# Patient Record
Sex: MALE | Race: WHITE | NOT HISPANIC OR LATINO | Employment: PART TIME | URBAN - METROPOLITAN AREA
[De-identification: names, ages, dates, MRNs, and addresses within clinical notes are randomized per-mention and may not be internally consistent; named-entity substitution may affect disease eponyms.]

---

## 2017-01-24 ENCOUNTER — TRANSCRIBE ORDERS (OUTPATIENT)
Dept: ADMINISTRATIVE | Age: 42
End: 2017-01-24

## 2017-01-24 ENCOUNTER — APPOINTMENT (OUTPATIENT)
Dept: LAB | Age: 42
End: 2017-01-24
Payer: MEDICARE

## 2017-01-24 DIAGNOSIS — Z79.899 OTHER LONG TERM (CURRENT) DRUG THERAPY: ICD-10-CM

## 2017-01-24 DIAGNOSIS — G40.109 LOCALZ-RLTD SYMPTOMATIC EPILEPSY W SMPL PART SZ, NONINTRACT, WO STATUS (HCC): ICD-10-CM

## 2017-01-24 LAB
25(OH)D3 SERPL-MCNC: 8.3 NG/ML (ref 30–100)
ALBUMIN SERPL BCP-MCNC: 3.7 G/DL (ref 3.5–5)
ALP SERPL-CCNC: 56 U/L (ref 46–116)
ALT SERPL W P-5'-P-CCNC: 26 U/L (ref 12–78)
ANION GAP SERPL CALCULATED.3IONS-SCNC: 7 MMOL/L (ref 4–13)
AST SERPL W P-5'-P-CCNC: 6 U/L (ref 5–45)
BASOPHILS # BLD AUTO: 0.03 THOUSANDS/ΜL (ref 0–0.1)
BASOPHILS NFR BLD AUTO: 0 % (ref 0–1)
BILIRUB SERPL-MCNC: 0.39 MG/DL (ref 0.2–1)
BUN SERPL-MCNC: 15 MG/DL (ref 5–25)
CALCIUM SERPL-MCNC: 8.8 MG/DL (ref 8.3–10.1)
CHLORIDE SERPL-SCNC: 105 MMOL/L (ref 100–108)
CO2 SERPL-SCNC: 31 MMOL/L (ref 21–32)
CREAT SERPL-MCNC: 0.75 MG/DL (ref 0.6–1.3)
EOSINOPHIL # BLD AUTO: 0.27 THOUSAND/ΜL (ref 0–0.61)
EOSINOPHIL NFR BLD AUTO: 3 % (ref 0–6)
ERYTHROCYTE [DISTWIDTH] IN BLOOD BY AUTOMATED COUNT: 12.4 % (ref 11.6–15.1)
GFR SERPL CREATININE-BSD FRML MDRD: >60 ML/MIN/1.73SQ M
GLUCOSE SERPL-MCNC: 90 MG/DL (ref 65–140)
HCT VFR BLD AUTO: 46 % (ref 36.5–49.3)
HGB BLD-MCNC: 15.6 G/DL (ref 12–17)
LYMPHOCYTES # BLD AUTO: 3.97 THOUSANDS/ΜL (ref 0.6–4.47)
LYMPHOCYTES NFR BLD AUTO: 38 % (ref 14–44)
MCH RBC QN AUTO: 30.1 PG (ref 26.8–34.3)
MCHC RBC AUTO-ENTMCNC: 33.9 G/DL (ref 31.4–37.4)
MCV RBC AUTO: 89 FL (ref 82–98)
MONOCYTES # BLD AUTO: 1.06 THOUSAND/ΜL (ref 0.17–1.22)
MONOCYTES NFR BLD AUTO: 10 % (ref 4–12)
NEUTROPHILS # BLD AUTO: 5.01 THOUSANDS/ΜL (ref 1.85–7.62)
NEUTS SEG NFR BLD AUTO: 49 % (ref 43–75)
NRBC BLD AUTO-RTO: 0 /100 WBCS
PLATELET # BLD AUTO: 266 THOUSANDS/UL (ref 149–390)
PMV BLD AUTO: 9.8 FL (ref 8.9–12.7)
POTASSIUM SERPL-SCNC: 3.9 MMOL/L (ref 3.5–5.3)
PROT SERPL-MCNC: 6.7 G/DL (ref 6.4–8.2)
RBC # BLD AUTO: 5.18 MILLION/UL (ref 3.88–5.62)
SODIUM SERPL-SCNC: 143 MMOL/L (ref 136–145)
VALPROATE SERPL-MCNC: 66 UG/ML (ref 50–100)
WBC # BLD AUTO: 10.51 THOUSAND/UL (ref 4.31–10.16)

## 2017-01-24 PROCEDURE — 82306 VITAMIN D 25 HYDROXY: CPT

## 2017-01-24 PROCEDURE — 80053 COMPREHEN METABOLIC PANEL: CPT

## 2017-01-24 PROCEDURE — 36415 COLL VENOUS BLD VENIPUNCTURE: CPT

## 2017-01-24 PROCEDURE — 80164 ASSAY DIPROPYLACETIC ACD TOT: CPT

## 2017-01-24 PROCEDURE — 85025 COMPLETE CBC W/AUTO DIFF WBC: CPT

## 2017-01-27 ENCOUNTER — ALLSCRIPTS OFFICE VISIT (OUTPATIENT)
Dept: OTHER | Facility: OTHER | Age: 42
End: 2017-01-27

## 2017-08-11 DIAGNOSIS — Z79.899 OTHER LONG TERM (CURRENT) DRUG THERAPY: ICD-10-CM

## 2017-08-11 DIAGNOSIS — J45.20 MILD INTERMITTENT ASTHMA, UNCOMPLICATED: ICD-10-CM

## 2017-08-11 DIAGNOSIS — E55.9 VITAMIN D DEFICIENCY: ICD-10-CM

## 2017-09-12 ENCOUNTER — TRANSCRIBE ORDERS (OUTPATIENT)
Dept: ADMINISTRATIVE | Age: 42
End: 2017-09-12

## 2017-09-12 ENCOUNTER — APPOINTMENT (OUTPATIENT)
Dept: LAB | Age: 42
End: 2017-09-12
Payer: MEDICARE

## 2017-09-12 DIAGNOSIS — Z79.899 OTHER LONG TERM (CURRENT) DRUG THERAPY: ICD-10-CM

## 2017-09-12 DIAGNOSIS — J45.20 MILD INTERMITTENT ASTHMA, UNCOMPLICATED: ICD-10-CM

## 2017-09-12 DIAGNOSIS — E55.9 VITAMIN D DEFICIENCY: ICD-10-CM

## 2017-09-12 LAB
25(OH)D3 SERPL-MCNC: 37.2 NG/ML (ref 30–100)
ALBUMIN SERPL BCP-MCNC: 3.5 G/DL (ref 3.5–5)
ALP SERPL-CCNC: 52 U/L (ref 46–116)
ALT SERPL W P-5'-P-CCNC: 14 U/L (ref 12–78)
ANION GAP SERPL CALCULATED.3IONS-SCNC: 6 MMOL/L (ref 4–13)
AST SERPL W P-5'-P-CCNC: 5 U/L (ref 5–45)
BASOPHILS # BLD AUTO: 0.03 THOUSANDS/ΜL (ref 0–0.1)
BASOPHILS NFR BLD AUTO: 0 % (ref 0–1)
BILIRUB SERPL-MCNC: 0.41 MG/DL (ref 0.2–1)
BUN SERPL-MCNC: 13 MG/DL (ref 5–25)
CALCIUM SERPL-MCNC: 8.9 MG/DL (ref 8.3–10.1)
CHLORIDE SERPL-SCNC: 107 MMOL/L (ref 100–108)
CHOLEST SERPL-MCNC: 162 MG/DL (ref 50–200)
CO2 SERPL-SCNC: 31 MMOL/L (ref 21–32)
CREAT SERPL-MCNC: 0.78 MG/DL (ref 0.6–1.3)
EOSINOPHIL # BLD AUTO: 0.32 THOUSAND/ΜL (ref 0–0.61)
EOSINOPHIL NFR BLD AUTO: 4 % (ref 0–6)
ERYTHROCYTE [DISTWIDTH] IN BLOOD BY AUTOMATED COUNT: 12.7 % (ref 11.6–15.1)
GFR SERPL CREATININE-BSD FRML MDRD: 112 ML/MIN/1.73SQ M
GLUCOSE P FAST SERPL-MCNC: 92 MG/DL (ref 65–99)
HCT VFR BLD AUTO: 43.7 % (ref 36.5–49.3)
HDLC SERPL-MCNC: 40 MG/DL (ref 40–60)
HGB BLD-MCNC: 15.5 G/DL (ref 12–17)
LDLC SERPL CALC-MCNC: 96 MG/DL (ref 0–100)
LYMPHOCYTES # BLD AUTO: 2.46 THOUSANDS/ΜL (ref 0.6–4.47)
LYMPHOCYTES NFR BLD AUTO: 31 % (ref 14–44)
MCH RBC QN AUTO: 31.3 PG (ref 26.8–34.3)
MCHC RBC AUTO-ENTMCNC: 35.5 G/DL (ref 31.4–37.4)
MCV RBC AUTO: 88 FL (ref 82–98)
MONOCYTES # BLD AUTO: 0.81 THOUSAND/ΜL (ref 0.17–1.22)
MONOCYTES NFR BLD AUTO: 10 % (ref 4–12)
NEUTROPHILS # BLD AUTO: 4.34 THOUSANDS/ΜL (ref 1.85–7.62)
NEUTS SEG NFR BLD AUTO: 55 % (ref 43–75)
NRBC BLD AUTO-RTO: 0 /100 WBCS
PLATELET # BLD AUTO: 273 THOUSANDS/UL (ref 149–390)
PMV BLD AUTO: 10 FL (ref 8.9–12.7)
POTASSIUM SERPL-SCNC: 4.2 MMOL/L (ref 3.5–5.3)
PROT SERPL-MCNC: 6.7 G/DL (ref 6.4–8.2)
RBC # BLD AUTO: 4.95 MILLION/UL (ref 3.88–5.62)
SODIUM SERPL-SCNC: 144 MMOL/L (ref 136–145)
TRIGL SERPL-MCNC: 128 MG/DL
TSH SERPL DL<=0.05 MIU/L-ACNC: 3.23 UIU/ML (ref 0.36–3.74)
VALPROATE SERPL-MCNC: 67 UG/ML (ref 50–100)
WBC # BLD AUTO: 8.04 THOUSAND/UL (ref 4.31–10.16)

## 2017-09-12 PROCEDURE — 80164 ASSAY DIPROPYLACETIC ACD TOT: CPT

## 2017-09-12 PROCEDURE — 36415 COLL VENOUS BLD VENIPUNCTURE: CPT

## 2017-09-12 PROCEDURE — 80061 LIPID PANEL: CPT

## 2017-09-12 PROCEDURE — 82306 VITAMIN D 25 HYDROXY: CPT

## 2017-09-12 PROCEDURE — 80053 COMPREHEN METABOLIC PANEL: CPT

## 2017-09-12 PROCEDURE — 85025 COMPLETE CBC W/AUTO DIFF WBC: CPT

## 2017-09-12 PROCEDURE — 84443 ASSAY THYROID STIM HORMONE: CPT

## 2017-09-15 ENCOUNTER — GENERIC CONVERSION - ENCOUNTER (OUTPATIENT)
Dept: OTHER | Facility: OTHER | Age: 42
End: 2017-09-15

## 2017-09-26 ENCOUNTER — ALLSCRIPTS OFFICE VISIT (OUTPATIENT)
Dept: OTHER | Facility: OTHER | Age: 42
End: 2017-09-26

## 2017-10-04 ENCOUNTER — APPOINTMENT (OUTPATIENT)
Dept: RADIOLOGY | Facility: HOSPITAL | Age: 42
End: 2017-10-04
Payer: MEDICARE

## 2017-10-04 ENCOUNTER — OFFICE VISIT (OUTPATIENT)
Dept: URGENT CARE | Age: 42
End: 2017-10-04
Payer: MEDICARE

## 2017-10-04 ENCOUNTER — APPOINTMENT (EMERGENCY)
Dept: RADIOLOGY | Facility: HOSPITAL | Age: 42
End: 2017-10-04
Payer: MEDICARE

## 2017-10-04 ENCOUNTER — HOSPITAL ENCOUNTER (OUTPATIENT)
Facility: HOSPITAL | Age: 42
Setting detail: OBSERVATION
Discharge: HOME | End: 2017-10-05
Attending: EMERGENCY MEDICINE | Admitting: INTERNAL MEDICINE
Payer: MEDICARE

## 2017-10-04 DIAGNOSIS — R51.9 INTRACTABLE HEADACHE: ICD-10-CM

## 2017-10-04 DIAGNOSIS — R51.9 HEADACHE: Primary | ICD-10-CM

## 2017-10-04 DIAGNOSIS — R56.9 SEIZURES (HCC): ICD-10-CM

## 2017-10-04 DIAGNOSIS — T85.618A SHUNT MALFUNCTION: ICD-10-CM

## 2017-10-04 PROBLEM — G40.909 SEIZURE DISORDER (HCC): Chronic | Status: ACTIVE | Noted: 2017-10-04

## 2017-10-04 PROBLEM — J45.909 ASTHMA: Chronic | Status: ACTIVE | Noted: 2017-10-04

## 2017-10-04 PROBLEM — G43.909 MIGRAINE HEADACHE: Chronic | Status: ACTIVE | Noted: 2017-10-04

## 2017-10-04 PROBLEM — G80.9 CEREBRAL PALSY (HCC): Chronic | Status: ACTIVE | Noted: 2017-10-04

## 2017-10-04 LAB
ALBUMIN SERPL BCP-MCNC: 3.7 G/DL (ref 3.5–5)
ALP SERPL-CCNC: 60 U/L (ref 46–116)
ALT SERPL W P-5'-P-CCNC: 14 U/L (ref 12–78)
AMMONIA PLAS-SCNC: 41 UMOL/L (ref 11–35)
ANION GAP SERPL CALCULATED.3IONS-SCNC: 9 MMOL/L (ref 4–13)
AST SERPL W P-5'-P-CCNC: 27 U/L (ref 5–45)
BACTERIA UR QL AUTO: ABNORMAL /HPF
BASOPHILS # BLD AUTO: 0.01 THOUSANDS/ΜL (ref 0–0.1)
BASOPHILS NFR BLD AUTO: 0 % (ref 0–1)
BILIRUB SERPL-MCNC: 0.62 MG/DL (ref 0.2–1)
BILIRUB UR QL STRIP: NEGATIVE
BUN SERPL-MCNC: 11 MG/DL (ref 5–25)
CALCIUM SERPL-MCNC: 9.5 MG/DL (ref 8.3–10.1)
CHLORIDE SERPL-SCNC: 105 MMOL/L (ref 100–108)
CLARITY UR: CLEAR
CO2 SERPL-SCNC: 22 MMOL/L (ref 21–32)
COLOR UR: YELLOW
CREAT SERPL-MCNC: 0.68 MG/DL (ref 0.6–1.3)
EOSINOPHIL # BLD AUTO: 0.01 THOUSAND/ΜL (ref 0–0.61)
EOSINOPHIL NFR BLD AUTO: 0 % (ref 0–6)
ERYTHROCYTE [DISTWIDTH] IN BLOOD BY AUTOMATED COUNT: 12.1 % (ref 11.6–15.1)
GFR SERPL CREATININE-BSD FRML MDRD: 118 ML/MIN/1.73SQ M
GLUCOSE SERPL-MCNC: 145 MG/DL (ref 65–140)
GLUCOSE UR STRIP-MCNC: NEGATIVE MG/DL
HCT VFR BLD AUTO: 44.7 % (ref 36.5–49.3)
HGB BLD-MCNC: 16.5 G/DL (ref 12–17)
HGB UR QL STRIP.AUTO: ABNORMAL
HYALINE CASTS #/AREA URNS LPF: ABNORMAL /LPF
KETONES UR STRIP-MCNC: ABNORMAL MG/DL
LEUKOCYTE ESTERASE UR QL STRIP: NEGATIVE
LYMPHOCYTES # BLD AUTO: 1.25 THOUSANDS/ΜL (ref 0.6–4.47)
LYMPHOCYTES NFR BLD AUTO: 8 % (ref 14–44)
MCH RBC QN AUTO: 31.1 PG (ref 26.8–34.3)
MCHC RBC AUTO-ENTMCNC: 36.9 G/DL (ref 31.4–37.4)
MCV RBC AUTO: 84 FL (ref 82–98)
MONOCYTES # BLD AUTO: 1.08 THOUSAND/ΜL (ref 0.17–1.22)
MONOCYTES NFR BLD AUTO: 7 % (ref 4–12)
NEUTROPHILS # BLD AUTO: 14.2 THOUSANDS/ΜL (ref 1.85–7.62)
NEUTS SEG NFR BLD AUTO: 85 % (ref 43–75)
NITRITE UR QL STRIP: NEGATIVE
NON-SQ EPI CELLS URNS QL MICRO: ABNORMAL /HPF
NRBC BLD AUTO-RTO: 0 /100 WBCS
PH UR STRIP.AUTO: 8 [PH] (ref 4.5–8)
PLATELET # BLD AUTO: 295 THOUSANDS/UL (ref 149–390)
PMV BLD AUTO: 9.9 FL (ref 8.9–12.7)
POTASSIUM SERPL-SCNC: 5.1 MMOL/L (ref 3.5–5.3)
PROT SERPL-MCNC: 7.5 G/DL (ref 6.4–8.2)
PROT UR STRIP-MCNC: ABNORMAL MG/DL
RBC # BLD AUTO: 5.3 MILLION/UL (ref 3.88–5.62)
RBC #/AREA URNS AUTO: ABNORMAL /HPF
SODIUM SERPL-SCNC: 136 MMOL/L (ref 136–145)
SP GR UR STRIP.AUTO: 1.02 (ref 1–1.03)
UROBILINOGEN UR QL STRIP.AUTO: 0.2 E.U./DL
VALPROATE SERPL-MCNC: 62 UG/ML (ref 50–100)
WBC # BLD AUTO: 16.66 THOUSAND/UL (ref 4.31–10.16)
WBC #/AREA URNS AUTO: ABNORMAL /HPF

## 2017-10-04 PROCEDURE — 99285 EMERGENCY DEPT VISIT HI MDM: CPT

## 2017-10-04 PROCEDURE — G0463 HOSPITAL OUTPT CLINIC VISIT: HCPCS | Performed by: FAMILY MEDICINE

## 2017-10-04 PROCEDURE — 96374 THER/PROPH/DIAG INJ IV PUSH: CPT

## 2017-10-04 PROCEDURE — 99213 OFFICE O/P EST LOW 20 MIN: CPT | Performed by: FAMILY MEDICINE

## 2017-10-04 PROCEDURE — 80053 COMPREHEN METABOLIC PANEL: CPT | Performed by: EMERGENCY MEDICINE

## 2017-10-04 PROCEDURE — 74000 HB X-RAY EXAM OF ABDOMEN (SINGLE ANTEROPOSTERIOR VIEW): CPT

## 2017-10-04 PROCEDURE — 85025 COMPLETE CBC W/AUTO DIFF WBC: CPT | Performed by: EMERGENCY MEDICINE

## 2017-10-04 PROCEDURE — 80175 DRUG SCREEN QUAN LAMOTRIGINE: CPT | Performed by: EMERGENCY MEDICINE

## 2017-10-04 PROCEDURE — 70450 CT HEAD/BRAIN W/O DYE: CPT

## 2017-10-04 PROCEDURE — 36415 COLL VENOUS BLD VENIPUNCTURE: CPT | Performed by: EMERGENCY MEDICINE

## 2017-10-04 PROCEDURE — 71020 HB CHEST X-RAY 2VW FRONTAL&LATL: CPT

## 2017-10-04 PROCEDURE — 71010 HB CHEST X-RAY 1 VIEW FRONTAL (PORTABLE): CPT

## 2017-10-04 PROCEDURE — 82140 ASSAY OF AMMONIA: CPT | Performed by: EMERGENCY MEDICINE

## 2017-10-04 PROCEDURE — 70250 X-RAY EXAM OF SKULL: CPT

## 2017-10-04 PROCEDURE — 96375 TX/PRO/DX INJ NEW DRUG ADDON: CPT

## 2017-10-04 PROCEDURE — 80164 ASSAY DIPROPYLACETIC ACD TOT: CPT | Performed by: EMERGENCY MEDICINE

## 2017-10-04 PROCEDURE — 81001 URINALYSIS AUTO W/SCOPE: CPT | Performed by: PHYSICIAN ASSISTANT

## 2017-10-04 RX ORDER — LAMOTRIGINE 100 MG/1
TABLET ORAL
COMMUNITY
End: 2017-10-05 | Stop reason: HOSPADM

## 2017-10-04 RX ORDER — ACETAMINOPHEN 325 MG/1
650 TABLET ORAL EVERY 6 HOURS PRN
Status: DISCONTINUED | OUTPATIENT
Start: 2017-10-04 | End: 2017-10-05 | Stop reason: HOSPADM

## 2017-10-04 RX ORDER — KETOROLAC TROMETHAMINE 30 MG/ML
15 INJECTION, SOLUTION INTRAMUSCULAR; INTRAVENOUS ONCE
Status: COMPLETED | OUTPATIENT
Start: 2017-10-04 | End: 2017-10-04

## 2017-10-04 RX ORDER — LORAZEPAM 2 MG/ML
1 INJECTION INTRAMUSCULAR ONCE
Status: COMPLETED | OUTPATIENT
Start: 2017-10-04 | End: 2017-10-04

## 2017-10-04 RX ORDER — SUMATRIPTAN 25 MG/1
25 TABLET, FILM COATED ORAL ONCE
Status: COMPLETED | OUTPATIENT
Start: 2017-10-04 | End: 2017-10-04

## 2017-10-04 RX ORDER — ACETAMINOPHEN 160 MG
2000 TABLET,DISINTEGRATING ORAL DAILY
COMMUNITY

## 2017-10-04 RX ORDER — LORAZEPAM 2 MG/ML
1 INJECTION INTRAMUSCULAR EVERY 4 HOURS PRN
Status: DISCONTINUED | OUTPATIENT
Start: 2017-10-04 | End: 2017-10-05 | Stop reason: HOSPADM

## 2017-10-04 RX ORDER — ALBUTEROL SULFATE 90 UG/1
AEROSOL, METERED RESPIRATORY (INHALATION)
COMMUNITY
Start: 2015-12-23 | End: 2019-06-11 | Stop reason: ALTCHOICE

## 2017-10-04 RX ORDER — METOCLOPRAMIDE HYDROCHLORIDE 5 MG/ML
10 INJECTION INTRAMUSCULAR; INTRAVENOUS ONCE
Status: COMPLETED | OUTPATIENT
Start: 2017-10-04 | End: 2017-10-04

## 2017-10-04 RX ORDER — MELATONIN
1000 DAILY
Status: DISCONTINUED | OUTPATIENT
Start: 2017-10-04 | End: 2017-10-05 | Stop reason: HOSPADM

## 2017-10-04 RX ORDER — KETOROLAC TROMETHAMINE 30 MG/ML
15 INJECTION, SOLUTION INTRAMUSCULAR; INTRAVENOUS EVERY 6 HOURS PRN
Status: DISCONTINUED | OUTPATIENT
Start: 2017-10-04 | End: 2017-10-05 | Stop reason: HOSPADM

## 2017-10-04 RX ORDER — LAMOTRIGINE 100 MG/1
200 TABLET ORAL DAILY
Status: DISCONTINUED | OUTPATIENT
Start: 2017-10-05 | End: 2017-10-05

## 2017-10-04 RX ORDER — DIVALPROEX SODIUM 500 MG/1
500 TABLET, DELAYED RELEASE ORAL EVERY 8 HOURS SCHEDULED
Status: DISCONTINUED | OUTPATIENT
Start: 2017-10-04 | End: 2017-10-05 | Stop reason: HOSPADM

## 2017-10-04 RX ORDER — DIPHENHYDRAMINE HYDROCHLORIDE 50 MG/ML
25 INJECTION INTRAMUSCULAR; INTRAVENOUS ONCE
Status: COMPLETED | OUTPATIENT
Start: 2017-10-04 | End: 2017-10-04

## 2017-10-04 RX ORDER — METHYLPREDNISOLONE SODIUM SUCCINATE 125 MG/2ML
500 INJECTION, POWDER, LYOPHILIZED, FOR SOLUTION INTRAMUSCULAR; INTRAVENOUS ONCE
Status: DISCONTINUED | OUTPATIENT
Start: 2017-10-04 | End: 2017-10-04

## 2017-10-04 RX ORDER — LORAZEPAM 1 MG/1
TABLET ORAL
COMMUNITY
End: 2017-10-20

## 2017-10-04 RX ORDER — ALBUTEROL SULFATE 90 UG/1
1 AEROSOL, METERED RESPIRATORY (INHALATION) EVERY 4 HOURS PRN
Status: DISCONTINUED | OUTPATIENT
Start: 2017-10-04 | End: 2017-10-05 | Stop reason: HOSPADM

## 2017-10-04 RX ORDER — LORAZEPAM 1 MG/1
1 TABLET ORAL 3 TIMES DAILY
Status: DISCONTINUED | OUTPATIENT
Start: 2017-10-04 | End: 2017-10-05 | Stop reason: HOSPADM

## 2017-10-04 RX ORDER — RIZATRIPTAN BENZOATE 10 MG/1
TABLET, ORALLY DISINTEGRATING ORAL
COMMUNITY
Start: 2016-02-05 | End: 2018-02-20

## 2017-10-04 RX ORDER — DIVALPROEX SODIUM 500 MG/1
TABLET, DELAYED RELEASE ORAL
COMMUNITY
End: 2017-10-26 | Stop reason: HOSPADM

## 2017-10-04 RX ADMIN — VITAMIN D, TAB 1000IU (100/BT) 1000 UNITS: 25 TAB at 16:07

## 2017-10-04 RX ADMIN — LORAZEPAM 1 MG: 1 TABLET ORAL at 21:28

## 2017-10-04 RX ADMIN — METOCLOPRAMIDE 10 MG: 5 INJECTION, SOLUTION INTRAMUSCULAR; INTRAVENOUS at 12:05

## 2017-10-04 RX ADMIN — SUMATRIPTAN SUCCINATE 25 MG: 25 TABLET, FILM COATED ORAL at 16:07

## 2017-10-04 RX ADMIN — FLUTICASONE PROPIONATE AND SALMETEROL 1 PUFF: 50; 100 POWDER RESPIRATORY (INHALATION) at 21:27

## 2017-10-04 RX ADMIN — DIVALPROEX SODIUM 500 MG: 500 TABLET, DELAYED RELEASE ORAL at 16:07

## 2017-10-04 RX ADMIN — LAMOTRIGINE 150 MG: 100 TABLET ORAL at 21:28

## 2017-10-04 RX ADMIN — SODIUM CHLORIDE 500 MG: 0.9 INJECTION, SOLUTION INTRAVENOUS at 13:07

## 2017-10-04 RX ADMIN — LORAZEPAM 1 MG: 1 TABLET ORAL at 16:07

## 2017-10-04 RX ADMIN — LORAZEPAM 1 MG: 2 INJECTION INTRAMUSCULAR; INTRAVENOUS at 10:27

## 2017-10-04 RX ADMIN — DIPHENHYDRAMINE HYDROCHLORIDE 25 MG: 50 INJECTION, SOLUTION INTRAMUSCULAR; INTRAVENOUS at 12:02

## 2017-10-04 RX ADMIN — KETOROLAC TROMETHAMINE 15 MG: 30 INJECTION, SOLUTION INTRAMUSCULAR at 12:04

## 2017-10-04 RX ADMIN — DIVALPROEX SODIUM 500 MG: 500 TABLET, DELAYED RELEASE ORAL at 21:28

## 2017-10-04 NOTE — H&P
History and Physical - [facility] Internal Medicine    Patient Information: Fleet Jeans 43 y o  male MRN: [id number]  Unit/Bed#: ED 01 Encounter: [id number]  Admitting Physician: Brian Vásquez DO  PCP: Matt Dupree DO  Date of Admission:  10/04/17    Assessment/Plan:    Hospital Problem List:     Active Problems:    Intractable headache      Plan for the Primary Problem(s): # Neuro  - Intractable headache  - breakthrough sz  - Consult neurology  - S/p CT head  - S/p XR shunt series noted with disconnection of one of the shunts, per patient's mom last xr series done in [place] > 5yrs ago and she states she was assured the shunts were functioning  No recent xr series here on file and patient does not routinely follow up with a neurosurgeon  Will consult neurosurgery for further input   - Sz precautions, prn ativan  - Prn IV toradol, received IV solumedrol in the ED, now headache free  - Cont antisz meds, levels pending    # Leukocytosis - likely stress induced; no sign of occult infxn, cont to monitor    Plan for Additional Problems:   # Asthma - no sign of exacerbation    VTE Prophylaxis: Low risk  / sequential compression device   Code Status: Full code  POLST: There is no POLST form on file for this patient (pre-hospital)    Anticipated Length of Stay:  Patient will be admitted on an Observation basis with an anticipated length of stay of  < 2 midnights  Justification for Hospital Stay: Intractable headache, sz breakthrough    Total Time for Visit, including Counseling / Coordination of Care: 45 minutes  Greater than 50% of this total time spent on direct patient counseling and coordination of care  Chief Complaint:   Intractable headache, breakthrough sz x 2    History of Present Illness:    History aided by his mother as well    Fleet Jeans is a 43 y o  male medical hx of cerebral palsy, TBI as a child, sz d/o, s/p shunt placement x 2   Migraine headache who presents with intractable headache and breakthrough sz  His mother notes symptoms initially occurring in the middle of the night was given rizatriptan w/ no relief  Early 4am he had a vomiting episode, non bloody, non bilious thus decided to present to the urgent care center  While there patient had 2 sz events w/ resulting post-ictal confusion  Patient has been seizure free x 2 years  Upon presentation to the ED, s/p CT head, xray shunt series  In the ED given IV ativan, solumedrol, toradol, benadryl and now patient reports of resolved headache  Patient has had no sz event since presentation to the ED  Neurology has been notified recommended to admit for observation  Review of Systems:    Review of Systems   Constitutional: Negative  HENT: Negative  Eyes: Negative  Respiratory: Negative  Cardiovascular: Negative  Gastrointestinal: Negative  Endocrine: Negative  Genitourinary: Negative  Musculoskeletal: Negative  Allergic/Immunologic: Negative  Neurological:        Resolved headache   Hematological: Negative  Psychiatric/Behavioral: Negative  Past Medical and Surgical History:   Cerebral palsy  Congenital hydrocephalus, s/p  shunt x 2 with multiple revisions and replacement  Seizure disorder  Asthma  Vit D deficiency  Migraine Headache    Past Surgical History:   Procedure Laterality Date    BRAIN SURGERY         Meds/Allergies:    Prior to Admission medications    Medication Sig Start Date End Date Taking? Authorizing Provider   albuterol (PROAIR HFA) 90 mcg/act inhaler ProAir  (90 Base) MCG/ACT Inhalation Aerosol Solution  INHALE 1 TO 2 PUFFS EVERY 4 TO 6 HOURS AS NEEDED  Quantity: 1;  Refills: 5       Myah Wilkinson DO;  Started 23-Dec-2015  Manda Fleming  5 GM Inhaler 12/23/15  Yes Historical Provider, MD   cholecalciferol (VITAMIN D3) 1,000 units tablet Take 1,000 Units by mouth daily Pt takes 2,000 units per day   Yes Historical Provider, MD   fluticasone-salmeterol (Rama Anastasiia) 100-50 mcg/dose Advair Diskus 100-50 MCG/DOSE Inhalation Aerosol Powder Breath Activated  TAKE 1 PUFF TWICE A DAY   Quantity: 1;  Refills: 4       Murray Anna DO;  Started 23-Dec-2015  [id number] Aerosol Powder Breath Activated Disp Pack 12/23/15  Yes Historical Provider, MD   rizatriptan (MAXALT-MLT) 10 MG disintegrating tablet Rizatriptan Benzoate 10 MG Oral Tablet Dispersible  TAKE 1 TABLET AT ONSET OF HEADACHE  MAY REPEAT EVERY 2 HOURS AS NEEDED  MAXIMUM 3 TABLETS IN 24 HOURS  Quantity: 9;  Refills: 5       Prema KULKARNI D ;  Started [id number]  Active 2/5/16  Yes Historical Provider, MD   divalproex sodium (DEPAKOTE) 500 mg EC tablet Divalproex Sodium 500 MG Oral Tablet Delayed Release  1 tab in am, 1 tab at noon, 1 tab in pm   Quantity: 270;  Refills: 3       Premamouna Bains M D ; Active    Historical Provider, MD   lamoTRIgine (LaMICtal) 100 mg tablet LamoTRIgine 100 MG Oral Tablet  2 TABS IN AM AND 1 5 TABS AT PM   Quantity: 315;  Refills: 3       Premamouna KULKARNI D ; Active    Historical Provider, MD   LORazepam (ATIVAN) 1 mg tablet LORazepam 1 MG Oral Tablet  1 TAB THREE TIMES A DAY   Refills: 0    Active    Historical Provider, MD     I have reviewed home medications with patient family member      Allergies:  Latex, animal dander, grass  Social History:     Marital Status: Single   Occupation: Works part-time at [employer]  Patient Pre-hospital Living Situation: Resides with his mother, independent of adls  Patient Pre-hospital Level of Mobility: Independent of assisted devices  Patient Pre-hospital Diet Restrictions: None  Substance Use History:   History   Alcohol Use No     History   Smoking Status    Never Smoker   Smokeless Tobacco    Never Used     History   Drug Use No       Family History:    non-contributory    Physical Exam:     Vitals:   Blood Pressure: 119/70 (10/04/17 1300)  Pulse: 92 (10/04/17 1300)  Temperature: (!) 97 2 °F (36 2 °C) (10/04/17 0956)  Temp Source: Oral (10/04/17 2836)  Respirations: 22 (10/04/17 1300)  Weight - Scale: 95 3 kg (210 lb) (10/04/17 0956)  SpO2: 97 % (10/04/17 1300)    General Appearance:  Sleeping but arousable to verbal commands, alert, no distress, appears stated age   Head:  Normocephalic, without obvious abnormality, atraumatic   Neck: Supple   Lungs:   Clear to auscultation bilaterally, respirations unlabored   Chest Wall:  No tenderness or deformity    Heart:  Regular rate and rhythm, S1 and S2 normal, no murmur, rub or gallop   Abdomen:   Soft, non-tender, bowel sounds active all four quadrants,  no masses, no organomegaly   Extremities: Extremities normal, atraumatic, no cyanosis or edema   Pulses: 2+ and symmetric all extremities   Skin: Skin color, texture, turgor normal, no rashes or lesions   Lymph nodes:    Neurologic: No gross focal deficts       Additional Data:     Lab Results: I have personally reviewed pertinent reports  Results from last 7 days  Lab Units 10/04/17  1121   WBC Thousand/uL 16 66*   HEMOGLOBIN g/dL 16 5   HEMATOCRIT % 44 7   PLATELETS Thousands/uL 295   NEUTROS PCT % 85*   LYMPHS PCT % 8*   MONOS PCT % 7   EOS PCT % 0       Results from last 7 days  Lab Units 10/04/17  1023   SODIUM mmol/L 136   POTASSIUM mmol/L 5 1   CHLORIDE mmol/L 105   CO2 mmol/L 22   BUN mg/dL 11   CREATININE mg/dL 0 68   CALCIUM mg/dL 9 5   TOTAL PROTEIN g/dL 7 5   BILIRUBIN TOTAL mg/dL 0 62   ALK PHOS U/L 60   ALT U/L 14   AST U/L 27   GLUCOSE RANDOM mg/dL 145*           Imaging: I have personally reviewed pertinent reports  Ct Head Without Contrast    Result Date: 10/4/2017  Narrative: CT BRAIN - WITHOUT CONTRAST INDICATION:  Headache  COMPARISON:  None  TECHNIQUE:  CT examination of the brain was performed  In addition to axial images, coronal reformatted images were created and submitted for interpretation  Radiation dose length product (DLP) for this visit:  1124 94 mGy-cm     This examination, like all CT scans performed in the 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  There is left frontal ventricular catheter with its tip in the frontal horn of right lateral ventricle  There are two left parietal region catheters one with its tip in the posterior body of left lateral ventricle and the other with its tip in the cephalocaudal malacia in the left parietal region  The left lateral ventricle is slightly smaller than the right lateral ventricle however there is no evidence of significant hydrocephalus  VISUALIZED ORBITS AND PARANASAL SINUSES:  Unremarkable  CALVARIUM AND EXTRACRANIAL SOFT TISSUES:   Normal      Impression: No acute parenchymal brain abnormality  Encephalomalacia and 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  Ventricular catheters are noted in both right and left lateral ventricles as described  Right lateral ventricle is slightly larger in the left however, there does not appear to be significant hydrocephalus  Workstation performed: [id number]     Xr Shunt Series    Result Date: 10/4/2017  Narrative: SHUNT SERIES INDICATION:  Evaluate shunt  COMPARISON:  None VIEWS: AP and lateral projections of the skull, chest and abdomen IMAGES:  7 FINDINGS: Patient is status post multiple shunt revisions which limits evaluation  There are 2 separate tubes coursing through the left neck over the left chest and terminating with tip in the left upper quadrant of the abdomen  One tube ends within the soft tissues of the left neck at the C4-5 level    The 2nd tube continues superiorly and terminates over the occiput  This shunt appears grossly intact  Impression: Left-sided  shunts one of which appears disconnected and the 2nd of which appears intact  Correlate with surgical history and prior studies if available to evaluate for interval change  Workstation performed: [id number]       EKG, Pathology, and Other Studies Reviewed on Admission:   · EKG:     Allscripts Records Reviewed: Yes     ** Please Note: Dragon 360 Dictation voice to text software may have been used in the creation of this document   **

## 2017-10-04 NOTE — ED ATTENDING ATTESTATION
Yen Urias DO, saw and evaluated the patient  I have discussed the patient with the resident/non-physician practitioner and agree with the resident's/non-physician practitioner's findings, Plan of Care, and MDM as documented in the resident's/non-physician practitioner's note, except where noted  All available labs and Radiology studies were reviewed  At this point I agree with the current assessment done in the Emergency Department  I have conducted an independent evaluation of this patient a history and physical is as follows:  Patient is a 70-year-old male with history of seizure disorder, migraine headaches, hydrocephalus with a  shunt with multiple revisions of replacements presenting with headache that has been present x2 days and that 2 seizures today  Patient states he had 1 seizure while at home and had another seizure while at [facility]  Currently asymptomatic other than his headache  Was initially postictal upon arrival to the emergency department that has since resolved  Patient is here with his mother who was very familiar with his medical history  Follows with Dr Linda Jama, Neurology  Will check labs including Depakote level, LFTs, will manage her medications for his headache, obtain a CT head and shunt series and discussed with Neurology  Discussed with Neurology, reviewed imaging and labs  Recommended to observe overnight given he had 2 seizures today  Will be evaluated by Neurology and admitted to Medicine  Reviewed with patient and family who understand and agree with admission for observation at this time      Critical Care Time  CritCare Time

## 2017-10-04 NOTE — ED NOTES
Dr Ned Blackman made patient and family know that they are admitted to the [facility]  10/04/17 9263

## 2017-10-04 NOTE — PLAN OF CARE
Problem: Potential for Falls  Goal: Patient will remain free of falls  INTERVENTIONS:  - Assess patient frequently for physical needs  -  Identify cognitive and physical deficits and behaviors that affect risk of falls    -  [place] fall precautions as indicated by assessment   - Educate patient/family on patient safety including physical limitations  - Instruct patient to call for assistance with activity based on assessment  - Modify environment to reduce risk of injury  - Consider OT/PT consult to assist with strengthening/mobility   Outcome: Progressing

## 2017-10-04 NOTE — ED PROVIDER NOTES
History  Chief Complaint   Patient presents with    Seizure - Prior Hx Of     Pt started last night with headache behind his eyes  Pt had witnessed seizure today at home and went to [facility]  Pt had another seizure other there  Pt vomited  Pt c/o headache, pain behind eyes  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  HPI  Patient is a 17-year-old male with history of seizure disorder, TBI as a child, hydrocephalus status post shunt who presents emergency department today with headache and seizures  Patient's last seizure was 2 years ago, well controlled on medications, no recent change in medications  Patient had headache that started last night described as pressure eyes eyes  Patient woke up this morning he had vomiting and then had a seizure at home  Patient with [facility], [place] for evaluation where he had a witnessed seizure at [facility] [place]  The seizure terminated without any benzodiazepine medications  On evaluation in the emergency department at [place], patient is endorsing 10/10 pain, he is not slurring his words, neuro exam is nonfocal with the exception that he is exquisitely photophobic  Patient does get headaches for which he takes Triptan aborting medications  This usually works if it does not patient will take NSAIDs  Pain at the patient was having was similar to his previous headache pain however was much more intense  The aborting medications and NSAIDs did not help his pain  Pts last seizure was approximately 2 years ago  Patient has had no recent changes in his antiepileptic medications  Patient did miss his morning doses of antibiotics secondary to nausea  Patient otherwise denies any recent illness, fevers chills cough abdominal pain or diarrhea  Case was discussed with on-call epileptologist   Because patient had 2 seizures in 24 hours patient should be observed  She patient will be admitted to the hospital for observation      Prior to Admission Medications Prescriptions Last Dose Informant Patient Reported? Taking? LORazepam (ATIVAN) 1 mg tablet Unknown at Unknown time  Yes No   Sig: LORazepam 1 MG Oral Tablet  1 TAB THREE TIMES A DAY   Refills: 0    Active   albuterol (PROAIR HFA) 90 mcg/act inhaler Unknown at Unknown time  Yes No   Sig: ProAir  (90 Base) MCG/ACT Inhalation Aerosol Solution  INHALE 1 TO 2 PUFFS EVERY 4 TO 6 HOURS AS NEEDED  Quantity: 1;  Refills: 5       Myah Wilkinson DO;  Started 23-Dec-2015  Wanda Medrano  5 GM Inhaler   cholecalciferol (VITAMIN D3) 1,000 units tablet 10/3/2017 at Unknown time  Yes Yes   Sig: Take 1,000 Units by mouth daily Pt takes 2,000 units per day   divalproex sodium (DEPAKOTE) 500 mg EC tablet 10/3/2017 at Unknown time  Yes Yes   Sig: Divalproex Sodium 500 MG Oral Tablet Delayed Release  1 tab in am, 1 tab at noon, 1 tab in pm   Quantity: 270;  Refills: 3       Marvin SAEED ; Active   fluticasone-salmeterol (ADVAIR DISKUS) 100-50 mcg/dose 10/3/2017 at Unknown time  Yes Yes   Sig: Advair Diskus 100-50 MCG/DOSE Inhalation Aerosol Powder Breath Activated  TAKE 1 PUFF TWICE A DAY   Quantity: 1;  Refills: 4       Dove Creekyessi García DO;  Started 23-Dec-2015  [id number] Aerosol Powder Breath Activated Disp Pack   lamoTRIgine (LaMICtal) 100 mg tablet 10/3/2017 at Unknown time  Yes Yes   Sig: LamoTRIgine 100 MG Oral Tablet  2 TABS IN AM AND 1 5 TABS AT PM   Quantity: 315;  Refills: 3       Marvin SAEED ; Active   rizatriptan (MAXALT-MLT) 10 MG disintegrating tablet 10/4/2017 at Unknown time  Yes Yes   Sig: Rizatriptan Benzoate 10 MG Oral Tablet Dispersible  TAKE 1 TABLET AT ONSET OF HEADACHE  MAY REPEAT EVERY 2 HOURS AS NEEDED  MAXIMUM 3 TABLETS IN 24 HOURS     Quantity: 9;  Refills: 5       Marvin SAEED ;  Started [id number]  Active      Facility-Administered Medications: None       Past Medical History:   Diagnosis Date    Asthma     [facility]bral pals[facility] ([facility] [address] [facility]     Congenital hydrocephalus (HCC)     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[facility] [address] )     Migraines        Past Surgical History:   Procedure Laterality Date    BRAIN SURGERY      Multiple  shunt revisions    HERNIA REPAIR         Family History   Problem Relation Age of Onset    Family history unknown: Yes     I have reviewed and agree with the history as documented  Social History   Substance Use Topics    Smoking status: Never Smoker    Smokeless tobacco: Never Used    Alcohol use No        Review of Systems   Constitutional: Negative for activity change, chills, diaphoresis and fever  HENT: Negative for ear pain, trouble swallowing and voice change  Eyes: Negative for pain  Respiratory: Negative for chest tightness, shortness of breath, wheezing and stridor  Cardiovascular: Negative for chest pain, palpitations and leg swelling  Gastrointestinal: Negative for abdominal distention, abdominal pain, constipation, diarrhea, nausea and vomiting  Endocrine: Negative for polyuria  Genitourinary: Negative for dysuria and frequency  Musculoskeletal: Negative for back pain and myalgias  Skin: Negative for rash  Neurological: Positive for seizures and headaches  Negative for dizziness, syncope and weakness  Psychiatric/Behavioral: Negative for agitation and dysphoric mood  Physical Exam  ED Triage Vitals [10/04/17 0956]   Temperature Pulse Respirations Blood Pressure SpO2   (!) 97 2 °F (36 2 °C) 81 18 143/73 100 %      Temp Source Heart Rate Source Patient Position - Orthostatic VS BP Location FiO2 (%)   Oral Monitor Lying Left arm --      Pain Score       Worst Possible Pain           Physical Exam   Constitutional: He is oriented to person, place, and time  He appears well-developed and well-nourished  No distress  HENT:   Head: Normocephalic and atraumatic     Right Ear: External ear normal    Left Ear: External ear normal    Mouth/Throat: Oropharynx is clear and moist    Eyes: Conjunctivae and EOM are normal  Pupils are equal, round, and reactive to light  Right eye exhibits no discharge  Left eye exhibits no discharge  No scleral icterus  Neck: Normal range of motion  Neck supple  No tracheal deviation present  No thyromegaly present  Cardiovascular: Normal rate, regular rhythm and intact distal pulses  Exam reveals no gallop and no friction rub  No murmur heard  Pulmonary/Chest: Effort normal and breath sounds normal  No stridor  No respiratory distress  He has no wheezes  He has no rales  Abdominal: Soft  Bowel sounds are normal  He exhibits no distension  There is no tenderness  There is no rebound and no guarding  Musculoskeletal: Normal range of motion  He exhibits no edema or deformity  Neurological: He is alert and oriented to person, place, and time  No cranial nerve deficit  Neuro exam is nonfocal, patient has exquisite photophobia  The patient can raise both legs off the bed and has symmetric strength  Patient can hold both arms out in front of him against resistance  Patient has symmetric strength  No changes in sensation  Skin: Skin is warm and dry  No rash noted  He is not diaphoretic  No erythema  Psychiatric: He has a normal mood and affect  His behavior is normal  Thought content normal    Nursing note and vitals reviewed        ED Medications  Medications   LORazepam (ATIVAN) 2 mg/mL injection 1 mg (1 mg Intravenous Given 10/4/17 1027)   ketorolac (TORADOL) 30 mg/mL injection 15 mg (15 mg Intravenous Given 10/4/17 1204)   metoclopramide (REGLAN) injection 10 mg (10 mg Intravenous Given 10/4/17 1205)   diphenhydrAMINE (BENADRYL) injection 25 mg (25 mg Intravenous Given 10/4/17 1202)   methylPREDNISolone sodium succinate (Solu-MEDROL) 500 mg in sodium chloride 0 9 % 250 mL IVPB (500 mg Intravenous New Bag 10/4/17 1307)   SUMAtriptan (IMITREX) tablet 25 mg (25 mg Oral Given 10/4/17 1607)       Diagnostic Studies  Labs Reviewed   AMMONIA - Abnormal        Result Value Ref Range Status    Ammonia 41 (*) 11 - 35 umol/L Final    Comment: Moderately Hemolyzed; Results May be Affected  Specimen collection should occur prior to Sulfasalazine administration due to the potential for falsely elevated results  Specimen collection should occur prior to Sulfapyridine administration due to the potential for falsely depressed results  CBC AND DIFFERENTIAL - Abnormal     WBC 16 66 (*) 4 31 - 10 16 Thousand/uL Final    Neutrophils Relative 85 (*) 43 - 75 % Final    Lymphocytes Relative 8 (*) 14 - 44 % Final    Neutrophils Absolute 14 20 (*) 1 85 - 7 62 Thousands/µL Final    RBC 5 30  3 88 - 5 62 Million/uL Final    Hemoglobin 16 5  12 0 - 17 0 g/dL Final    Hematocrit 44 7  36 5 - 49 3 % Final    MCV 84  82 - 98 fL Final    MCH 31 1  26 8 - 34 3 pg Final    MCHC 36 9  31 4 - 37 4 g/dL Final    RDW 12 1  11 6 - 15 1 % Final    MPV 9 9  8 9 - 12 7 fL Final    Platelets 447  047 - 390 Thousands/uL Final    nRBC 0  /100 WBCs Final    Monocytes Relative 7  4 - 12 % Final    Eosinophils Relative 0  0 - 6 % Final    Basophils Relative 0  0 - 1 % Final    Lymphocytes Absolute 1 25  0 60 - 4 47 Thousands/µL Final    Monocytes Absolute 1 08  0 17 - 1 22 Thousand/µL Final    Eosinophils Absolute 0 01  0 00 - 0 61 Thousand/µL Final    Basophils Absolute 0 01  0 00 - 0 10 Thousands/µL Final   COMPREHENSIVE METABOLIC PANEL - Abnormal     Glucose 145 (*) 65 - 140 mg/dL Final    Comment:   If the patient is fasting, the ADA then defines impaired fasting glucose as > 100 mg/dL and diabetes as > or equal to 123 mg/dL  Specimen collection should occur prior to Sulfasalazine administration due to the potential for falsely depressed results  Specimen collection should occur prior to Sulfapyridine administration due to the potential for falsely elevated results  Sodium 136  136 - 145 mmol/L Final    Potassium 5 1  3 5 - 5 3 mmol/L Final    Comment: Slightly Hemolyzed;  Results May be Affected    Chloride 105  100 - 108 mmol/L Final    CO2 22  21 - 32 mmol/L Final    Anion Gap 9  4 - 13 mmol/L Final    BUN 11  5 - 25 mg/dL Final    Creatinine 0 68  0 60 - 1 30 mg/dL Final    Comment: Standardized to IDMS reference method    Calcium 9 5  8 3 - 10 1 mg/dL Final    AST 27  5 - 45 U/L Final    Comment: Slightly Hemolyzed; Results May be Affected  Specimen collection should occur prior to Sulfasalazine administration due to the potential for falsely depressed results  ALT 14  12 - 78 U/L Final    Comment:   Specimen collection should occur prior to Sulfasalazine and/or Sulfapyridine administration due to the potential for falsely depressed results  Alkaline Phosphatase 60  46 - 116 U/L Final    Total Protein 7 5  6 4 - 8 2 g/dL Final    Albumin 3 7  3 5 - 5 0 g/dL Final    Total Bilirubin 0 62  0 20 - 1 00 mg/dL Final    eGFR 118  ml/min/1 73sq m Final    Narrative:     National Kidney Disease Education Program recommendations are as follows:  GFR calculation is accurate only with a steady state creatinine  Chronic Kidney disease less than 60 ml/min/1 73 sq  meters  Kidney failure less than 15 ml/min/1 73 sq  meters  VALPROIC ACID LEVEL, TOTAL - Normal    Valproic Acid, Total 62  50 - 100 ug/mL Final       XR shunt series   Final Result      Left-sided  shunts one of which appears disconnected and the 2nd of which appears intact  Correlate with surgical history and prior studies if available to evaluate for interval change  Workstation performed: [id number]         CT head without contrast   Final Result      No acute parenchymal brain abnormality  Encephalomalacia and 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  Ventricular catheters are noted in both right and left lateral ventricles as described  Right lateral ventricle is slightly larger in the left however, there does not appear to be significant hydrocephalus           Workstation performed: [id number]         XR chest portable    (Results Pending)       Procedures  Procedures      Phone Consults  ED Phone Contact    ED Course  ED Course                                MDM  Number of Diagnoses or Management Options  Headache:   Seizures [facility]):   Diagnosis management comments: 42-year-old male with history of hydrocephalus status post shunt  Concerned that this could be shunt failure given headache and seizures  Will get labs, will get levels of antiepileptic medications  Patient's urine is negative will discuss case with on-call Neurology  Disposition will be pending results of the workup      CritCare Time    Disposition  Final diagnoses:   Headache   Seizures ([address] )     ED Disposition     None      Follow-up Information     Follow up With Specialties Details Why Contact Ramses Queen, DO Family Medicine Follow up in 1 week(s)  [address] [place] [address]  [phone]      Eliana Rossi, DO Neurology Follow up in 2 week(s)  [facility] [address] [facility] [address] Neurosurgery Follow up in 1 week(s)  [address] [id number]  [phone]        Discharge Medication List as of 10/5/2017  5:10 PM      CONTINUE these medications which have CHANGED    Details   lamoTRIgine (LaMICtal) 200 MG tablet Take 1 tablet by mouth 2 (two) times a day, Starting Thu 10/5/2017, Normal         CONTINUE these medications which have NOT CHANGED    Details   cholecalciferol (VITAMIN D3) 1,000 units tablet Take 1,000 Units by mouth daily Pt takes 2,000 units per day, Historical Med      divalproex sodium (DEPAKOTE) 500 mg EC tablet Divalproex Sodium 500 MG Oral Tablet Delayed Release  1 tab in am, 1 tab at noon, 1 tab in pm   Quantity: 270;  Refills: 3       Redge Lone M D ; Active, Historical Med      fluticasone-salmeterol (ADVAIR DISKUS) 100-50 mcg/dose Advair Diskus 100-50 MCG/DOSE Inhalation Aerosol Powder Breath Activated  TAKE 1 PUFF TWICE A DAY Quantity: 1;  Refills: 4       Gaurang Flanagan DO;  Started 23-Dec-2015  [id number] Aerosol Powder Breath Activated Disp Pack, Historical Med      rizatriptan (MAXALT-MLT) 10 MG disintegrating tablet Rizatriptan Benzoate 10 MG Oral Tablet Dispersible  TAKE 1 TABLET AT ONSET OF HEADACHE  MAY REPEAT EVERY 2 HOURS AS NEEDED  MAXIMUM 3 TABLETS IN 24 HOURS  Quantity: 9;  Refills: 5       Nyla Schaumann M D ;  Started 5-Feb-2016  Active, Historical Med      al[facility]ol [facility]) 90 mcg/act inhaler ProAir  (90 Base) MCG/ACT Inhalation Aerosol Solution  INHALE 1 TO 2 PUFFS EVERY 4 TO 6 HOURS AS NEEDED  Quantity: 1;  Refills: 5       Myha Wilkinson DO;  Started 23-Dec-2015  Moi Fredericksher  5 GM Inhaler, Historical Med      LORazepam (ATIVAN) 1 mg tablet LORazepam 1 MG Oral Tablet  1 TAB THREE TIMES A DAY   Refills: 0    Active, Historical Med             Outpatient Discharge Orders  Discharge Diet     Activity as tolerated         ED Provider  Attending physically available and evaluated Paul Nash I managed the patient along with the ED Attending      Electronically Signed by       Jacquelyn Carmona MD  Resident  10/06/17 7793

## 2017-10-04 NOTE — CONSULTS
Consultation - Neurology   Ely Kowalski 43 y o  male MRN: [id number]  Unit/Bed#: CW2 218-02 Encounter: [id number]      Assessment/Plan   Assessment:  3 43year old male admitted after two seizures in setting of intractable headache   2  Congenital hydrocephalus s/p  shunt and multiple revisions  3  Asthma  4  Mild cerebral palsy   5  Leukocytosis     Plan:  1  Patient notes headache is now resolved  Would continue to monitor  Sumatriptan ordered PRN if headache returns  2  Leukocytosis may be related to seizure, but would investigate for occult infection which may also lower seizure threshold  Will check UA, consider CXR  3  Continue on Depakote 500 mg every 8 hours and Lamotrigine 200 mg QAM and 150 mg QPM  Patient has remained stable on medications for many years  Depakote level 62 and this is consistent with his prior levels which were checked in January and September 2017  Await Lamotrigine level  4  Will appreciate neurosurgery input regarding possible disconnected  shunt  Patient does not appear to have any signs of increased intracranial pressure at this time but given headache which was not responsive to treatments which are usually effective for patient would appreciate their input  5  Maintain seizure precautions  6  Monitor exam and notify with changes  History of Present Illness     Reason for Consult / Principal Problem: Intractable headache/Seizures   Hx and PE limited by:   Patient is a poor historian but his mother is at bedside and will provide history  HPI: Ely Kowalski is a 43 y o   male who presented to the hospital after he was witnessed to have a seizure  Per the patient he had a headache that started last night which she describes as pressure behind his eyes  He notes associated photophobia, nausea at that time  His mother notes that she gave him a Triptan which normally is effective for his migraine headaches but he had no relief    He took Aleve p m  at bedtime but per his mother he did not sleep well secondary to the headache  This morning when he woke up he had nausea and vomiting and then his mother said she noted he had a mini seizure    She describes as many seizures as him having an aura of feeling fuzzy hands, he will then have decreased awareness and his upper extremities will shake  She notes he had a witnessed episode of this at the urgent care today and was brought by ambulance to the hospital for evaluation  Both of these episodes which happened today resolved on their own without any additional medication  She does note that while at the urgent care she felt as though he was slurring his words but that has since resolved  She notes that he has this type of seizure and also has had generalized tonic-clonic seizures in the past   She notes that he has not had a seizure and approximately two years  He does have a history of congenital hydrocephalus and multiple shunt revisions in the past   In the emergency room he was given IV Ativan, Solu-Medrol, Toradol, Reglan and Benadryl  At the time of this exam, the patient notes that he feels better and notes that his headache is 0/10 in severity and denies complaints aside from feeling tired  Per the patient's mother he gets migraine headaches infrequently and generally these are well treated with over the counter medications or the Triptan for which he is prescribed  She notes that he gets a migraine every few months  Inpatient consult to Neurology  Consult performed by: Stephanie Cortez ordered by: Randi Henderson          Review of Systems   Constitutional: Positive for fatigue  Negative for chills and fever  HENT: Negative for trouble swallowing  Eyes: Negative for visual disturbance  Respiratory: Negative for shortness of breath  Cardiovascular: Negative for chest pain  Gastrointestinal: Negative for abdominal pain, nausea and vomiting     Genitourinary: Negative for difficulty urinating and dysuria  Musculoskeletal: Negative for back pain and neck pain  Neurological: Positive for headaches  Negative for dizziness, seizures, speech difficulty, weakness, light-headedness and numbness  Psychiatric/Behavioral: Negative for confusion  Historical Information   Past Medical History:   Diagnosis Date    Asthma     Cerebral [facility] [address] [facility]     Congenital hydrocephalus (H[facility])     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      Past Surgical History:   Procedure Laterality Date    BRAIN SURGERY      Multiple  shunt revisions     Social History   History   Alcohol Use No     History   Drug Use No     History   Smoking Status    Never Smoker   Smokeless Tobacco    Never Used     Family History: History reviewed  No pertinent family history  Review of previous medical records was completed  Patient has a history of localization-related epilepsy and follow Dr Alexandra Barron an outpatient  He was most recently seen by him in January 2017  His antiepileptic medications are Depakote 500 mg 3 times a day and lamotrigine 200 mg in the morning and 150 mg at night  Her records he has two different types of spells  He will have generalized tonic-clonic seizures and he will also have many seizures which were described as the patient breaking into a sweat and then briefly losing awareness  Meds/Allergies   Scheduled Meds:  cholecalciferol 1,000 Units Oral Daily   divalproex sodium 500 mg Oral Q8H [address]   fluticasone-salmeterol 1 puff Inhalation Q12H ALEX   lamoTRIgine 150 mg Oral HS   [START ON 10/5/2017] lamoTRIgine 200 mg Oral Daily   LORazepam 1 mg Oral TID   SUMAtriptan 25 mg Oral Once     Continuous Infusions:   PRN Meds:   acetaminophen    albuterol    ketorolac    LORazepam      Allergies   Allergen Reactions    Latex Hives       Objective   Vitals:Blood pressure 137/67, pulse 86, temperature 98 1 °F (36 7 °C), temperature source Oral, resp   rate 18, height 5' 10" (1 778 m), weight 87 2 kg (192 lb 3 2 oz), SpO2 95 %  ,Body mass index is 27 58 kg/m²  No intake or output data in the 24 hours ending 10/04/17 1507    Invasive Devices: Invasive Devices          No matching active lines, drains, or airways          Physical Exam   Constitutional: He is oriented to person, place, and time  He appears well-developed and well-nourished  No distress  HENT:   Head: Normocephalic and atraumatic  Eyes: Conjunctivae and EOM are normal  Pupils are equal, round, and reactive to light  No scleral icterus  Neck: Normal range of motion  Neck supple  Cardiovascular: Normal rate and regular rhythm  Pulmonary/Chest: Effort normal and breath sounds normal    Abdominal: Soft  He exhibits no distension  There is no tenderness  Musculoskeletal: Normal range of motion  He exhibits no edema  Neurological: He is alert and oriented to person, place, and time  He has a normal Finger-Nose-Finger Test    Reflex Scores:       Bicep reflexes are 2+ on the right side and 2+ on the left side  Brachioradialis reflexes are 2+ on the right side and 2+ on the left side  Patellar reflexes are 2+ on the right side and 2+ on the left side  Achilles reflexes are 2+ on the right side and 2+ on the left side  Skin: Skin is warm and dry  No rash noted  He is not diaphoretic  No erythema  No pallor  Psychiatric: He has a normal mood and affect  His speech is normal and behavior is normal      Neurologic Exam     Mental Status   Oriented to person, place, and time  Oriented to person  Oriented to place  Oriented to time  Registration: recalls 3 of 3 objects  Recall of objects at 5 minutes: Unable to recall objects with or without cue  Attention: decreased  Concentration: decreased  Speech: speech is normal   Level of consciousness: alert  Knowledge: good  Able to name object  Normal comprehension  Able to spell word world forward       Cranial Nerves     CN II   Right visual field deficit: none  Left visual field deficit: none     CN III, IV, VI   Pupils are equal, round, and reactive to light  Extraocular motions are normal    Right pupil: Size: 3 mm  Shape: regular  Reactivity: brisk  Consensual response: intact  Left pupil: Size: 3 mm  Shape: regular  Reactivity: brisk  Consensual response: intact  Nystagmus: none   Diplopia: none  Ophthalmoparesis: none  Upgaze: normal  Downgaze: normal  Conjugate gaze: present    CN V   Right facial sensation deficit: none  Left facial sensation deficit: none    CN VII   Right facial weakness: none  Left facial weakness: none    CN VIII   Hearing: intact    CN IX, X   Palate: symmetric    CN XII   Tongue: not atrophic  Fasciculations: absent  Tongue deviation: none  Mild left ptosis  Motor Exam   Muscle bulk: normal  Overall muscle tone: normal  Right arm tone: normal  Left arm tone: normal  Right arm pronator drift: absent  Left arm pronator drift: absent  Right leg tone: normal  Left leg tone: normalMotor strength 5/5 B/L UE and LE       Sensory Exam   Light touch normal    Right arm light touch: normal  Left arm light touch: normal  Right leg light touch: normal  Left leg light touch: normal  Vibration normal    Right arm vibration: normal  Left arm vibration: normal  Right leg vibration: normal  Left leg vibration: normal  Pinprick normal    Right arm pinprick: normal  Left arm pinprick: normal  Right leg pinprick: normal  Left leg pinprick: normal    Gait, Coordination, and Reflexes     Coordination   Finger to nose coordination: normal    Tremor   Resting tremor: absent  Intention tremor: absent  Action tremor: absent    Reflexes   Right brachioradialis: 2+  Left brachioradialis: 2+  Right biceps: 2+  Left biceps: 2+  Right patellar: 2+  Left patellar: 2+  Right achilles: 2+  Left achilles: 2+  Right plantar: normal  Left plantar: normalGait deferred       Lab Results:   Recent Results (from the past 24 hour(s))   Ammonia    Collection Time: 10/04/17 10:23 AM   Result Value Ref Range    Ammonia 41 (H) 11 - 35 umol/L   Valproic acid level, total    Collection Time: 10/04/17 10:23 AM   Result Value Ref Range    Valproic Acid, Total 62 50 - 100 ug/mL   Comprehensive metabolic panel    Collection Time: 10/04/17 10:23 AM   Result Value Ref Range    Sodium 136 136 - 145 mmol/L    Potassium 5 1 3 5 - 5 3 mmol/L    Chloride 105 100 - 108 mmol/L    CO2 22 21 - 32 mmol/L    Anion Gap 9 4 - 13 mmol/L    BUN 11 5 - 25 mg/dL    Creatinine 0 68 0 60 - 1 30 mg/dL    Glucose 145 (H) 65 - 140 mg/dL    Calcium 9 5 8 3 - 10 1 mg/dL    AST 27 5 - 45 U/L    ALT 14 12 - 78 U/L    Alkaline Phosphatase 60 46 - 116 U/L    Total Protein 7 5 6 4 - 8 2 g/dL    Albumin 3 7 3 5 - 5 0 g/dL    Total Bilirubin 0 62 0 20 - 1 00 mg/dL    eGFR 118 ml/min/1 73sq m   CBC and differential    Collection Time: 10/04/17 11:21 AM   Result Value Ref Range    WBC 16 66 (H) 4 31 - 10 16 Thousand/uL    RBC 5 30 3 88 - 5 62 Million/uL    Hemoglobin 16 5 12 0 - 17 0 g/dL    Hematocrit 44 7 36 5 - 49 3 %    MCV 84 82 - 98 fL    MCH 31 1 26 8 - 34 3 pg    MCHC 36 9 31 4 - 37 4 g/dL    RDW 12 1 11 6 - 15 1 %    MPV 9 9 8 9 - 12 7 fL    Platelets 737 425 - 547 Thousands/uL    nRBC 0 /100 WBCs    Neutrophils Relative 85 (H) 43 - 75 %    Lymphocytes Relative 8 (L) 14 - 44 %    Monocytes Relative 7 4 - 12 %    Eosinophils Relative 0 0 - 6 %    Basophils Relative 0 0 - 1 %    Neutrophils Absolute 14 20 (H) 1 85 - 7 62 Thousands/µL    Lymphocytes Absolute 1 25 0 60 - 4 47 Thousands/µL    Monocytes Absolute 1 08 0 17 - 1 22 Thousand/µL    Eosinophils Absolute 0 01 0 00 - 0 61 Thousand/µL    Basophils Absolute 0 01 0 00 - 0 10 Thousands/µL       Imaging Studies: I have personally reviewed pertinent reports  and I have personally reviewed pertinent films in PACS  CTH- No acute parenchymal brain abnormality   Encephalomalacia 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

## 2017-10-05 VITALS
OXYGEN SATURATION: 95 % | HEIGHT: 70 IN | WEIGHT: 192.2 LBS | DIASTOLIC BLOOD PRESSURE: 64 MMHG | TEMPERATURE: 97.9 F | HEART RATE: 88 BPM | RESPIRATION RATE: 18 BRPM | SYSTOLIC BLOOD PRESSURE: 113 MMHG | BODY MASS INDEX: 27.52 KG/M2

## 2017-10-05 LAB
ANION GAP SERPL CALCULATED.3IONS-SCNC: 11 MMOL/L (ref 4–13)
BUN SERPL-MCNC: 16 MG/DL (ref 5–25)
CALCIUM SERPL-MCNC: 8.9 MG/DL (ref 8.3–10.1)
CHLORIDE SERPL-SCNC: 105 MMOL/L (ref 100–108)
CO2 SERPL-SCNC: 23 MMOL/L (ref 21–32)
CREAT SERPL-MCNC: 0.74 MG/DL (ref 0.6–1.3)
ERYTHROCYTE [DISTWIDTH] IN BLOOD BY AUTOMATED COUNT: 12.7 % (ref 11.6–15.1)
GFR SERPL CREATININE-BSD FRML MDRD: 114 ML/MIN/1.73SQ M
GLUCOSE SERPL-MCNC: 109 MG/DL (ref 65–140)
HCT VFR BLD AUTO: 43.3 % (ref 36.5–49.3)
HGB BLD-MCNC: 15.3 G/DL (ref 12–17)
MCH RBC QN AUTO: 30.5 PG (ref 26.8–34.3)
MCHC RBC AUTO-ENTMCNC: 35.3 G/DL (ref 31.4–37.4)
MCV RBC AUTO: 86 FL (ref 82–98)
PLATELET # BLD AUTO: 311 THOUSANDS/UL (ref 149–390)
PMV BLD AUTO: 10.2 FL (ref 8.9–12.7)
POTASSIUM SERPL-SCNC: 3.8 MMOL/L (ref 3.5–5.3)
RBC # BLD AUTO: 5.01 MILLION/UL (ref 3.88–5.62)
SODIUM SERPL-SCNC: 139 MMOL/L (ref 136–145)
WBC # BLD AUTO: 18.3 THOUSAND/UL (ref 4.31–10.16)

## 2017-10-05 PROCEDURE — 80048 BASIC METABOLIC PNL TOTAL CA: CPT | Performed by: INTERNAL MEDICINE

## 2017-10-05 PROCEDURE — 85027 COMPLETE CBC AUTOMATED: CPT | Performed by: INTERNAL MEDICINE

## 2017-10-05 RX ORDER — LAMOTRIGINE 100 MG/1
200 TABLET ORAL 2 TIMES DAILY
Status: DISCONTINUED | OUTPATIENT
Start: 2017-10-05 | End: 2017-10-05 | Stop reason: HOSPADM

## 2017-10-05 RX ORDER — LAMOTRIGINE 200 MG/1
200 TABLET ORAL 2 TIMES DAILY
Qty: 60 TABLET | Refills: 0 | Status: ON HOLD | OUTPATIENT
Start: 2017-10-05 | End: 2017-10-26

## 2017-10-05 RX ADMIN — DIVALPROEX SODIUM 500 MG: 500 TABLET, DELAYED RELEASE ORAL at 14:41

## 2017-10-05 RX ADMIN — DIVALPROEX SODIUM 500 MG: 500 TABLET, DELAYED RELEASE ORAL at 08:24

## 2017-10-05 RX ADMIN — LORAZEPAM 1 MG: 1 TABLET ORAL at 08:23

## 2017-10-05 RX ADMIN — FLUTICASONE PROPIONATE AND SALMETEROL 1 PUFF: 50; 100 POWDER RESPIRATORY (INHALATION) at 08:23

## 2017-10-05 RX ADMIN — LAMOTRIGINE 200 MG: 100 TABLET ORAL at 08:26

## 2017-10-05 RX ADMIN — LAMOTRIGINE 200 MG: 100 TABLET ORAL at 17:07

## 2017-10-05 RX ADMIN — LORAZEPAM 1 MG: 1 TABLET ORAL at 17:07

## 2017-10-05 RX ADMIN — VITAMIN D, TAB 1000IU (100/BT) 1000 UNITS: 25 TAB at 08:23

## 2017-10-05 NOTE — CONSULTS
Consultation - Neurosurgery   Mati Dorado 43 y o  male MRN: [id number]  Unit/Bed#: CW2 211-02 Encounter: [id number]      Assessment/Plan     Assessment:  1  History of congenital hydrocephalus status post multiple shunts and revisions  2  History of longstanding seizure disorder frequent breakthrough seizures  3  Cerebral palsy  4  Headache and vomiting-resolved  5  History of migraines  6  Asthma    Plan:  44-year-old male with a complicated past medical history of shunt placement and revision is indicated for congenital hydrocephalus  Also had longstanding history of seizure disorder  Presents with headache found to have disconnection of shunt tubing  · Neuro exam:  Patient is GCS 15, alert and oriented x3, follows commands consistently, pleasant and cooperative, baseline increased tone on left secondary to cerebral palsy  Otherwise exam baseline  · Imaging reviewed personally with attending  · CT head:  No acute intraparenchymal brain abnormality  Encephalomalacia in left parietal region  Ventricular catheter is fixed in bilateral ventricles  No ventriculomegaly appreciated  · x-ray shunt series:  2 separate shunt catheters coursing through left neck over left chest terminating in left upper quadrant  One catheter terminating in the soft tissues of the left neck at the C4-5 region  The catheter terminates over the left occiput  · Discussed with internal medicine team   A vehicle and for further neurosurgical diagnostics or intervention this admission  Patient is clinically well  Disconnection of shunt tubing is likely incidental   No clinical signs of infection  Leukocytosis likely secondary to steroid dose in the ED and reactive response status post seizure  · Neurology team consulted  Appreciate recommendations of 80 adjustment  Will need close outpatient follow-up  · Due to stability of exam and return to baseline patient is clear for discharge from a neurosurgical standpoint  Close interval follow-up with her surgery scheduled for 10/20  Repeat CT head will be completed prior to this appointment  Encouraged patient/mother to call with any questions or concerns  Patient will need to bring available pre-existing records to this appointment    History of Present Illness     HPI: Ladean Kocher is a 43y o  year old male , PMH of congenital hydrocephalus status post multiple shunt revisions, cerebral palsy, seizure disorder, who presents with 1 day history of headaches and vomiting followed by 2 episodes of seizures found to have a disconnection of distal shunt catheter tubing  Upon evaluation acute symptoms have resolved the patient is at baseline per mother  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  Upon discharge patient is behind development, had persistent vomiting  Patient was taken to neurologist at 5 months diagnosed with hydrocephalus upon completion of CT scan  First shunt was inserted at that time  Mother believes in left frontal region  Upon shunt insertion patient improvement developmentally until the age of 2  Which time he developed seizure disorder  In this time frame patient had lengthening of distal shunt catheter  He subsequently had did developmental delay word how to walk and talk again   At the age of 11 patient developed a distal blockage of shunt catheter  Mother states that he had both a cranial and distal incision  Following this revision patient noted to have a CSF leak over the reservoir  Patient was intraoperatively found to have a proximal dysfunction at which time the proximal shunt portion was replaced  Next significant event occurred at the age of 6  Mom states patient became comatose this time was diagnosed with hemorrhagic stroke   Struggled with status epilepticus during this period    Over the next few years patient recovered and was able to be weaned off of antiepileptic drugs  In his teens patient had return of grand mal seizures for which 8 these were re-initiated  A few years later patient had another comatose phase  During this time seizures recurred  Mother believes that 1 shunt was removed and another was added at that time  She is unable to identify where the locations were  In his 35s mother states that patient was diagnosed with this cyst that was taking up most of his skull at which time a left temporal shunt was inserted specifically for this cyst   In this perioperative period breakthrough seizures occurred  The continue to follow with a neurologist   At that time the Depakote levels were adjusted while Lamictal levels remained consistent  Of note patient has been diagnosed with a left esotropia and underwent procedure at the age of 6  For a this has recurred and outpatient follow-up with ophthalmology as scheduled  Patient lives at home with his mother  Does not drive  Bags groceries  He does have a history of migraines but states that the recent headache was a different quality  Patient reports developing a slight headache after work on Tuesday p m   Mother gave him Tylenol and Aleve prior to bed  Awoke in the middle of the night with pounding headache  Mother states giving him a half a day prescription medication  The narcotic which is not his own  Symptoms do not resolve  Upon a the morning patient vomited twice and was taken to an urgent care for evaluation  At the urgent care patient was witnessed to have 2 seizure episodes  Mother describes this as a mini seizure in which she developed generalized tonic-clonic movement but does not dropped to the ground  He is big seizures are described as generalized tonic-clonic with a postictal phase  Since admission patient feels he is back to baseline  She denies headache, nausea, vomiting, fever chills, recent cold symptoms,    Denies recurrent seizure since admission  Inpatient consult to Neurosurgery  Consult performed by: Riya Chapman ordered by: Kulwinder Singh          Review of Systems   Constitutional: Negative for fatigue and fever  HENT: Negative for hearing loss and trouble swallowing  Eyes: Negative for visual disturbance  Respiratory: Negative for shortness of breath  Cardiovascular: Negative for chest pain and palpitations  Gastrointestinal: Positive for vomiting (resolved)  Negative for abdominal pain  Musculoskeletal: Negative for back pain, gait problem, neck pain and neck stiffness  Skin: Negative for color change  Allergic/Immunologic:        Latex   Neurological: Positive for tremors (baseline ) and seizures  Negative for dizziness and speech difficulty  Headaches: resolved  Psychiatric/Behavioral: Negative for confusion         Historical Information   Past Medical History:   Diagnosis Date    Asthma     Cere[facility]y ([facility] [address] )     Congenital hydrocephalus (HCC)     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[facility] [address] )     Migraines      Past Surgical History:   Procedure Laterality Date    BRAIN SURGERY      Multiple  shunt revisions    HERNIA REPAIR       History   Alcohol Use No     History   Drug Use No     History   Smoking Status    Never Smoker   Smokeless Tobacco    Never Used     Family History   Problem Relation Age of Onset    Family history unknown: Yes       Meds/Allergies   all current active meds have been reviewed, current meds:   Current Facility-Administered Medications   Medication Dose Route Frequency    acetaminophen (TYLENOL) tablet 650 mg  650 mg Oral Q6H PRN    albuterol (PROVENTIL HFA,VENTOLIN HFA) inhaler 1 puff  1 puff Inhalation Q4H PRN    cholecalciferol (VITAMIN D3) tablet 1,000 Units  1,000 Units Oral Daily    divalproex sodium (DEPAKOTE) EC tablet 500 mg  500 mg Oral Q8H [address]    fluticasone-salmeterol (ADVAIR) 100-50 mcg/dose inhaler 1 puff  1 puff Inhalation Q12H [address]    ketorolac (TORADOL) 30 mg/mL injection 15 mg  15 mg Intravenous Q6H PRN    lamoTRIgine (LaMICtal) tablet 200 mg  200 mg Oral BID    LORazepam (ATIVAN) 2 mg/mL injection 1 mg  1 mg Intravenous Q4H PRN    LORazepam (ATIVAN) tablet 1 mg  1 mg Oral TID    and PTA meds:   Prior to Admission Medications   Prescriptions Last Dose Informant Patient Reported? Taking? LORazepam (ATIVAN) 1 mg tablet Unknown at Unknown time  Yes No   Sig: LORazepam 1 MG Oral Tablet  1 TAB THREE TIMES A DAY   Refills: 0    Active   albuterol (PROAIR HFA) 90 mcg/act inhaler Unknown at Unknown time  Yes No   Sig: ProAir  (90 Base) MCG/ACT Inhalation Aerosol Solution  INHALE 1 TO 2 PUFFS EVERY 4 TO 6 HOURS AS NEEDED  Quantity: 1;  Refills: 5       Myah Wilkinson DO;  Started 23-Dec-2015  Lucila Novel  5 GM Inhaler   cholecalciferol (VITAMIN D3) 1,000 units tablet 10/3/2017 at Unknown time  Yes Yes   Sig: Take 1,000 Units by mouth daily Pt takes 2,000 units per day   divalproex sodium (DEPAKOTE) 500 mg EC tablet 10/3/2017 at Unknown time  Yes Yes   Sig: Divalproex Sodium 500 MG Oral Tablet Delayed Release  1 tab in am, 1 tab at noon, 1 tab in pm   Quantity: 270;  Refills: 3       Meriel Mons M D ; Active   fluticasone-salmeterol (ADVAIR DISKUS) 100-50 mcg/dose 10/3/2017 at Unknown time  Yes Yes   Sig: Advair Diskus 100-50 MCG/DOSE Inhalation Aerosol Powder Breath Activated  TAKE 1 PUFF TWICE A DAY   Quantity: 1;  Refills: 4       Viraj Wagner DO;  Started 23-Dec-2015  [id number] Aerosol Powder Breath Activated Disp Pack   lamoTRIgine (LaMICtal) 100 mg tablet 10/3/2017 at Unknown time  Yes Yes   Sig: LamoTRIgine 100 MG Oral Tablet  2 TABS IN AM AND 1 5 TABS AT PM   Quantity: 315;  Refills: 3       Meriel Mons M D ; Active   rizatriptan (MAXALT-MLT) 10 MG disintegrating tablet 10/4/2017 at Unknown time  Yes Yes   Sig: Rizatriptan Benzoate 10 MG Oral Tablet Dispersible  TAKE 1 TABLET AT ONSET OF HEADACHE  MAY REPEAT EVERY 2 HOURS AS NEEDED   MAXIMUM 3 TABLETS IN 24 HOURS  Quantity: 9;  Refills: 5       Michelle SAEED ;  Started 5-Feb-2016  Active      Facility-Administered Medications: None     Allergies   Allergen Reactions    Latex Hives       Objective     Intake/Output Summary (Last 24 hours) at 10/05/17 1619  Last data filed at 10/05/17 1228   Gross per 24 hour   Intake              600 ml   Output              200 ml   Net              400 ml       Physical Exam   Constitutional: He is oriented to person, place, and time  He appears well-developed and well-nourished  Eyes: EOM are normal  Pupils are equal, round, and reactive to light  Neck:   Palpable left-sided shunt catheter  Cardiovascular: Normal rate  Pulmonary/Chest: Effort normal    Abdominal: Soft  Neurological: He is oriented to person, place, and time  He has a normal Finger-Nose-Finger Test  GCS eye subscore is 4  GCS verbal subscore is 5  GCS motor subscore is 6  Reflex Scores:       Bicep reflexes are 2+ on the right side and 2+ on the left side  Brachioradialis reflexes are 2+ on the right side and 2+ on the left side  Patellar reflexes are 3+ on the right side and 3+ on the left side  2 left sided shunt reservoirs palpable located in left parietal region  More superior of two reservoirs is palpable- compresses and refills briskly  Inferior more reservoir does not compress  Skin: Skin is warm and dry  Multiple scalp incisions related to prior shunt placement  Multiple left-sided cervical and abdominal incisions related to shunt placement  All incisions are clean dry and intact  No dehiscence, fluctuance, drainage  Psychiatric: His speech is normal    Baseline  Alert, cooperative, pleasant, conversational      Neurologic Exam     Mental Status   Oriented to person, place, and time  Follows 3 step commands  Attention: normal  Concentration: normal    Speech: speech is normal   Level of consciousness: alert  Knowledge: good  Normal comprehension  Cranial Nerves     CN II   Visual fields full to confrontation  CN III, IV, VI   Pupils are equal, round, and reactive to light  Extraocular motions are normal    Nystagmus: none   Conjugate gaze: absent (Left-sided esotropia )    CN V   Facial sensation intact  CN VII   Facial expression full, symmetric  CN VIII   CN VIII normal      CN XI   CN XI normal      CN XII   CN XII normal      Motor Exam   Overall muscle tone: increased  Right arm tone: decreased  Left arm tone: increased  Right leg tone: decreased  Left leg tone: increased5/5 throughout b/l UE  5-/5 in LLE 4+ in RLE (baseline per patient)     Sensory Exam   Light touch normal    Proprioception normal      Gait, Coordination, and Reflexes     Coordination   Finger to nose coordination: normal    Tremor   Resting tremor: present (Baseline tremor in bilateral upper extremities )    Reflexes   Right brachioradialis: 2+  Left brachioradialis: 2+  Right biceps: 2+  Left biceps: 2+  Right patellar: 3+  Left patellar: 3+  Right Sheramn: absent  Left Sherman: absent  Right ankle clonus: absent  Left ankle clonus: absent      Vitals:Blood pressure 113/64, pulse 88, temperature 97 9 °F (36 6 °C), temperature source Oral, resp  rate 18, height 5' 10" (1 778 m), weight 87 2 kg (192 lb 3 2 oz), SpO2 95 %  ,Body mass index is 27 58 kg/m²  Lab Results:   I have personally reviewed pertinent results  Lab Results   Component Value Date    WBC 18 30 (H) 10/05/2017    HGB 15 3 10/05/2017    HCT 43 3 10/05/2017    MCV 86 10/05/2017     10/05/2017    MCH 30 5 10/05/2017    MCHC 35 3 10/05/2017    RDW 12 7 10/05/2017    MPV 10 2 10/05/2017     10/05/2017     10/05/2017    CO2 23 10/05/2017    ANIONGAP 11 10/05/2017    BUN 16 10/05/2017    CREATININE 0 74 10/05/2017    GLUCOSE 109 10/05/2017    CALCIUM 8 9 10/05/2017    EGFR 114 10/05/2017       Imaging Studies: I have personally reviewed pertinent reports     and I have personally reviewed pertinent films in PACS    EKG, Pathology, and Other Studies: I have personally reviewed pertinent reports        VTE Prophylaxis: Sequential compression device Romie Interiano)     Code Status: Level 1 - Full Code  Advance Directive and Living Will:      Power of :    POLST:

## 2017-10-05 NOTE — MEDICAL STUDENT
MEDICAL STUDENT  Inpatient Progress Note for TRAINING ONLY  Not Part of Legal Medical Record       Progress Note - Devang Cage 43 y o  male MRN: [id number]    Unit/Bed#: 2 211-02 Encounter: [id number]      Assessment:  44 y/o male w/ seizures, headache  Plan:  1  Seizures  - Valproic acid level 62, around baseline  - Lamotrigine level pending  - Appreciate neurology input, will maintain seizure precautions, follow up on neurosurgery consult  No evidence of increased intracranial pressure  No signs of ventriculitis/meningitis clinically  If no evidence of infection, and if Neurosurgery feels shunt issue is noncontributory, may increase Lamictal to 200 b i d     - No recurrence of seizures  - Follow up on neurosurgical consult     2  Headache  - Resolved in emergency department with solumedrol, Toradol, benadryl     - Neurology to provide recommendations for headache management  3  Congenital Hydrocephalus s/p  Shunt and Multiple Revisions  - XR shunt series showing one shunt appears disconnected  - CT head showing no acute parenchymal brain abnormality  Encephalomalacia and possible morphologic abnormality such as 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  Ventricular catheters in both right and left lateral ventricles, with right lateral ventricle slightly larger than the left, but without significant hydrocephalus  4  Leukocytosis  - UA w/o evidence of infection  - CXR pending, will follow up on CXR     - WBC 18 30, up from 16 66 yesterday  May be related to stress/seizure plus use of methylprednisolone  - No current signs of infection, will monitor  5  Microscopic Hematuria  - 20-30 RBC/hpf on urinalysis  - Follow up outpatient to evaluate for resolution  6  Asthma  - Continue current regimen, no signs of exacerbation         Subjective:   Patient reports he is doing well, has no complaints, has no headache, and has had no recurrence of seizures    Denies any fever, chest pain, shortness of breath, nausea, abdominal pain, malaise, or pain/burning with urination  Feels well  Reports the headache he had yesterday was similar to his migraines, but did not resolve with the triptans his mother gave him  Seizures were similar to prior seizures  Patient's mother states that if the shunt was malfunctioning, then patient would have severe headaches which would be unable to be resolved with medication until the shunt is fixed  States one shunt was used to drain a cyst in the brain, and that if that shunt is disconnected, that is probably okay, but wishes to speak with neurosurgery regarding this  No recent falls/trauma/injuries  Objective:     Vitals: Blood pressure 116/56, pulse 80, temperature 97 5 °F (36 4 °C), temperature source Oral, resp  rate 18, height 5' 10" (1 778 m), weight 87 2 kg (192 lb 3 2 oz), SpO2 93 %  ,Body mass index is 27 58 kg/m²  Intake/Output Summary (Last 24 hours) at 10/05/17 0977  Last data filed at 10/05/17 0755   Gross per 24 hour   Intake              360 ml   Output              200 ml   Net              160 ml       Physical Exam:   Physical Exam   Constitutional: He appears well-developed and well-nourished  HENT:   Head: Atraumatic  Neck: No JVD present  No neck rigidity  No tracheal deviation present  Cardiovascular: Normal rate, regular rhythm and normal heart sounds  Exam reveals no gallop and no friction rub  No murmur heard  Pulmonary/Chest: Effort normal and breath sounds normal  No stridor  No respiratory distress  He has no wheezes  He has no rales  Abdominal: Soft  Bowel sounds are normal  He exhibits no distension  There is no tenderness  There is no rebound and no guarding  Musculoskeletal: Normal range of motion  Neurological: He is alert  He has normal strength  He displays tremor (upper extremities, normal for patient)  Skin: Skin is warm and dry  No rash noted  He is not diaphoretic  No erythema  No pallor  Psychiatric: He has a normal mood and affect  His behavior is normal  Judgment and thought content normal    Vitals reviewed  Invasive Devices     Peripheral Intravenous Line            Peripheral IV 10/04/17 Left Wrist less than 1 day                Lab, Imaging and other studies: I have personally reviewed pertinent reports      VTE Pharmacologic Prophylaxis: Reason for no pharmacologic prophylaxis low risk  VTE Mechanical Prophylaxis: sequential compression device

## 2017-10-05 NOTE — PROGRESS NOTES
Progress Note - Neurology   Taylor Kent 43 y o  male MRN: [id number]  Unit/Bed#: CW2 211-02 Encounter: [id number]    Assessment:  3 43year old male admitted after two seizures in setting of intractable headache  2  Congenital hydrocephalus s/p  shunt and multiple revisions with possible disconnection in one of the catheters   3  Asthma  4  Mild cerebral palsy  5  Leukocytosis- worsened today, likely secondary to steroid dose in ER yesterday for headache    Plan:  1  No infectious origin noted, UA unremarkable for infection, CXR pending  2  Work-up of hematuria as per medicine team    3  Awaiting neurosurgical input regarding possible shunt disconnection  4  Would recommend increase Lamictal to 200 mg BID and continue on Depakote 500 mg TID  5  Maintain seizure precautions  6  Did discuss headache management with patient's mother at bedside  Recommend triptan be given earlier rather than later when migraine starts as this may aid in efficacy  Also discussed that dose may be repeated 2 hours later if headache persists  7  Recommend outpatient follow-up with epilepsy team  Office will contact to arrange for follow-up  8  Above case an plan discussed with Dr Krunal Sy and he is in agreement with above  Subjective:   Taylor Kent is a 43year old male with PMH of congenital hydrocephalus s/p  shunt and resultant localization related epilepsy  He presented to the hospital complaining of HA and after two witnessed seizure-events       ROS:  History obtained from the patient  General ROS: negative for - chills, fatigue or fever  Ophthalmic ROS: negative for - blurry vision, decreased vision or double vision  Respiratory ROS: negative for - shortness of breath  Cardiovascular ROS: negative for - chest pain  Gastrointestinal ROS: negative for - abdominal pain or nausea/vomiting  Musculoskeletal ROS: negative for - muscular weakness  Neurological ROS: negative for - confusion, dizziness, headaches, seizures, speech problems, visual changes or weakness    Medications  Scheduled Meds:  cholecalciferol 1,000 Units Oral Daily   divalproex sodium 500 mg Oral Q8H [address]   fluticasone-salmeterol 1 puff Inhalation Q12H ALEX   lamoTRIgine 150 mg Oral HS   lamoTRIgine 200 mg Oral Daily   LORazepam 1 mg Oral TID     Continuous Infusions:   PRN Meds:   acetaminophen    albuterol    ketorolac    LORazepam    Vitals: Blood pressure 116/56, pulse 80, temperature 97 5 °F (36 4 °C), temperature source Oral, resp  rate 18, height 5' 10" (1 778 m), weight 87 2 kg (192 lb 3 2 oz), SpO2 93 %  ,Body mass index is 27 58 kg/m²      Physical Exam: /56   Pulse 80   Temp 97 5 °F (36 4 °C) (Oral)   Resp 18   Ht 5' 10" (1 778 m)   Wt 87 2 kg (192 lb 3 2 oz)   SpO2 93%   BMI 27 58 kg/m²   General appearance: alert, appears stated age and cooperative  Head: Normocephalic, without obvious abnormality, atraumatic  Eyes: negative findings: lids and lashes normal, conjunctivae and sclerae normal and pupils equal, round, reactive to light and accomodation  Lungs: clear to auscultation bilaterally  Heart: regular rate and rhythm  Abdomen: soft, non-tender; bowel sounds normal; no masses,  no organomegaly  Extremities: extremities normal, atraumatic, no cyanosis or edema  Skin: Skin color, texture, turgor normal  No rashes or lesions  Neurologic: Mental status: Alert, oriented, thought content appropriate  Cranial nerves: II: pupils equal, round, reactive to light and accommodation, III,VII: ptosis No ptosis noted, III,IV,VI: extraocular muscles extra-ocular motions intact, VII: upper facial muscle function normal bilaterally, VII: lower facial muscle function normal bilaterally, XII: tongue strength  normal  Sensory: normal, Grossly intact to crude touch  Motor:Motor strength 5/5 B/L UE and LE  Coordination: finger to nose normal bilaterally, mild endpoint tremor B/L    Labs  Recent Results (from the past 24 hour(s)) Ammonia    Collection Time: 10/04/17 10:23 AM   Result Value Ref Range    Ammonia 41 (H) 11 - 35 umol/L   Valproic acid level, total    Collection Time: 10/04/17 10:23 AM   Result Value Ref Range    Valproic Acid, Total 62 50 - 100 ug/mL   Comprehensive metabolic panel    Collection Time: 10/04/17 10:23 AM   Result Value Ref Range    Sodium 136 136 - 145 mmol/L    Potassium 5 1 3 5 - 5 3 mmol/L    Chloride 105 100 - 108 mmol/L    CO2 22 21 - 32 mmol/L    Anion Gap 9 4 - 13 mmol/L    BUN 11 5 - 25 mg/dL    Creatinine 0 68 0 60 - 1 30 mg/dL    Glucose 145 (H) 65 - 140 mg/dL    Calcium 9 5 8 3 - 10 1 mg/dL    AST 27 5 - 45 U/L    ALT 14 12 - 78 U/L    Alkaline Phosphatase 60 46 - 116 U/L    Total Protein 7 5 6 4 - 8 2 g/dL    Albumin 3 7 3 5 - 5 0 g/dL    Total Bilirubin 0 62 0 20 - 1 00 mg/dL    eGFR 118 ml/min/1 73sq m   CBC and differential    Collection Time: 10/04/17 11:21 AM   Result Value Ref Range    WBC 16 66 (H) 4 31 - 10 16 Thousand/uL    RBC 5 30 3 88 - 5 62 Million/uL    Hemoglobin 16 5 12 0 - 17 0 g/dL    Hematocrit 44 7 36 5 - 49 3 %    MCV 84 82 - 98 fL    MCH 31 1 26 8 - 34 3 pg    MCHC 36 9 31 4 - 37 4 g/dL    RDW 12 1 11 6 - 15 1 %    MPV 9 9 8 9 - 12 7 fL    Platelets 910 248 - 492 Thousands/uL    nRBC 0 /100 WBCs    Neutrophils Relative 85 (H) 43 - 75 %    Lymphocytes Relative 8 (L) 14 - 44 %    Monocytes Relative 7 4 - 12 %    Eosinophils Relative 0 0 - 6 %    Basophils Relative 0 0 - 1 %    Neutrophils Absolute 14 20 (H) 1 85 - 7 62 Thousands/µL    Lymphocytes Absolute 1 25 0 60 - 4 47 Thousands/µL    Monocytes Absolute 1 08 0 17 - 1 22 Thousand/µL    Eosinophils Absolute 0 01 0 00 - 0 61 Thousand/µL    Basophils Absolute 0 01 0 00 - 0 10 Thousands/µL   Urinalysis with reflex to microscopic    Collection Time: 10/04/17  4:19 PM   Result Value Ref Range    Color, UA Yellow     Clarity, UA Clear     Specific Gravity, UA 1 023 1 003 - 1 030    pH, UA 8 0 4 5 - 8 0    Leukocytes, UA Negative Negative    Nitrite, UA Negative Negative    Protein, UA 30 (1+) (A) Negative mg/dl    Glucose, UA Negative Negative mg/dl    Ketones, UA 15 (1+) (A) Negative mg/dl    Urobilinogen, UA 0 2 0 2, 1 0 E U /dl E U /dl    Bilirubin, UA Negative Negative    Blood, UA Moderate (A) Negative   Urine Microscopic    Collection Time: 10/04/17  4:19 PM   Result Value Ref Range    RBC, UA 20-30 (A) None Seen, 0-5 /hpf    WBC, UA 2-4 (A) None Seen, 0-5, 5-55, 5-65 /hpf    Epithelial Cells None Seen None Seen, Occasional /hpf    Bacteria, UA None Seen None Seen, Occasional /hpf    Hyaline Casts, UA 3-5 (A) None Seen /lpf   Basic metabolic panel    Collection Time: 10/05/17  4:40 AM   Result Value Ref Range    Sodium 139 136 - 145 mmol/L    Potassium 3 8 3 5 - 5 3 mmol/L    Chloride 105 100 - 108 mmol/L    CO2 23 21 - 32 mmol/L    Anion Gap 11 4 - 13 mmol/L    BUN 16 5 - 25 mg/dL    Creatinine 0 74 0 60 - 1 30 mg/dL    Glucose 109 65 - 140 mg/dL    Calcium 8 9 8 3 - 10 1 mg/dL    eGFR 114 ml/min/1 73sq m   CBC (With Platelets)    Collection Time: 10/05/17  4:40 AM   Result Value Ref Range    WBC 18 30 (H) 4 31 - 10 16 Thousand/uL    RBC 5 01 3 88 - 5 62 Million/uL    Hemoglobin 15 3 12 0 - 17 0 g/dL    Hematocrit 43 3 36 5 - 49 3 %    MCV 86 82 - 98 fL    MCH 30 5 26 8 - 34 3 pg    MCHC 35 3 31 4 - 37 4 g/dL    RDW 12 7 11 6 - 15 1 %    Platelets 720 985 - 670 Thousands/uL    MPV 10 2 8 9 - 12 7 fL     Imaging   No new neuro imaging available for review  Counseling / Coordination of Care  Total time spent today 27 minutes  Greater than 50% of total time was spent with the patient and / or family counseling and / or coordination of care  A description of the counseling / coordination of care: Discussed plan of care wih increase Lamictal to 200 mg BID  Depakote dose will remain the same  Recommend treat migraine headaches with triptan earlier once migraine starts to hopefully aid in efficacy   Patient and his mothe expressed understanding

## 2017-10-05 NOTE — CASE MANAGEMENT
[address] [facility] in the [facility] by Yovani Simmons for 2017  Network Utilization Review Department  Phone: [phone]; Fax [phone]  ATTENTION: The Network Utilization Review Department is now centralized for our 7 Facilities  Make a note that we have a new phone and fax numbers for our Department  Please call with any questions or concerns to [phone] and carefully follow the prompts so that you are directed to the right person  All voicemails are confidential  Fax any determinations, approvals, denials, and requests for initial or continue stay review clinical to [phone]  Due to HIGH CALL volume, it would be easier if you could please send faxed requests to expedite your requests and in part, help us provide discharge notifications faster  Initial Clinical Review    Admission: Date/Time/Statement:   10/4  1222    Orders Placed This Encounter   Procedures    Place in Observation (expected length of stay for this patient is less than two midnights)     Standing Status:   Standing     Number of Occurrences:   1     Order Specific Question:   Admitting Physician     Answer:   Kulwinder Singh [[id number]]     Order Specific Question:   Level of Care     Answer:   Med Surg [16]         ED: Date/Time/Mode of Arrival:   ED Arrival Information     Expected Arrival Acuity Means of Arrival Escorted By Service Admission Type    10/4/2017 09:34 10/4/2017 09:52 [facility]    Arrival Complaint    severe headache          Chief Complaint:   Chief Complaint   Patient presents with    Seizure - Prior Hx Of     Pt started last night with headache behind his eyes  Pt had witnessed seizure today at home and went to [facility]  Pt had another seizure other there  Pt vomited  Pt c/o headache, pain behind eyes  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          History of Illness: History aided by his mother as well     Carlos Ervin Belen is a 43 y o  male medical hx of cerebral palsy, TBI as a child, sz d/o, s/p shunt placement x 2  Migraine headache who presents with intractable headache and breakthrough sz  His mother notes symptoms initially occurring in the middle of the night was given rizatriptan w/ no relief  Early 4am he had a vomiting episode, non bloody, non bilious thus decided to present to the urgent care center  While there patient had 2 sz events w/ resulting post-ictal confusion  Patient has been seizure free x 2 years      Upon presentation to the ED, s/p CT head, xray shunt series  In the ED given IV ativan, solumedrol, toradol, benadryl and now patient reports of resolved headache  Patient has had no sz event since presentation to the ED  Neurology has been notified recommended to admit for observation        ED Vital Signs:   ED Triage Vitals [10/04/17 0956]   Temperature Pulse Respirations Blood Pressure SpO2   (!) 97 2 °F (36 2 °C) 81 18 143/73 100 %      Temp Source Heart Rate Source Patient Position - Orthostatic VS BP Location FiO2 (%)   Oral Monitor Lying Left arm --      Pain Score       Worst Possible Pain        Wt Readings from Last 1 Encounters:   10/04/17 87 2 kg (192 lb 3 2 oz)       Vital Signs (abnormal): wnl    Abnormal Labs/Diagnostic Test Results: wbc  16 66, ammonia  41, gluc   145  CT head- No acute parenchymal brain abnormality  Encephalomalacia and 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  Ventricular catheters are noted in both right and left lateral ventricles as described   Right lateral ventricle is slightly larger in the left however, there does not appear to be significant hydrocephalus    XR shunt series-      Left-sided  shunts one of which appears disconnected and the 2nd of which appears intact          ED Treatment:   Medication Administration from 10/04/2017 0934 to 10/04/2017 3868       Date/Time Order Dose Route Action Action by Comments     10/04/2017 1027 LORazepam (ATIVAN) 2 mg/mL injection 1 mg 1 mg Intravenous Given Kristie Bull, BEATRIZ      03/91/0566 1204 ketorolac (TORADOL) 30 mg/mL injection 15 mg 15 mg Intravenous Given Kristie Bull, BEATRIZ      38/99/2651 1205 metoclopramide (REGLAN) injection 10 mg 10 mg Intravenous Given Giuseppe Ashwin Bull, BEATRIZ      51/36/5290 1202 diphenhydrAMINE (BENADRYL) injection 25 mg 25 mg Intravenous Given Kristie Bull, BEATRIZ      21/17/1795 1307 methylPREDNISolone sodium succinate (Solu-MEDROL) 500 mg in sodium chloride 0 9 % 250 mL IVPB 500 mg Intravenous New Bag Willie Bosworth, RN           Past Medical/Surgical History: Active Ambulatory Problems     Diagnosis Date Noted    C[facility]bral palsy ([facility] ) 10/04/2017    [facility] 10[facility]04[facility]    Migraine headache 10/04/2017    Asthma 10/04/2017     Resolved Ambulatory Problems     Diagnosis Date Noted    No Resolved Ambulatory Problems     Past Medical History:   Diagnosis Date    Asthma     Cerebral palsy ([facility] )     Congenital hydrocephalus (HCC)     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HCC)     Migraines        Admitting Diagnosis: Seizures ([facility] ) [R56 9]  Severe headache [R51]  Shunt malfunction [T85 618A]  Intractable headache [R51]  Headache [R51]    Age/Sex: 43 y o  male    Assessment/Plan: Hospital Problem List:      Active Problems:    Intractable headache   Plan for the Primary Problem(s): # Neuro  - Intractable headache  - breakthrough sz  - Consult neurology  - S/p CT head  - S/p XR shunt series noted with disconnection of one of the shunts, per patient's mom last xr series done in [place] > 5yrs ago and she states she was assured the shunts were functioning  No recent xr series here on file and patient does not routinely follow up with a neurosurgeon    Will consult neurosurgery for further input   - Sz precautions, prn ativan  - Prn IV toradol, received IV solumedrol in the ED, now headache free  - Cont antisz meds, levels pending   # Leukocytosis - likely stress induced; no sign of occult infxn, cont to monitor   Plan for Additional Problems:   # Asthma - no sign of exacerbation   VTE Prophylaxis: Low risk  / sequential compression device   Code Status: Full code  POLST: There is no POLST form on file for this patient (pre-hospital)   Anticipated Length of Stay:  Patient will be admitted on an Observation basis with an anticipated length of stay of  < 2 midnights  Justification for Hospital Stay: Intractable headache, sz breakthrough    Admission Orders:  Scheduled Meds:   cholecalciferol 1,000 Units Oral Daily   divalproex sodium 500 mg Oral Q8H [address]   fluticasone-salmeterol 1 puff Inhalation Q12H ALEX   lamoTRIgine 150 mg Oral HS   lamoTRIgine 200 mg Oral Daily   LORazepam 1 mg Oral TID     Continuous Infusions:    PRN Meds:   acetaminophen    albuterol    ketorolac    LORazepam     Seizure precautions  Up w/ assist   Neuro surgical consult   SCD  10/5   Cbc,bmp  Wbc  18 30    Neuro consult   10/4  Assessment:  3 43year old male admitted after two seizures in setting of intractable headache   2  Congenital hydrocephalus s/p  shunt and multiple revisions  3  Asthma  4  Mild cerebral palsy   5  Leukocytosis    Plan:  1  Patient notes headache is now resolved  Would continue to monitor  Sumatriptan ordered PRN if headache returns  2  Leukocytosis may be related to seizure, but would investigate for occult infection which may also lower seizure threshold  Will check UA, consider CXR  3  Continue on Depakote 500 mg every 8 hours and Lamotrigine 200 mg QAM and 150 mg QPM  Patient has remained stable on medications for many years  Depakote level 62 and this is consistent with his prior levels which were checked in January and September 2017  Await Lamotrigine level  4  Will appreciate neurosurgery input regarding possible disconnected  shunt   Patient does not appear to have any signs of increased intracranial pressure at this time but given headache which was not responsive to treatments which are usually effective for patient would appreciate their input  5  Maintain seizure precautions     6  Monitor exam and notify with changes

## 2017-10-05 NOTE — CASE MANAGEMENT
Continued Stay Review    Date: 10/05/2017    Vital Signs: /64   Pulse 88   Temp 97 9 °F (36 6 °C) (Oral)   Resp 18   Ht 5' 10" (1 778 m)   Wt 87 2 kg (192 lb 3 2 oz)   SpO2 95%   BMI 27 58 kg/m²     Medications:   Scheduled Meds:   cholecalciferol 1,000 Units Oral Daily   divalproex sodium 500 mg Oral Q8H ALEX   fluticasone-salmeterol 1 puff Inhalation Q12H ALEX   lamoTRIgine 200 mg Oral BID   LORazepam 1 mg Oral TID     Continuous Infusions:    PRN Meds:   acetaminophen    albuterol    ketorolac    LORazepam    Abnormal Labs/Diagnostic Results:     Age/Sex: 43 y o  male     Assessment/Plan:   Assessment:  3 43year old male admitted after two seizures in setting of intractable headache  2  Congenital hydrocephalus s/p  shunt and multiple revisions with possible disconnection in one of the catheters   3  Asthma  4  Mild cerebral palsy  5  Leukocytosis- worsened today, likely secondary to steroid dose in ER yesterday for headache     Plan:  1  No infectious origin noted, UA unremarkable for infection, CXR pending  2  Work-up of hematuria as per medicine team    3  Awaiting neurosurgical input regarding possible shunt disconnection  4  Would recommend increase Lamictal to 200 mg BID and continue on Depakote 500 mg TID  5  Maintain seizure precautions  6  Did discuss headache management with patient's mother at bedside  Recommend triptan be given earlier rather than later when migraine starts as this may aid in efficacy  Also discussed that dose may be repeated 2 hours later if headache persists  7  Recommend outpatient follow-up with epilepsy team  Office will contact to arrange for follow-up     8  Above case an plan discussed with Dr Mary Munson and he is in agreement with above      Discharge Plan:

## 2017-10-05 NOTE — SOCIAL WORK
Pt new admit to the floor  CM met with patient and explained cm role  Pt alert and oriented  Pt reports he lives in 2 story home w/Mom Roger Ray [phone] and family w/5 prateek  Pt reports being independent PTA, he reports good support at home, he does not drive, and he has transport for d/c  Pts pharmacy is [facility] on [address] in [place]  No POA  Pt has special needs, so pts Mom Roger Ray is the Guardian (mom, wanted it notated in system)  Denies DME, VNA, and rehab  Denies hx/admission for psych/mental health  CM reviewed d/c planning process including the following: identifying help at home, patient preference for d/c planning needs, Discharge Lounge, [facility] Meds to Bed program, availability of treatment team to discuss questions or concerns patient and/or family may have regarding understanding medications and recognizing signs and symptoms once discharged  CM also encouraged patient to follow up with all recommended appointments after discharge  Patient advised of importance for patient and family to participate in managing patients medical well being

## 2017-10-05 NOTE — DISCHARGE SUMMARY
[facility]    Patient Information: Leopold Schmitt 43 y o  male MRN: [id number]  Unit/Bed#: CW2 211-02 Encounter: [id number]    Discharging Physician / Practitioner: Adrianna Bradshaw MD  PCP: Orville Frazier DO  Admission Date: 10/4/2017  Discharge Date: 10/05/17    Reason for Admission:  Headache and possible seizures    Discharge Diagnoses:     Principal Problem:    Intractable headache  Resolved Problems:    * No resolved hospital problems  *      Consultations During Hospital Stay:  · Neurology, Neurosurgery    Procedures Performed:     · None    Significant Findings / Test Results:     CT head WO Con -   No acute parenchymal brain abnormality    Encephalomalacia and 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  Ventricular catheters are noted in both right and left lateral ventricles as described  Right lateral ventricle is slightly larger in the left however, there does not appear to be significant hydrocephalus  XR shunt series  Left-sided  shunts one of which appears disconnected and the 2nd of which appears intact  Correlate with surgical history and prior studies if available to evaluate for interval change  Incidental Findings:   · none     Test Results Pending at Discharge (will require follow up):   · none     Outpatient Tests Requested:  · none    Complications:  none    Hospital Course:     Leopold Schmitt is a 43 y o  male patient who originally presented to the hospital on 10/4/2017 due to headache and possible seizures  Patient has known history of chronic migraine headaches and he gets occasional flares  He came in of his headache was not resolved with its triptan at home  In the ER he was given Solu-Medrol and Toradol and is headache resolved  For his seizures he was evaluated by Neurology and his dose of Lamictal was increased to 200 b i d    He did not have any more seizures while in the hospital     He was evaluated by Neurosurgery given the concern for his the patient malfunction  But since patient was asymptomatic and CT did not show any significant hydrocephalus, Neurosurgery recommended outpatient follow-up within 1 week  Patient's mother has been given contact information for Neurosurgery  He will need close follow-up with his PCP and Neurosurgery  Condition at Discharge: good     Discharge Day Visit / Exam:     Subjective:  Pt seen and examined by me this morning  Pt denies any complaints  His headache resolved  No dizziness or lightheadedness  No seizures after being admitted to the hospital     Vitals: Blood Pressure: 113/64 (10/05/17 1502)  Pulse: 88 (10/05/17 1502)  Temperature: 97 9 °F (36 6 °C) (10/05/17 1502)  Temp Source: Oral (10/05/17 1502)  Respirations: 18 (10/05/17 1502)  Height: 5' 10" (177 8 cm) (10/04/17 1427)  Weight - Scale: 87 2 kg (192 lb 3 2 oz) (10/04/17 1427)  SpO2: 95 % (10/05/17 1502)     Exam:   Physical Exam  Constitutional: Pt appears well-developed and well-nourished  Not in any acute distress  HENT:   Head: Normocephalic and atraumatic  Eyes: EOM are normal    Neck: Neck supple  Cardiovascular: Normal rate, regular rhythm, normal heart sounds and intact distal pulses  Exam reveals no gallop and no friction rub  No murmur heard  Pulmonary/Chest: Effort normal and breath sounds normal  No respiratory distress  Pt has no wheezes or rales  Abdominal: Soft  Bowel sounds are normal    Musculoskeletal: Normal range of motion  Neurological: alert and oriented to person, place, and time  Normal strength and sensations  Psychiatric: normal mood and affect  Discussion with Family: mother    Discharge instructions/Information to patient and family:   See after visit summary for information provided to patient and family        Provisions for Follow-Up Care:  See after visit summary for information related to follow-up care and any pertinent home health orders  Disposition:     Home    For Discharges to [facility] SNF:   · Not Applicable to this Patient - Not Applicable to this Patient    Planned Readmission: no     Discharge Statement:  I spent 45 minutes discharging the patient  This time was spent on the day of discharge  I had direct contact with the patient on the day of discharge  Greater than 50% of the total time was spent examining patient, answering all patient questions, arranging and discussing plan of care with patient as well as directly providing post-discharge instructions  Additional time then spent on discharge activities  Discharge Medications:  See after visit summary for reconciled discharge medications provided to patient and family        ** Please Note: This note has been constructed using a voice recognition system **

## 2017-10-06 LAB — LAMOTRIGINE SERPL-MCNC: 8.9 UG/ML (ref 2–20)

## 2017-10-06 NOTE — PHYSICIAN ADVISOR
Current patient class: Observation  The patient is currently on Hospital Day: 2      The patient was admitted to the hospital  on N/A at N/A for the following diagnosis:  Seizures ([address] ) [R56 9]  Severe headache [R51]  Shunt malfunction [T85 618A]  Intractable headache [R51]  Headache [R51]     After review of the relevant documentation, labs, vital signs and test results, the patient is most appropriate for OBSERVATION STATUS  The patient was admitted to the hospital without an expected length of stay of at least 2 midnights  Given that the patient is subject to the 2 midnight benchmark as a CMS patient, they are appropriate for observation status at this time  Should the patient remain hospitalized for a second midnight the status should be reevaluated for medical necessity  Rationale is as follows: The patient is a 43 yrs   Male who presented to the ED at 10/4/2017  9:52 AM with a chief complaint of Seizure - Prior Hx Of (Pt started last night with headache behind his eyes  Pt had witnessed seizure today at home and went to [facility]  Pt had another seizure other there  Pt vomited  Pt c/o headache, pain behind eyes    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  )    The patients vitals on arrival were ED Triage Vitals [10/04/17 0956]   Temperature Pulse Respirations Blood Pressure SpO2   (!) 97 2 °F (36 2 °C) 81 18 143/73 100 %      Temp Source Heart Rate Source Patient Position - Orthostatic VS BP Location FiO2 (%)   Oral Monitor Lying Left arm --      Pain Score       Worst Possible Pain           Past Medical History:   Diagnosis Date    Asthma     Cerebral palsy (HCC)     Congenital hydrocephalus (HCC)     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HCC)     Migraines      Past Surgical History:   Procedure Laterality Date    BRAIN SURGERY      Multiple  shunt revisions    HERNIA REPAIR             Consults have been placed to:   IP CONSULT TO NEUROLOGY  IP CONSULT TO NEUROSURGERY    Vitals:    10/04/17 4732 10/05/17 0738 10/05/17 0755 10/05/17 1502   BP: 124/60  116/56 113/64   Pulse: 98  80 88   Resp: 18  18 18   Temp: 98 8 °F (37 1 °C)  97 5 °F (36 4 °C) 97 9 °F (36 6 °C)   TempSrc: Oral  Oral Oral   SpO2: 96% 97% 93% 95%   Weight:       Height:           Most recent labs:    Recent Labs      10/04/17   1023   10/05/17   0440   WBC   --    < >  18 30*   HGB   --    < >  15 3   HCT   --    < >  43 3   PLT   --    < >  311   K  5 1   --   3 8   NA  136   --   139   CALCIUM  9 5   --   8 9   BUN  11   --   16   CREATININE  0 68   --   0 74   AST  27   --    --    ALT  14   --    --    ALKPHOS  60   --    --    BILITOT  0 62   --    --     < > = values in this interval not displayed  Scheduled Meds:  Continuous Infusions:  No current facility-administered medications for this encounter  PRN Meds:      Surgical procedures (if appropriate):

## 2017-10-06 NOTE — PROGRESS NOTES
Assessment  1  Headache (784 0) (R51)    Plan  Headache    · Ondansetron 4 MG Oral Tablet Disintegrating    Discussion/Summary  Discussion Summary:   Patient sent to ER via Ambulance  Zofran given but patient vomited  Transfer center called  Report given to charge nurse at [facility]  appears ill, EMS called upon examining patient  He was given 1 dose of zofran but vomited about 5 minutes after taking medication  He has tremors in both arm  Right arm greater than left arm  Alert and speaking  After vomiting episode sluggish  Medication Side Effects Reviewed: Possible side effects of new medications were reviewed with the patient/guardian today  Understands and agrees with treatment plan: The treatment plan was reviewed with the patient/guardian  The patient/guardian understands and agrees with the treatment plan   Counseling Documentation With Imm: The patient was counseled regarding instructions for management,-patient and family education,-importance of compliance with treatment  Chief Complaint  1  Headache  Chief Complaint Free Text Note Form: c/o headache to face and whole head with nausea and vomiting,+ photophobia , pt with hx  of seizures,migraines and shunt to head  accompanyed with mother , whom is speaking for patient, patient able to speak , A/A/Ox 3   History of Present Illness  HPI: This is 43year old male here today with severe headache, nausea, and vomiting  Symptoms started last night  History of headache but this is different from previous headaches  He has history of hydrocephalus as child with bilateral shunts  He had vomiting this morning prior to coming large amount  Headache started at 1 AM  This is worse headache he has every had  Mother states he has not headache in a 'while as per mother  He has tremor of the right arm  Mother states he seems sluggish today  Mother states this morning he had tremor in his right arm this morning  He has tremor in arms here today  History of seizures  He has not had seizure in about 2 years  Mother believes he had motor seizure this morning  No recent head trauma  Upon walking in exam room, patient does not appear well, he appears uncomfortable with tremors in arms  He is alert and answer questions appropriately but is slightly sluggish  he was given Migraine medication as 1 am with no relief  He denies any cough or congestion  Hospital Based Practices Required Assessment: (on a scale of 0 to 10, the patient rates the pain at 10 )   Abuse And Domestic Violence Screen    Yes, the patient is safe at home -The patient states no one is hurting them  Depression And Suicide Screen  No, the patient has not had thoughts of hurting themself  No, the patient has not felt depressed in the past 7 days  Prefered Language is  english  Review of Systems  Focused-Male:   Constitutional: feeling poorly, but-as noted in HPI    ENT: no complaints of earache, no loss of hearing, no nosebleeds or nasal discharge, no sore throat or hoarseness  Cardiovascular: no complaints of slow or fast heart rate, no chest pain, no palpitations, no leg claudication or lower extremity edema  Respiratory: no complaints of shortness of breath, no wheezing or cough, no dyspnea on exertion, no orthopnea or PND  Neurological: headache, but-as noted in HPI  ROS Reviewed:   ROS reviewed  Active Problems  1  Encounter for preventive health examination (V70 0) (Z00 00)   2  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)   3  Long term use of drug (V58 69) (Z79 899)   4  Medicare annual wellness visit, subsequent (V70 0) (Z00 00)   5  Migraine with aura and without status migrainosus, not intractable (346 00) (G43 109)   6  Mild cerebral palsy (343 9) (G80 9)   7  Mild intermittent asthma without complication (667 28) (M18 15)   8  Need for influenza vaccination (V04 81) (Z23)   9  Sinus congestion (478 19) (R09 81)   10   Situational anxiety (300 09) (F41 8) 11  Vitamin D deficiency (268 9) (E55 9)    Past Medical History  1  History of influenza vaccination (V49 89) (Z92 29)   2  History of Mild intermittent asthma without complication (411 55) (B49 34)  Active Problems And Past Medical History Reviewed: The active problems and past medical history were reviewed and updated today  Family History  Mother    1  Family history of hypercholesterolemia (V18 19) (Z83 42)   2  Family history of hypertension (V17 49) (Z82 49)   3  Family history of malignant neoplasm of breast (V16 3) (Z80 3)  Family History Reviewed: The family history was reviewed and updated today  Social History   · Never a smoker  Social History Reviewed: The social history was reviewed and updated today  The social history was reviewed and is unchanged  Surgical History  1  History of Creation Of Ventriculo-Peritoneal CSF Shunt   2  History of Eye Surgery   3  History of Hernia Repair   4  History of Replacement Of Ventricular CSF Catheter  Surgical History Reviewed: The surgical history was reviewed and updated today  Current Meds   1  Advair Diskus 100-50 MCG/DOSE Inhalation Aerosol Powder Breath Activated; INHALE 1   PUFF BY MOUTH TWICE DAILY; Therapy: 94ZQU9130 to (Evaluate:27Tls3078)  Requested for: 22Nov2016; Last   Rx:22Nov2016 Ordered   2  ALPRAZolam 0 25 MG Oral Tablet; 1 TAB  PO BEFORE TRAVEL - CAN REPEAT  Q8   HOURS AS NEEDED; Therapy: 26ODJ6577 to (Evaluate:22Jan2016) Recorded   3  Divalproex Sodium 500 MG Oral Tablet Delayed Release; 1 tab in am, 1 tab at noon, 1   tab in pm  Requested for: 70AWK4746; Last QD:[id number] Ordered   4  LamoTRIgine 100 MG Oral Tablet; TAKE 2 TABLETS BY MOUTH EVERY MORNING AND   1 AND 1/2 TABLET BY MOUTH EVERY EVENING; Therapy: 05DED8396 to (Evaluate:26Mar2018)  Requested for: 86STO8156; Last   Rx:31Mar2017 Ordered   5  LORazepam 1 MG Oral Tablet; 1 TAB THREE TIMES A DAY; Therapy: (Recorded:74Tmx2053) to Recorded   6   ProAir HFA 108 (90 Base) MCG/ACT Inhalation Aerosol Solution; INHALE 1 TO 2 PUFFS   EVERY 4 TO 6 HOURS AS NEEDED; Therapy: 48HRA6859 to (Evaluate:14Ezq1324)  Requested for: 92Glt7790; Last   Rx:02Qix2977 Ordered   7  Rizatriptan Benzoate 10 MG Oral Tablet Disintegrating; TAKE 1 TABLET AT ONSET OF   HEADACHE  MAY REPEAT EVERY 2 HOURS AS NEEDED  MAXIMUM 3 TABLETS IN 24   HOURS; Therapy: 13OBS7582 to (Evaluate:73Owm8037)  Requested for: 32NZG6577; Last   Rx:27Jan2017 Ordered   8  Vitamin D (Ergocalciferol) 43833 UNIT Oral Capsule; 1 CAPSULE WEEKLY FOR THE   NEXT 8 WEEKS; Therapy: 84FQF6827 to (Evaluate:01Nyn3145)  Requested for: 21Mar2017; Last   Rx:27Jan2017; Status: ACTIVE - Renewal Denied Ordered   9  Vitamin D 2000 UNIT Oral Capsule; take 1 capsule daily; Therapy: 30VSK3008 to )  Requested for: 24AOJ2623; Last   PB:49PGP0963 Ordered  Medication List Reviewed: The medication list was reviewed and updated today  Allergies  1  Latex Gloves MISC  2  Animal dander - Cats   3  Grass    Vitals  Signs   Recorded: 86CES8077 08:59AM   Temperature: 98 F, Temporal  Heart Rate: 94  Respiration: 20  Systolic: 809  Diastolic: 83  Weight: 034 lb   BMI Calculated: 27 98  BSA Calculated: 2 06  O2 Saturation: 100  Pain Scale: 10    Physical Exam    Constitutional   General appearance: Abnormal  -appears uncomfortable  Hand on head  Pulmonary   Respiratory effort: No increased work of breathing or signs of respiratory distress  Psychiatric   Orientation to person, place and time: Normal  -slightly sluggish after episode of vomiting here today  Mood and affect: Normal     Additional Exam:  Tremors to bilateral arms, has some tremor at baseline as per mother but it is increased today  Future Appointments    Date/Time Provider Specialty Site   02/02/2018 10:00 AM SHAD Funes   Neurology [address]     Signatures   Electronically signed by : Steph Ferrer, Kd [facility]; Oct  4 2017 11: 49AM EST                       (Author)    Electronically signed by : Arsen Rios DO; Oct  5 2017 10:28AM EST                       (Co-author)

## 2017-10-17 DIAGNOSIS — G40.109 LOCALZ-RLTD SYMPTOMATIC EPILEPSY W SMPL PART SZ, NONINTRACT, WO STATUS (HCC): ICD-10-CM

## 2017-10-17 DIAGNOSIS — G91.9 HYDROCEPHALUS (HCC): ICD-10-CM

## 2017-10-17 DIAGNOSIS — G93.89 OTHER SPECIFIED DISORDERS OF BRAIN (CODE): ICD-10-CM

## 2017-10-17 DIAGNOSIS — Z48.89 ENCOUNTER FOR OTHER SPECIFIED SURGICAL AFTERCARE (CODE): ICD-10-CM

## 2017-10-17 DIAGNOSIS — G80.8 OTHER CEREBRAL PALSY (HCC): ICD-10-CM

## 2017-10-19 ENCOUNTER — HOSPITAL ENCOUNTER (OUTPATIENT)
Dept: RADIOLOGY | Facility: HOSPITAL | Age: 42
Discharge: HOME | DRG: 026 | End: 2017-10-19
Payer: MEDICARE

## 2017-10-19 ENCOUNTER — TRANSCRIBE ORDERS (OUTPATIENT)
Dept: RADIOLOGY | Facility: HOSPITAL | Age: 42
End: 2017-10-19

## 2017-10-19 DIAGNOSIS — G80.8 OTHER CEREBRAL PALSY (HCC): ICD-10-CM

## 2017-10-19 DIAGNOSIS — G91.9 HYDROCEPHALUS (HCC): ICD-10-CM

## 2017-10-19 DIAGNOSIS — G93.89 OTHER SPECIFIED DISORDERS OF BRAIN (CODE): ICD-10-CM

## 2017-10-19 PROCEDURE — 70450 CT HEAD/BRAIN W/O DYE: CPT

## 2017-10-20 ENCOUNTER — GENERIC CONVERSION - ENCOUNTER (OUTPATIENT)
Dept: OTHER | Facility: OTHER | Age: 42
End: 2017-10-20

## 2017-10-20 ENCOUNTER — HOSPITAL ENCOUNTER (INPATIENT)
Facility: HOSPITAL | Age: 42
LOS: 6 days | Discharge: HOME | DRG: 026 | End: 2017-10-26
Attending: EMERGENCY MEDICINE | Admitting: GENERAL PRACTICE
Payer: MEDICARE

## 2017-10-20 ENCOUNTER — APPOINTMENT (EMERGENCY)
Dept: RADIOLOGY | Facility: HOSPITAL | Age: 42
DRG: 026 | End: 2017-10-20
Payer: MEDICARE

## 2017-10-20 DIAGNOSIS — Z98.2 VP (VENTRICULOPERITONEAL) SHUNT STATUS: ICD-10-CM

## 2017-10-20 DIAGNOSIS — R51.9 HEADACHE: ICD-10-CM

## 2017-10-20 DIAGNOSIS — J45.909 ASTHMA, UNSPECIFIED ASTHMA SEVERITY, UNSPECIFIED WHETHER COMPLICATED, UNSPECIFIED WHETHER PERSISTENT: Chronic | ICD-10-CM

## 2017-10-20 DIAGNOSIS — G80.9 CEREBRAL PALSY (HCC): Chronic | ICD-10-CM

## 2017-10-20 DIAGNOSIS — T85.09XA OBSTRUCTED VP SHUNT, INITIAL ENCOUNTER (HCC): ICD-10-CM

## 2017-10-20 DIAGNOSIS — R56.9 SEIZURE-LIKE ACTIVITY (HCC): Primary | ICD-10-CM

## 2017-10-20 LAB
ALBUMIN SERPL BCP-MCNC: 3.7 G/DL (ref 3.5–5)
ALP SERPL-CCNC: 52 U/L (ref 46–116)
ALT SERPL W P-5'-P-CCNC: 15 U/L (ref 12–78)
ANION GAP SERPL CALCULATED.3IONS-SCNC: 8 MMOL/L (ref 4–13)
APPEARANCE CSF: CLEAR
AST SERPL W P-5'-P-CCNC: 5 U/L (ref 5–45)
BASOPHILS # BLD AUTO: 0.03 THOUSANDS/ΜL (ref 0–0.1)
BASOPHILS NFR BLD AUTO: 0 % (ref 0–1)
BILIRUB SERPL-MCNC: 0.42 MG/DL (ref 0.2–1)
BUN SERPL-MCNC: 17 MG/DL (ref 5–25)
CALCIUM SERPL-MCNC: 9.3 MG/DL (ref 8.3–10.1)
CHLORIDE SERPL-SCNC: 104 MMOL/L (ref 100–108)
CO2 SERPL-SCNC: 28 MMOL/L (ref 21–32)
CREAT SERPL-MCNC: 0.78 MG/DL (ref 0.6–1.3)
EOSINOPHIL # BLD AUTO: 0.47 THOUSAND/ΜL (ref 0–0.61)
EOSINOPHIL NFR BLD AUTO: 4 % (ref 0–6)
ERYTHROCYTE [DISTWIDTH] IN BLOOD BY AUTOMATED COUNT: 12.3 % (ref 11.6–15.1)
GFR SERPL CREATININE-BSD FRML MDRD: 111 ML/MIN/1.73SQ M
GLUCOSE CSF-MCNC: 57 MG/DL (ref 50–80)
GLUCOSE SERPL-MCNC: 100 MG/DL (ref 65–140)
GLUCOSE SERPL-MCNC: 101 MG/DL (ref 65–140)
GRAM STN SPEC: NORMAL
GRAM STN SPEC: NORMAL
HCT VFR BLD AUTO: 46.1 % (ref 36.5–49.3)
HGB BLD-MCNC: 16.2 G/DL (ref 12–17)
LYMPHOCYTES # BLD AUTO: 3.79 THOUSANDS/ΜL (ref 0.6–4.47)
LYMPHOCYTES NFR BLD AUTO: 31 % (ref 14–44)
MCH RBC QN AUTO: 30.7 PG (ref 26.8–34.3)
MCHC RBC AUTO-ENTMCNC: 35.1 G/DL (ref 31.4–37.4)
MCV RBC AUTO: 88 FL (ref 82–98)
MONOCYTES # BLD AUTO: 0.93 THOUSAND/ΜL (ref 0.17–1.22)
MONOCYTES NFR BLD AUTO: 8 % (ref 4–12)
NEUTROPHILS # BLD AUTO: 6.82 THOUSANDS/ΜL (ref 1.85–7.62)
NEUTS SEG NFR BLD AUTO: 57 % (ref 43–75)
NRBC BLD AUTO-RTO: 0 /100 WBCS
PLATELET # BLD AUTO: 268 THOUSANDS/UL (ref 149–390)
PMV BLD AUTO: 10 FL (ref 8.9–12.7)
POTASSIUM SERPL-SCNC: 3.5 MMOL/L (ref 3.5–5.3)
PROT CSF-MCNC: 70 MG/DL (ref 15–45)
PROT SERPL-MCNC: 7.2 G/DL (ref 6.4–8.2)
RBC # BLD AUTO: 5.27 MILLION/UL (ref 3.88–5.62)
RBC # CSF MANUAL: 1.1 UL (ref 0–10)
SODIUM SERPL-SCNC: 140 MMOL/L (ref 136–145)
TOTAL CELLS COUNTED BLD: NO
TUBE # CSF: 4
VALPROATE SERPL-MCNC: 25 UG/ML (ref 50–100)
WBC # BLD AUTO: 12.16 THOUSAND/UL (ref 4.31–10.16)
WBC # CSF AUTO: 1.65 /UL (ref 0–5)

## 2017-10-20 PROCEDURE — 80175 DRUG SCREEN QUAN LAMOTRIGINE: CPT | Performed by: EMERGENCY MEDICINE

## 2017-10-20 PROCEDURE — 96375 TX/PRO/DX INJ NEW DRUG ADDON: CPT

## 2017-10-20 PROCEDURE — G8979 MOBILITY GOAL STATUS: HCPCS

## 2017-10-20 PROCEDURE — 89050 BODY FLUID CELL COUNT: CPT | Performed by: NEUROLOGICAL SURGERY

## 2017-10-20 PROCEDURE — 74000 HB X-RAY EXAM OF ABDOMEN (SINGLE ANTEROPOSTERIOR VIEW): CPT

## 2017-10-20 PROCEDURE — 36415 COLL VENOUS BLD VENIPUNCTURE: CPT | Performed by: EMERGENCY MEDICINE

## 2017-10-20 PROCEDURE — 97161 PT EVAL LOW COMPLEX 20 MIN: CPT

## 2017-10-20 PROCEDURE — 00963ZX DRAINAGE OF CEREBRAL VENTRICLE, PERCUTANEOUS APPROACH, DIAGNOSTIC: ICD-10-PCS | Performed by: NEUROLOGICAL SURGERY

## 2017-10-20 PROCEDURE — 80164 ASSAY DIPROPYLACETIC ACD TOT: CPT | Performed by: EMERGENCY MEDICINE

## 2017-10-20 PROCEDURE — G8980 MOBILITY D/C STATUS: HCPCS

## 2017-10-20 PROCEDURE — 97165 OT EVAL LOW COMPLEX 30 MIN: CPT

## 2017-10-20 PROCEDURE — 89051 BODY FLUID CELL COUNT: CPT | Performed by: NEUROLOGICAL SURGERY

## 2017-10-20 PROCEDURE — G8989 SELF CARE D/C STATUS: HCPCS

## 2017-10-20 PROCEDURE — 96374 THER/PROPH/DIAG INJ IV PUSH: CPT

## 2017-10-20 PROCEDURE — 70250 X-RAY EXAM OF SKULL: CPT

## 2017-10-20 PROCEDURE — G8978 MOBILITY CURRENT STATUS: HCPCS

## 2017-10-20 PROCEDURE — G8987 SELF CARE CURRENT STATUS: HCPCS

## 2017-10-20 PROCEDURE — 96361 HYDRATE IV INFUSION ADD-ON: CPT

## 2017-10-20 PROCEDURE — 85025 COMPLETE CBC W/AUTO DIFF WBC: CPT | Performed by: EMERGENCY MEDICINE

## 2017-10-20 PROCEDURE — 82945 GLUCOSE OTHER FLUID: CPT | Performed by: NEUROLOGICAL SURGERY

## 2017-10-20 PROCEDURE — 82948 REAGENT STRIP/BLOOD GLUCOSE: CPT

## 2017-10-20 PROCEDURE — 80053 COMPREHEN METABOLIC PANEL: CPT | Performed by: EMERGENCY MEDICINE

## 2017-10-20 PROCEDURE — 84157 ASSAY OF PROTEIN OTHER: CPT | Performed by: NEUROLOGICAL SURGERY

## 2017-10-20 PROCEDURE — G8988 SELF CARE GOAL STATUS: HCPCS

## 2017-10-20 PROCEDURE — 87070 CULTURE OTHR SPECIMN AEROBIC: CPT | Performed by: NEUROLOGICAL SURGERY

## 2017-10-20 PROCEDURE — 99285 EMERGENCY DEPT VISIT HI MDM: CPT

## 2017-10-20 PROCEDURE — 71020 HB CHEST X-RAY 2VW FRONTAL&LATL: CPT

## 2017-10-20 RX ORDER — DIVALPROEX SODIUM 500 MG/1
500 TABLET, DELAYED RELEASE ORAL DAILY
Status: DISCONTINUED | OUTPATIENT
Start: 2017-10-20 | End: 2017-10-22

## 2017-10-20 RX ORDER — DIPHENHYDRAMINE HYDROCHLORIDE 50 MG/ML
25 INJECTION INTRAMUSCULAR; INTRAVENOUS ONCE
Status: COMPLETED | OUTPATIENT
Start: 2017-10-20 | End: 2017-10-20

## 2017-10-20 RX ORDER — SUMATRIPTAN 50 MG/1
50 TABLET, FILM COATED ORAL ONCE
Status: COMPLETED | OUTPATIENT
Start: 2017-10-20 | End: 2017-10-20

## 2017-10-20 RX ORDER — DOCUSATE SODIUM 100 MG/1
100 CAPSULE, LIQUID FILLED ORAL 2 TIMES DAILY
Status: DISCONTINUED | OUTPATIENT
Start: 2017-10-20 | End: 2017-10-26 | Stop reason: HOSPADM

## 2017-10-20 RX ORDER — SENNOSIDES 8.6 MG
1 TABLET ORAL DAILY
Status: DISCONTINUED | OUTPATIENT
Start: 2017-10-20 | End: 2017-10-25

## 2017-10-20 RX ORDER — MELATONIN
1000 DAILY
Status: DISCONTINUED | OUTPATIENT
Start: 2017-10-20 | End: 2017-10-26 | Stop reason: HOSPADM

## 2017-10-20 RX ORDER — RIZATRIPTAN BENZOATE 10 MG/1
10 TABLET, ORALLY DISINTEGRATING ORAL ONCE AS NEEDED
Status: DISCONTINUED | OUTPATIENT
Start: 2017-10-20 | End: 2017-10-20

## 2017-10-20 RX ORDER — KETOROLAC TROMETHAMINE 30 MG/ML
15 INJECTION, SOLUTION INTRAMUSCULAR; INTRAVENOUS ONCE
Status: COMPLETED | OUTPATIENT
Start: 2017-10-20 | End: 2017-10-20

## 2017-10-20 RX ORDER — METOCLOPRAMIDE HYDROCHLORIDE 5 MG/ML
10 INJECTION INTRAMUSCULAR; INTRAVENOUS ONCE
Status: COMPLETED | OUTPATIENT
Start: 2017-10-20 | End: 2017-10-20

## 2017-10-20 RX ORDER — POLYETHYLENE GLYCOL 3350 17 G/17G
17 POWDER, FOR SOLUTION ORAL DAILY
Status: DISCONTINUED | OUTPATIENT
Start: 2017-10-20 | End: 2017-10-26 | Stop reason: HOSPADM

## 2017-10-20 RX ORDER — LAMOTRIGINE 100 MG/1
100 TABLET ORAL 2 TIMES DAILY
Status: DISCONTINUED | OUTPATIENT
Start: 2017-10-20 | End: 2017-10-20

## 2017-10-20 RX ORDER — ACETAMINOPHEN 325 MG/1
650 TABLET ORAL EVERY 6 HOURS PRN
Status: DISCONTINUED | OUTPATIENT
Start: 2017-10-20 | End: 2017-10-26 | Stop reason: HOSPADM

## 2017-10-20 RX ORDER — DIVALPROEX SODIUM 500 MG/1
500 TABLET, DELAYED RELEASE ORAL EVERY 8 HOURS SCHEDULED
Status: DISCONTINUED | OUTPATIENT
Start: 2017-10-20 | End: 2017-10-20

## 2017-10-20 RX ORDER — DIVALPROEX SODIUM 500 MG/1
500 TABLET, DELAYED RELEASE ORAL EVERY 12 HOURS SCHEDULED
Status: DISCONTINUED | OUTPATIENT
Start: 2017-10-20 | End: 2017-10-20

## 2017-10-20 RX ORDER — LAMOTRIGINE 100 MG/1
100 TABLET ORAL 2 TIMES DAILY
COMMUNITY
End: 2017-10-26 | Stop reason: HOSPADM

## 2017-10-20 RX ORDER — DIVALPROEX SODIUM 500 MG/1
1000 TABLET, DELAYED RELEASE ORAL EVERY 12 HOURS SCHEDULED
Status: DISCONTINUED | OUTPATIENT
Start: 2017-10-20 | End: 2017-10-22

## 2017-10-20 RX ORDER — ALBUTEROL SULFATE 90 UG/1
1 AEROSOL, METERED RESPIRATORY (INHALATION) EVERY 4 HOURS PRN
Status: DISCONTINUED | OUTPATIENT
Start: 2017-10-20 | End: 2017-10-26 | Stop reason: HOSPADM

## 2017-10-20 RX ORDER — LAMOTRIGINE 100 MG/1
200 TABLET ORAL 2 TIMES DAILY
Status: DISCONTINUED | OUTPATIENT
Start: 2017-10-20 | End: 2017-10-26 | Stop reason: HOSPADM

## 2017-10-20 RX ADMIN — SODIUM CHLORIDE 1000 ML: 0.9 INJECTION, SOLUTION INTRAVENOUS at 05:26

## 2017-10-20 RX ADMIN — FLUTICASONE PROPIONATE AND SALMETEROL 1 PUFF: 50; 100 POWDER RESPIRATORY (INHALATION) at 08:57

## 2017-10-20 RX ADMIN — FLUTICASONE PROPIONATE AND SALMETEROL 1 PUFF: 50; 100 POWDER RESPIRATORY (INHALATION) at 21:47

## 2017-10-20 RX ADMIN — SUMATRIPTAN SUCCINATE 50 MG: 50 TABLET ORAL at 19:34

## 2017-10-20 RX ADMIN — ALBUTEROL SULFATE 1 PUFF: 90 AEROSOL, METERED RESPIRATORY (INHALATION) at 10:57

## 2017-10-20 RX ADMIN — DIPHENHYDRAMINE HYDROCHLORIDE 25 MG: 50 INJECTION, SOLUTION INTRAMUSCULAR; INTRAVENOUS at 05:26

## 2017-10-20 RX ADMIN — DIVALPROEX SODIUM 500 MG: 500 TABLET, DELAYED RELEASE ORAL at 11:20

## 2017-10-20 RX ADMIN — ENOXAPARIN SODIUM 40 MG: 40 INJECTION SUBCUTANEOUS at 08:54

## 2017-10-20 RX ADMIN — DIVALPROEX SODIUM 500 MG: 500 TABLET, DELAYED RELEASE ORAL at 17:19

## 2017-10-20 RX ADMIN — METOCLOPRAMIDE 10 MG: 5 INJECTION, SOLUTION INTRAMUSCULAR; INTRAVENOUS at 05:27

## 2017-10-20 RX ADMIN — VITAMIN D, TAB 1000IU (100/BT) 1000 UNITS: 25 TAB at 08:51

## 2017-10-20 RX ADMIN — SENNOSIDES 8.6 MG: 8.6 TABLET, FILM COATED ORAL at 08:53

## 2017-10-20 RX ADMIN — DIVALPROEX SODIUM 1000 MG: 500 TABLET, DELAYED RELEASE ORAL at 21:48

## 2017-10-20 RX ADMIN — VALPROATE SODIUM 1000 MG: 100 INJECTION, SOLUTION INTRAVENOUS at 06:09

## 2017-10-20 RX ADMIN — LAMOTRIGINE 200 MG: 100 TABLET ORAL at 19:34

## 2017-10-20 RX ADMIN — DOCUSATE SODIUM 100 MG: 100 CAPSULE, LIQUID FILLED ORAL at 08:51

## 2017-10-20 RX ADMIN — DOCUSATE SODIUM 100 MG: 100 CAPSULE, LIQUID FILLED ORAL at 17:19

## 2017-10-20 RX ADMIN — KETOROLAC TROMETHAMINE 15 MG: 30 INJECTION, SOLUTION INTRAMUSCULAR at 05:26

## 2017-10-20 RX ADMIN — LAMOTRIGINE 200 MG: 100 TABLET ORAL at 08:54

## 2017-10-20 NOTE — CONSULTS
Consultation - Neurosurgery   Vance Furth 43 y o  male MRN: [id number]  Unit/Bed#: -01 Encounter: [id number]      Assessment/Plan     Assessment:  1  History of congenital hydrocephalus status post multiple shunts and revisions  2  History of longstanding seizure disorder frequent breakthrough seizures  3  Cerebral palsy  4  Headache and vomiting-resolved  5  History of migraines  6  Asthma    Plan:  · Neuro exam:  Nonfocal, alert and oriented x3, GCS 15, no pronator drift, nuchal rigidity, signs of hydrocephalus  · Imaging reviewed personally and with attending  · CT head 10/4: no acute intraparenchymal brain abnormality, encephalomalacia in left parietal region  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  Left frontal ventricular catherter in good position within the right anterior horn  Ventricles look well decompressed  Left posterior parietal ventricular catheter remnant which is connected to a valve with a reservoir in the scalp  Left lateral parietal ventricular catheter w hich extends to region of posterior horn, but is within parenchymal    · CT head 10/19: stable from above  · Xray shunt series 10/4: The left frontal ventricular catheter  appears attached to a Rickham reservoir and the valve connection before coursing down the left side of the hemicranium down the neck to the left upper quadrant  This is likely the functioning shunt  2 separate shunt catheters noted coursing through left neck over left chest terminating in left upper quadrant  One catheter terminating in the soft tissues of the left neck at the C4-5 region   The catheter terminates over the left occiput     · XR shunt series 10/20: 2  shunt catheters unchanged, including discontinuity of catheter in left lateral neck  · Imaging requested from previous institutions  · Continue medical management by primary team- [facility]    As discussed with Yosvayn Denton DNP  · Neurology team consulted for management of anti-epileptic regimen- appreciate input  · Anticipate shunt tap today to determine elevated intracranial pressure  Based on results may proceed with nuclear med shunt survey  Pending results of shunt tap patient may indicate revision of ventriculoperitoneal shunt system this hospitalization vs close outpatient follow-up  History of Present Illness     HPI: Franco Russo is a 43y o  year old male PMH of congenital hydrocephalus status post multiple shunt revisions, cerebral palsy, seizure disorder, who presents with headaches and seizure-like activity  Mother states patient awoke from sleep complaining of frontal headache described as throbbing and rated at 7/10  This is consistent with his migraine headaches  Following dosing Triptan his headache did not subside  Thereafter he developed a "mini seizure" described as bilateral UE tonic-clonic activity lasting about 1 minute  Of note, patient had recent admission on 10/5 for 2 day history of headaches and vomiting followed by 2 episodes of seizures  Patient was found to have disconnection of distal shunt catheter tubing  Patient was clinically baseline and therefor was discharged home with close interval outpatient follow-up with Dr Ye Oconnell his appointment was scheduled for today  Since recent discharge patient has had increasing frequency of headaches  Mom states that prior to previous shunt revisions secondary to failure patient presented with symptoms such as increasing seizure, headaches, vomiting, and "being comatose     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  Upon discharge patient was behind development, had persistent vomiting  Patient was taken to neurologist at 5 months diagnosed with hydrocephalus upon completion of CT scan  First shunt was inserted at that time  Mother believes in left frontal region  Upon shunt insertion patient improvement developmentally until the age of 2    Which time he developed seizure disorder  In this time frame patient had lengthening of distal shunt catheter  He subsequently had did developmental delay learned how to walk and talk again   At the age of 11 patient developed a distal blockage of shunt catheter  Mother states that he had both a cranial and distal incision  Following this revision patient noted to have a CSF leak over the reservoir  Patient was intraoperatively found to have a proximal dysfunction at which time the proximal shunt portion was replaced  Next significant event occurred at the age of 6  Mom states patient became comatose this time was diagnosed with hemorrhagic stroke   Struggled with status epilepticus during this period  Over the next few years patient recovered and was able to be weaned off of antiepileptic drugs  In his teens patient had return of grand mal seizures for which 8 these were re-initiated  A few years later patient had another comatose phase  During this time seizures recurred  Mother believes that 1 shunt was removed and another was added at that time  She is unable to identify where the locations were  In his 35s mother states that patient was diagnosed with this cyst that was taking up most of his skull at which time a left temporal shunt was inserted specifically for this cyst   In this perioperative period breakthrough seizures occurred  They continue to follow with a neurologist   At that time the Depakote levels were adjusted while Lamictal levels remained consistent      Of note patient has been diagnosed with a left esotropia and underwent procedure at the age of 6  For a this has recurred and outpatient follow-up with ophthalmology as scheduled  Patient lives at home with his mother  Does not drive  Bags groceries  He does have a history of migraines but states that the recent headache was a different quality      Inpatient consult to Neurosurgery  Consult performed by: Prabhjot Hernandez  Consult ordered by: Zakia Thompson          Review of Systems   Constitutional: Negative for activity change, appetite change, fatigue and fever  HENT: Negative for tinnitus  Eyes: Negative for visual disturbance  Respiratory: Negative for cough and shortness of breath  Gastrointestinal: Negative for abdominal pain, nausea and vomiting  Genitourinary: Negative for difficulty urinating  Musculoskeletal: Negative for back pain, neck pain and neck stiffness  Skin: Negative for color change  Allergic/Immunologic:        Latex   Neurological: Positive for dizziness, seizures, weakness (Prior to seizure-like activity) and headaches  Negative for numbness  Psychiatric/Behavioral: Negative for confusion  The patient is nervous/anxious          Historical Information   Past Medical History:   Diagnosis Date    Asthma     [facility] [address] [facility]     [facility] [address] [facility]     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     Migraines     Seizures ([facility])      Past Surgical History:   Procedure Laterality Date    BRAIN SURGERY      Multiple  shunt revisions    HERNIA REPAIR       History   Alcohol Use No     Comment: N/A     History   Drug Use No     Comment: N/A     History   Smoking Status    Never Smoker   Smokeless Tobacco    Never Used     Comment: N/A     Family History   Problem Relation Age of Onset    Family history unknown: Yes       Meds/Allergies   all current active meds have been reviewed, current meds:   Current Facility-Administered Medications   Medication Dose Route Frequency    acetaminophen (TYLENOL) tablet 650 mg  650 mg Oral Q6H PRN    albuterol (PROVENTIL HFA,VENTOLIN HFA) inhaler 1 puff  1 puff Inhalation Q4H PRN    cholecalciferol (VITAMIN D3) tablet 1,000 Units  1,000 Units Oral Daily    divalproex sodium (DEPAKOTE) EC tablet 1,000 mg  1,000 mg Oral Q12H [address]    divalproex sodium (DEPAKOTE) EC tablet 500 mg  500 mg Oral Daily    docusate sodium (COLACE) capsule 100 mg  100 mg Oral BID  enoxaparin (LOVENOX) subcutaneous injection 40 mg  40 mg Subcutaneous Daily    fluticasone-salmeterol (ADVAIR) 100-50 mcg/dose inhaler 1 puff  1 puff Inhalation Q12H [facility]    lamoTRIgine (LaMICtal) tablet 200 mg  200 mg Oral BID    polyethylene glycol (MIRALAX) packet 17 g  17 g Oral Daily    rizatriptan (MAXALT-MLT) disintegrating tablet 10 mg  10 mg Oral Once PRN    senna (SENOKOT) tablet 8 6 mg  1 tablet Oral Daily    and PTA meds:   Prior to Admission Medications   Prescriptions Last Dose Informant Patient Reported? Taking? albuterol (PROAIR HFA) 90 mcg/act inhaler 10/19/2017 at Unknown time  Yes Yes   Sig: ProAir  (90 Base) MCG/ACT Inhalation Aerosol Solution  INHALE 1 TO 2 PUFFS EVERY 4 TO 6 HOURS AS NEEDED  Quantity: 1;  Refills: 5       Myah Wilkinson DO;  Started 23-Dec-2015  Heinz Kussmaul  5 GM Inhaler   cholecalciferol (VITAMIN D3) 1,000 units tablet 10/19/2017 at Unknown time  Yes Yes   Sig: Take 1,000 Units by mouth daily Pt takes 2,000 units per day   divalproex sodium (DEPAKOTE) 500 mg EC tablet 10/19/2017 at Unknown time  Yes Yes   Sig: Divalproex Sodium 500 MG Oral Tablet Delayed Release  1 tab in am, 1 tab at noon, 1 tab in pm   Quantity: 270;  Refills: 3       Rollen Roller M D ; Active   fluticasone-salmeterol (ADVAIR DISKUS) 100-50 mcg/dose 10/19/2017 at Unknown time  Yes Yes   Sig: Advair Diskus 100-50 MCG/DOSE Inhalation Aerosol Powder Breath Activated  TAKE 1 PUFF TWICE A DAY   Quantity: 1;  Refills: 4       Stevenson Fierro DO;  Started 23-Dec-2015  [id number] Aerosol Powder Breath Activated Disp Pack   lamoTRIgine (LaMICtal) 100 mg tablet 10/19/2017 at Unknown time  Yes Yes   Sig: Take 100 mg by mouth 2 (two) times a day   lamoTRIgine (LaMICtal) 200 MG tablet 10/19/2017 at Unknown time  No Yes   Sig: Take 1 tablet by mouth 2 (two) times a day   rizatriptan (MAXALT-MLT) 10 MG disintegrating tablet 10/20/2017 at 0000  Yes Yes   Sig: Rizatriptan Benzoate 10 MG Oral Tablet Dispersible  TAKE 1 TABLET AT ONSET OF HEADACHE  MAY REPEAT EVERY 2 HOURS AS NEEDED  MAXIMUM 3 TABLETS IN 24 HOURS  Quantity: 9;  Refills: 5       Rollen Roller M D ;  Started 5-Feb-2016  Active      Facility-Administered Medications: None     Allergies   Allergen Reactions    Latex Hives       Objective     Intake/Output Summary (Last 24 hours) at 10/20/17 1540  Last data filed at 10/20/17 1100   Gross per 24 hour   Intake             1240 ml   Output                0 ml   Net             1240 ml       Physical Exam   Constitutional: He is oriented to person, place, and time  He appears well-developed and well-nourished  HENT:   Left frontal Rickham reservoir  Two left-sided palpable shunt reservoir is in the temporal parietal region  More anterior shunt reservoir does not compress, more posterior shunt reservoir compresses and refills briskly  Eyes: EOM are normal  Pupils are equal, round, and reactive to light  Patient has pre-existing left-sided strabismus  Unchanged  Patient looking up to right superior rhythmicly quadrant following EOM testing  Neck: Normal range of motion  Neck supple  Cardiovascular: Normal rate  Pulmonary/Chest: Effort normal    Abdominal: Soft  He exhibits no distension  Incisions from prior  shunt insertion/revision well approximated  Musculoskeletal: Normal range of motion  Neurological: He is alert and oriented to person, place, and time  He has normal strength  He has a normal Finger-Nose-Finger Test  GCS eye subscore is 4  GCS verbal subscore is 5  GCS motor subscore is 6  Reflex Scores:       Brachioradialis reflexes are 2+ on the right side and 2+ on the left side  Patellar reflexes are 2+ on the right side and 2+ on the left side  Skin: Skin is warm and dry  Cranial incisions well healed  Psychiatric: He has a normal mood and affect   His speech is normal and behavior is normal  Judgment and thought content normal      Neurologic Exam Mental Status   Oriented to person, place, and time  Oriented to person  Oriented to place  Oriented to time  Follows 3 step commands  Attention: normal  Concentration: normal    Speech: speech is normal   Level of consciousness: alert  Knowledge: good  Able to perform simple calculations  Able to name object  Able to repeat  Normal comprehension  Cranial Nerves     CN II   Visual fields full to confrontation  CN III, IV, VI   Pupils are equal, round, and reactive to light  Extraocular motions are normal    Nystagmus: none     CN V   Facial sensation intact  CN VII   Facial expression full, symmetric  CN VIII   CN VIII normal      CN XI   CN XI normal      CN XII   CN XII normal    Left-sided strabismus     Motor Exam   Muscle bulk: normal  Right arm pronator drift: absent  Left arm pronator drift: absent    Strength   Strength 5/5 throughout  Sensory Exam   Light touch normal    Proprioception normal      Gait, Coordination, and Reflexes     Coordination   Finger to nose coordination: normal    Reflexes   Right brachioradialis: 2+  Left brachioradialis: 2+  Right patellar: 2+  Left patellar: 2+  Right ankle clonus: absent  Left ankle clonus: absent      Vitals:Blood pressure 143/66, pulse 72, temperature 98 1 °F (36 7 °C), temperature source Oral, resp  rate 16, height 5' 8" (1 727 m), weight 87 9 kg (193 lb 12 6 oz), SpO2 96 %  ,Body mass index is 29 46 kg/m²  Lab Results:   I have personally reviewed pertinent results      Lab Results   Component Value Date    WBC 12 16 (H) 10/20/2017    HGB 16 2 10/20/2017    HCT 46 1 10/20/2017    MCV 88 10/20/2017     10/20/2017    MCH 30 7 10/20/2017    MCHC 35 1 10/20/2017    RDW 12 3 10/20/2017    MPV 10 0 10/20/2017    NRBC 0 10/20/2017     10/20/2017     10/20/2017    CO2 28 10/20/2017    ANIONGAP 8 10/20/2017    BUN 17 10/20/2017    CREATININE 0 78 10/20/2017    GLUCOSE 101 10/20/2017    CALCIUM 9 3 10/20/2017 AST 5 10/20/2017    ALT 15 10/20/2017    ALKPHOS 52 10/20/2017    PROT 7 2 10/20/2017    ALBUMIN 3 7 10/20/2017    BILITOT 0 42 10/20/2017    EGFR 111 10/20/2017       Imaging Studies: I have personally reviewed pertinent reports  and I have personally reviewed pertinent films in PACS    EKG, Pathology, and Other Studies: I have personally reviewed pertinent reports        VTE Prophylaxis: Enoxaparin (Lovenox)    Code Status: Level 1 - Full Code  Advance Directive and Living Will:      Power of :    POLST:

## 2017-10-20 NOTE — ED NOTES
Pt right arm shaking at this time  Per Mom ,"This is one of his mini seizures   If he has a lot more, it's going to turn into a grand mal seizure  "     Peterson Reno, RN  10/20/17 6404

## 2017-10-20 NOTE — ASSESSMENT & PLAN NOTE
-right arm tremors with reduced responsiveness to stimuli at home  -several episodes occurred in the ER  -see seizure assessment and plan

## 2017-10-20 NOTE — ASSESSMENT & PLAN NOTE
-patient born premature with history of congenital hydrocephalus  - shunts placed at age 10 months with multiple revisions in the past  -Neurosurgery consulted to manage shunt  Dr Bambi Herbert at the bedside to tap  shunt   CSF clear, cultures sent    -per Dr Bambi Herbert, patient may need isotope study on Monday  ? 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 CT head 10/19: stable from above  ? Xray shunt series 10/4: The left frontal ventricular catheter  appears attached to a Rickham reservoir and the valve connection before coursing down the left side of the hemicranium down the neck to the left upper quadrant  This is likely the functioning shunt   2 separate shunt catheters noted coursing through left neck over left chest terminating in left upper quadrant  One catheter terminating in the soft tissues of the left neck at the C4-5 region   The catheter terminates over the left occiput     ? XR shunt series 10/20: 2  shunt catheters unchanged, including discontinuity of catheter in left lateral neck

## 2017-10-20 NOTE — ASSESSMENT & PLAN NOTE
-patient with history of migraines with increasing frequency and headaches   -patient arrived with headaches, takes PRN triptan at home but was not effective  -given 30mg toradol IV in the ER  -patient now denies headache at this time

## 2017-10-20 NOTE — PHYSICAL THERAPY NOTE
Physical Therapy eval    Patient Name: Jess Arvizu    [id number] Date: 10/20/2017     Problem List  Patient Active Problem List   Diagnosis    Intractable headache    Cerebral palsy (HCC)    Seizure disorder (HCC)    Migraine headache    Asthma    [facility] [address] [facility]     (ventriculoperitoneal) shunt status        Past Medical History  Past Medical History:   Diagnosis Date    Asthma     [facility]     [address]enital hydrocephalus ([address]     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[facility] [address] [facility]     Migraines     Seizures ([facility] [facility]         Past Surgical History  Past Surgical History:   Procedure Laterality Date    BRAIN SURGERY      Multiple  shunt revisions    HERNIA REPAIR           10/20/17 1330   Note Type   Note type Eval only   Pain Assessment   Pain Assessment No/denies pain   Pain Score No Pain   Home Living   Type of Home House  (renting)   Home Layout Two level;Stairs to enter without rails;Bed/bath upstairs  (FF to access bed/bath with L HR)   Bathroom Shower/Tub Tub/shower unit   Prior Function   Level of [place] Independent with ADLs and functional mobility; Needs assistance with IADLs   Lives With Family  (mom, brother/MARTIN and nephew)   Receives Help From Family   ADL Assistance Independent   IADLs Needs assistance   Falls in the last 6 months 0   Vocational Part time employment  ([employer]  x 4 hrs x 4 days a week)   Restrictions/Precautions   Weight Bearing Precautions Per Order No   Other Precautions Seizure;Aspiration   General   Additional Pertinent History per mother pt has mental age of 7-14 years old   Family/Caregiver Present Yes  (mother)   Cognition   Overall Cognitive Status Impaired   Arousal/Participation Cooperative   Orientation Level Oriented X4   Memory Within functional limits   Following Commands Follows one step commands without difficulty   RLE Assessment   RLE Assessment WFL   Strength RLE   RLE Overall Strength 4/5   LLE Assessment   LLE Assessment WFL   Strength LLE   LLE Overall Strength 4+/5   Coordination   Sensation WFL   Light Touch   RLE Light Touch Grossly intact   LLE Light Touch Grossly intact   Bed Mobility   Rolling R 7  Independent   Rolling L 7  Independent   Supine to Sit 7  Independent   Sit to Supine 7  Independent   Transfers   Sit to Stand 7  Independent   Stand to Sit 7  Independent   Stand pivot 7  Independent   Ambulation/Elevation   Gait pattern Inconsistent garcia   Gait Assistance 7  Independent   Assistive Device None   Stair Management Assistance 5  Supervision   Stair Management Technique One rail L   Number of Stairs 12   Ramp Technique (no AD)   Ramp Assistance 5  Supervision   Curbs S without an AD   Balance   Static Sitting Normal   Dynamic Sitting Normal   Static Standing Good   Dynamic Standing Good   Ambulatory Good   Endurance Deficit   Endurance Deficit No   Activity Tolerance   Activity Tolerance Patient tolerated treatment well   Nurse Made Aware spoke with BEATRIZ Espinal prior to eval and confirmed pt is cleared for PT   Assessment   Assessment Pt is a 43year old male with admission dx of seizure  Pt tolerated eval with no c/o pain, headache or dizziness  PT eval completed with pt  completing functional transfers and amb task at indep level without using an AD  While negotiated FF with 1 HR, ramp and curb without an AD at S level   pt verbalized the need to concentrate as taught by his mother and demonstrated safe technique while going up/down the steps using 1 HR alternating between nonreciprocal to reciprocal pattern without any difficulty noted  mother confirmed pt is functioning at baseline and she also confirmed ability to provide S with stair activity at d/c   Pt is ok to be d/c to home with family support once medically cleared by medical team     Barriers to Discharge None   Barriers to Discharge Comments pt to go home with family support as confirmed by mother   Recommendation Recommendation Home with family support   PT - OK to Discharge Yes  (once medically appropriate)   Barthel Index   Feeding 10   Bathing 5   Grooming Score 5   Dressing Score 10   Bladder Score 10   Bowels Score 10   Toilet Use Score 10   Transfers (Bed/Chair) Score 15   Mobility (Level Surface) Score 15   Stairs Score 5   Barthel Index Score 95

## 2017-10-20 NOTE — PLAN OF CARE
Problem: Potential for Falls  Goal: Patient will remain free of falls  INTERVENTIONS:  - Assess patient frequently for physical needs  -  Identify cognitive and physical deficits and behaviors that affect risk of falls    -  [place] fall precautions as indicated by assessment   - Educate patient/family on patient safety including physical limitations  - Instruct patient to call for assistance with activity based on assessment  - Modify environment to reduce risk of injury  - Consider OT/PT consult to assist with strengthening/mobility   Outcome: Progressing      Problem: PAIN - ADULT  Goal: Verbalizes/displays adequate comfort level or baseline comfort level  Interventions:  - Encourage patient to monitor pain and request assistance  - Assess pain using appropriate pain scale  - Administer analgesics based on type and severity of pain and evaluate response  - Implement non-pharmacological measures as appropriate and evaluate response  - Consider cultural and social influences on pain and pain management  - Notify physician/advanced practitioner if interventions unsuccessful or patient reports new pain  Outcome: Progressing      Problem: INFECTION - ADULT  Goal: Absence or prevention of progression during hospitalization  INTERVENTIONS:  - Assess and monitor for signs and symptoms of infection  - Monitor lab/diagnostic results  - Monitor all insertion sites, i e  indwelling lines, tubes, and drains  - Monitor endotracheal (as able) and nasal secretions for changes in amount and color  - [place] appropriate cooling/warming therapies per order  - Administer medications as ordered  - Instruct and encourage patient and family to use good hand hygiene technique  - Identify and instruct in appropriate isolation precautions for identified infection/condition  Outcome: Progressing      Problem: SAFETY ADULT  Goal: Maintain or return to baseline ADL function  INTERVENTIONS:  -  Assess patient's ability to carry out ADLs; assess patient's baseline for ADL function and identify physical deficits which impact ability to perform ADLs (bathing, care of mouth/teeth, toileting, grooming, dressing, etc )  - Assess/evaluate cause of self-care deficits   - Assess range of motion  - Assess patient's mobility; develop plan if impaired  - Assess patient's need for assistive devices and provide as appropriate  - Encourage maximum independence but intervene and supervise when necessary  ¯ Involve family in performance of ADLs  ¯ Assess for home care needs following discharge   ¯ Request OT consult to assist with ADL evaluation and planning for discharge  ¯ Provide patient education as appropriate  Outcome: Progressing    Goal: Maintain or return mobility status to optimal level  INTERVENTIONS:  - Assess patient's baseline mobility status (ambulation, transfers, stairs, etc )    - Identify cognitive and physical deficits and behaviors that affect mobility  - Identify mobility aids required to assist with transfers and/or ambulation (gait belt, sit-to-stand, lift, walker, cane, etc )  - [place] fall precautions as indicated by assessment  - Record patient progress and toleration of activity level on Mobility SBAR; progress patient to next Phase/Stage  - Instruct patient to call for assistance with activity based on assessment  - Request Rehabilitation consult to assist with strengthening/weightbearing, etc   Outcome: Progressing      Problem: DISCHARGE PLANNING  Goal: Discharge to home or other facility with appropriate resources  INTERVENTIONS:  - Identify barriers to discharge w/patient and caregiver  - Arrange for needed discharge resources and transportation as appropriate  - Identify discharge learning needs (meds, wound care, etc )  - Arrange for interpretive services to assist at discharge as needed  - Refer to Case Management Department for coordinating discharge planning if the patient needs post-hospital services based on physician/advanced practitioner order or complex needs related to functional status, cognitive ability, or social support system  Outcome: Progressing      Problem: Knowledge Deficit  Goal: Patient/family/caregiver demonstrates understanding of disease process, treatment plan, medications, and discharge instructions  Complete learning assessment and assess knowledge base  Interventions:  - Provide teaching at level of understanding  - Provide teaching via preferred learning methods  Outcome: Progressing      Problem: Nutrition/Hydration-ADULT  Goal: Nutrient/Hydration intake appropriate for improving, restoring or maintaining nutritional needs  Monitor and assess patient's nutrition/hydration status for malnutrition (ex- brittle hair, bruises, dry skin, pale skin and conjunctiva, muscle wasting, smooth red tongue, and disorientation)  Collaborate with interdisciplinary team and initiate plan and interventions as ordered  Monitor patient's weight and dietary intake as ordered or per policy  Utilize nutrition screening tool and intervene per policy  Determine patient's food preferences and provide high-protein, high-caloric foods as appropriate       INTERVENTIONS:  - Monitor oral intake, urinary output, labs, and treatment plans  - Assess nutrition and hydration status and recommend course of action  - Evaluate amount of meals eaten  - Assist patient with eating if necessary   - Allow adequate time for meals  - Recommend/ encourage appropriate diets, oral nutritional supplements, and vitamin/mineral supplements  - Order, calculate, and assess calorie counts as needed  - Recommend, monitor, and adjust tube feedings and TPN/PPN based on assessed needs  - Assess need for intravenous fluids  - Provide specific nutrition/hydration education as appropriate  - Include patient/family/caregiver in decisions related to nutrition  Outcome: Progressing      Problem: NEUROSENSORY - ADULT  Goal: Achieves stable or improved neurological status  INTERVENTIONS  - Monitor and report changes in neurological status  - Initiate measures to prevent increased intracranial pressure  - Maintain blood pressure and fluid volume within ordered parameters to optimize cerebral perfusion  - Monitor temperature, glucose, and sodium or any other associated labs   Initiate appropriate interventions as ordered  - Monitor for seizure activity   - Administer anti-seizure medications as ordered  Outcome: Progressing    Goal: Absence of seizures  INTERVENTIONS  - Monitor for seizure activity  - Administer anti-seizure medications as ordered  - Monitor neurological status  Outcome: Progressing    Goal: Remains free of injury related to seizures activity  INTERVENTIONS  - Maintain airway, patient safety  and administer oxygen as ordered  - Monitor patient for seizure activity, document and report duration and description of seizure to physician/advanced practitioner  - If seizure occurs,  ensure patient safety during seizure  - Reorient patient post seizure  - Seizure pads on all 4 side rails  - Instruct patient/family to notify RN of any seizure activity including if an aura is experienced  - Instruct patient/family to call for assistance with activity based on nursing assessment  - Administer anti-seizure medications as ordered  - Monitor fetal well being  Outcome: Progressing    Goal: Achieves maximal functionality and self care  INTERVENTIONS  - Monitor swallowing and airway patency with patient fatigue and changes in neurological status  - Encourage and assist patient to increase activity and self care with guidance from rehab services  - Encourage visually impaired, hearing impaired and aphasic patients to use assistive/communication devices  Outcome: Progressing      Problem: SKIN/TISSUE INTEGRITY - ADULT  Goal: Skin integrity remains intact  INTERVENTIONS  - Identify patients at risk for skin breakdown  - Assess and monitor skin integrity  - Assess and monitor nutrition and hydration status  - Monitor labs (i e  albumin)  - Assess for incontinence   - Turn and reposition patient  - Assist with mobility/ambulation  - Relieve pressure over bony prominences  - Avoid friction and shearing  - Provide appropriate hygiene as needed including keeping skin clean and dry  - Evaluate need for skin moisturizer/barrier cream  - Collaborate with interdisciplinary team (i e  Nutrition, Rehabilitation, etc )   - Patient/family teaching  Outcome: Progressing

## 2017-10-20 NOTE — PROCEDURES
Procedure Note    Date of procedure 10/20/2017  1345 hours    Title of procedure:  Left parietal existing shunt reservoir tap    Surgeon: Dr Idalmis Hilton MD  Assistant none    No qualified resident was available to assist    Anesthesia none    Preoperative diagnosis:  Shunt dysfunction  Disconnection left parietal  shunt system  Separate cysto peritoneal shunt system disconnected- nonfunctioning  Separate left frontal [place] reservoir with blind ending distal tubing-non functional  Ex-32 week preemie  Hydrocephalus  Placement of left parietal  shunt system aged five months, [address]  Seizures seizures aged age [age]  Mild cerebral palsy  Delayed developmental milestones: first words and walking aged [age]  Prior shunt dysfunction  Intraventricular and intraparenchymal hemorrhage aged [age] at time of shunt revision  Placement of a [place] reservoir aged [age]  Further revision and left  shunt system aged 5  Recent episodic headaches, sudden sharp the last 1-2 hrs, X4 over two weeks  Recent seizures progressing to GM 10/04 and 10/19/2017    Postop diagnosis:   Same    The risks, benefits and complications of surgery were explained in detail to Ely Kowalski and his mother Sarah Petty  including hemorrhage, infection, CSF leakage, seizures wound problems, pain, weakness, numbness, dysesthesiae, paralysis, coma, and death  Also, the possibility of further surgery being required was emphasized  This could be revision removal adjustment or externalization of shunt system (s)    Other potential medical complications were outlined, including deep venous thrombosis, pulmonary embolism, pneumonia, urinary tract infection, myocardial infarction,  and stroke  The need for physical therapy, occupational therapy, and rehabilitation was also mentioned  The alternatives to surgery were discussed       Dalimuriel Kowalski and his mother Richardson Kati asked relevant questions and asked that we proceed with left parietal reservoir shunt tap  Procedure and Technique: The area of the dome of the reservoir was clipped  The area was prepped with Betadine  X 3 which was allowed to dry    A time-out was occasioned with his primary nurse Deangelo Scott  The patient was correctly identified  The imaging was reviewed in the room  The consented procedure were confirmed  All were in agreement  The dome of the left parietal shunt reservoir was accessed with a 25 gauge butterfly  There was free ascent of CSF up the tube to a level of 5-6 cm of water  A small sample of CSF was removed at this point  The runoff was observed to go down  It went down very quickly  Too quickly suggestive of a disconnected distal shunt proximal peritoneal catheter tubing in my estimate    The rapid runoff confirmed that the shunt was disconnected and CSF was likely draining into likely pseudo sheath around shunt which had stretched or migrated away from the distal end of the catheter below the shunt reservoir    See shunt survey and my evaluation in consult    Further CSF, which was clear, could be aspirated  About 3-1/2 cc in total   Thereafter it was difficult to aspirate any further CSF  This is likely because the left lateral ventricle is quite small  The CSF was sent for surveillance panel  The preliminary results showed CSF glucose of 57 CSF protein of 70  G Gram stain showed no polys and no bacteria 1+ mono nuclear cells    The butterfly needle was withdrawn and the exit site compressed with dry gauze for 2 minutes    He tolerated the test well    Conclusion:  patent proximal ventricular catheter in left lateral ventricle  Accelerated distal runoff into soft tissues or stretched pseudo sheath around disconnected shunt suggestive of disconnection as seen on shunt survey    Recommend distal shunt revision with replacement of valve to a new a programmable valve and new distal peritoneal tubing      Following discussion Jennifer Jean and his mother were in agreement with this recommendation    We will look to find an OR slot in the near future possibly Monday    10/20/2017 6:33 PM    Flor Bernal MD, Attending Neurological Surgeon

## 2017-10-20 NOTE — SOCIAL WORK
CM met with the Pt and his mother at bedside to explain the CM role and discuss any potential D/C needs  Pt lives with his mother and immediate family in a 2story home with 3STE  PTA IADL's, mother provides 24hr support in the home  Pt does not use any DME  No hx of HHC, IP rehab or [facility] admission  Pt is employed, does not drive  Pt's [facility]  Pt's mother can provide transportation home @ D/C  CM reviewed d/c planning process including the following: identifying help at home, patient preference for d/c planning needs, Discharge Lounge, [facility] Meds to Bed program, availability of treatment team to discuss questions or concerns patient and/or family may have regarding understanding medications and recognizing signs and symptoms once discharged  CM also encouraged patient to follow up with all recommended appointments after discharge  Patient advised of importance for patient and family to participate in managing patients medical well being

## 2017-10-20 NOTE — OCCUPATIONAL THERAPY NOTE
[address] Evaluation     Patient Name: [facility]  BIKKW'U Date: 10/20/2017  Problem List  Patient Active Problem List   Diagnosis    Intractable headache    Cerebral palsy (HCC)    Seizure disorder (HCC)    Migraine headache    Asthma    [facility] [address] [facility]     (ventriculoperitoneal) shunt status     Past Medical History  Past Medical History:   Diagnosis Date    Asthma     [facility]bral [facility]y ([facility] [address] [facility]     Congenital hydrocephalus ([facility] [address] [facility]     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[facility] [address] )     Migraines     Seizures ([facility] [address] )      Past Surgical History  Past Surgical History:   Procedure Laterality Date    BRAIN SURGERY      Multiple  shunt revisions    HERNIA REPAIR        10/20/17 1320   Note Type   Note type Eval only   Restrictions/Precautions   Weight Bearing Precautions Per Order No   Other Precautions Seizure   Pain Assessment   Pain Assessment No/denies pain   Pain Score No Pain   Home Living   Type of [address] [place] [address] Multi-level;Bed/bath upstairs   Bathroom Shower/Tub Tub/shower unit   Bathroom Toilet Standard   Bathroom Accessibility Accessible   Prior Function   Level of [place] Independent with ADLs and functional mobility   Lives With Family  (Mother, Brother sister in-law and nephew)   Receives Help From Family   ADL Assistance Independent   IADLs Needs assistance   Falls in the last 6 months 0   Vocational Part time employment   Lifestyle   Autonomy Pt reports I w/ ADLs w/ Supervision monitoring for bathing in shower  A for IADL management, driving, and medication management       Reciprocal Relationships Pt reports he resides with Mother, Brother sister in-law and nephew   Service to Others 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   Intrinsic Gratification Pt reports he enjoys watching television and reading   Subjective   Subjective "I'm doing much better now "   ADL   Where Assessed Edge of bed   Eating Assistance 5  Supervision/Setup Eating Deficit Setup   Grooming Assistance 5  Supervision/Setup   Grooming Deficit Setup   UB Bathing Assistance 5  Supervision/Setup   UB Bathing Deficit Setup   LB Bathing Assistance 7  Independent   LB Bathing Deficit Setup   UB Dressing Assistance 5  Supervision/Setup   UB Dressing Deficit Setup   LB Dressing Assistance 5  Supervision/Setup   LB Dressing Deficit Setup   Toileting Assistance  5  Supervision/Setup   Functional Assistance 5  Supervision/Setup   Functional Deficit Setup   Transfers   Sit to Stand 5  Supervision   Stand to Sit 5  Supervision   Stand pivot 5  Supervision   Additional Comments No device   Functional Mobility   Functional Mobility 5  Supervision   Balance   Static Sitting Good   Dynamic Sitting Good   Static Standing Fair +   Dynamic Standing Fair +   Activity Tolerance   Activity Tolerance Patient tolerated treatment well   RUE Assessment   RUE Assessment WFL   LUE Assessment   LUE Assessment WFL   Hand Function   Gross Motor Coordination Functional   Fine Motor Coordination Functional   Sensation   Light Touch No apparent deficits   Sharp/Dull No apparent deficits   Stereognosis No apparent deficits   Proprioception   Proprioception No apparent deficits   Vision-Basic Assessment   Current Vision Wears glasses all the time   Cognition   Overall Cognitive Status Impaired   Arousal/Participation Alert; Cooperative   Attention Within functional limits   Orientation Level Oriented to person;Oriented to place;Oriented to situation   Memory Decreased short term memory;Decreased recall of recent events;Decreased recall of precautions   Following Commands Follows multistep commands with increased time or repetition   Comments Pt's mother reports pt has memory deficits and requires supervision and cueing at times for daily activities  Assessment   Assessment Pt is a 43 y o  male seen for OT evaluation s/p admit to [place] [id number] on 10/20/2017 w/ Seizure ([facility] [address] )    OT evaluation and assessment of strength, ADL participation, and functional transfers completed  Pt reports I w/ ADLs w/ supervision at times, A required for  IADLs PTA  Pt resides in multi level home w/supportive mother and family  Pt using no DME PTA  Educated Pt on safety precautions, importance of and how to use using call bell; currently using chair alarm  Educated Pt on role of Occupational Therapy in acute setting  Based on Pt extensive Occupational profile review, From OT standpoint Pt appears to be functioning near baseline for ADLs, and functional transfers  Pt has adequate support from family at home  D/C home w/ family support when medically stable  No further acute OT services are required      Plan   OT Frequency Eval only   Recommendation   OT Discharge Recommendation Home with family support   OT - OK to Discharge Yes   [place] Index   Feeding 10   Bathing 5   Grooming Score 5   Dressing Score 10   Bladder Score 10   Bowels Score 10   Toilet Use Score 10   Transfers (Bed/Chair) Score 15   Mobility (Level Surface) Score 10   Stairs Score 5   Barthel Index Score 90

## 2017-10-20 NOTE — ASSESSMENT & PLAN NOTE
-brought to the ED by his mother for right arm tremors with associated episodes of decreased responsiveness    -patient had several episodes in the ER as well  -depakote levels drawn on admission were low, patient loaded with 1g depakote in the ER  -lamictal levels drawn in the ER, pending  -Neurology consulted, recommend to increase Depakote to 1000 mg in 8AM, 500 mg at 2PM and 1000 mg at 8PM  Check level in AM    And to continue Lamictal 200 mg BID     -non focal neuro exam  Alert and oriented, follows commands with all extremities equally

## 2017-10-20 NOTE — CONSULTS
Consultation - Neurology   Mike Lazar 43 y o  male MRN: [id number]  Unit/Bed#: -01 Encounter: [id number]      Assessment/Plan   Assessment:  3 43year old male admitted with breakthrough seizure activity in setting of low Depakote level  2  Congenitcal hydrocephalus s/p  shunt and multiple revisions  3  Asthma  4  Mild cerebral palsy    Plan:  1  Recommend increase Depakote to 1000 mg in 8AM, 500 mg at 2PM and 1000 mg at 8PM  Check level in AM    2  Continue Lamictal 200 mg BID  3  Defer work-up of  shunt to neurosurgery team    4  Patient will follow-up with Dr Jeet Doe as outpatient  Office will contact patient to schedule appointment  5  Above case and plan discussed with Dr Per Choudhary and he is in agreement with above  History of Present Illness     Reason for Consult / Principal Problem: Seizure-like activity    HPI: Mike Lazar is a 43 y o  male who presents with multiple episodes of seizure-like activity  His mother is at bedside and notes that he was having his "mini-seizures " She notes that this involved him having tonic clonic movements of his UE and decreased responsiveness  She notes that just prior to this he had complained of HA and she had given him triptan  She denied any N/V associated with the episode  She notes he has not been ill recently and denied any missed doses of medications  He was recently admitted for similar event and was scheduled to see neurosurgery today to evaluate  shunt as it appears to be disconnected in the neck area  The patient currently denies any complaints and states that he is feeling well  He does not some tremor B/L UE due to Depakote but not interfering with life at this time  He remains able to work, eat and write   Depakote level was noted to be low on admission and he was given a loading dose in the ED>     Consult to neurology  Consult performed by: Georgie Pearce  Consult ordered by: Ritika Adames          Review of Systems Constitutional: Negative for chills, fatigue and fever  HENT: Negative for trouble swallowing  Eyes: Negative for visual disturbance  Respiratory: Negative for shortness of breath  Cardiovascular: Negative for chest pain  Gastrointestinal: Negative for abdominal pain, nausea and vomiting  Genitourinary: Negative for difficulty urinating and dysuria  Musculoskeletal: Negative for back pain and neck pain  Neurological: Positive for tremors and seizures  Negative for dizziness, syncope, facial asymmetry, speech difficulty, weakness, light-headedness, numbness and headaches  Psychiatric/Behavioral: Negative for confusion  Historical Information   Past Medical History:   Diagnosis Date    Asthma     [facility] ([facility]     Congenital hydrocephalus (HCC)     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[facility] )     Migraines     Seizures (HCC)      Past Surgical History:   Procedure Laterality Date    BRAIN SURGERY      Multiple  shunt revisions    HERNIA REPAIR       Social History   History   Alcohol Use No     Comment: N/A     History   Drug Use No     Comment: N/A     History   Smoking Status    Never Smoker   Smokeless Tobacco    Never Used     Comment: N/A     Family History:   Family History   Problem Relation Age of Onset    Family history unknown: Yes       Review of previous medical records was completed  Patient was recently admitted at the beginning of October  At that time, he complained of intractable HA and seizures  Lamictal was increased to 200 mg BID at that time  He has a known history of localization-related epilepsy and follows with Dr Dana Singh as an outpatient  He also has extensive neurosurgical history and has had multiple  shunt revisions       Meds/Allergies   Scheduled Meds:  cholecalciferol 1,000 Units Oral Daily   divalproex sodium 500 mg Oral Q8H [address]   docusate sodium 100 mg Oral BID   enoxaparin 40 mg Subcutaneous Daily   fluticasone-salmeterol 1 puff Inhalation Q12H [address]   lamoTRIgine 200 mg Oral BID   polyethylene glycol 17 g Oral Daily   senna 1 tablet Oral Daily     Continuous Infusions:   PRN Meds:   acetaminophen    albuterol    rizatriptan      Allergies   Allergen Reactions    Latex Hives       Objective   Vitals:Blood pressure 143/66, pulse 72, temperature 98 1 °F (36 7 °C), temperature source Oral, resp  rate 16, height 5' 8" (1 727 m), weight 87 9 kg (193 lb 12 6 oz), SpO2 96 %  ,Body mass index is 29 46 kg/m²  Intake/Output Summary (Last 24 hours) at 10/20/17 1447  Last data filed at 10/20/17 1100   Gross per 24 hour   Intake             1240 ml   Output                0 ml   Net             1240 ml       Invasive Devices: Invasive Devices     Peripheral Intravenous Line            Peripheral IV 10/20/17 Right Antecubital less than 1 day                Physical Exam   Constitutional: He is oriented to person, place, and time  He appears well-developed and well-nourished  No distress  HENT:   Head: Normocephalic and atraumatic  Eyes: Conjunctivae and EOM are normal  Pupils are equal, round, and reactive to light  No scleral icterus  Neck: Normal range of motion  Neck supple  Cardiovascular: Normal rate and regular rhythm  Pulmonary/Chest: Effort normal and breath sounds normal  No respiratory distress  Abdominal: Soft  Bowel sounds are normal  He exhibits no distension  There is no tenderness  Musculoskeletal: Normal range of motion  He exhibits no edema  Neurological: He is alert and oriented to person, place, and time  He has a normal Finger-Nose-Finger Test    Reflex Scores:       Bicep reflexes are 3+ on the right side and 3+ on the left side  Brachioradialis reflexes are 3+ on the right side and 3+ on the left side  Patellar reflexes are 3+ on the right side and 3+ on the left side  Achilles reflexes are 2+ on the right side and 2+ on the left side  Skin: Skin is warm and dry  No rash noted  He is not diaphoretic   No erythema  No pallor  Psychiatric: He has a normal mood and affect  His speech is normal and behavior is normal      Neurologic Exam     Mental Status   Oriented to person, place, and time  Oriented to person  Oriented to place  Oriented to time  Registration: recalls 3 of 3 objects  Recall at 5 minutes: recalls 2 of 3 objects  Attention: normal  Concentration: normal    Speech: speech is normal   Level of consciousness: alert  Knowledge: good  Normal comprehension  Able to spell word "world" forward and backward  Cranial Nerves     CN II   Right visual field deficit: none  Left visual field deficit: none     CN III, IV, VI   Pupils are equal, round, and reactive to light  Extraocular motions are normal    Right pupil: Size: 4 mm  Shape: regular  Reactivity: brisk  Consensual response: intact  Left pupil: Size: 4 mm  Shape: regular  Reactivity: brisk  Consensual response: intact  Nystagmus: none   Diplopia: none  Ophthalmoparesis: none  Upgaze: normal  Downgaze: normal  Conjugate gaze: present    CN V   Facial sensation intact  Right facial sensation deficit: none  Left facial sensation deficit: none    CN VII   Facial expression full, symmetric     Right facial weakness: none  Left facial weakness: none    CN VIII   Hearing: intact    CN IX, X   Palate: symmetric    CN XI   Right sternocleidomastoid strength: normal  Left sternocleidomastoid strength: normal    CN XII   Tongue: not atrophic  Fasciculations: absent  Tongue deviation: none  Mild L ptosis      Motor Exam   Muscle bulk: normal  Overall muscle tone: normal  Right arm tone: normal  Left arm tone: normal  Right arm pronator drift: absent  Left arm pronator drift: absent  Right leg tone: normal  Left leg tone: normalMotor strength 5/5 B/L UE and LE     Sensory Exam   Light touch normal    Right arm light touch: normal  Left arm light touch: normal  Right leg light touch: normal  Left leg light touch: normal  Vibration normal  Right arm vibration: normal  Left arm vibration: normal  Right leg vibration: normal  Left leg vibration: normal    Gait, Coordination, and Reflexes     Coordination   Finger to nose coordination: normal    Tremor   Resting tremor: present  Intention tremor: present    Reflexes   Right brachioradialis: 3+  Left brachioradialis: 3+  Right biceps: 3+  Left biceps: 3+  Right patellar: 3+  Left patellar: 3+  Right achilles: 2+  Left achilles: 2+  Right plantar: normal  Left plantar: normalGait deferred         Lab Results:   Recent Results (from the past 24 hour(s))   Fingerstick Glucose (POCT)    Collection Time: 10/20/17  3:48 AM   Result Value Ref Range    POC Glucose 100 65 - 140 mg/dl   CBC and differential    Collection Time: 10/20/17  3:57 AM   Result Value Ref Range    WBC 12 16 (H) 4 31 - 10 16 Thousand/uL    RBC 5 27 3 88 - 5 62 Million/uL    Hemoglobin 16 2 12 0 - 17 0 g/dL    Hematocrit 46 1 36 5 - 49 3 %    MCV 88 82 - 98 fL    MCH 30 7 26 8 - 34 3 pg    MCHC 35 1 31 4 - 37 4 g/dL    RDW 12 3 11 6 - 15 1 %    MPV 10 0 8 9 - 12 7 fL    Platelets 837 161 - 078 Thousands/uL    nRBC 0 /100 WBCs    Neutrophils Relative 57 43 - 75 %    Lymphocytes Relative 31 14 - 44 %    Monocytes Relative 8 4 - 12 %    Eosinophils Relative 4 0 - 6 %    Basophils Relative 0 0 - 1 %    Neutrophils Absolute 6 82 1 85 - 7 62 Thousands/µL    Lymphocytes Absolute 3 79 0 60 - 4 47 Thousands/µL    Monocytes Absolute 0 93 0 17 - 1 22 Thousand/µL    Eosinophils Absolute 0 47 0 00 - 0 61 Thousand/µL    Basophils Absolute 0 03 0 00 - 0 10 Thousands/µL   Comprehensive metabolic panel    Collection Time: 10/20/17  3:57 AM   Result Value Ref Range    Sodium 140 136 - 145 mmol/L    Potassium 3 5 3 5 - 5 3 mmol/L    Chloride 104 100 - 108 mmol/L    CO2 28 21 - 32 mmol/L    Anion Gap 8 4 - 13 mmol/L    BUN 17 5 - 25 mg/dL    Creatinine 0 78 0 60 - 1 30 mg/dL    Glucose 101 65 - 140 mg/dL    Calcium 9 3 8 3 - 10 1 mg/dL    AST 5 5 - 45 U/L ALT 15 12 - 78 U/L    Alkaline Phosphatase 52 46 - 116 U/L    Total Protein 7 2 6 4 - 8 2 g/dL    Albumin 3 7 3 5 - 5 0 g/dL    Total Bilirubin 0 42 0 20 - 1 00 mg/dL    eGFR 111 ml/min/1 73sq m   Valproic acid level, total    Collection Time: 10/20/17  3:57 AM   Result Value Ref Range    Valproic Acid, Total 25 (L) 50 - 100 ug/mL       Imaging Studies: I have personally reviewed pertinent reports  and I have personally reviewed pertinent films in PACS  CTH- Stable CT of the brain  No acute intracranial pathology       VTE Prophylaxis: Enoxaparin (Lovenox)

## 2017-10-20 NOTE — PROGRESS NOTES
Progress Note - Vilma Wells 43 y o  male MRN: [id number]    Unit/Bed#: -01 Encounter: [id number]        * Seizure [facility])   Assessment & Plan    -brought to the ED by his mother for right arm tremors with associated episodes of decreased responsiveness    -patient had several episodes in the ER as well  -depakote levels drawn on admission were low, patient loaded with 1g depakote in the ER  -lamictal levels drawn in the ER, pending  -Neurology consulted, recommend to increase Depakote to 1000 mg in 8AM, 500 mg at 2PM and 1000 mg at 8PM  Check level in AM    And to continue Lamictal 200 mg BID  -non focal neuro exam  Alert and oriented, follows commands with all extremities equally             (ventriculoperitoneal) shunt status   Assessment & Plan    -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 CT head 10/19: stable from above  ? Xray shunt series 10/4: The left frontal ventricular catheter  appears attached to a [place] reservoir and the valve connection before coursing down the left side of the hemicranium down the neck to the left upper quadrant   This is likely the functioning shunt   2 separate shunt catheters noted coursing through left neck over left chest terminating in left upper quadrant  One catheter terminating in the soft tissues of the left neck at the C4-5 region   The catheter terminates over the left occiput     ? XR shunt series 10/20: 2  shunt catheters unchanged, including discontinuity of catheter in left lateral neck  Seizure-like activity (HCC)   Assessment & Plan    -right arm tremors with reduced responsiveness to stimuli at home  -several episodes occurred in the ER  -see seizure assessment and plan        Asthma   Assessment & Plan    -continue PRN albuterol, scheduled advair  -denies shortness of breath or difficulty breathing        Migraine headache   Assessment & Plan    -patient with history of migraines with increasing frequency and headaches   -patient arrived with headaches, takes PRN triptan at home but was not effective  -given 30mg toradol IV in the ER  -patient now denies headache at this time            Cerebral palsy [facility])   Assessment & Plan    -patient with cerebral palsy, lives with his mother who helps care for him            VTE Pharmacologic Prophylaxis:   Pharmacologic: Enoxaparin (Lovenox)  Mechanical VTE Prophylaxis in Place: Yes    Patient Centered Rounds: I have performed bedside rounds with nursing staff today  Discussions with Specialists or Other Care Team Provider: nursing, neurosurgery    Education and Discussions with Family / Patient: patient, mother    Time Spent for Care: 30 minutes  More than 50% of total time spent on counseling and coordination of care as described above  Current Length of Stay: 0 day(s)    Current Patient Status: Inpatient   Certification Statement: The patient will continue to require additional inpatient hospital stay due to isotope study of  shunt on Monday, monitor seizures    Discharge Plan: discharge home when medically stable    Code Status: Level 1 - Full Code      Subjective:   Patient feels well overall  Denies headache at this time   Mother provides most of history at the bedside  Denies nausea, vomiting, fever, or chills  Objective:     Vitals:   Temp (24hrs), Av 8 °F (36 6 °C), Min:97 4 °F (36 3 °C), Max:98 1 °F (36 7 °C)    HR:  [72-93] 74  Resp:  [10-21] 18  BP: (124-147)/(66-82) 124/82  SpO2:  [96 %-99 %] 96 %  Body mass index is 29 46 kg/m²  Input and Output Summary (last 24 hours): Intake/Output Summary (Last 24 hours) at 10/20/17 1739  Last data filed at 10/20/17 1100   Gross per 24 hour   Intake             1240 ml   Output                0 ml   Net             1240 ml       Physical Exam:     Physical Exam   Constitutional: He is oriented to person, place, and time  He appears well-developed and well-nourished  No distress  HENT:   Head: Normocephalic and atraumatic  Right Ear: External ear normal    Left Ear: External ear normal    Eyes: Conjunctivae are normal  Pupils are equal, round, and reactive to light  Right eye exhibits no discharge  Left eye exhibits no discharge  No scleral icterus  Neck: Normal range of motion  Neck supple  No JVD present  No tracheal deviation present  No thyromegaly present  Cardiovascular: Normal rate, regular rhythm and normal heart sounds  Exam reveals no gallop and no friction rub  No murmur heard  Pulmonary/Chest: Effort normal and breath sounds normal  No respiratory distress  He has no wheezes  He has no rales  Abdominal: Soft  Bowel sounds are normal  He exhibits no distension  There is no tenderness  There is no rebound  Musculoskeletal: Normal range of motion  He exhibits no edema, tenderness or deformity  Neurological: He is alert and oriented to person, place, and time  He displays tremor  He displays no atrophy  He exhibits normal muscle tone  Cerebral palsy   Skin: Skin is warm and dry  No rash noted  He is not diaphoretic  No erythema  Psychiatric: He has a normal mood and affect  His speech is normal  Cognition and memory are impaired         Additional Data: Labs:      Results from last 7 days  Lab Units 10/20/17  0357   WBC Thousand/uL 12 16*   HEMOGLOBIN g/dL 16 2   HEMATOCRIT % 46 1   PLATELETS Thousands/uL 268   NEUTROS PCT % 57   LYMPHS PCT % 31   MONOS PCT % 8   EOS PCT % 4       Results from last 7 days  Lab Units 10/20/17  0357   SODIUM mmol/L 140   POTASSIUM mmol/L 3 5   CHLORIDE mmol/L 104   CO2 mmol/L 28   BUN mg/dL 17   CREATININE mg/dL 0 78   CALCIUM mg/dL 9 3   TOTAL PROTEIN g/dL 7 2   BILIRUBIN TOTAL mg/dL 0 42   ALK PHOS U/L 52   ALT U/L 15   AST U/L 5   GLUCOSE RANDOM mg/dL 101           * I Have Reviewed All Lab Data Listed Above  * Additional Pertinent Lab Tests Reviewed: All Labs Within Last 24 Hours Reviewed    Imaging:    Imaging Reports Reviewed Today Include: reviewed      Recent Cultures (last 7 days):           Last 24 Hours Medication List:     cholecalciferol 1,000 Units Oral Daily   divalproex sodium 1,000 mg Oral Q12H [facility]   divalproex sodium 500 mg Oral Daily   docusate sodium 100 mg Oral BID   enoxaparin 40 mg Subcutaneous Daily   fluticasone-salmeterol 1 puff Inhalation Q12H [facility]   lamoTRIgine 200 mg Oral BID   polyethylene glycol 17 g Oral Daily   senna 1 tablet Oral Daily   SUMAtriptan 50 mg Oral Once        Today, Patient Was Seen By: DEEDEE Tim    ** Please Note: Dictation voice to text software may have been used in the creation of this document   **

## 2017-10-20 NOTE — ED ATTENDING ATTESTATION
Ramirez Lee MD, saw and evaluated the patient  I have discussed the patient with the resident/non-physician practitioner and agree with the resident's/non-physician practitioner's findings, Plan of Care, and MDM as documented in the resident's/non-physician practitioner's note, except where noted  All available labs and Radiology studies were reviewed  At this point I agree with the current assessment done in the Emergency Department  I have conducted an independent evaluation of this patient including a focused history of:    Emergency Department Note- Nain Ingram 43 y o  male MRN: [id number]    Unit/Bed#: -01 Encounter: [id number]    Nain Ingram is a 43 y o  male who presents with   Chief Complaint   Patient presents with    Seizure - Prior Hx Of     Mother reports "pt  has been having multiple mini seizures tonight "  Pt  had CT performed yesterday, and was to follow up with neurosurgery today  History of Present Illness   HPI:  Nain Ingram is a 43 y o  male who presents for evaluation of: Worsened headache tonight  Patient has h/o  shunt and shunt revisions  Patient had an episode of shaking of RUE which concerned his mother for seizure  Tonight's HA is typical and frontal  Patient's last CTH was yesterday ordered by [address] [place] [address]'s neurosurgery that was unremarkable       Review of Systems    Historical Information   Past Medical History:   Diagnosis Date    Asthma     Cerebral palsy ([address] )     Congenital hydrocephalus ([address] )     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[address] )     Migraines     Seizures (HCC)      Past Surgical History:   Procedure Laterality Date    BRAIN SURGERY      Multiple  shunt revisions    HERNIA REPAIR       Social History   History   Alcohol Use No     Comment: N/A     History   Drug Use No     Comment: N/A     History   Smoking Status    Never Smoker   Smokeless Tobacco    Never Used     Comment: N/A     Family History: non-contributory    Meds/Allergies   all medications and allergies reviewed  Allergies   Allergen Reactions    Latex Hives       Objective   First Vitals:   Blood Pressure: 135/69 (10/20/17 0317)  Pulse: 72 (10/20/17 0317)  Temperature: (!) 97 4 °F (36 3 °C) (10/20/17 0317)  Temp Source: Oral (10/20/17 0317)  Respirations: 21 (10/20/17 0317)  Height: 5' 8" (172 7 cm) (10/20/17 0317)  Weight - Scale: 87 9 kg (193 lb 12 6 oz) (10/20/17 0749)  SpO2: 98 % (10/20/17 0317)    Current Vitals:   Blood Pressure: 124/82 (10/20/17 1554)  Pulse: 74 (10/20/17 1554)  Temperature: 98 °F (36 7 °C) (10/20/17 1554)  Temp Source: Oral (10/20/17 1554)  Respirations: 18 (10/20/17 1554)  Height: 5' 8" (172 7 cm) (10/20/17 0749)  Weight - Scale: 87 9 kg (193 lb 12 6 oz) (10/20/17 0749)  SpO2: 96 % (10/20/17 1554)      Intake/Output Summary (Last 24 hours) at 10/20/17 1555  Last data filed at 10/20/17 1100   Gross per 24 hour   Intake             1240 ml   Output                0 ml   Net             1240 ml       Invasive Devices     Peripheral Intravenous Line            Peripheral IV 10/20/17 Right Antecubital less than 1 day                Physical Exam   Constitutional: He is oriented to person, place, and time  He appears well-developed  HENT:   Head: Normocephalic and atraumatic  Musculoskeletal: Normal range of motion  He exhibits no deformity  Neurological: He is alert and oriented to person, place, and time  Skin: Skin is warm and dry  Nursing note and vitals reviewed  Medical Decision Makin  Recurrent HA and seizures: patient is scheduled to see Dr Jf López of [facility] neurosurgery tomorrow       Recent Results (from the past 36 hour(s))   Fingerstick Glucose (POCT)    Collection Time: 10/20/17  3:48 AM   Result Value Ref Range    POC Glucose 100 65 - 140 mg/dl   CBC and differential    Collection Time: 10/20/17  3:57 AM   Result Value Ref Range    WBC 12 16 (H) 4 31 - 10 16 Thousand/uL    RBC 5 27 3 88 - 5 62 Million/uL    Hemoglobin 16 2 12 0 - 17 0 g/dL    Hematocrit 46 1 36 5 - 49 3 %    MCV 88 82 - 98 fL    MCH 30 7 26 8 - 34 3 pg    MCHC 35 1 31 4 - 37 4 g/dL    RDW 12 3 11 6 - 15 1 %    MPV 10 0 8 9 - 12 7 fL    Platelets 855 931 - 089 Thousands/uL    nRBC 0 /100 WBCs    Neutrophils Relative 57 43 - 75 %    Lymphocytes Relative 31 14 - 44 %    Monocytes Relative 8 4 - 12 %    Eosinophils Relative 4 0 - 6 %    Basophils Relative 0 0 - 1 %    Neutrophils Absolute 6 82 1 85 - 7 62 Thousands/µL    Lymphocytes Absolute 3 79 0 60 - 4 47 Thousands/µL    Monocytes Absolute 0 93 0 17 - 1 22 Thousand/µL    Eosinophils Absolute 0 47 0 00 - 0 61 Thousand/µL    Basophils Absolute 0 03 0 00 - 0 10 Thousands/µL   Comprehensive metabolic panel    Collection Time: 10/20/17  3:57 AM   Result Value Ref Range    Sodium 140 136 - 145 mmol/L    Potassium 3 5 3 5 - 5 3 mmol/L    Chloride 104 100 - 108 mmol/L    CO2 28 21 - 32 mmol/L    Anion Gap 8 4 - 13 mmol/L    BUN 17 5 - 25 mg/dL    Creatinine 0 78 0 60 - 1 30 mg/dL    Glucose 101 65 - 140 mg/dL    Calcium 9 3 8 3 - 10 1 mg/dL    AST 5 5 - 45 U/L    ALT 15 12 - 78 U/L    Alkaline Phosphatase 52 46 - 116 U/L    Total Protein 7 2 6 4 - 8 2 g/dL    Albumin 3 7 3 5 - 5 0 g/dL    Total Bilirubin 0 42 0 20 - 1 00 mg/dL    eGFR 111 ml/min/1 73sq m   Valproic acid level, total    Collection Time: 10/20/17  3:57 AM   Result Value Ref Range    Valproic Acid, Total 25 (L) 50 - 100 ug/mL     XR shunt series   Final Result      Unchanged  shunt catheters  Workstation performed: [id number]               Portions of the record may have been created with voice recognition software  Occasional wrong word or "sound a like" substitutions may have occurred due to the inherent limitations of voice recognition software  Read the chart carefully and recognize, using context, where substitutions have occurred

## 2017-10-20 NOTE — H&P
History and Physical - [facility] Internal Medicine    Patient Information: Deandra Stiles 43 y o  male MRN: [id number]  Unit/Bed#: ED 01 Encounter: [id number]  Admitting Physician: Kvng Hurtado MD  PCP: Derek Trivedi DO  Date of Admission:  10/20/17    Assessment/Plan:    Hospital Problem List:     Principal Problem:    Seizure [facility])  Active Problems:    Cerebral palsy ([facility] [address] )    Seizure disorder ([facility] [address] )    Migraine headache    Asthma     (ventriculoperitoneal) shunt status      Plan for the Primary Problem(s):  · SZD with seizure-like activity and HA last night  · Follow-up Lamictal level   Valproic  level low on admission, patient loaded with Depakote   · Will increased dose of valproate  to 500 mg q 8 hours  · Seizure , fall, aspiration precautions   · Neurology evaluation  ·  neurosurgery follow-up  · C/w Lamictal    Plan for Additional Problems:   · Leukocytosis, trending down   most likely reactive  2/2 SZD  Will monitor VS, off abx  VTE Prophylaxis: Enoxaparin (Lovenox)  / sequential compression device   Code Status: full  POLST: There is no POLST form on file for this patient (pre-hospital)    Anticipated Length of Stay:  Patient will be admitted on an Inpatient basis with an anticipated length of stay of  >2 midnights  Justification for Hospital Stay:  Neurology and neurosurgeon evaluation    Total Time for Visit, including Counseling / Coordination of Care: 45 minutes  Greater than 50% of this total time spent on direct patient counseling and coordination of care  Chief Complaint:   Seizure-like activity    History of Present Illness:    Deandra Stiles is a 43 y o  male with history of congenital hydrocephalus ,s/p   shunt, SZD , migraine who presented with episodes of seizure noted by patient;s mother tonight      Seizure activity described as upper extremity tonic-clonic activity lasted up to 1 minute and associated with decreased responsiveness to question and stimulus post event and diaphoresis  Patient also was complaining on throbbing headache behind his eyes, triptan did not help  Most of the history obtained from the patient mother who states that she wants "to get an answer" why patient  has increased seizure activity lately,  as SZD was well controlled x 2 years  Of notes on 10/4-10/5 patient was admitted to the hospital with similar complaints  He was evaluated by Neurology and neurosurgeon  Lamictal was increased to 200 mg b i d    given abnormal findings of nonfunctioning left-sided occipital shunt  patient was seen by neurosurgeon, who did not think that ventricle drainage can be improve with intervention and there are no  indication for further neurosurgical diagnostic or intervention  The ventricles were well decompressed on imaging study  Outpatient follow-up with neurosurgeon,Dr Kate, and XR shunt series was recommended within a week  ER proceed with x-ray shunt series which reported unchanged  shunt catheters  Valproic acid level low/25, Lamictal level still pending  The neurology on call contacted, he recommended  to proceed with loading dose of Depakote given low level on admission  Patient received 1 g of Valproic acid in the ER  He will be admitted on an inpatient basis for neurology and neurosurgery evaluation            Review of Systems:    Review of Systems   Neurological: Positive for seizures and headaches  Past Medical and Surgical History:     Past Medical History:   Diagnosis Date    Asthma     Cerebral palsy ([address] )     Congenital hydrocephalus ([address] )     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[address] )     Migraines     Seizures (HCC)        Past Surgical History:   Procedure Laterality Date    BRAIN SURGERY      Multiple  shunt revisions    HERNIA REPAIR         Meds/Allergies:    Prior to Admission medications    Medication Sig Start Date End Date Taking?  Authorizing Provider   albuterol (PROAIR HFA) 90 mcg/act inhaler ProAir  (90 Base) MCG/ACT Inhalation Aerosol Solution  INHALE 1 TO 2 PUFFS EVERY 4 TO 6 HOURS AS NEEDED  Quantity: 1;  Refills: 5       Myah Wilkinson DO;  Started 23-Dec-2015  Candelario Sales  5 GM Inhaler 12/23/15  Yes Historical Provider, MD   cholecalciferol (VITAMIN D3) 1,000 units tablet Take 1,000 Units by mouth daily Pt takes 2,000 units per day   Yes Historical Provider, MD   divalproex sodium (DEPAKOTE) 500 mg EC tablet Divalproex Sodium 500 MG Oral Tablet Delayed Release  1 tab in am, 1 tab at noon, 1 tab in pm   Quantity: 270;  Refills: 3       Ila SAEED ; Active   Yes Historical Provider, MD   fluticasone-salmeterol (ADVAIR DISKUS) 100-50 mcg/dose Advair Diskus 100-50 MCG/DOSE Inhalation Aerosol Powder Breath Activated  TAKE 1 PUFF TWICE A DAY   Quantity: 1;  Refills: 4       Virginia Hasmukh BECKETT;  Started 23-Dec-2015  [id number] Aerosol Powder Breath Activated Disp Pack 12/23/15  Yes Historical Provider, MD   lamoTRIgine (LaMICtal) 100 mg tablet Take 100 mg by mouth 2 (two) times a day   Yes Historical Provider, MD   lamoTRIgine (LaMICtal) 200 MG tablet Take 1 tablet by mouth 2 (two) times a day 10/5/17  Yes Shaunna Paniagua MD   rizatriptan (MAXALT-MLT) 10 MG disintegrating tablet Rizatriptan Benzoate 10 MG Oral Tablet Dispersible  TAKE 1 TABLET AT ONSET OF HEADACHE  MAY REPEAT EVERY 2 HOURS AS NEEDED  MAXIMUM 3 TABLETS IN 24 HOURS  Quantity: 9;  Refills: 5       Ila SEAED ;  Started 5-Feb-2016  Active 2/5/16  Yes Historical Provider, MD   LORazepam (ATIVAN) 1 mg tablet LORazepam 1 MG Oral Tablet  1 TAB THREE TIMES A DAY   Refills: 0    Active  10/20/17  Historical Provider, MD     I have reviewed home medications with a medical source (PCP, Pharmacy, other)  Allergies:    Allergies   Allergen Reactions    Latex Hives       Social History:     Marital Status: Single   Occupation: none  Patient Pre-hospital Living Situation :home  Patient Pre-hospital Level of Mobility: reg  Patient Pre-hospital Diet Restrictions: reg  Substance Use History:   History   Alcohol Use No     History   Smoking Status    Never Smoker   Smokeless Tobacco    Never Used     History   Drug Use No       Family History:    non-contributory    Physical Exam:     Vitals:   Blood Pressure: 147/67 (10/20/17 0611)  Pulse: 93 (10/20/17 0611)  Temperature: (!) 97 4 °F (36 3 °C) (10/20/17 0317)  Temp Source: Oral (10/20/17 0317)  Respirations: 20 (10/20/17 4782)  Height: 5' 8" (172 7 cm) (10/20/17 0317)  SpO2: 98 % (10/20/17 9904)    Physical Exam   Constitutional: He appears well-developed  HENT:   Head: Normocephalic  Eyes: Pupils are equal, round, and reactive to light  Neck: Normal range of motion  Cardiovascular: Regular rhythm  Pulmonary/Chest: No respiratory distress  Abdominal: He exhibits no distension  Musculoskeletal: He exhibits no edema  Neurological: He is alert  Skin: Skin is warm  Psychiatric: He has a normal mood and affect  Additional Data:     Lab Results: I have personally reviewed pertinent reports  Results from last 7 days  Lab Units 10/20/17  0357   WBC Thousand/uL 12 16*   HEMOGLOBIN g/dL 16 2   HEMATOCRIT % 46 1   PLATELETS Thousands/uL 268   NEUTROS PCT % 57   LYMPHS PCT % 31   MONOS PCT % 8   EOS PCT % 4       Results from last 7 days  Lab Units 10/20/17  0357   SODIUM mmol/L 140   POTASSIUM mmol/L 3 5   CHLORIDE mmol/L 104   CO2 mmol/L 28   BUN mg/dL 17   CREATININE mg/dL 0 78   CALCIUM mg/dL 9 3   TOTAL PROTEIN g/dL 7 2   BILIRUBIN TOTAL mg/dL 0 42   ALK PHOS U/L 52   ALT U/L 15   AST U/L 5   GLUCOSE RANDOM mg/dL 101           Imaging: I have personally reviewed pertinent reports  Xr Chest Portable    Result Date: 10/10/2017  Narrative: CHEST INDICATION:  Onset of seizures, concern for infection  COMPARISON:  Chest radiograph 9/30/2015 VIEWS:   AP frontal IMAGES:  1 FINDINGS:     Cardiomediastinal silhouette appears unremarkable  The lungs are clear    No pneumothorax or pleural effusion  Visualized osseous structures appear within normal limits for the patient's age  There is tubing overlying the left abdomen and chest      Impression: No active pulmonary disease  Workstation performed: [id number]     Ct Head Wo Contrast    Result Date: 10/19/2017  Narrative: CT BRAIN - WITHOUT CONTRAST INDICATION:  Hydrocephalus  COMPARISON:  10/4/2017 TECHNIQUE:  CT examination of the brain was performed  In addition to axial images, coronal reformatted images were created and submitted for interpretation  Radiation dose length product (DLP) for this visit:  1173 mGy-cm   This examination, like all CT scans performed in the [facility], was performed utilizing techniques to minimize radiation dose exposure, including the use of iterative reconstruction and automated exposure control  IMAGE QUALITY:  Diagnostic  FINDINGS:  PARENCHYMA:   Two left parietal lizett holes with a right anterior frontal lizett hole  3 separate shunt catheters are noted, extending into the ventricular system  The posterior shunts extend into the posterior body and atria of the lateral ventricle while the anterior shunt extends into the lateral horn of the right lateral ventricle despite its left-sided approach  No mass, mass effect or midline shift  No extra-axial fluid collections  No hemorrhage  Mild localized encephalomalacia in the left parietal lobe at the site of shunt insertions  VENTRICLES AND EXTRA-AXIAL SPACES:  No ventriculomegaly  Ventricles are stable  Left lateral ventricle decompressed especially posteriorly  VISUALIZED ORBITS AND PARANASAL SINUSES:  Unremarkable  CALVARIUM AND EXTRACRANIAL SOFT TISSUES:   Normal      Impression: Stable CT of the brain  No acute intracranial pathology  Workstation performed: [id number]     Ct Head Without Contrast    Result Date: 10/4/2017  Narrative: CT BRAIN - WITHOUT CONTRAST INDICATION:  Headache  COMPARISON:  None   TECHNIQUE:  CT examination of the brain was performed  In addition to axial images, coronal reformatted images were created and submitted for interpretation  Radiation dose length product (DLP) for this visit:  1124 94 mGy-cm   This examination, like all CT scans performed in the [facility], was performed utilizing techniques to minimize radiation dose exposure, including the use of iterative reconstruction and automated exposure control  IMAGE QUALITY:  Diagnostic  FINDINGS:  PARENCHYMA:  No intracranial mass, mass effect or midline shift  No CT signs of acute infarction  There is no parenchymal hemorrhage  VENTRICLES AND EXTRA-AXIAL SPACES:  There is 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  There is left frontal ventricular catheter with its tip in the frontal horn of right lateral ventricle  There are two left parietal region catheters one with its tip in the posterior body of left lateral ventricle and the other with its tip in the cephalocaudal malacia in the left parietal region  The left lateral ventricle is slightly smaller than the right lateral ventricle however there is no evidence of significant hydrocephalus  VISUALIZED ORBITS AND PARANASAL SINUSES:  Unremarkable  CALVARIUM AND EXTRACRANIAL SOFT TISSUES:   Normal      Impression: No acute parenchymal brain abnormality  Encephalomalacia and 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  Ventricular catheters are noted in both right and left lateral ventricles as described  Right lateral ventricle is slightly larger in the left however, there does not appear to be significant hydrocephalus  Workstation performed: [id number]     Xr Shunt Series    Result Date: 10/20/2017  Narrative: SHUNT SERIES INDICATION:  Evaluate shunt   COMPARISON:  None VIEWS: AP and lateral projections of the skull, chest and abdomen IMAGES:  7 FINDINGS: 2 left-sided  shunt catheters appear unchanged, including discontinuity of one of the catheters in the left lateral neck  The tubing is unchanged through the visualized course in the neck, chest and abdomen  Both catheters terminate in the left abdomen  Impression: Unchanged  shunt catheters  Workstation performed: [id number]     Xr Shunt Series    Result Date: 10/4/2017  Narrative: SHUNT SERIES INDICATION:  Evaluate shunt  COMPARISON:  None VIEWS: AP and lateral projections of the skull, chest and abdomen IMAGES:  7 FINDINGS: Patient is status post multiple shunt revisions which limits evaluation  There are 2 separate tubes coursing through the left neck over the left chest and terminating with tip in the left upper quadrant of the abdomen  One tube ends within the soft tissues of the left neck at the C4-5 level  The 2nd tube continues superiorly and terminates over the occiput  This shunt appears grossly intact  Impression: Left-sided  shunts one of which appears disconnected and the 2nd of which appears intact  Correlate with surgical history and prior studies if available to evaluate for interval change  Workstation performed: [id number]       EKG, Pathology, and Other Studies Reviewed on Admission:   · EKG: sinus    Allscripts / Epic Records Reviewed: Yes     ** Please Note: This note has been constructed using a voice recognition system   **

## 2017-10-20 NOTE — ED PROVIDER NOTES
History  Chief Complaint   Patient presents with    Seizure - Prior Hx Of     Mother reports "pt  has been having multiple mini seizures tonight "  Pt  had CT performed yesterday, and was to follow up with neurosurgery today  51-year-old male with history of hydrocephalus status post  shunt placement with no recent revisions, chronic headaches who presents for evaluation of headaches and seizure-like activity  The mother bedside present provides [de-identified] of the history  She states that the patient awoke from sleeping this evening and reported a frontal headache behind his eyes, constant, throbbing, 7/10  He states this is the typical location and severity of his headaches  He took a triptan which did not resolve his headache  Mother states she then witnessed the patient having "seizure-like activity "which she calls a mini seizure  She describes it as bilateral upper extremity tonic-clonic activity the last for seconds upwards to 1 minute at a time  She feels that the patient intermittently goes in and out of responsiveness to questions and stimulus  She states this is a very similar presentation to what he had on 10/04 when he was admitted to the hospital   She reports 1 episode of the symptoms prior to arrival and 2 since arrival to the hospital   She denies any fevers, recent illness, nausea, vomiting, diarrhea, constipation  No sick contacts  No urinary symptoms  No recent change in medication  No recent trauma or falls  Patient had an outpatient CT head to evaluate for hydrocephalus for a scheduled Neurosurgery outpatient follow-up  At that time there is no evidence of any hydrocephalus  On exam the patient is well-appearing in no acute distress with normal vitals  Nonfocal cranial nerve and motor/sensory exam             Prior to Admission Medications   Prescriptions Last Dose Informant Patient Reported? Taking?    albuterol (PROAIR HFA) 90 mcg/act inhaler 10/19/2017 at Unknown time  Yes Yes   Sig: ProAir  (90 Base) MCG/ACT Inhalation Aerosol Solution  INHALE 1 TO 2 PUFFS EVERY 4 TO 6 HOURS AS NEEDED  Quantity: 1;  Refills: 5       Myah Wilkinson DO;  Started 23-Dec-2015  Yuliet Alden  5 GM Inhaler   cholecalciferol (VITAMIN D3) 1,000 units tablet 10/19/2017 at Unknown time  Yes Yes   Sig: Take 1,000 Units by mouth daily Pt takes 2,000 units per day   divalproex sodium (DEPAKOTE) 500 mg EC tablet 10/19/2017 at Unknown time  Yes Yes   Sig: Divalproex Sodium 500 MG Oral Tablet Delayed Release  1 tab in am, 1 tab at noon, 1 tab in pm   Quantity: 270;  Refills: 3       Gercarlota Grgraham M D ; Active   fluticasone-salmeterol (ADVAIR DISKUS) 100-50 mcg/dose 10/19/2017 at Unknown time  Yes Yes   Sig: Advair Diskus 100-50 MCG/DOSE Inhalation Aerosol Powder Breath Activated  TAKE 1 PUFF TWICE A DAY   Quantity: 1;  Refills: 4       Tchulayony Altman DO;  Started 23-Dec-2015  [id number] Aerosol Powder Breath Activated Disp Pack   lamoTRIgine (LaMICtal) 100 mg tablet 10/19/2017 at Unknown time  Yes Yes   Sig: Take 100 mg by mouth 2 (two) times a day   lamoTRIgine (LaMICtal) 200 MG tablet 10/19/2017 at Unknown time  No Yes   Sig: Take 1 tablet by mouth 2 (two) times a day   rizatriptan (MAXALT-MLT) 10 MG disintegrating tablet 10/20/2017 at 0000  Yes Yes   Sig: Rizatriptan Benzoate 10 MG Oral Tablet Dispersible  TAKE 1 TABLET AT ONSET OF HEADACHE  MAY REPEAT EVERY 2 HOURS AS NEEDED  MAXIMUM 3 TABLETS IN 24 HOURS     Quantity: 9;  Refills: 5       Vishal Petersen M D ;  Started 5-Feb-2016  Active      Facility-Administered Medications: None       Past Medical History:   Diagnosis Date    Asthma     Cerebral palsy ([facility] [address] )     Congenital hydrocephalus (HCC)     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HCC)     Migraines     Seizures (HCC)        Past Surgical History:   Procedure Laterality Date    BRAIN SURGERY      Multiple  shunt revisions    HERNIA REPAIR         Family History   Problem Relation Age of Onset    Family history unknown: Yes     I have reviewed and agree with the history as documented  Social History   Substance Use Topics    Smoking status: Never Smoker    Smokeless tobacco: Never Used    Alcohol use No        Review of Systems   Constitutional: Negative for activity change, chills, fatigue and fever  HENT: Negative for congestion and sore throat  Eyes: Negative for photophobia, pain and visual disturbance  Respiratory: Negative for cough, chest tightness, shortness of breath and wheezing  Cardiovascular: Negative for chest pain, palpitations and leg swelling  Gastrointestinal: Negative for abdominal pain, blood in stool, diarrhea, nausea and vomiting  Genitourinary: Negative for difficulty urinating, dysuria, flank pain, hematuria and urgency  Musculoskeletal: Negative for arthralgias, back pain and gait problem  Skin: Negative for rash  Allergic/Immunologic: Negative for immunocompromised state  Neurological: Positive for seizures and headaches  Negative for dizziness, syncope, weakness, light-headedness and numbness  Hematological: Negative for adenopathy  Psychiatric/Behavioral: Negative for agitation and sleep disturbance  Physical Exam  ED Triage Vitals [10/20/17 0317]   Temperature Pulse Respirations Blood Pressure SpO2   (!) 97 4 °F (36 3 °C) 72 21 135/69 98 %      Temp Source Heart Rate Source Patient Position - Orthostatic VS BP Location FiO2 (%)   Oral Monitor Sitting Left arm --      Pain Score       Worst Possible Pain           Physical Exam   Constitutional: He is oriented to person, place, and time  Vital signs are normal  He appears well-developed and well-nourished  He does not appear ill  No distress  HENT:   Head: Normocephalic and atraumatic  Not macrocephalic and not microcephalic  Head is without raccoon's eyes, without Grimaldo's sign, without abrasion and without contusion     Right Ear: Tympanic membrane normal    Left Ear: Tympanic membrane normal  Nose: Nose normal    Mouth/Throat: Uvula is midline, oropharynx is clear and moist and mucous membranes are normal  No oropharyngeal exudate, posterior oropharyngeal edema, posterior oropharyngeal erythema or tonsillar abscesses  No tonsillar exudate  Eyes: Conjunctivae and EOM are normal  Pupils are equal, round, and reactive to light  Fundoscopic exam:       The right eye shows no papilledema  The left eye shows no papilledema  Neck: Trachea normal, normal range of motion, full passive range of motion without pain and phonation normal  Neck supple  No JVD present  No spinous process tenderness and no muscular tenderness present  Carotid bruit is not present  Normal range of motion present  No Brudzinski's sign and no Kernig's sign noted  Cardiovascular: Normal rate, regular rhythm and intact distal pulses  No murmur heard  Pulmonary/Chest: Effort normal and breath sounds normal  No accessory muscle usage  No tachypnea  No respiratory distress  He has no decreased breath sounds  He has no wheezes  He has no rhonchi  He has no rales  He exhibits no tenderness and no crepitus  Abdominal: Soft  Normal appearance and bowel sounds are normal  He exhibits no distension  There is no hepatosplenomegaly  There is no tenderness  There is no rigidity, no rebound, no guarding and no CVA tenderness  Musculoskeletal: Normal range of motion  Full range of motion all extremities  No bony tenderness   Lymphadenopathy:     He has no cervical adenopathy  Neurological: He is alert and oriented to person, place, and time  He has normal strength  No cranial nerve deficit or sensory deficit  He exhibits normal muscle tone  Coordination normal  GCS eye subscore is 4  GCS verbal subscore is 5  GCS motor subscore is 6  Unremarkable cranial nerve exam  Pupils 4 mm equal round reactive to light  No nystagmus, normal extraocular motion  5 out of 5 upper and lower extremity strength   No subjective sensory deficits to the face, upper or lower extremity  Normal finger-nose and heel shin  Skin: Skin is warm, dry and intact  Capillary refill takes less than 2 seconds  No rash noted  He is not diaphoretic  Psychiatric: He has a normal mood and affect  Nursing note and vitals reviewed        ED Medications  Medications   albuterol (PROVENTIL HFA,VENTOLIN HFA) inhaler 1 puff (not administered)   cholecalciferol (VITAMIN D3) tablet 1,000 Units (not administered)   fluticasone-salmeterol (ADVAIR) 100-50 mcg/dose inhaler 1 puff (not administered)   lamoTRIgine (LaMICtal) tablet 200 mg (not administered)   rizatriptan (MAXALT-MLT) disintegrating tablet 10 mg (not administered)   docusate sodium (COLACE) capsule 100 mg (not administered)   senna (SENOKOT) tablet 8 6 mg (not administered)   polyethylene glycol (MIRALAX) packet 17 g (not administered)   enoxaparin (LOVENOX) subcutaneous injection 40 mg (not administered)   acetaminophen (TYLENOL) tablet 650 mg (not administered)   divalproex sodium (DEPAKOTE) EC tablet 500 mg (not administered)   ketorolac (TORADOL) 30 mg/mL injection 15 mg (15 mg Intravenous Given 10/20/17 0526)   sodium chloride 0 9 % bolus 1,000 mL (0 mL Intravenous Stopped 10/20/17 0647)   metoclopramide (REGLAN) injection 10 mg (10 mg Intravenous Given 10/20/17 0527)   diphenhydrAMINE (BENADRYL) injection 25 mg (25 mg Intravenous Given 10/20/17 0526)   valproate (DEPACON) 1,000 mg in sodium chloride 0 9 % 50 mL IVPB (0 mg Intravenous Stopped 10/20/17 0718)       Diagnostic Studies  Labs Reviewed   CBC AND DIFFERENTIAL - Abnormal        Result Value Ref Range Status    WBC 12 16 (*) 4 31 - 10 16 Thousand/uL Final    RBC 5 27  3 88 - 5 62 Million/uL Final    Hemoglobin 16 2  12 0 - 17 0 g/dL Final    Hematocrit 46 1  36 5 - 49 3 % Final    MCV 88  82 - 98 fL Final    MCH 30 7  26 8 - 34 3 pg Final    MCHC 35 1  31 4 - 37 4 g/dL Final    RDW 12 3  11 6 - 15 1 % Final    MPV 10 0  8 9 - 12 7 fL Final Platelets 623  559 - 390 Thousands/uL Final    nRBC 0  /100 WBCs Final    Neutrophils Relative 57  43 - 75 % Final    Lymphocytes Relative 31  14 - 44 % Final    Monocytes Relative 8  4 - 12 % Final    Eosinophils Relative 4  0 - 6 % Final    Basophils Relative 0  0 - 1 % Final    Neutrophils Absolute 6 82  1 85 - 7 62 Thousands/µL Final    Lymphocytes Absolute 3 79  0 60 - 4 47 Thousands/µL Final    Monocytes Absolute 0 93  0 17 - 1 22 Thousand/µL Final    Eosinophils Absolute 0 47  0 00 - 0 61 Thousand/µL Final    Basophils Absolute 0 03  0 00 - 0 10 Thousands/µL Final   VALPROIC ACID LEVEL, TOTAL - Abnormal     Valproic Acid, Total 25 (*) 50 - 100 ug/mL Final   POCT GLUCOSE - Normal    POC Glucose 100  65 - 140 mg/dl Final   COMPREHENSIVE METABOLIC PANEL    Sodium 561  136 - 145 mmol/L Final    Potassium 3 5  3 5 - 5 3 mmol/L Final    Chloride 104  100 - 108 mmol/L Final    CO2 28  21 - 32 mmol/L Final    Anion Gap 8  4 - 13 mmol/L Final    BUN 17  5 - 25 mg/dL Final    Creatinine 0 78  0 60 - 1 30 mg/dL Final    Comment: Standardized to IDMS reference method    Glucose 101  65 - 140 mg/dL Final    Comment:   If the patient is fasting, the ADA then defines impaired fasting glucose as > 100 mg/dL and diabetes as > or equal to 123 mg/dL  Specimen collection should occur prior to Sulfasalazine administration due to the potential for falsely depressed results  Specimen collection should occur prior to Sulfapyridine administration due to the potential for falsely elevated results  Calcium 9 3  8 3 - 10 1 mg/dL Final    AST 5  5 - 45 U/L Final    Comment:   Specimen collection should occur prior to Sulfasalazine administration due to the potential for falsely depressed results  ALT 15  12 - 78 U/L Final    Comment:   Specimen collection should occur prior to Sulfasalazine and/or Sulfapyridine administration due to the potential for falsely depressed results       Alkaline Phosphatase 52  46 - 116 U/L Final Total Protein 7 2  6 4 - 8 2 g/dL Final    Albumin 3 7  3 5 - 5 0 g/dL Final    Total Bilirubin 0 42  0 20 - 1 00 mg/dL Final    eGFR 111  ml/min/1 73sq m Final    Narrative:     National Kidney Disease Education Program recommendations are as follows:  GFR calculation is accurate only with a steady state creatinine  Chronic Kidney disease less than 60 ml/min/1 73 sq  meters  Kidney failure less than 15 ml/min/1 73 sq  meters  LAMOTRIGINE LEVEL   PLATELET COUNT   POCT URINALYSIS DIPSTICK       XR shunt series   Final Result      Unchanged  shunt catheters  Workstation performed: [id number]             Procedures  Procedures      Phone Consults  ED Phone Contact    ED Course  ED Course as of Oct 20 0719   Fri Oct 20, 2017   9935 Discussed w reading rad -similar to prev study - apperas intact  both enter into abd  end in l abd  one is discontinuous in neck    same as before  in head, one is not continuous  [place] apperas same as before  also evaluating ct head  reports - shunts unchanged in position  No hydro    [id number]   Discuss of with Dr Jess Barry  of Neurology given the patient's history, presentation and valproic acid levels  He recommended loading dose of 1 g of Depakote and recommended outpatient follow-up with epileptologist   Discussed the patient would be admitted for observation given the increased frequency of seizures, they will see the patient in consultation                                [facility]  Ayaz[facility]ussell Time    Disposition  Final diagnoses:   Seizure-like activity ([address] )   Headache     ED Disposition     ED Disposition Condition Comment    Admit  Case was discussed with SCOTT and the patient's admission status was agreed to be Admission Status: inpatient status to the service of Dr Susi Rueda   Follow-up Information    None       Patient's Medications   Discharge Prescriptions    No medications on file     No discharge procedures on file      ED Provider  Attending physically available and evaluated Lottie Torre  I managed the patient along with the ED Attending      Electronically Signed by       Ayaz Chandler DO  Resident  10/20/17 9115

## 2017-10-21 LAB
ANION GAP SERPL CALCULATED.3IONS-SCNC: 7 MMOL/L (ref 4–13)
BUN SERPL-MCNC: 12 MG/DL (ref 5–25)
CALCIUM SERPL-MCNC: 8.7 MG/DL (ref 8.3–10.1)
CHLORIDE SERPL-SCNC: 106 MMOL/L (ref 100–108)
CO2 SERPL-SCNC: 29 MMOL/L (ref 21–32)
CREAT SERPL-MCNC: 0.7 MG/DL (ref 0.6–1.3)
GFR SERPL CREATININE-BSD FRML MDRD: 116 ML/MIN/1.73SQ M
GLUCOSE SERPL-MCNC: 94 MG/DL (ref 65–140)
MAGNESIUM SERPL-MCNC: 2.3 MG/DL (ref 1.6–2.6)
PHOSPHATE SERPL-MCNC: 3.5 MG/DL (ref 2.7–4.5)
POTASSIUM SERPL-SCNC: 3.8 MMOL/L (ref 3.5–5.3)
SODIUM SERPL-SCNC: 142 MMOL/L (ref 136–145)
VALPROATE SERPL-MCNC: 93 UG/ML (ref 50–100)

## 2017-10-21 PROCEDURE — 80048 BASIC METABOLIC PNL TOTAL CA: CPT | Performed by: HOSPITALIST

## 2017-10-21 PROCEDURE — 80164 ASSAY DIPROPYLACETIC ACD TOT: CPT | Performed by: PHYSICIAN ASSISTANT

## 2017-10-21 PROCEDURE — 83735 ASSAY OF MAGNESIUM: CPT | Performed by: HOSPITALIST

## 2017-10-21 PROCEDURE — 84100 ASSAY OF PHOSPHORUS: CPT | Performed by: HOSPITALIST

## 2017-10-21 RX ADMIN — DIVALPROEX SODIUM 500 MG: 500 TABLET, DELAYED RELEASE ORAL at 13:37

## 2017-10-21 RX ADMIN — POLYETHYLENE GLYCOL 3350 17 G: 17 POWDER, FOR SOLUTION ORAL at 08:23

## 2017-10-21 RX ADMIN — DIVALPROEX SODIUM 1000 MG: 500 TABLET, DELAYED RELEASE ORAL at 08:23

## 2017-10-21 RX ADMIN — SENNOSIDES 8.6 MG: 8.6 TABLET, FILM COATED ORAL at 08:22

## 2017-10-21 RX ADMIN — DIVALPROEX SODIUM 1000 MG: 500 TABLET, DELAYED RELEASE ORAL at 20:10

## 2017-10-21 RX ADMIN — ENOXAPARIN SODIUM 40 MG: 40 INJECTION SUBCUTANEOUS at 08:23

## 2017-10-21 RX ADMIN — VITAMIN D, TAB 1000IU (100/BT) 1000 UNITS: 25 TAB at 08:23

## 2017-10-21 RX ADMIN — LAMOTRIGINE 200 MG: 100 TABLET ORAL at 18:50

## 2017-10-21 RX ADMIN — DOCUSATE SODIUM 100 MG: 100 CAPSULE, LIQUID FILLED ORAL at 08:23

## 2017-10-21 RX ADMIN — FLUTICASONE PROPIONATE AND SALMETEROL 1 PUFF: 50; 100 POWDER RESPIRATORY (INHALATION) at 08:24

## 2017-10-21 RX ADMIN — LAMOTRIGINE 200 MG: 100 TABLET ORAL at 08:24

## 2017-10-21 RX ADMIN — FLUTICASONE PROPIONATE AND SALMETEROL 1 PUFF: 50; 100 POWDER RESPIRATORY (INHALATION) at 20:10

## 2017-10-21 NOTE — PROGRESS NOTES
Progress Note - Silke Jefferson 43 y o  male MRN: [id number]    Unit/Bed#: -01 Encounter: [id number]        * Seizure [facility])   Assessment & Plan    -brought to the ED by his mother for right arm tremors with associated episodes of decreased responsiveness    -patient had several episodes in the ER as well  -depakote levels drawn on admission were low, patient loaded with 1g depakote in the ER  -lamictal levels drawn in the ER, pending=  -Neurology consulted, recommend to increase Depakote to 1000 mg in 8AM, 500 mg at 2PM and 1000 mg at 8PM  Level this AM 92  And to continue Lamictal 200 mg BID  -non focal neuro exam  Alert and oriented, follows commands with all extremities equally             (ventriculoperitoneal) shunt status   Assessment & Plan    -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 CT head 10/19: stable from above     ? Xray shunt series 10/4: The left frontal ventricular catheter  appears attached to a [place] reservoir and the valve connection before coursing down the left side of the hemicranium down the neck to the left upper quadrant  This is likely the functioning shunt   2 separate shunt catheters noted coursing through left neck over left chest terminating in left upper quadrant  One catheter terminating in the soft tissues of the left neck at the C4-5 region   The catheter terminates over the left occiput     ? XR shunt series 10/20: 2  shunt catheters unchanged, including discontinuity of catheter in left lateral neck  Seizure-like activity (HCC)   Assessment & Plan    -right arm tremors with reduced responsiveness to stimuli at home  -several episodes occurred in the ER  -see seizure assessment and plan        Asthma   Assessment & Plan    -continue PRN albuterol, scheduled advair  -denies shortness of breath or difficulty breathing        Migraine headache   Assessment & Plan    -patient with history of migraines with increasing frequency and headaches   -patient arrived with headaches, takes PRN triptan at home but was not effective  -given 30mg toradol IV in the ER  -patient now denies headache at this time            Cerebral palsy [facility])   Assessment & Plan    -patient with cerebral palsy, lives with his mother who helps care for him              No new subjective & objective note has been filed under this hospital service since the last note was generated  VTE Pharmacologic Prophylaxis:   Pharmacologic: Enoxaparin (Lovenox)  Mechanical VTE Prophylaxis in Place: Yes    Patient Centered Rounds: I have performed bedside rounds with nursing staff today  Discussions with Specialists or Other Care Team Provider: nursing    Education and Discussions with Family / Patient: patient and patients mother at the bedside    Time Spent for Care: 30 minutes  More than 50% of total time spent on counseling and coordination of care as described above      Current Length of Stay: 1 day(s)    Current Patient Status: Inpatient   Certification Statement: The patient will continue to require additional inpatient hospital stay due to pt  for procedure on monday  Discharge Plan: pending final procedure and final plan    Code Status: Level 1 - Full Code      Subjective:   Pt is doing well  He currently has no symptoms  very nervous about his procedure on Monday  mom from home  No seizure activity   Objective:     Vitals:   Temp (24hrs), Av 7 °F (36 5 °C), Min:97 3 °F (36 3 °C), Max:98 °F (36 7 °C)    HR:  [72-77] 72  Resp:  [18-20] 20  BP: (118-140)/(64-82) 118/64  SpO2:  [96 %] 96 %  Body mass index is 29 46 kg/m²  Input and Output Summary (last 24 hours): Intake/Output Summary (Last 24 hours) at 10/21/17 1516  Last data filed at 10/21/17 1300   Gross per 24 hour   Intake              930 ml   Output                0 ml   Net              930 ml       Physical Exam:     Physical Exam   Constitutional: He is oriented to person, place, and time  He appears well-developed and well-nourished  No distress  Eyes: Conjunctivae and EOM are normal  Pupils are equal, round, and reactive to light  Right eye exhibits no discharge  Left eye exhibits no discharge  No scleral icterus  Neck: No tracheal deviation present  No thyromegaly present  Cardiovascular: Normal rate  Exam reveals no gallop and no friction rub  No murmur heard  Pulmonary/Chest: Effort normal and breath sounds normal  No stridor  No respiratory distress  He has no wheezes  He has no rales  He exhibits no tenderness  Abdominal: He exhibits no distension and no mass  There is no rebound and no guarding  Musculoskeletal: He exhibits no edema, tenderness or deformity  Lymphadenopathy:     He has no cervical adenopathy  Neurological: He is oriented to person, place, and time  Skin: No rash noted  He is not diaphoretic  No erythema  No pallor  Psychiatric: He has a normal mood and affect         Additional Data:     Labs:      Results from last 7 days  Lab Units 10/20/17  0357   WBC Thousand/uL 12 16* HEMOGLOBIN g/dL 16 2   HEMATOCRIT % 46 1   PLATELETS Thousands/uL 268   NEUTROS PCT % 57   LYMPHS PCT % 31   MONOS PCT % 8   EOS PCT % 4       Results from last 7 days  Lab Units 10/21/17  0537 10/20/17  0357   SODIUM mmol/L 142 140   POTASSIUM mmol/L 3 8 3 5   CHLORIDE mmol/L 106 104   CO2 mmol/L 29 28   BUN mg/dL 12 17   CREATININE mg/dL 0 70 0 78   CALCIUM mg/dL 8 7 9 3   TOTAL PROTEIN g/dL  --  7 2   BILIRUBIN TOTAL mg/dL  --  0 42   ALK PHOS U/L  --  52   ALT U/L  --  15   AST U/L  --  5   GLUCOSE RANDOM mg/dL 94 101           * I Have Reviewed All Lab Data Listed Above  * Additional Pertinent Lab Tests Reviewed: All Labs Within Last 24 Hours Reviewed    Imaging:     Imaging Reports Reviewed Today Include: reviewed       Recent Cultures (last 7 days):       Results from last 7 days  Lab Units 10/20/17  1605   GRAM STAIN RESULT  1+ Mononuclear Cells  No Polys or Bacteria seen       Last 24 Hours Medication List:     cholecalciferol 1,000 Units Oral Daily   divalproex sodium 1,000 mg Oral Q12H [address]   divalproex sodium 500 mg Oral Daily   docusate sodium 100 mg Oral BID   enoxaparin 40 mg Subcutaneous Daily   fluticasone-salmeterol 1 puff Inhalation Q12H ALEX   lamoTRIgine 200 mg Oral BID   polyethylene glycol 17 g Oral Daily   senna 1 tablet Oral Daily        Today, Patient Was Seen By: DEEDEE Barbosa    ** Please Note: Dictation voice to text software may have been used in the creation of this document   **

## 2017-10-21 NOTE — PROGRESS NOTES
Progress Note - Neurosurgery   Mike Lazar 43 y o  male MRN: [id number]  Unit/Bed#: -[place] Encounter: [id number]    Assessment:  1  History of congenital hydrocephalus status post multiple shunts and revisions  2  History of longstanding seizure disorder frequent breakthrough seizures  3  Cerebral palsy  4  Headache and vomiting-resolved  5  History of migraines  6  Asthma    Plan:  - for tentative shunt revision on Monday with dr Janet Maxwell no specific complaints    Intake/Output       10/21/17 0701 - 10/22/17 0700      [id number] Total       Intake    P O   360  -- 360    Total Intake 360 -- 360       Output    Urine  --  -- --    Unmeasured Urine Occurrence 1 x -- 1 x    Total Output -- -- --       Net I/O     360 -- 360          Invasive Devices     Peripheral Intravenous Line            Peripheral IV 10/20/17 Right Antecubital 1 day                Physical Exam:     Vitals: Blood pressure 118/64, pulse 72, temperature 97 7 °F (36 5 °C), temperature source Oral, resp  rate 20, height 5' 8" (1 727 m), weight 87 9 kg (193 lb 12 6 oz), SpO2 96 %  ,Body mass index is 29 46 kg/m²  Awake and alert  Moves all extremities  Abdomen soft  Lungs clear  Hear regular  Good coordination    Lab Results: I have personally reviewed pertinent results  Imaging Studies: I have personally reviewed pertinent reports          VTE Pharmacologic Prophylaxis: Enoxaparin (Lovenox)    VTE Mechanical Prophylaxis: sequential compression device

## 2017-10-21 NOTE — ASSESSMENT & PLAN NOTE
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 CT head 10/19: stable from above  ? Xray shunt series 10/4: The left frontal ventricular catheter  appears attached to a Rickham reservoir and the valve connection before coursing down the left side of the hemicranium down the neck to the left upper quadrant  This is likely the functioning shunt   2 separate shunt catheters noted coursing through left neck over left chest terminating in left upper quadrant  One catheter terminating in the soft tissues of the left neck at the C4-5 region   The catheter terminates over the left occiput     ? XR shunt series 10/20: 2  shunt catheters unchanged, including discontinuity of catheter in left lateral neck

## 2017-10-21 NOTE — ASSESSMENT & PLAN NOTE
-brought to the ED by his mother for right arm tremors with associated episodes of decreased responsiveness    -patient had several episodes in the ER as well  -depakote levels drawn on admission were low, patient loaded with 1g depakote in the ER  -lamictal levels drawn in the ER, pending=  -Neurology consulted, recommend to increase Depakote to 1000 mg in 8AM, 500 mg at 2PM and 1000 mg at 8PM  Level this AM 92  And to continue Lamictal 200 mg BID     -non focal neuro exam  Alert and oriented, follows commands with all extremities equally

## 2017-10-22 PROBLEM — R56.9 SEIZURE-LIKE ACTIVITY (HCC): Status: ACTIVE | Noted: 2017-10-20

## 2017-10-22 LAB
BASOPHILS # BLD AUTO: 0.03 THOUSANDS/ΜL (ref 0–0.1)
BASOPHILS NFR BLD AUTO: 0 % (ref 0–1)
BILIRUB UR QL STRIP: NEGATIVE
CLARITY UR: CLEAR
COLOR UR: YELLOW
EOSINOPHIL # BLD AUTO: 0.48 THOUSAND/ΜL (ref 0–0.61)
EOSINOPHIL NFR BLD AUTO: 6 % (ref 0–6)
ERYTHROCYTE [DISTWIDTH] IN BLOOD BY AUTOMATED COUNT: 12.6 % (ref 11.6–15.1)
GLUCOSE UR STRIP-MCNC: NEGATIVE MG/DL
HCT VFR BLD AUTO: 42.9 % (ref 36.5–49.3)
HGB BLD-MCNC: 15.1 G/DL (ref 12–17)
HGB UR QL STRIP.AUTO: NEGATIVE
KETONES UR STRIP-MCNC: NEGATIVE MG/DL
LEUKOCYTE ESTERASE UR QL STRIP: NEGATIVE
LYMPHOCYTES # BLD AUTO: 2.54 THOUSANDS/ΜL (ref 0.6–4.47)
LYMPHOCYTES NFR BLD AUTO: 29 % (ref 14–44)
MCH RBC QN AUTO: 30.9 PG (ref 26.8–34.3)
MCHC RBC AUTO-ENTMCNC: 35.2 G/DL (ref 31.4–37.4)
MCV RBC AUTO: 88 FL (ref 82–98)
MONOCYTES # BLD AUTO: 0.63 THOUSAND/ΜL (ref 0.17–1.22)
MONOCYTES NFR BLD AUTO: 7 % (ref 4–12)
NEUTROPHILS # BLD AUTO: 4.91 THOUSANDS/ΜL (ref 1.85–7.62)
NEUTS SEG NFR BLD AUTO: 58 % (ref 43–75)
NITRITE UR QL STRIP: NEGATIVE
NRBC BLD AUTO-RTO: 0 /100 WBCS
PH UR STRIP.AUTO: 7 [PH] (ref 4.5–8)
PLATELET # BLD AUTO: 224 THOUSANDS/UL (ref 149–390)
PMV BLD AUTO: 9.6 FL (ref 8.9–12.7)
PROT UR STRIP-MCNC: NEGATIVE MG/DL
RBC # BLD AUTO: 4.89 MILLION/UL (ref 3.88–5.62)
SP GR UR STRIP.AUTO: 1.02 (ref 1–1.03)
UROBILINOGEN UR QL STRIP.AUTO: 0.2 E.U./DL
VALPROATE SERPL-MCNC: 48 UG/ML (ref 50–100)
WBC # BLD AUTO: 8.71 THOUSAND/UL (ref 4.31–10.16)

## 2017-10-22 PROCEDURE — 80164 ASSAY DIPROPYLACETIC ACD TOT: CPT | Performed by: NURSE PRACTITIONER

## 2017-10-22 PROCEDURE — 81003 URINALYSIS AUTO W/O SCOPE: CPT | Performed by: PHYSICIAN ASSISTANT

## 2017-10-22 PROCEDURE — 85025 COMPLETE CBC W/AUTO DIFF WBC: CPT | Performed by: PHYSICIAN ASSISTANT

## 2017-10-22 RX ORDER — BUTALBITAL, ACETAMINOPHEN AND CAFFEINE 50; 325; 40 MG/1; MG/1; MG/1
1 TABLET ORAL ONCE
Status: COMPLETED | OUTPATIENT
Start: 2017-10-22 | End: 2017-10-22

## 2017-10-22 RX ORDER — SODIUM CHLORIDE, SODIUM LACTATE, POTASSIUM CHLORIDE, CALCIUM CHLORIDE 600; 310; 30; 20 MG/100ML; MG/100ML; MG/100ML; MG/100ML
100 INJECTION, SOLUTION INTRAVENOUS CONTINUOUS
Status: DISCONTINUED | OUTPATIENT
Start: 2017-10-23 | End: 2017-10-23

## 2017-10-22 RX ORDER — SODIUM CHLORIDE 9 MG/ML
75 INJECTION, SOLUTION INTRAVENOUS CONTINUOUS
Status: DISCONTINUED | OUTPATIENT
Start: 2017-10-22 | End: 2017-10-23

## 2017-10-22 RX ORDER — MAGNESIUM SULFATE HEPTAHYDRATE 40 MG/ML
2 INJECTION, SOLUTION INTRAVENOUS ONCE
Status: COMPLETED | OUTPATIENT
Start: 2017-10-22 | End: 2017-10-22

## 2017-10-22 RX ORDER — LORAZEPAM 2 MG/ML
0.5 INJECTION INTRAMUSCULAR ONCE
Status: DISCONTINUED | OUTPATIENT
Start: 2017-10-22 | End: 2017-10-26 | Stop reason: HOSPADM

## 2017-10-22 RX ORDER — DIVALPROEX SODIUM 500 MG/1
1000 TABLET, DELAYED RELEASE ORAL EVERY 8 HOURS SCHEDULED
Status: DISCONTINUED | OUTPATIENT
Start: 2017-10-22 | End: 2017-10-26 | Stop reason: HOSPADM

## 2017-10-22 RX ORDER — KETOROLAC TROMETHAMINE 30 MG/ML
30 INJECTION, SOLUTION INTRAMUSCULAR; INTRAVENOUS ONCE
Status: COMPLETED | OUTPATIENT
Start: 2017-10-22 | End: 2017-10-22

## 2017-10-22 RX ORDER — SODIUM CHLORIDE, SODIUM LACTATE, POTASSIUM CHLORIDE, CALCIUM CHLORIDE 600; 310; 30; 20 MG/100ML; MG/100ML; MG/100ML; MG/100ML
100 INJECTION, SOLUTION INTRAVENOUS CONTINUOUS
Status: DISCONTINUED | OUTPATIENT
Start: 2017-10-22 | End: 2017-10-22

## 2017-10-22 RX ORDER — LORAZEPAM 2 MG/ML
1.5 INJECTION INTRAMUSCULAR ONCE
Status: DISCONTINUED | OUTPATIENT
Start: 2017-10-22 | End: 2017-10-26 | Stop reason: HOSPADM

## 2017-10-22 RX ORDER — LORAZEPAM 2 MG/ML
INJECTION INTRAMUSCULAR
Status: COMPLETED
Start: 2017-10-22 | End: 2017-10-22

## 2017-10-22 RX ADMIN — ENOXAPARIN SODIUM 40 MG: 40 INJECTION SUBCUTANEOUS at 08:21

## 2017-10-22 RX ADMIN — KETOROLAC TROMETHAMINE 30 MG: 30 INJECTION, SOLUTION INTRAMUSCULAR at 10:50

## 2017-10-22 RX ADMIN — FLUTICASONE PROPIONATE AND SALMETEROL 1 PUFF: 50; 100 POWDER RESPIRATORY (INHALATION) at 21:57

## 2017-10-22 RX ADMIN — DOCUSATE SODIUM 100 MG: 100 CAPSULE, LIQUID FILLED ORAL at 08:21

## 2017-10-22 RX ADMIN — ACETAMINOPHEN 650 MG: 325 TABLET, FILM COATED ORAL at 09:35

## 2017-10-22 RX ADMIN — FLUTICASONE PROPIONATE AND SALMETEROL 1 PUFF: 50; 100 POWDER RESPIRATORY (INHALATION) at 08:22

## 2017-10-22 RX ADMIN — SENNOSIDES 8.6 MG: 8.6 TABLET, FILM COATED ORAL at 08:21

## 2017-10-22 RX ADMIN — SODIUM CHLORIDE 75 ML/HR: 0.9 INJECTION, SOLUTION INTRAVENOUS at 14:37

## 2017-10-22 RX ADMIN — MAGNESIUM SULFATE HEPTAHYDRATE 2 G: 40 INJECTION, SOLUTION INTRAVENOUS at 14:38

## 2017-10-22 RX ADMIN — DIVALPROEX SODIUM 1000 MG: 500 TABLET, DELAYED RELEASE ORAL at 19:39

## 2017-10-22 RX ADMIN — POLYETHYLENE GLYCOL 3350 17 G: 17 POWDER, FOR SOLUTION ORAL at 08:21

## 2017-10-22 RX ADMIN — BUTALBITAL, ACETAMINOPHEN, AND CAFFEINE 1 TABLET: 50; 325; 40 TABLET ORAL at 14:32

## 2017-10-22 RX ADMIN — LORAZEPAM 2 MG: 2 INJECTION INTRAMUSCULAR; INTRAVENOUS at 10:15

## 2017-10-22 RX ADMIN — DOCUSATE SODIUM 100 MG: 100 CAPSULE, LIQUID FILLED ORAL at 18:29

## 2017-10-22 RX ADMIN — LAMOTRIGINE 200 MG: 100 TABLET ORAL at 18:29

## 2017-10-22 RX ADMIN — LAMOTRIGINE 200 MG: 100 TABLET ORAL at 08:22

## 2017-10-22 RX ADMIN — VALPROATE SODIUM 1000 MG: 100 INJECTION, SOLUTION INTRAVENOUS at 11:48

## 2017-10-22 RX ADMIN — VITAMIN D, TAB 1000IU (100/BT) 1000 UNITS: 25 TAB at 08:21

## 2017-10-22 RX ADMIN — DIVALPROEX SODIUM 1000 MG: 500 TABLET, DELAYED RELEASE ORAL at 08:22

## 2017-10-22 NOTE — ASSESSMENT & PLAN NOTE
-patient with cerebral palsy, lives with his mother who helps care for him  - pt with mentation of a 15year old (teenager)

## 2017-10-22 NOTE — ASSESSMENT & PLAN NOTE
-right arm tremors with reduced responsiveness to stimuli at home  -several episodes occurred in the ER  -see seizure assessment and plan  - pt with no events overnight depakote level this am 48? Will discuss with neuro as yest level seem a little high   - today pt with 10/10 headache and right arm tremors   Dr Cash Reyes examined pt and spoke with mom at the bedside relayed verbal orders to be to administer 2mg iv ativan/iv depekote 1000mg stat  And torodol for ha now, increase dosing to 1000mg tid

## 2017-10-22 NOTE — ASSESSMENT & PLAN NOTE
-brought to the ED by his mother for right arm tremors with associated episodes of decreased responsiveness    -patient had several episodes in the ER as well  -depakote levels drawn on admission were low, patient loaded with 1g depakote in the ER  -lamictal levels drawn in the ER, pending=  -patient with severe headache on morning of 10/22, following which he had increasing tremors in right side which his mom states precede a grand mal seizure  Patient reloaded with 1g Depakote IV, 2mg IV ativan given and 30mg IV toradol given for headache   -Per Dr Per Choudhary, tremors more likely a response to headache pain than seizure activity; regardless, depakote dose increased to 1000mg TID  And to continue Lamictal 200 mg BID     -Valproic acid level 100 today(10/23)  -will check valproic acid level again tomorrow before morning dose  -non focal neuro exam  Alert and oriented, follows commands with all extremities equally, has mentation of a 15year old "per mom"

## 2017-10-22 NOTE — ASSESSMENT & PLAN NOTE
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 CT head 10/19: stable from above  ? Xray shunt series 10/4: The left frontal ventricular catheter  appears attached to a Rickham reservoir and the valve connection before coursing down the left side of the hemicranium down the neck to the left upper quadrant  This is likely the functioning shunt   2 separate shunt catheters noted coursing through left neck over left chest terminating in left upper quadrant  One catheter terminating in the soft tissues of the left neck at the C4-5 region   The catheter terminates over the left occiput     ? XR shunt series 10/20: 2  shunt catheters unchanged, including discontinuity of catheter in left lateral neck

## 2017-10-22 NOTE — PROGRESS NOTES
Progress Note - Neurosurgery   Lottie Torre 43 y o  male MRN: [id number]  Unit/Bed#: -01 Encounter: [id number]    Assessment:  1  History of congenital hydrocephalus status post multiple shunts and revisions  2  History of longstanding seizure disorder frequent breakthrough seizures  3  Cerebral palsy  4  Headache and vomiting-resolved  5  History of migraines  6  Asthma      Plan:  - seizure breakthrough today  - being loaded with depacon  - for shunt revision tomorrow morning  - Dr Charmaine Hardin updated his  mother at the bedside    Subjective/Objective     Was complaining of a 10/10 head ache earlier      Intake/Output       10/22/17 0701 - 10/23/17 0700      [id number] Total       Intake    P O   120  -- 120    Total Intake 120 -- 120       Output    Total Output -- -- --       Net I/O     120 -- 120          Invasive Devices     Peripheral Intravenous Line            Peripheral IV 10/20/17 Right Antecubital 2 days                Physical Exam:     Vitals: Blood pressure 130/62, pulse 86, temperature 97 7 °F (36 5 °C), temperature source Oral, resp  rate 18, height 5' 8" (1 727 m), weight 87 9 kg (193 lb 12 6 oz), SpO2 94 %  ,Body mass index is 29 46 kg/m²  Resting comfortable  Moves all extremities  Lungs clear  Heart regular  Abdomen soft      Lab Results: I have personally reviewed pertinent results  Imaging Studies: I have personally reviewed pertinent reports          VTE Pharmacologic Prophylaxis: Reason for no pharmacologic prophylaxis surgery monday    VTE Mechanical Prophylaxis: sequential compression device

## 2017-10-22 NOTE — ASSESSMENT & PLAN NOTE
-patient with history of migraines with increasing frequency and headaches over the last several months  -patient arrived with headaches, takes PRN triptan at home but was not effective  -given 30mg toradol IV in the ER  -had a 10/10 headache on 10/22, given another dose of 30mg IV toradol  -patient now denies headache at this time

## 2017-10-22 NOTE — ASSESSMENT & PLAN NOTE
-brought to the ED by his mother for right arm tremors with associated episodes of decreased responsiveness    -patient had several episodes in the ER as well  -depakote levels drawn on admission were low, patient loaded with 1g depakote in the ER  -lamictal levels drawn in the ER, pending=  -Neurology consulted, recommend to increase Depakote to 1000 mg in 8AM, 500 mg at 2PM and 1000 mg at 8PM  Yesterday AM 92, this am back down to 48 at 5am, so after discussion with dr Denise Brown ordered stat dose 2mg iv ativan stat now, increased midday dose to 1000mg hence now TID, and stat one time additional iv dose now of iv depakote with one time dose of iv torodol  And to continue Lamictal 200 mg BID     -non focal neuro exam  Alert and oriented, follows commands with all extremities equally, has mentation of a 15year old "per mom"

## 2017-10-22 NOTE — PROGRESS NOTES
Progress Note - Juma Chilel 43 y o  male MRN: [id number]    Unit/Bed#: -01 Encounter: [id number]        * Seizure [facility])   Assessment & Plan    -brought to the ED by his mother for right arm tremors with associated episodes of decreased responsiveness    -patient had several episodes in the ER as well  -depakote levels drawn on admission were low, patient loaded with 1g depakote in the ER  -lamictal levels drawn in the ER, pending=  -Neurology consulted, recommend to increase Depakote to 1000 mg in 8AM, 500 mg at 2PM and 1000 mg at 8PM  Yesterday AM 92, this am back down to 48 at 5am, so after discussion with dr Dannielle Spencer ordered stat dose 2mg iv ativan stat now, increased midday dose to 1000mg hence now TID, and stat one time additional iv dose now of iv depakote with one time dose of iv torodol  And to continue Lamictal 200 mg BID  -non focal neuro exam  Alert and oriented, follows commands with all extremities equally, has mentation of a 15year old "per mom"              (ventriculoperitoneal) shunt status   Assessment & Plan    -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 CT head 10/19: stable from above  ? Xray shunt series 10/4: The left frontal ventricular catheter  appears attached to a Rickham reservoir and the valve connection before coursing down the left side of the hemicranium down the neck to the left upper quadrant  This is likely the functioning shunt   2 separate shunt catheters noted coursing through left neck over left chest terminating in left upper quadrant  One catheter terminating in the soft tissues of the left neck at the C4-5 region   The catheter terminates over the left occiput     ? XR shunt series 10/20: 2  shunt catheters unchanged, including discontinuity of catheter in left lateral neck  Seizure-like activity (HCC)   Assessment & Plan    -right arm tremors with reduced responsiveness to stimuli at home  -several episodes occurred in the ER  -see seizure assessment and plan  - pt with no events overnight depakote level this am 48? Will discuss with neuro as yest level seem a little high   - today pt with 10/10 headache and right arm tremors   Dr Simpson Pulling examined pt and spoke with mom at the bedside relayed verbal orders to be to administer 2mg iv ativan/iv depekote 1000mg stat  And torodol for ha now, increase dosing to 1000mg tid           Asthma   Assessment & Plan    -continue PRN albuterol, scheduled advair  -denies shortness of breath or difficulty breathing        Migraine headache   Assessment & Plan    -patient with history of migraines with increasing frequency and headaches   -patient arrived with headaches, takes PRN triptan at home but was not effective  -given 30mg toradol IV in the ER  -patient now denies headache at this time            Cerebral palsy [facility])   Assessment & Plan    -patient with cerebral palsy, lives with his mother who helps care for him  - pt with mentation of a 15year old (teenager)               No new subjective & objective note has been filed under this hospital service since the last note was generated  VTE Pharmacologic Prophylaxis:   Pharmacologic: Enoxaparin (Lovenox)  Mechanical VTE Prophylaxis in Place: Yes    Patient Centered Rounds: I have performed bedside rounds with nursing staff today  Discussions with Specialists or Other Care Team Provider: nursing, katie idal and dr Neita Boast     Education and Discussions with Family / Patient: patient mother at bedside     Time Spent for Care: 30 minutes  More than 50% of total time spent on counseling and coordination of care as described above  Current Length of Stay: 2 day(s)    Current Patient Status: Inpatient   Certification Statement: The patient will continue to require additional inpatient hospital stay due to planned procedure on monday per neurosx team     Discharge Plan: discharge likely to home when medically stable     Code Status: Level 1 - Full Code      Subjective:   Mom states when ha of 10/10 and right hand tremor pt begins to have grand mal seizure  At this time pt is with eyes closed states headache really bad 10 /10, and right hand tremor  (clear change from past few days I have seen the pt) discussed with mother and son at bedside  As well as dr Neita Boast and katie dial of neurosx today  Objective:     Vitals:   Temp (24hrs), Av 8 °F (36 6 °C), Min:97 6 °F (36 4 °C), Max:98 2 °F (36 8 °C)    HR:  [74-99] 99  Resp:  [18-20] 18  BP: (129-135)/(60-79) 135/79  SpO2:  [94 %-98 %] 97 %  Body mass index is 29 46 kg/m²  Input and Output Summary (last 24 hours): Intake/Output Summary (Last 24 hours) at 10/22/17 2109  Last data filed at 10/22/17 1700   Gross per 24 hour   Intake              360 ml   Output                0 ml   Net              360 ml       Physical Exam:     Physical Exam   Constitutional: He is oriented to person, place, and time   He appears well-developed and well-nourished  He appears distressed  HENT:   Head: Normocephalic  Pt reports 10 /10 headache wont open eyes    Eyes: Right eye exhibits no discharge  Left eye exhibits no discharge  Neck: No tracheal deviation present  No thyromegaly present  Cardiovascular: Normal rate  Exam reveals no gallop and no friction rub  No murmur heard  Pulmonary/Chest: No stridor  No respiratory distress  He has no wheezes  He has no rales  He exhibits no tenderness  Abdominal: Soft  Bowel sounds are normal  He exhibits no distension and no mass  There is no tenderness  There is no rebound and no guarding  Musculoskeletal: He exhibits no edema, tenderness or deformity  Neurological: He is oriented to person, place, and time  Right hand tremor    Skin: No rash noted  He is not diaphoretic  No erythema  No pallor  Psychiatric:   Pt is with right hand tremor       Additional Data:     Labs:      Results from last 7 days  Lab Units 10/22/17  0919   WBC Thousand/uL 8 71   HEMOGLOBIN g/dL 15 1   HEMATOCRIT % 42 9   PLATELETS Thousands/uL 224   NEUTROS PCT % 58   LYMPHS PCT % 29   MONOS PCT % 7   EOS PCT % 6       Results from last 7 days  Lab Units 10/21/17  0537 10/20/17  0357   SODIUM mmol/L 142 140   POTASSIUM mmol/L 3 8 3 5   CHLORIDE mmol/L 106 104   CO2 mmol/L 29 28   BUN mg/dL 12 17   CREATININE mg/dL 0 70 0 78   CALCIUM mg/dL 8 7 9 3   TOTAL PROTEIN g/dL  --  7 2   BILIRUBIN TOTAL mg/dL  --  0 42   ALK PHOS U/L  --  52   ALT U/L  --  15   AST U/L  --  5   GLUCOSE RANDOM mg/dL 94 101           * I Have Reviewed All Lab Data Listed Above  * Additional Pertinent Lab Tests Reviewed:  All Labs Within Last 24 Hours Reviewed    Imaging:    Imaging Reports Reviewed Today Include: reviewed       Recent Cultures (last 7 days):       Results from last 7 days  Lab Units 10/20/17  1605   GRAM STAIN RESULT  1+ Mononuclear Cells  No Polys or Bacteria seen       Last 24 Hours Medication List:     [START ON 10/23/2017] cefazolin 2,000 mg Intravenous Once   cholecalciferol 1,000 Units Oral Daily   divalproex sodium 1,000 mg Oral Q8H ALEX   docusate sodium 100 mg Oral BID   fluticasone-salmeterol 1 puff Inhalation Q12H ALEX   lamoTRIgine 200 mg Oral BID   LORazepam 0 5 mg Intravenous Once   LORazepam 1 5 mg Intravenous Once   polyethylene glycol 17 g Oral Daily   senna 1 tablet Oral Daily        Today, Patient Was Seen By: DEEDEE Parada    ** Please Note: Dictation voice to text software may have been used in the creation of this document   **

## 2017-10-22 NOTE — ASSESSMENT & PLAN NOTE
-right arm tremors with reduced responsiveness to stimuli at home  -several episodes occurred in the ER  -see seizure assessment and plan  - pt with no events overnight depakote level this am 48?  Will discuss with neuro as yest level seem a little high

## 2017-10-23 ENCOUNTER — APPOINTMENT (INPATIENT)
Dept: RADIOLOGY | Facility: HOSPITAL | Age: 42
DRG: 026 | End: 2017-10-23
Payer: MEDICARE

## 2017-10-23 ENCOUNTER — ANESTHESIA (INPATIENT)
Dept: PERIOP | Facility: HOSPITAL | Age: 42
DRG: 026 | End: 2017-10-23
Payer: MEDICARE

## 2017-10-23 ENCOUNTER — ANESTHESIA EVENT (INPATIENT)
Dept: PERIOP | Facility: HOSPITAL | Age: 42
DRG: 026 | End: 2017-10-23
Payer: MEDICARE

## 2017-10-23 PROBLEM — T85.09XA OBSTRUCTED VP SHUNT, INITIAL ENCOUNTER (HCC): Status: ACTIVE | Noted: 2017-10-23

## 2017-10-23 LAB
ABO GROUP BLD: NORMAL
ANION GAP SERPL CALCULATED.3IONS-SCNC: 7 MMOL/L (ref 4–13)
APTT PPP: 31 SECONDS (ref 23–35)
BACTERIA CSF CULT: NO GROWTH
BASOPHILS # BLD AUTO: 0.04 THOUSANDS/ΜL (ref 0–0.1)
BASOPHILS NFR BLD AUTO: 0 % (ref 0–1)
BLD GP AB SCN SERPL QL: NEGATIVE
BUN SERPL-MCNC: 11 MG/DL (ref 5–25)
CALCIUM SERPL-MCNC: 8.2 MG/DL (ref 8.3–10.1)
CHLORIDE SERPL-SCNC: 107 MMOL/L (ref 100–108)
CO2 SERPL-SCNC: 30 MMOL/L (ref 21–32)
CREAT SERPL-MCNC: 0.82 MG/DL (ref 0.6–1.3)
EOSINOPHIL # BLD AUTO: 0.42 THOUSAND/ΜL (ref 0–0.61)
EOSINOPHIL NFR BLD AUTO: 4 % (ref 0–6)
ERYTHROCYTE [DISTWIDTH] IN BLOOD BY AUTOMATED COUNT: 12.7 % (ref 11.6–15.1)
GFR SERPL CREATININE-BSD FRML MDRD: 109 ML/MIN/1.73SQ M
GLUCOSE SERPL-MCNC: 62 MG/DL (ref 65–140)
GLUCOSE SERPL-MCNC: 83 MG/DL (ref 65–140)
GLUCOSE SERPL-MCNC: 98 MG/DL (ref 65–140)
HCT VFR BLD AUTO: 40.9 % (ref 36.5–49.3)
HGB BLD-MCNC: 14.2 G/DL (ref 12–17)
INR PPP: 1.08 (ref 0.86–1.16)
LYMPHOCYTES # BLD AUTO: 3.12 THOUSANDS/ΜL (ref 0.6–4.47)
LYMPHOCYTES NFR BLD AUTO: 33 % (ref 14–44)
MAGNESIUM SERPL-MCNC: 2.3 MG/DL (ref 1.6–2.6)
MCH RBC QN AUTO: 30.5 PG (ref 26.8–34.3)
MCHC RBC AUTO-ENTMCNC: 34.7 G/DL (ref 31.4–37.4)
MCV RBC AUTO: 88 FL (ref 82–98)
MONOCYTES # BLD AUTO: 0.72 THOUSAND/ΜL (ref 0.17–1.22)
MONOCYTES NFR BLD AUTO: 8 % (ref 4–12)
NEUTROPHILS # BLD AUTO: 5.16 THOUSANDS/ΜL (ref 1.85–7.62)
NEUTS SEG NFR BLD AUTO: 55 % (ref 43–75)
NRBC BLD AUTO-RTO: 0 /100 WBCS
PLATELET # BLD AUTO: 234 THOUSANDS/UL (ref 149–390)
PMV BLD AUTO: 9.6 FL (ref 8.9–12.7)
POTASSIUM SERPL-SCNC: 4.2 MMOL/L (ref 3.5–5.3)
PROTHROMBIN TIME: 14 SECONDS (ref 12.1–14.4)
RBC # BLD AUTO: 4.66 MILLION/UL (ref 3.88–5.62)
RH BLD: POSITIVE
SODIUM SERPL-SCNC: 144 MMOL/L (ref 136–145)
SPECIMEN EXPIRATION DATE: NORMAL
VALPROATE SERPL-MCNC: 100 UG/ML (ref 50–100)
WBC # BLD AUTO: 9.59 THOUSAND/UL (ref 4.31–10.16)

## 2017-10-23 PROCEDURE — 74000 HB X-RAY EXAM OF ABDOMEN (SINGLE ANTEROPOSTERIOR VIEW): CPT

## 2017-10-23 PROCEDURE — 86850 RBC ANTIBODY SCREEN: CPT | Performed by: NURSE PRACTITIONER

## 2017-10-23 PROCEDURE — 86901 BLOOD TYPING SEROLOGIC RH(D): CPT | Performed by: NURSE PRACTITIONER

## 2017-10-23 PROCEDURE — 85730 THROMBOPLASTIN TIME PARTIAL: CPT | Performed by: NURSE PRACTITIONER

## 2017-10-23 PROCEDURE — 009630Z DRAINAGE OF CEREBRAL VENTRICLE WITH DRAINAGE DEVICE, PERCUTANEOUS APPROACH: ICD-10-PCS | Performed by: NEUROLOGICAL SURGERY

## 2017-10-23 PROCEDURE — 71020 HB CHEST X-RAY 2VW FRONTAL&LATL: CPT

## 2017-10-23 PROCEDURE — 85610 PROTHROMBIN TIME: CPT | Performed by: NURSE PRACTITIONER

## 2017-10-23 PROCEDURE — C1729 CATH, DRAINAGE: HCPCS | Performed by: NEUROLOGICAL SURGERY

## 2017-10-23 PROCEDURE — 70250 X-RAY EXAM OF SKULL: CPT

## 2017-10-23 PROCEDURE — 85025 COMPLETE CBC W/AUTO DIFF WBC: CPT | Performed by: NURSE PRACTITIONER

## 2017-10-23 PROCEDURE — 82948 REAGENT STRIP/BLOOD GLUCOSE: CPT

## 2017-10-23 PROCEDURE — 86900 BLOOD TYPING SEROLOGIC ABO: CPT | Performed by: NURSE PRACTITIONER

## 2017-10-23 PROCEDURE — 83735 ASSAY OF MAGNESIUM: CPT | Performed by: NURSE PRACTITIONER

## 2017-10-23 PROCEDURE — 80048 BASIC METABOLIC PNL TOTAL CA: CPT | Performed by: NURSE PRACTITIONER

## 2017-10-23 PROCEDURE — 80164 ASSAY DIPROPYLACETIC ACD TOT: CPT | Performed by: NURSE PRACTITIONER

## 2017-10-23 DEVICE — BACTISEAL VP CATHETER KIT: Type: IMPLANTABLE DEVICE | Site: CRANIAL | Status: FUNCTIONAL

## 2017-10-23 DEVICE — IMPLANTABLE DEVICE: Type: IMPLANTABLE DEVICE | Site: CRANIAL | Status: FUNCTIONAL

## 2017-10-23 RX ORDER — OXYCODONE HYDROCHLORIDE 10 MG/1
10 TABLET ORAL EVERY 4 HOURS PRN
Status: DISCONTINUED | OUTPATIENT
Start: 2017-10-23 | End: 2017-10-26 | Stop reason: HOSPADM

## 2017-10-23 RX ORDER — SODIUM CHLORIDE 9 MG/ML
100 INJECTION, SOLUTION INTRAVENOUS CONTINUOUS
Status: DISCONTINUED | OUTPATIENT
Start: 2017-10-23 | End: 2017-10-23

## 2017-10-23 RX ORDER — FENTANYL CITRATE/PF 50 MCG/ML
25 SYRINGE (ML) INJECTION
Status: COMPLETED | OUTPATIENT
Start: 2017-10-23 | End: 2017-10-23

## 2017-10-23 RX ORDER — MEPERIDINE HYDROCHLORIDE 25 MG/ML
12.5 INJECTION INTRAMUSCULAR; INTRAVENOUS; SUBCUTANEOUS
Status: DISCONTINUED | OUTPATIENT
Start: 2017-10-23 | End: 2017-10-23 | Stop reason: HOSPADM

## 2017-10-23 RX ORDER — PROPOFOL 10 MG/ML
INJECTION, EMULSION INTRAVENOUS AS NEEDED
Status: DISCONTINUED | OUTPATIENT
Start: 2017-10-23 | End: 2017-10-23 | Stop reason: SURG

## 2017-10-23 RX ORDER — ROCURONIUM BROMIDE 10 MG/ML
INJECTION, SOLUTION INTRAVENOUS AS NEEDED
Status: DISCONTINUED | OUTPATIENT
Start: 2017-10-23 | End: 2017-10-23 | Stop reason: SURG

## 2017-10-23 RX ORDER — ONDANSETRON 2 MG/ML
4 INJECTION INTRAMUSCULAR; INTRAVENOUS EVERY 6 HOURS PRN
Status: DISCONTINUED | OUTPATIENT
Start: 2017-10-23 | End: 2017-10-23 | Stop reason: SDUPTHER

## 2017-10-23 RX ORDER — LORAZEPAM 1 MG/1
1 TABLET ORAL ONCE
Status: COMPLETED | OUTPATIENT
Start: 2017-10-23 | End: 2017-10-23

## 2017-10-23 RX ORDER — LIDOCAINE HYDROCHLORIDE 10 MG/ML
INJECTION, SOLUTION INFILTRATION; PERINEURAL AS NEEDED
Status: DISCONTINUED | OUTPATIENT
Start: 2017-10-23 | End: 2017-10-23 | Stop reason: SURG

## 2017-10-23 RX ORDER — LIDOCAINE HYDROCHLORIDE AND EPINEPHRINE 10; 10 MG/ML; UG/ML
INJECTION, SOLUTION INFILTRATION; PERINEURAL AS NEEDED
Status: DISCONTINUED | OUTPATIENT
Start: 2017-10-23 | End: 2017-10-23 | Stop reason: HOSPADM

## 2017-10-23 RX ORDER — ONDANSETRON 2 MG/ML
4 INJECTION INTRAMUSCULAR; INTRAVENOUS EVERY 4 HOURS PRN
Status: DISCONTINUED | OUTPATIENT
Start: 2017-10-23 | End: 2017-10-26 | Stop reason: HOSPADM

## 2017-10-23 RX ORDER — PANTOPRAZOLE SODIUM 40 MG/1
40 TABLET, DELAYED RELEASE ORAL DAILY
Status: DISCONTINUED | OUTPATIENT
Start: 2017-10-24 | End: 2017-10-23

## 2017-10-23 RX ORDER — MAGNESIUM HYDROXIDE/ALUMINUM HYDROXICE/SIMETHICONE 120; 1200; 1200 MG/30ML; MG/30ML; MG/30ML
30 SUSPENSION ORAL EVERY 6 HOURS PRN
Status: DISCONTINUED | OUTPATIENT
Start: 2017-10-23 | End: 2017-10-26 | Stop reason: HOSPADM

## 2017-10-23 RX ORDER — FENTANYL CITRATE 50 UG/ML
25 INJECTION, SOLUTION INTRAMUSCULAR; INTRAVENOUS
Status: DISCONTINUED | OUTPATIENT
Start: 2017-10-23 | End: 2017-10-26 | Stop reason: HOSPADM

## 2017-10-23 RX ORDER — ONDANSETRON 2 MG/ML
INJECTION INTRAMUSCULAR; INTRAVENOUS AS NEEDED
Status: DISCONTINUED | OUTPATIENT
Start: 2017-10-23 | End: 2017-10-23 | Stop reason: SURG

## 2017-10-23 RX ORDER — MIDAZOLAM HYDROCHLORIDE 1 MG/ML
INJECTION INTRAMUSCULAR; INTRAVENOUS
Status: DISPENSED
Start: 2017-10-23 | End: 2017-10-24

## 2017-10-23 RX ORDER — ALBUMIN, HUMAN INJ 5% 5 %
SOLUTION INTRAVENOUS CONTINUOUS PRN
Status: DISCONTINUED | OUTPATIENT
Start: 2017-10-23 | End: 2017-10-23 | Stop reason: SURG

## 2017-10-23 RX ORDER — CHLORHEXIDINE GLUCONATE 0.12 MG/ML
15 RINSE ORAL EVERY 12 HOURS SCHEDULED
Status: DISCONTINUED | OUTPATIENT
Start: 2017-10-23 | End: 2017-10-26 | Stop reason: HOSPADM

## 2017-10-23 RX ORDER — GLYCOPYRROLATE 0.2 MG/ML
INJECTION INTRAMUSCULAR; INTRAVENOUS AS NEEDED
Status: DISCONTINUED | OUTPATIENT
Start: 2017-10-23 | End: 2017-10-23 | Stop reason: SURG

## 2017-10-23 RX ORDER — FENTANYL CITRATE/PF 50 MCG/ML
25 SYRINGE (ML) INJECTION
Status: DISCONTINUED | OUTPATIENT
Start: 2017-10-23 | End: 2017-10-23 | Stop reason: HOSPADM

## 2017-10-23 RX ORDER — MIDAZOLAM HYDROCHLORIDE 1 MG/ML
INJECTION INTRAMUSCULAR; INTRAVENOUS AS NEEDED
Status: DISCONTINUED | OUTPATIENT
Start: 2017-10-23 | End: 2017-10-23 | Stop reason: SURG

## 2017-10-23 RX ORDER — HEPARIN SODIUM 5000 [USP'U]/ML
5000 INJECTION, SOLUTION INTRAVENOUS; SUBCUTANEOUS EVERY 8 HOURS SCHEDULED
Status: DISCONTINUED | OUTPATIENT
Start: 2017-10-24 | End: 2017-10-24

## 2017-10-23 RX ORDER — KETOROLAC TROMETHAMINE 30 MG/ML
30 INJECTION, SOLUTION INTRAMUSCULAR; INTRAVENOUS ONCE
Status: CANCELLED | OUTPATIENT
Start: 2017-10-23 | End: 2017-10-23

## 2017-10-23 RX ORDER — BISACODYL 10 MG
10 SUPPOSITORY, RECTAL RECTAL DAILY PRN
Status: DISCONTINUED | OUTPATIENT
Start: 2017-10-23 | End: 2017-10-26 | Stop reason: HOSPADM

## 2017-10-23 RX ORDER — FENTANYL CITRATE 50 UG/ML
INJECTION, SOLUTION INTRAMUSCULAR; INTRAVENOUS AS NEEDED
Status: DISCONTINUED | OUTPATIENT
Start: 2017-10-23 | End: 2017-10-23 | Stop reason: SURG

## 2017-10-23 RX ORDER — ONDANSETRON 2 MG/ML
4 INJECTION INTRAMUSCULAR; INTRAVENOUS ONCE AS NEEDED
Status: DISCONTINUED | OUTPATIENT
Start: 2017-10-23 | End: 2017-10-23 | Stop reason: HOSPADM

## 2017-10-23 RX ORDER — KETOROLAC TROMETHAMINE 30 MG/ML
30 INJECTION, SOLUTION INTRAMUSCULAR; INTRAVENOUS ONCE
Status: COMPLETED | OUTPATIENT
Start: 2017-10-23 | End: 2017-10-23

## 2017-10-23 RX ORDER — FENTANYL CITRATE/PF 50 MCG/ML
25 SYRINGE (ML) INJECTION
Status: CANCELLED | OUTPATIENT
Start: 2017-10-23

## 2017-10-23 RX ORDER — CEFAZOLIN SODIUM 1 G/3ML
INJECTION, POWDER, FOR SOLUTION INTRAMUSCULAR; INTRAVENOUS AS NEEDED
Status: DISCONTINUED | OUTPATIENT
Start: 2017-10-23 | End: 2017-10-23 | Stop reason: SURG

## 2017-10-23 RX ADMIN — FENTANYL CITRATE 25 MCG: 50 INJECTION INTRAMUSCULAR; INTRAVENOUS at 16:03

## 2017-10-23 RX ADMIN — DIVALPROEX SODIUM 1000 MG: 500 TABLET, DELAYED RELEASE ORAL at 21:25

## 2017-10-23 RX ADMIN — LIDOCAINE HYDROCHLORIDE 100 MG: 10 INJECTION, SOLUTION INFILTRATION; PERINEURAL at 13:06

## 2017-10-23 RX ADMIN — ROCURONIUM BROMIDE 10 MG: 10 INJECTION, SOLUTION INTRAVENOUS at 13:15

## 2017-10-23 RX ADMIN — FLUTICASONE PROPIONATE AND SALMETEROL 1 PUFF: 50; 100 POWDER RESPIRATORY (INHALATION) at 22:43

## 2017-10-23 RX ADMIN — FENTANYL CITRATE 25 MCG: 50 INJECTION INTRAMUSCULAR; INTRAVENOUS at 15:59

## 2017-10-23 RX ADMIN — ROCURONIUM BROMIDE 40 MG: 10 INJECTION, SOLUTION INTRAVENOUS at 13:06

## 2017-10-23 RX ADMIN — SODIUM CHLORIDE 100 ML/HR: 0.9 INJECTION, SOLUTION INTRAVENOUS at 16:50

## 2017-10-23 RX ADMIN — SODIUM CHLORIDE, SODIUM LACTATE, POTASSIUM CHLORIDE, AND CALCIUM CHLORIDE: .6; .31; .03; .02 INJECTION, SOLUTION INTRAVENOUS at 12:58

## 2017-10-23 RX ADMIN — ACETAMINOPHEN 650 MG: 325 TABLET, FILM COATED ORAL at 22:16

## 2017-10-23 RX ADMIN — FENTANYL CITRATE 25 MCG: 50 INJECTION INTRAMUSCULAR; INTRAVENOUS at 15:48

## 2017-10-23 RX ADMIN — FLUTICASONE PROPIONATE AND SALMETEROL 1 PUFF: 50; 100 POWDER RESPIRATORY (INHALATION) at 08:43

## 2017-10-23 RX ADMIN — OXYCODONE HYDROCHLORIDE 10 MG: 10 TABLET ORAL at 22:16

## 2017-10-23 RX ADMIN — CEFAZOLIN SODIUM 2000 MG: 2 SOLUTION INTRAVENOUS at 04:58

## 2017-10-23 RX ADMIN — FENTANYL CITRATE 50 MCG: 50 INJECTION, SOLUTION INTRAMUSCULAR; INTRAVENOUS at 14:33

## 2017-10-23 RX ADMIN — LORAZEPAM 1 MG: 1 TABLET ORAL at 02:09

## 2017-10-23 RX ADMIN — SODIUM CHLORIDE, SODIUM LACTATE, POTASSIUM CHLORIDE, AND CALCIUM CHLORIDE 100 ML/HR: .6; .31; .03; .02 INJECTION, SOLUTION INTRAVENOUS at 00:22

## 2017-10-23 RX ADMIN — PROPOFOL 200 MG: 10 INJECTION, EMULSION INTRAVENOUS at 13:06

## 2017-10-23 RX ADMIN — SODIUM CHLORIDE: 0.9 INJECTION, SOLUTION INTRAVENOUS at 13:08

## 2017-10-23 RX ADMIN — FENTANYL CITRATE 50 MCG: 50 INJECTION, SOLUTION INTRAMUSCULAR; INTRAVENOUS at 13:06

## 2017-10-23 RX ADMIN — SODIUM CHLORIDE, SODIUM LACTATE, POTASSIUM CHLORIDE, AND CALCIUM CHLORIDE 100 ML/HR: .6; .31; .03; .02 INJECTION, SOLUTION INTRAVENOUS at 16:00

## 2017-10-23 RX ADMIN — FENTANYL CITRATE 50 MCG: 50 INJECTION, SOLUTION INTRAMUSCULAR; INTRAVENOUS at 14:31

## 2017-10-23 RX ADMIN — KETOROLAC TROMETHAMINE 30 MG: 30 INJECTION, SOLUTION INTRAMUSCULAR at 16:30

## 2017-10-23 RX ADMIN — NEOSTIGMINE METHYLSULFATE 3 MG: 1 INJECTION, SOLUTION INTRAMUSCULAR; INTRAVENOUS; SUBCUTANEOUS at 15:11

## 2017-10-23 RX ADMIN — FENTANYL CITRATE 25 MCG: 50 INJECTION INTRAMUSCULAR; INTRAVENOUS at 16:18

## 2017-10-23 RX ADMIN — FENTANYL CITRATE 50 MCG: 50 INJECTION, SOLUTION INTRAMUSCULAR; INTRAVENOUS at 13:49

## 2017-10-23 RX ADMIN — ALBUMIN HUMAN: 0.05 INJECTION, SOLUTION INTRAVENOUS at 13:45

## 2017-10-23 RX ADMIN — CEFAZOLIN SODIUM 1000 MG: 1 SOLUTION INTRAVENOUS at 22:18

## 2017-10-23 RX ADMIN — MIDAZOLAM HYDROCHLORIDE 2 MG: 1 INJECTION, SOLUTION INTRAMUSCULAR; INTRAVENOUS at 12:57

## 2017-10-23 RX ADMIN — CEFAZOLIN 2000 MG: 1 INJECTION, POWDER, FOR SOLUTION INTRAVENOUS at 13:15

## 2017-10-23 RX ADMIN — DIVALPROEX SODIUM 1000 MG: 500 TABLET, DELAYED RELEASE ORAL at 16:08

## 2017-10-23 RX ADMIN — GLYCOPYRROLATE 0.4 MG: 0.2 INJECTION, SOLUTION INTRAMUSCULAR; INTRAVENOUS at 15:11

## 2017-10-23 RX ADMIN — DIVALPROEX SODIUM 1000 MG: 500 TABLET, DELAYED RELEASE ORAL at 06:16

## 2017-10-23 RX ADMIN — CHLORHEXIDINE GLUCONATE 15 ML: 1.2 RINSE ORAL at 11:42

## 2017-10-23 RX ADMIN — DIVALPROEX SODIUM 1000 MG: 500 TABLET, DELAYED RELEASE ORAL at 00:21

## 2017-10-23 RX ADMIN — CHLORHEXIDINE GLUCONATE 15 ML: 1.2 RINSE ORAL at 21:24

## 2017-10-23 RX ADMIN — ONDANSETRON 4 MG: 2 INJECTION INTRAMUSCULAR; INTRAVENOUS at 15:11

## 2017-10-23 NOTE — PROGRESS NOTES
Mother, Carmina Nathan at bedside and is staying overnight with pt  RN updated plan for OR tomorrow, P/U time 11am  Carmina Nathan has no questions at this time

## 2017-10-23 NOTE — OP NOTE
OPERATIVE REPORT  PATIENT NAME: Nain Ingram    :  1975  MRN: [id number]  Pt Location: BE OR ROOM 09    SURGERY DATE: 10/23/2017    Surgeon(s) and Role:     * Adolphus Boas, MD - Primary    Preop Diagnosis:   (ventriculoperitoneal) shunt status [Z98 2]  Seizure-like activity ([address] ) [R56 9]    Post-Op Diagnosis Codes:     *  (ventriculoperitoneal) shunt status [Z98 2]     * Seizure-like activity ([address] ) [R56 9]    Procedure(s) (LRB):  SHUNT VENTRICULAR-PERITONEAL revision (Left)    Specimen(s):  * No specimens in log *    Estimated Blood Loss:   50 mL    Drains:   None    Anesthesia Type:   General    Operative Indications:   (ventriculoperitoneal) shunt status [Z98 2] malfunction  Seizure-like activity ([address] ) [R56 9]      Operative Findings:  Disconnection of ventriculoperitoneal shunt    Complications:   None    Procedure and Technique:  After adequate general endotracheal anesthesia the patient was placed supine on the operating table  The hair was clipped and the scalp neck chest and abdomen were prepped with Betadine soap then DuraPrep  Double layer drapes were placed in normal fashion and 0300 hours Betadine impregnated sticky drape was placed over these  Time-out was called and all parameters a time-out were followed  The procedure began in the left parietal region  In this region the previous incision was injected with 1% lidocaine with 1 100,000 epinephrine  A 15  Blade was used to incise the skin  Bovie and bipolar were used throughout the procedure to maintain hemostasis  The Bovie and a cutting setting was used to divide the tissues to the para cranium  Jackelyn clips were placed on the scalp edges  A careful and tedious dissection was utilized to expose the to present shunt systems  A posterior shunt system had a reservoir which terminated in a small pressure valve which was disconnected  There was no flow cerebral spinal fluid through this    A 2nd were anterior Zachary reservoir was connected to a non barium impregnated ventricular catheter and this had free flow cerebral spinal fluid through it  Next attention was placed to the abdomen  In the midline below his previous incisions above the umbilicus a 2 cm incision was made by injecting the skin with 1% lidocaine with 1 100,000 epinephrine and incising the skin with a 15  Blade  Bovie was utilized to divide the tissues to the linea alba of the rectus sheath  Dissection was then carried out through the preperitoneal fat to the peritoneum  This was double clamped and a 4 0 Nurolon suture was passed through this to produce a pursestring  Inside the pursestring the peritoneum was opened and a loop of as omentum  Next a Passer was utilized to go from the abdominal incision to the retroauricular incision in the parietal boss  A back to seal catheter connected to a Hakim programable valve set at 100 mm of mercury was then passed through this tunneler the valve was connected to the anterior ventricular drain via a short segment of back to seal catheter  Each connection point was tied with 2 0 silk sutures  The whole system was then pulled into position and the shunt catheter pumped and refilled well  The coronal incision was then closed with interrupted inverted 3 0 Vicryl sutures and staples on the skin  The peritoneal and was fed into the peritoneal cavity and the pursestring was tied tightly taking care not to kink the catheter     The rectus sheath was then closed with interrupted inverted 3 0 Vicryl sutures  Subcutaneous tissues were closed with double layered fashion utilizing interrupted inverted 3 0 Vicryl sutures  Benzoin and Steri-Strips were placed on the epidermis and clean sterile dressing was placed  The patient was then taken to the recovery room     I was present for the entire procedure    Patient Disposition:  PACU     SIGNATURE: Jennifer Webb MD  DATE: October 23, 2017  TIME: 6:00 PM

## 2017-10-23 NOTE — ANESTHESIA POSTPROCEDURE EVALUATION
Post-Op Assessment Note      CV Status:  Stable    Mental Status:  Alert and awake    Hydration Status:  Euvolemic    PONV Controlled:  Controlled    Airway Patency:  Patent    Post Op Vitals Reviewed: Yes          Staff: CRNA           /60 (10/23/17 1539)    Temp 98 4 °F (36 9 °C) (10/23/17 1539)    Pulse 95 (10/23/17 1539)   Resp 16 (10/23/17 1539)    SpO2 100 % (10/23/17 1539)

## 2017-10-23 NOTE — ANESTHESIA PREPROCEDURE EVALUATION
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[facility] )    Migraines  Seizures ([facility]         Review of Systems/Medical History          Cardiovascular   Pulmonary  Asthma: well controlled/ stable , ,        GI/Hepatic            Endo/Other     GYN       Hematology   Musculoskeletal       Neurology  Seizures (Admitted with seizures 3 days ago  Now Controlled ) well controlled,     Psychology           Physical Exam    Airway    Mallampati score: I  TM Distance: >3 FB  Neck ROM: full     Dental   No notable dental hx     Cardiovascular      Pulmonary  Breath sounds clear to auscultation,     Other Findings        Anesthesia Plan  ASA Score- 3       Anesthesia Type- general with ASA Monitors  Additional Monitors:   Airway Plan: ETT  Induction- intravenous  Informed Consent- Anesthetic plan and risks discussed with patient and mother  I personally reviewed this patient with the CRNA  Discussed and agreed on the Anesthesia Plan with the CRNA         Lab Results   Component Value Date    GLUCOSE 83 10/23/2017    ALBUMIN 3 7 10/20/2017    ALT 15 10/20/2017    AST 5 10/20/2017    BUN 11 10/23/2017    CALCIUM 8 2 (L) 10/23/2017     10/23/2017    CHOL 162 09/12/2017    CO2 30 10/23/2017    CREATININE 0 82 10/23/2017    HDL 40 09/12/2017    INR 1 08 10/23/2017    HCT 40 9 10/23/2017    HGB 14 2 10/23/2017    PROT 7 2 10/20/2017    MG 2 3 10/23/2017    PHOS 3 5 10/21/2017     10/23/2017    K 4 2 10/23/2017     10/23/2017    TRIG 128 09/12/2017    WBC 9 59 10/23/2017

## 2017-10-23 NOTE — PROGRESS NOTES
I carefully reviewed the chart and images of this patient  He has a shunt disconnection on the left side of the posterior ventricular catheter  He has 3 ventricular catheters in place 2 of which appear to be nonfunctional     He is to peritoneal catheters 1 is in close proximity to the posterior left ventricular system which is likely the disconnected system the other looks like it is an old remnant system  The plan is to replace the valve and peritoneal catheter utilizing the ventricular catheter from the old system if it all possible  The risks, benefits and complications of surgery were explained in detail to [facility] and his mother  including hemorrhage, infection, CSF leakage, wound problems, pain, weakness, numbness, dysesthesiae, paralysis, coma, and death  Also, the possibility of further surgery being required was emphasized  Other potential medical complications were outlined, including deep venous thrombosis, pulmonary embolism, pneumonia, urinary tract infection, myocardial infarction,  and stroke  The need for physical therapy, occupational therapy, and rehabilitation was also mentioned  The alternatives to surgery were discussed  [facility] and his mother asked relevant questions and asked that we proceed with arrangements for surgery

## 2017-10-23 NOTE — PROGRESS NOTES
Progress Note - Ladean Kocher 43 y o  male MRN: [id number]    Unit/Bed#: OR POOL Encounter: [id number]        Seizure [facility])   Assessment & Plan    -brought to the ED by his mother for right arm tremors with associated episodes of decreased responsiveness    -patient had several episodes in the ER as well  -depakote levels drawn on admission were low, patient loaded with 1g depakote in the ER  -lamictal levels drawn in the ER, pending=  -patient with severe headache on morning of 10/22, following which he had increasing tremors in right side which his mom states precede a grand mal seizure  Patient reloaded with 1g Depakote IV, 2mg IV ativan given and 30mg IV toradol given for headache   -Per Dr Curt Dunn, tremors more likely a response to headache pain than seizure activity; regardless, depakote dose increased to 1000mg TID  And to continue Lamictal 200 mg BID  -Valproic acid level 100 today(10/23)  -will check valproic acid level again tomorrow before morning dose  -non focal neuro exam  Alert and oriented, follows commands with all extremities equally, has mentation of a 15year old "per mom"              (ventriculoperitoneal) shunt status   Assessment & Plan    -patient born premature with history of congenital hydrocephalus  - shunts placed at age 10 months with multiple revisions in the past  -Neurosurgery consulted to manage shunt  Dr Jorge Contreras at the bedside to tap  shunt on 10/20  CSF clear, Protein=70, RBC =1 1, glucose=57, and gram stain=1+ mononuclear cells, no polys or bacteria seen  Culture shows no growth  -Plan for  shunt revision by Dr Kunal Mahmood per Neurosurgery on Monday,  time 11am  Cefepime for pre-procedure antibiotic prophylaxis  Patient NPO since midnight  ? 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 CT head 10/19: stable from above  ? Xray shunt series 10/4: The left frontal ventricular catheter  appears attached to a Rickham reservoir and the valve connection before coursing down the left side of the hemicranium down the neck to the left upper quadrant  This is likely the functioning shunt   2 separate shunt catheters noted coursing through left neck over left chest terminating in left upper quadrant  One catheter terminating in the soft tissues of the left neck at the C4-5 region   The catheter terminates over the left occiput     ? XR shunt series 10/20: 2  shunt catheters unchanged, including discontinuity of catheter in left lateral neck  Seizure-like activity (HCC)   Assessment & Plan    -right arm tremors with reduced responsiveness to stimuli at home  -several episodes occurred in the ER  -see seizure assessment and plan  - pt with no events overnight depakote level this am 48? Will discuss with neuro as yest level seem a little high   - today pt with 10/10 headache and right arm tremors  Sunday Dr Balbina Cabral examined pt and spoke with mom at the bedside relayed verbal orders to be to administer 2mg iv ativan/iv depekote 1000mg stat  And torodol for ha now, increase dosing to 1000mg tid   Pt had some relief             Asthma   Assessment & Plan    -continue PRN albuterol, scheduled advair  -denies shortness of breath or difficulty breathing        Migraine headache   Assessment & Plan    -patient with history of migraines with increasing frequency and headaches over the last several months  -patient arrived with headaches, takes PRN triptan at home but was not effective  -given 30mg toradol IV in the ER  -had a 10/10 headache on 10/22, given another dose of 30mg IV toradol  -patient now denies headache at this time            Cerebral palsy [facility])   Assessment & Plan    -patient with cerebral palsy, lives with his mother who helps care for him  - pt with mentation of a 15year old (teenager)               No new subjective & objective note has been filed under this hospital service since the last note was generated  Patient not evaluated left this morning for the OR and at this time pt has not returned from the OR   Pt records reviewed labs ordered for am

## 2017-10-23 NOTE — CASE MANAGEMENT
Initial Clinical Review    Admission: Date/Time/Statement: 10/20/17 @ 0608     Orders Placed This Encounter   Procedures    Inpatient Admission (expected length of stay for this patient is greater than two midnights)     Standing Status:   Standing     Number of Occurrences:   1     Order Specific Question:   Admitting Physician     Answer:   Edvin Bell     Order Specific Question:   Level of Care     Answer:   Med Surg [16]     Order Specific Question:   Estimated length of stay     Answer:   More than 2 Midnights     Order Specific Question:   Certification     Answer:   I certify that inpatient services are medically necessary for this patient for a duration of greater than two midnights  See H&P and MD Progress Notes for additional information about the patient's course of treatment  ED: Date/Time/Mode of Arrival:   ED Arrival Information     Expected Arrival Acuity Means of Arrival Escorted By Service Admission Type    - 10/20/2017 03:11 Urgent Walk-In Self General Medicine Urgent    Arrival Complaint    Seizures          Chief Complaint:   Chief Complaint   Patient presents with    Seizure - Prior Hx Of     Mother reports "pt  has been having multiple mini seizures tonight "  Pt  had CT performed yesterday, and was to follow up with neurosurgery today  History of Illness:  43 y o  male with history of congenital hydrocephalus ,s/p   shunt, SZD , migraine who presented with episodes of seizure noted by patient;s mother tonight  Seizure activity described as upper extremity tonic-clonic activity lasted up to 1 minute and associated with decreased responsiveness to question and stimulus post event and diaphoresis  Patient also was complaining on throbbing headache behind his eyes, triptan did not help    Most of the history obtained from the patient mother who states that she wants "to get an answer" why patient  has increased seizure activity lately,  as SZD was well controlled x 2 years  Of notes on 10/4-10/5 patient was admitted to the hospital with similar complaints  He was evaluated by Neurology and neurosurgeon  Lamictal was increased to 200 mg b i d    given abnormal findings of nonfunctioning left-sided occipital shunt  patient was seen by neurosurgeon, who did not think that ventricle drainage can be improve with intervention and there are no  indication for further neurosurgical diagnostic or intervention  The ventricles were well decompressed on imaging study  Outpatient follow-up with neurosurgeon,Dr Kate, and XR shunt series was recommended within a week  ER proceed with x-ray shunt series which reported unchanged  shunt catheters  Valproic acid level low/25, Lamictal level still pending  The neurology on call contacted, he recommended  to proceed with loading dose of Depakote given low level on admission  Patient received 1 g of Valproic acid in the ER  He will be admitted on an inpatient basis for neurology and neurosurgery evaluation                ED Vital Signs:   ED Triage Vitals [10/20/17 0317]   Temperature Pulse Respirations Blood Pressure SpO2   (!) 97 4 °F (36 3 °C) 72 21 135/69 98 %      Temp Source Heart Rate Source Patient Position - Orthostatic VS BP Location FiO2 (%)   Oral Monitor Sitting Left arm --      Pain Score       Worst Possible Pain        Wt Readings from Last 1 Encounters:   10/20/17 87 9 kg (193 lb 12 6 oz)       Vital Signs (abnormal):      Abnormal Labs/Diagnostic Test Results: bs 62  Valproic 48        csf fluid--protein 70        ED Treatment:   Medication Administration from 10/20/2017 0311 to 10/20/2017 0735       Date/Time Order Dose Route Action Action by Comments     10/20/2017 0526 ketorolac (TORADOL) 30 mg/mL injection 15 mg 15 mg Intravenous Given Zander Alvarado RN      10/20/2017 0647 sodium chloride 0 9 % bolus 1,000 mL 0 mL Intravenous Stopped Zander Alvarado RN      10/20/2017 0526 sodium chloride 0 9 % bolus 1,000 mL 1,000 mL Intravenous [address] Veena MONZON, [address]      10/20/2017 6030 metoclopramide (REGLAN) injection 10 mg 10 mg Intravenous Given Barrera Christopher RN      10/20/2017 0526 diphenhydrAMINE (BENADRYL) injection 25 mg 25 mg Intravenous Given Barrera Christopher RN      10/20/2017 2014 valproate (DEPACON) 1,000 mg in sodium chloride 0 9 % 50 mL IVPB 0 mg Intravenous Stopped Missael Zamora RN      10/20/2017 0702 valproate (DEPACON) 1,000 mg in sodium chloride 0 9 % 50 mL IVPB 1,000 mg Intravenous New Bag Barrera Christopher RN           Past Medical/Surgical History: Active Ambulatory Problems     Diagnosis Date Noted    Intractable headache 10/04/2017    Cerebral palsy (HCC) 10/04/2017    Migraine headache 10/04/2017    Asthma 10/04/2017     Resolved Ambulatory Problems     Diagnosis Date Noted    No Resolved Ambulatory Problems     Past Medical History:   Diagnosis Date    Asthma     Cerebral palsy ([address] )     Congenital hydrocephalus ([address] )     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[address] )     Migraines     Seizures ([address] )        Admitting Diagnosis: Cerebral palsy ([address] ) [G80 9]  Seizure ([address] ) [R56 9]  Seizure-like activity ([address] ) [R56 9]  Headache [R51]    Age/Sex: 43 y o  male    Assessment/Plan: Principal Problem:    Seizure ([address] )  Active Problems:    Cerebral palsy ([address] )    Seizure disorder (HCC)    Migraine headache    Asthma     (ventriculoperitoneal) shunt status        Plan for the Primary Problem(s):  · SZD with seizure-like activity and HA last night  · Follow-up Lamictal level   Valproic  level low on admission, patient loaded with Depakote   · Will increased dose of valproate  to 500 mg q 8 hours  · Seizure , fall, aspiration precautions   · Neurology evaluation  ? neurosurgery follow-up  ? C/w Lamictal     Plan for Additional Problems:   · Leukocytosis, trending down   most likely reactive  2/2 SZD  Will monitor VS, off abx      VTE Prophylaxis: Enoxaparin (Lovenox)  / sequential compression device   Code Status: full  POLST: There is no POLST form on file for this patient (pre-hospital)     Anticipated Length of Stay:  Patient will be admitted on an Inpatient basis with an anticipated length of stay of  >2 midnights  Justification for Hospital Stay:  Neurology and neurosurgeon evaluation    Assessment:  1  History of congenital hydrocephalus status post multiple shunts and revisions  2  History of longstanding seizure disorder frequent breakthrough seizures  3  Cerebral palsy  4  Headache and vomiting-resolved  5  History of migraines  6   Asthma        Plan:--neurosurgery  - seizure breakthrough today  - being loaded with depacon  - for shunt revision tomorrow morning  - Dr Dannielle Rabago updated his  mother at the bedside       Admission Orders:  Scheduled Meds:   [MAR Hold] chlorhexidine 15 mL Swish & Spit Q12H [address]   [MAR Hold] cholecalciferol 1,000 Units Oral Daily   [MAR Hold] divalproex sodium 1,000 mg Oral Q8H [address]   [MAR Hold] docusate sodium 100 mg Oral BID   [MAR Hold] fluticasone-salmeterol 1 puff Inhalation Q12H [address]   [MAR Hold] lamoTRIgine 200 mg Oral BID   [MAR Hold] LORazepam 0 5 mg Intravenous Once   [MAR Hold] LORazepam 1 5 mg Intravenous Once   midazolam      [MAR Hold] polyethylene glycol 17 g Oral Daily   [MAR Hold] senna 1 tablet Oral Daily     Continuous Infusions:   lactated ringers 100 mL/hr Last Rate: Stopped (10/23/17 1134)   sodium chloride 75 mL/hr Last Rate: 0 mL/hr (10/23/17 0021)     PRN Meds:   [MAR Hold] acetaminophen    [MAR Hold] albuterol       Neurosurgery--Preoperative diagnosis:--10/20  Shunt dysfunction  Disconnection left parietal  shunt system  Separate cysto peritoneal shunt system disconnected- nonfunctioning  Separate left frontal Rickham reservoir with blind ending distal tubing-non functional  Ex-32 week preemie  Hydrocephalus  Placement of left parietal  shunt system aged [age] months, [address]  Seizures seizures aged age [age]  Mild cerebral palsy  Delayed developmental milestones: first words and walking aged [age]  Prior shunt dysfunction  Intraventricular and intraparenchymal hemorrhage aged [age] at time of shunt revision  Placement of a Rickham reservoir aged [age]  Further revision and left  shunt system aged 5  Recent episodic headaches, sudden sharp the last 1-2 hrs, X4 over two weeks  Recent seizures progressing to GM 10/04 and 10/19/2017     Postop diagnosis:   Same    10/23-neurosurgery--  Alexandro García has a shunt disconnection on the left side of the posterior ventricular catheter      He has 3 ventricular catheters in place 2 of which appear to be nonfunctional      He is to peritoneal catheters 1 is in close proximity to the posterior left ventricular system which is likely the disconnected system the other looks like it is an old remnant system      The plan is to replace the valve and peritoneal catheter utilizing the ventricular catheter from the old system if it all possible      The risks, benefits and complications of surgery were explained in detail to Vance Berman and his mother  including hemorrhage, infection, CSF leakage, wound problems, pain, weakness, numbness, dysesthesiae, paralysis, coma, and death  Also, the possibility of further surgery being required was emphasized       Other potential medical complications were outlined, including deep venous thrombosis, pulmonary embolism, pneumonia, urinary tract infection, myocardial infarction,  and stroke       The need for physical therapy, occupational therapy, and rehabilitation was also mentioned  The alternatives to surgery were discussed  Vance Berman and his mother asked relevant questions and asked that we proceed with arrangements for surgery

## 2017-10-24 ENCOUNTER — APPOINTMENT (INPATIENT)
Dept: RADIOLOGY | Facility: HOSPITAL | Age: 42
DRG: 026 | End: 2017-10-24
Payer: MEDICARE

## 2017-10-24 LAB
ANION GAP SERPL CALCULATED.3IONS-SCNC: 6 MMOL/L (ref 4–13)
APTT PPP: 32 SECONDS (ref 23–35)
BASOPHILS # BLD AUTO: 0.03 THOUSANDS/ΜL (ref 0–0.1)
BASOPHILS NFR BLD AUTO: 0 % (ref 0–1)
BUN SERPL-MCNC: 8 MG/DL (ref 5–25)
CALCIUM SERPL-MCNC: 8.6 MG/DL (ref 8.3–10.1)
CHLORIDE SERPL-SCNC: 105 MMOL/L (ref 100–108)
CO2 SERPL-SCNC: 29 MMOL/L (ref 21–32)
CREAT SERPL-MCNC: 0.71 MG/DL (ref 0.6–1.3)
EOSINOPHIL # BLD AUTO: 0.12 THOUSAND/ΜL (ref 0–0.61)
EOSINOPHIL NFR BLD AUTO: 1 % (ref 0–6)
ERYTHROCYTE [DISTWIDTH] IN BLOOD BY AUTOMATED COUNT: 12.5 % (ref 11.6–15.1)
GFR SERPL CREATININE-BSD FRML MDRD: 116 ML/MIN/1.73SQ M
GLUCOSE SERPL-MCNC: 91 MG/DL (ref 65–140)
HCT VFR BLD AUTO: 40.8 % (ref 36.5–49.3)
HGB BLD-MCNC: 14.4 G/DL (ref 12–17)
INR PPP: 1.1 (ref 0.86–1.16)
LAMOTRIGINE SERPL-MCNC: 18.2 UG/ML (ref 2–20)
LYMPHOCYTES # BLD AUTO: 1.81 THOUSANDS/ΜL (ref 0.6–4.47)
LYMPHOCYTES NFR BLD AUTO: 16 % (ref 14–44)
MAGNESIUM SERPL-MCNC: 2 MG/DL (ref 1.6–2.6)
MCH RBC QN AUTO: 30.8 PG (ref 26.8–34.3)
MCHC RBC AUTO-ENTMCNC: 35.3 G/DL (ref 31.4–37.4)
MCV RBC AUTO: 87 FL (ref 82–98)
MONOCYTES # BLD AUTO: 1.14 THOUSAND/ΜL (ref 0.17–1.22)
MONOCYTES NFR BLD AUTO: 10 % (ref 4–12)
NEUTROPHILS # BLD AUTO: 7.93 THOUSANDS/ΜL (ref 1.85–7.62)
NEUTS SEG NFR BLD AUTO: 73 % (ref 43–75)
NRBC BLD AUTO-RTO: 0 /100 WBCS
PHOSPHATE SERPL-MCNC: 3.1 MG/DL (ref 2.7–4.5)
PLATELET # BLD AUTO: 243 THOUSANDS/UL (ref 149–390)
PMV BLD AUTO: 9.9 FL (ref 8.9–12.7)
POTASSIUM SERPL-SCNC: 4.2 MMOL/L (ref 3.5–5.3)
PROTHROMBIN TIME: 14.2 SECONDS (ref 12.1–14.4)
RBC # BLD AUTO: 4.67 MILLION/UL (ref 3.88–5.62)
SODIUM SERPL-SCNC: 140 MMOL/L (ref 136–145)
VALPROATE SERPL-MCNC: 91 UG/ML (ref 50–100)
WBC # BLD AUTO: 11.09 THOUSAND/UL (ref 4.31–10.16)

## 2017-10-24 PROCEDURE — 80048 BASIC METABOLIC PNL TOTAL CA: CPT | Performed by: NURSE PRACTITIONER

## 2017-10-24 PROCEDURE — 80164 ASSAY DIPROPYLACETIC ACD TOT: CPT | Performed by: NURSE PRACTITIONER

## 2017-10-24 PROCEDURE — 85730 THROMBOPLASTIN TIME PARTIAL: CPT | Performed by: PHYSICIAN ASSISTANT

## 2017-10-24 PROCEDURE — 83735 ASSAY OF MAGNESIUM: CPT | Performed by: EMERGENCY MEDICINE

## 2017-10-24 PROCEDURE — 70450 CT HEAD/BRAIN W/O DYE: CPT

## 2017-10-24 PROCEDURE — 85610 PROTHROMBIN TIME: CPT | Performed by: PHYSICIAN ASSISTANT

## 2017-10-24 PROCEDURE — 85025 COMPLETE CBC W/AUTO DIFF WBC: CPT | Performed by: NURSE PRACTITIONER

## 2017-10-24 PROCEDURE — 84100 ASSAY OF PHOSPHORUS: CPT | Performed by: EMERGENCY MEDICINE

## 2017-10-24 RX ADMIN — DOCUSATE SODIUM 100 MG: 100 CAPSULE, LIQUID FILLED ORAL at 08:32

## 2017-10-24 RX ADMIN — ACETAMINOPHEN 650 MG: 325 TABLET, FILM COATED ORAL at 22:30

## 2017-10-24 RX ADMIN — DIVALPROEX SODIUM 1000 MG: 500 TABLET, DELAYED RELEASE ORAL at 21:18

## 2017-10-24 RX ADMIN — OXYCODONE HYDROCHLORIDE 10 MG: 10 TABLET ORAL at 08:33

## 2017-10-24 RX ADMIN — FLUTICASONE PROPIONATE AND SALMETEROL 1 PUFF: 50; 100 POWDER RESPIRATORY (INHALATION) at 21:18

## 2017-10-24 RX ADMIN — ACETAMINOPHEN 650 MG: 325 TABLET, FILM COATED ORAL at 13:13

## 2017-10-24 RX ADMIN — ONDANSETRON 4 MG: 2 INJECTION INTRAMUSCULAR; INTRAVENOUS at 06:36

## 2017-10-24 RX ADMIN — SENNOSIDES 8.6 MG: 8.6 TABLET, FILM COATED ORAL at 08:33

## 2017-10-24 RX ADMIN — VITAMIN D, TAB 1000IU (100/BT) 1000 UNITS: 25 TAB at 08:31

## 2017-10-24 RX ADMIN — LAMOTRIGINE 200 MG: 100 TABLET ORAL at 18:00

## 2017-10-24 RX ADMIN — DOCUSATE SODIUM 100 MG: 100 CAPSULE, LIQUID FILLED ORAL at 18:00

## 2017-10-24 RX ADMIN — DIVALPROEX SODIUM 1000 MG: 500 TABLET, DELAYED RELEASE ORAL at 05:15

## 2017-10-24 RX ADMIN — OXYCODONE HYDROCHLORIDE 10 MG: 10 TABLET ORAL at 04:28

## 2017-10-24 RX ADMIN — FENTANYL CITRATE 25 MCG: 50 INJECTION INTRAMUSCULAR; INTRAVENOUS at 01:41

## 2017-10-24 RX ADMIN — POLYETHYLENE GLYCOL 3350 17 G: 17 POWDER, FOR SOLUTION ORAL at 08:33

## 2017-10-24 RX ADMIN — FLUTICASONE PROPIONATE AND SALMETEROL 1 PUFF: 50; 100 POWDER RESPIRATORY (INHALATION) at 08:32

## 2017-10-24 RX ADMIN — FENTANYL CITRATE 25 MCG: 50 INJECTION INTRAMUSCULAR; INTRAVENOUS at 10:56

## 2017-10-24 RX ADMIN — DIVALPROEX SODIUM 1000 MG: 500 TABLET, DELAYED RELEASE ORAL at 14:32

## 2017-10-24 RX ADMIN — CEFAZOLIN SODIUM 1000 MG: 1 SOLUTION INTRAVENOUS at 04:28

## 2017-10-24 RX ADMIN — FENTANYL CITRATE 25 MCG: 50 INJECTION INTRAMUSCULAR; INTRAVENOUS at 00:38

## 2017-10-24 RX ADMIN — LAMOTRIGINE 200 MG: 100 TABLET ORAL at 08:32

## 2017-10-24 NOTE — PHYSICAL THERAPY NOTE
Physical Therapy Cancellation Note    PT CONSULT RECEIVED  CHART REVIEWED PERFORMED  ASKED TO HOLD EVALUATION THIS AM PER RN SECONDARY PATIENT'S LI  PT WILL CONTINUE TO FOLLOW AND INITIATE SERVICES AS APPROPRIATE      Edgar Washington, ELPIDIO

## 2017-10-24 NOTE — CASE MANAGEMENT
Continued Stay Review    Date:10/24/2017    Vital Signs: /79   Pulse 80   Temp 99 °F (37 2 °C) (Oral)   Resp 16   Ht 5' 8" (1 727 m)   Wt 88 4 kg (194 lb 14 2 oz)   SpO2 92%   BMI 29 63 kg/m²     Medications:   Scheduled Meds:   chlorhexidine 15 mL Swish & Spit Q12H [address]   cholecalciferol 1,000 Units Oral Daily   divalproex sodium 1,000 mg Oral Q8H [address]   docusate sodium 100 mg Oral BID   fluticasone-salmeterol 1 puff Inhalation Q12H [address]   lamoTRIgine 200 mg Oral BID   LORazepam 0 5 mg Intravenous Once   LORazepam 1 5 mg Intravenous Once   polyethylene glycol 17 g Oral Daily   senna 1 tablet Oral Daily     Continuous Infusions:    PRN Meds:   acetaminophen    albuterol    aluminum-magnesium hydroxide-simethicone    bisacodyl    fentanyl citrate (PF)    HYDROmorphone    magnesium hydroxide    ondansetron    oxyCODONE    Abnormal Labs/Diagnostic Results: wbc 11 09  Ct of head--  Upon further review of the examination and with additional history   afforded by the neurosurgical physician assistant, the examination was   reevaluated    The indwelling left frontal ventriculoperitoneal shunt catheter appears   aborted    There are two left parietal ventriculoperitoneal shunt catheters    A more anterior catheter enters the body of the left lateral ventricle   (series 2, image 22)   This exits the calvarium posteriorly   This   catheter now appears to terminate along the inner table and appears   aborted    The more posterior catheter terminates in the region of the left parietal   lobe (series 2, image 24)    This exits the calvarium more anteriorly    This catheter appears to have been revised  Hopson Ke is a new shunt   reservoir present   The new tubing   traverses along the left parietal soft tissues    There is development of a multilocular low density lesion containing fluid   and blood products in the left parietal lobe   Although the findings may   be related to manipulation of the shunt catheters, recurrence of   intraparenchymal cyst not excluded, given  the history of catheter drainage of this lesion        ##cfslh I personally discussed this result with Ricardo Urias on 10/24/2017 10:43   AM  ##   Signed      Status post revision of left parietal ventriculoperitoneal shunt catheters   Blood products and air are present in the postoperative site  2   No acute intracranial abnormality  Shunt series--Status post revision of left parietal ventriculoperitoneal shunt catheter   The new intra-abdominal portion of the catheter appears tightly coiled in the anterior aspect of the left midabdomen   This catheter is contiguous with the catheter   terminating intracranially within the prior cystic lesion  Qing Santos has been recurrence of a multilocular cystic lesion in the left parietal lobe with fluid and blood products   Given the tightly coiled nature of the distal end of the catheter, shunt   malfunction not excluded  Age/Sex: 43 y o  male     Assessment/Plan: Assessment and Plan:   Principal Problem:    Obstructed  shunt, initial encounter ([facility] )  Active Problems:    Cerebral palsy (HCC)    Migraine headache    Asthma    [facility]izure ([facility] )     (ventriculoperitoneal) shunt status    Seizure-like activity ([facility] )  Resolved Problems:    * No resolved hospital problems             Neuro: POD #1 s/p  shunt revision - Q1H neurochecks   CT 10/24 Stable   NSX following, follow recommendations  Neuro exam unchanged  -  Seizures - Continue Depakote 1 g q8H, lamictal 200mg BID   Neurology following, follow up recommendations  - Headache - Tylenol PRN, Oxycodone 10 mg PRN moderate Pain, Dilaudid 1 mg PRN Severe Pain, fentanyl 25 mg PRN Breakthrough pain                 DV: No active issues                 Lung: H/o asthma - continue advair BID and albuterol PRN                   GI: No active issues   Bowel Regimen   No history of GERD   On regular diet   Nausea - Zofran PRN                  FEN: Replete electrolytes to maintain K >4, Mg >2                 : No active issues                 ID: No active issues   Monitor fever curve                  Heme: No active issues                     Endo: No active issues                 Msk/Skin: No active issues    OOB as tolerated                  Disposition: Will likely be stable for transfer later today        Discharge Plan: home with mother

## 2017-10-24 NOTE — PROGRESS NOTES
POD1    Patient without complaints  He is happy and smiling  EOMI  Incisions clean and dry  Power is 5/5  A,bulating in halls  Gerstmann's at baseline    CT demonstrates hemorrhage in the posterior parietal region with no mass effect  Will need MRI of the brain in a delayed fashion      OK for transfer to the floor

## 2017-10-24 NOTE — PHYSICIAN ADVISOR
Current patient class: Inpatient  The patient is currently on Hospital Day: 5      The patient was admitted to the hospital at [phone] on 10/20/17 for the following diagnosis:  Cerebral palsy ([address] ) [G80 9]  Seizure ([address] ) [R56 9]  Seizure-like activity ([address] ) [R56 9]  Headache [R51]       There is documentation in the medical record of an expected length of stay of at least 2 midnights  The patient is therefore expected to satisfy the 2 midnight benchmark and given the 2 midnight presumption is appropriate for INPATIENT ADMISSION  Given this expectation of a satisfying stay, CMS instructs us that the patient is most often appropriate for inpatient admission under part A provided medical necessity is documented in the chart  After review of the relevant documentation, labs, vital signs and test results, the patient is appropriate for INPATIENT ADMISSION  Admission to the hospital as an inpatient is a complex decision making process which requires the practitioner to consider the patients presenting complaint, history and physical examination and all relevant testing  With this in mind, in this case, the patient was deemed appropriate for INPATIENT ADMISSION  After review of the documentation and testing available at the time of the admission I concur with this clinical determination of medical necessity  Rationale is as follows:     The patient is a 43 yrs old Male who presented to the ED at 10/20/2017  3:20 AM with a chief complaint of Seizure - Prior Hx Of (Mother reports "pt  has been having multiple mini seizures tonight "  Pt  had CT performed yesterday, and was to follow up with neurosurgery today )    The patients vitals on arrival were ED Triage Vitals [10/20/17 0317]   Temperature Pulse Respirations Blood Pressure SpO2   (!) 97 4 °F (36 3 °C) 72 21 135/69 98 %      Temp Source Heart Rate Source Patient Position - Orthostatic VS BP Location FiO2 (%)   Oral Monitor Sitting Left arm --      Pain Score Worst Possible Pain           Past Medical History:   Diagnosis Date    Asthma     [facility] )     Congenital hydrocephalus (HCC)     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[facility] )     Migraines     Seizures (HCC)      Past Surgical History:   Procedure Laterality Date    BRAIN SURGERY      Multiple  shunt revisions    HERNIA REPAIR             Consults have been placed to:   IP CONSULT TO NEUROLOGY  IP CONSULT TO CASE MANAGEMENT  IP CONSULT TO NEUROSURGERY  IP CONSULT TO NUTRITION SERVICES  IP CONSULT TO MEDICAL CRITICAL CARE    Vitals:    10/23/17 2100 10/23/17 2200 10/23/17 2300 10/23/17 2345   BP: 131/65 134/84  133/75   Pulse: 96 78 84 76   Resp: 20 16 20 22   Temp:    98 8 °F (37 1 °C)   TempSrc:    Oral   SpO2: 98% 97% 95% 97%   Weight:       Height:           Most recent labs:    Recent Labs      10/23/17   0539   WBC  9 59   HGB  14 2   HCT  40 9   PLT  234   K  4 2   NA  144   CALCIUM  8 2*   BUN  11   CREATININE  0 82   INR  1 08       Scheduled Meds:  cefazolin 1,000 mg Intravenous Q8H   chlorhexidine 15 mL Swish & Spit Q12H [address]   cholecalciferol 1,000 Units Oral Daily   divalproex sodium 1,000 mg Oral Q8H ALEX   docusate sodium 100 mg Oral BID   fluticasone-salmeterol 1 puff Inhalation Q12H [address]   heparin (porcine) 5,000 Units Subcutaneous Q8H [address]   lamoTRIgine 200 mg Oral BID   LORazepam 0 5 mg Intravenous Once   LORazepam 1 5 mg Intravenous Once   polyethylene glycol 17 g Oral Daily   senna 1 tablet Oral Daily     Continuous Infusions:   PRN Meds:   acetaminophen    albuterol    aluminum-magnesium hydroxide-simethicone    bisacodyl    fentanyl citrate (PF)    HYDROmorphone    magnesium hydroxide    ondansetron    oxyCODONE    Surgical procedures (if appropriate):  Procedure(s):  SHUNT VENTRICULAR-PERITONEAL revision

## 2017-10-24 NOTE — RESTORATIVE TECHNICIAN NOTE
Restorative Specialist Mobility Note       Activity: Ambulate in elena, Ambulate in room, Chair     Assistive Device: Front wheel walker     Ambulation Response: Tolerated well (got slightly dizzy towards end of ambulation, RN aware, went away when seated )  Repositioned: Sitting, Up in chair           Range of Motion: All extremities, Active  Anti-Embolism Device On:  Bilateral, Sequential compression devices, below knee

## 2017-10-24 NOTE — PROGRESS NOTES
I have personally seen and examined patient and reviewed all data with resident/ MLP  Agree with note, assessment and plan  Critical care time c   Critical care time does not include procedures, family meeting or teaching  VPS malfunction pod #1 revision  Cephalgia  Seizure disorder  Cerebral palsy  History of asthma    Pain medications did not help headache this am  Zofran improved nausea  Shunt series  Displays intact shunt  CT scan postoperatively with expected changes continue to monitor neurologic exam   Neurosurgery evaluated patient and agreeable to transfer to floor  Patient neurologic lead at baseline

## 2017-10-24 NOTE — MEDICAL STUDENT
Mr Antoni Tinoco is a 37yoM with a history of congenital hydrocephalus s/p  shunt, seizures, and cerebral palsy who presented on 10/20 for headache and seizures  His  shunt was found to be disconnected and was revised by neurosurgery on 10/23  This morning he states that he continues to have the same bifrontal/retro-orbital headache, though it is now an 8/10 as compared to a 10  It is bifrontal and retro-orbital and throbbing in quality, different from his usual migraines  He says the pain medications did not give him relief, though the nausea medication seemed to help  He was able to eat jello and pudding overnight but had nausea this morning, which was mostly relieved by the medication  He has not yet had a bowel movement but has passed gas  He has some pain at the incision sites but otherwise denies other symptoms  He received 2 doses of fentanyl, 1 dose of oxycodone, and one dose of zofran since midnight  PMHx: congenital hydrocephalus, epilepsy, cerebral palsy, migraines, asthma, sleep apnea  Home meds: depakote 500 TID, lamictal 200?  BID, maxxalt 10 q2 prn, advair, albuterol q6 prn, vit D3,   Meds: depakote 1g Q8, lamictal 200 BID, colace, miralax, senna, vit d3 1000U, advair Q12m heparin 5000U,   PRN: tylenol 650 Q6, albuterol Q4, mylanta Q6, dulcolax 10 suppository, fentanyl 25 mcg Q1, dilaudid 1mg Q4, oxycodone 10 Q4, milk of mag, zofran 4 Q4    Diet: regular  Lines: periph IV (R antecubital/L hand)  VS: T afebrile, HR 70s-100s, RR 13-23, BP 110s-140s/50s-80s, O2  RA  I/O: 4 1L/3 2L + 2x UOP, 1x BM, net +960mL    PE  Gen: lying in bed in no apparent distress  Neck:  catheter along L neck, moderately tender to touch  CV: regular slightly tachycardic no murmurs  Pulm: clear  Abd: bandage clean/dry, moderately tender disffusely  MSK: no peripheral edema, +pulses  Neuro: awake and alert, fully oriented, R eye drifts toward right, decreased vision on R lower field, sensation and strength intact in all extremities, tremor in UE on holding hands out    Labs:  140 / 105 / 8 WBC 11, Hgb 14 4, plts 243  4 2 / 29 / 0 71  Ca 8 6, phos 3 1, Mg 2 0  PT 14 2, PTT 32, INR 1 1  Depakote level 91    BG 60s-90s  CSF showing increased protein with no growth (10/20)  UA normal    Imaging:  10/24 CTH - no acute intracranial abnormality, s/p  shunt revision    A: This is a 37yoM with a history of congenital hydrocephalus s/p  shunt revision     P  Neuro:  - obstructed  shunt POD#1 s/p revision: continue monitoring    - epilepsy: continue lamictal, depakote  Depakote level therapeutic  - pain control: prn tylenol, oxycodone (moderate pain), dilaudid (severe pain), fentanyl (breakthrough pain)  Try scheduled tylenol    CV: no acute issues    Pulm:  -asthma: not complaining odf SOB  Continue advair, albuterol prn    GI:   -nausea: continue zofran    FEN: no acute issues  Regular diet    : no acute issues    ID: no acute issues  No evidence of infection on CSF analysis    Heme:  -BG controlled, though slightly low overnight   Continue monitoring    MSK/skin: no acute issues  - PT/OT, OOB

## 2017-10-24 NOTE — PROGRESS NOTES
Transfer Accept Note - [place] 43 y o  male MRN: [id number]  Unit/Bed#: ICU 13 Encounter: [id number]     Reason for Admission / Chief Complaint:  shunt occlusion s/p revision POD #0     History of Present Illness:  Jess Arvizu is a 43 y o  male w/ a h/o congenital hydrocephalus s/p multiple shunt and revisions, cerebral palsy, migraines, seizures who presented on 10/20 for headache and seizure like activity  The patient awoke from sleeping that evening and reported a frontal headache  It was described as typical for his headaches  The mother then reported b/l tonic-clonic activity of upper extremities that lasted for a few seconds  He had a similar presentation for which he was admitted to the hospital on 10/4  Patient had a CT head performed on 10/19 which showed no acute abnormality  He then had a shunt series performed which revealed "2 left-sided  shunt catheters appear unchanged, including discontinuity of one of the catheters in the left lateral neck   The tubing is unchanged through the visualized course in the neck, chest and abdomen   Both catheters terminate in the left abdomen "  Neurosurgery and neurology was consulted  Neurology recommended the patient increase his regimen to 1000 mg Depakote at 8AM, 500mg at 2PM, and 1000mg at 8PM   Continue lamictal 200mg BID  The patient ultimately went for a shunt revision on 10/23  They found that the  shunt was disconnected  The  shunt was successfully revised  He presents to the ICU post op  History obtained from chart review       Past Medical History:  Past Medical History:   Diagnosis Date    Asthma     Cerebral palsy ([address] )     Congenital hydrocephalus ([address] )     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 [address] )     Migraines     Seizures (HCC)         Past Surgical History:  Past Surgical History:   Procedure Laterality Date    BRAIN SURGERY      Multiple  shunt revisions    HERNIA REPAIR          Past Family History:  Family History   Problem Relation Age of Onset    Family history unknown: Yes        Social History:  History   Smoking Status    Never Smoker   Smokeless Tobacco    Never Used     Comment: N/A     History   Alcohol Use No     Comment: N/A     History   Drug Use No     Comment: N/A     Marital Status: Single       Medications:  Current Facility-Administered Medications   Medication Dose Route Frequency    acetaminophen (TYLENOL) tablet 650 mg  650 mg Oral Q6H PRN    albuterol (PROVENTIL HFA,VENTOLIN HFA) inhaler 1 puff  1 puff Inhalation Q4H PRN    aluminum-magnesium hydroxide-simethicone (MYLANTA) 200-200-20 mg/5 mL oral suspension 30 mL  30 mL Oral Q6H PRN    bisacodyl (DULCOLAX) rectal suppository 10 mg  10 mg Rectal Daily PRN    ceFAZolin (ANCEF) IVPB (premix) 1,000 mg  1,000 mg Intravenous Q8H    chlorhexidine (PERIDEX) 0 12 % oral rinse 15 mL  15 mL Swish & Spit Q12H [address]    cholecalciferol (VITAMIN D3) tablet 1,000 Units  1,000 Units Oral Daily    divalproex sodium (DEPAKOTE) EC tablet 1,000 mg  1,000 mg Oral Q8H [address]    docusate sodium (COLACE) capsule 100 mg  100 mg Oral BID    fentanyl citrate (PF) 100 MCG/2ML 25 mcg  25 mcg Intravenous Q1H PRN    fluticasone-salmeterol (ADVAIR) 100-50 mcg/dose inhaler 1 puff  1 puff Inhalation Q12H [address]    [START ON 10/24/2017] heparin (porcine) subcutaneous injection 5,000 Units  5,000 Units Subcutaneous Q8H [address]    HYDROmorphone (DILAUDID) 1 mg/mL injection 1 mg  1 mg Intravenous Q4H PRN    lamoTRIgine (LaMICtal) tablet 200 mg  200 mg Oral BID    LORazepam (ATIVAN) 2 mg/mL injection 0 5 mg  0 5 mg Intravenous Once    LORazepam (ATIVAN) 2 mg/mL injection 1 5 mg  1 5 mg Intravenous Once    magnesium hydroxide (MILK OF MAGNESIA) 400 mg/5 mL oral suspension 30 mL  30 mL Oral Daily PRN    midazolam (VERSED) 2 mg/2 mL injection **AcuDose Override Pull**        ondansetron (ZOFRAN) injection 4 mg  4 mg Intravenous Q4H PRN    oxyCODONE (ROXICODONE) immediate release tablet 10 mg  10 mg Oral Q4H PRN    polyethylene glycol (MIRALAX) packet 17 g  17 g Oral Daily    senna (SENOKOT) tablet 8 6 mg  1 tablet Oral Daily     Home medications:  Prior to Admission medications    Medication Sig Start Date End Date Taking? Authorizing Provider   albuterol (PROAIR HFA) 90 mcg/act inhaler ProAir  (90 Base) MCG/ACT Inhalation Aerosol Solution  INHALE 1 TO 2 PUFFS EVERY 4 TO 6 HOURS AS NEEDED  Quantity: 1;  Refills: 5       Myah Wilkinson DO;  Started 23-Dec-2015  Sandee Gale  5 GM Inhaler 12/23/15  Yes Historical Provider, MD   cholecalciferol (VITAMIN D3) 1,000 units tablet Take 1,000 Units by mouth daily Pt takes 2,000 units per day   Yes Historical Provider, MD   divalproex sodium (DEPAKOTE) 500 mg EC tablet Divalproex Sodium 500 MG Oral Tablet Delayed Release  1 tab in am, 1 tab at noon, 1 tab in pm   Quantity: 270;  Refills: 3       Meghna SAEED ; Active   Yes Historical Provider, MD   fluticasone-salmeterol (ADVAIR DISKUS) 100-50 mcg/dose Advair Diskus 100-50 MCG/DOSE Inhalation Aerosol Powder Breath Activated  TAKE 1 PUFF TWICE A DAY   Quantity: 1;  Refills: 4       Lila Gonzalez DO;  Started 23-Dec-2015  [id number] Aerosol Powder Breath Activated Disp Pack 12/23/15  Yes Historical Provider, MD   lamoTRIgine (LaMICtal) 100 mg tablet Take 100 mg by mouth 2 (two) times a day   Yes Historical Provider, MD   lamoTRIgine (LaMICtal) 200 MG tablet Take 1 tablet by mouth 2 (two) times a day 10/5/17  Yes Mary Guadalupe MD   rizatriptan (MAXALT-MLT) 10 MG disintegrating tablet Rizatriptan Benzoate 10 MG Oral Tablet Dispersible  TAKE 1 TABLET AT ONSET OF HEADACHE  MAY REPEAT EVERY 2 HOURS AS NEEDED  MAXIMUM 3 TABLETS IN 24 HOURS  Quantity: 9;  Refills: 5       Meghna West  Started 5-Feb-2016  Active 2/5/16  Yes Historical Provider, MD     Allergies:   Allergies   Allergen Reactions    Latex Hives        ROS:   Review of Systems   Constitutional: Negative for chills, fatigue and fever  HENT: Negative for rhinorrhea, sore throat and trouble swallowing  Eyes: Negative for photophobia and visual disturbance  Respiratory: Negative for cough, chest tightness and shortness of breath  Cardiovascular: Negative for chest pain, palpitations and leg swelling  Gastrointestinal: Negative for abdominal pain, blood in stool, diarrhea, nausea and vomiting  Endocrine: Negative for polyuria  Genitourinary: Negative for dysuria, flank pain and hematuria  Musculoskeletal: Negative for back pain and neck pain  Skin: Positive for wound  Negative for color change and rash  Allergic/Immunologic: Negative for immunocompromised state  Neurological: Negative for dizziness, weakness, light-headedness, numbness and headaches  All other systems reviewed and are negative  Vitals:  Vitals:    10/23/17 1700 10/23/17 1715 10/23/17 1800 10/23/17 1900   BP: 134/68 154/70 113/64 143/78   Pulse: 82 76 66 76   Resp: 20 20 17 15   Temp:   98 1 °F (36 7 °C)    TempSrc:   Oral    SpO2: 97% 98% 99% 98%   Weight:       Height:         Temperature:   Temp (24hrs), Av 1 °F (36 7 °C), Min:97 6 °F (36 4 °C), Max:98 7 °F (37 1 °C)    Current: Temperature: 98 1 °F (36 7 °C)     Weights:   IBW: 68 4 kg  Body mass index is 29 46 kg/m²  Hemodynamic Monitoring:  N/A     Non-Invasive/Invasive Ventilation Settings:  Respiratory    Lab Data (Last 4 hours)    None         O2/Vent Data (Last 4 hours)    None              No results found for: PHART, NNF8HEH, PO2ART, BFQ6VQD, Z6NEMMRA, BEART, SOURCE  SpO2: SpO2: 98 %     Physical Exam:  Physical Exam   Constitutional: Vital signs are normal  He appears well-developed and well-nourished  He is cooperative  He does not appear ill  No distress  HENT:   Head: Normocephalic  Mouth/Throat: Uvula is midline, oropharynx is clear and moist and mucous membranes are normal    Surgical site C/D/I on L parietal area     Eyes: Conjunctivae, EOM and lids are normal  Pupils are equal, round, and reactive to light  Neck: Trachea normal, normal range of motion and full passive range of motion without pain  Neck supple  Cardiovascular: Normal rate, regular rhythm, normal heart sounds, intact distal pulses and normal pulses  Pulses:       Radial pulses are 2+ on the right side, and 2+ on the left side  Dorsalis pedis pulses are 2+ on the right side, and 2+ on the left side  Pulmonary/Chest: Effort normal and breath sounds normal    Abdominal: Soft  Normal appearance and bowel sounds are normal  There is no tenderness  There is no rigidity, no rebound and no guarding  Neurological: He is alert  He has normal strength and normal reflexes  No cranial nerve deficit or sensory deficit  Coordination normal    Reflex Scores:       Bicep reflexes are 2+ on the right side and 2+ on the left side  Patellar reflexes are 2+ on the right side and 2+ on the left side  CN 2-12, strength 5/5 throughout, sensation intact throughout, normal finger to nose and heel to shin  Visual fields intact  DTRs normal and symmetric          Labs:    Results from last 7 days  Lab Units 10/23/17  0539 10/22/17  0919 10/20/17  0357   WBC Thousand/uL 9 59 8 71 12 16*   HEMOGLOBIN g/dL 14 2 15 1 16 2   HEMATOCRIT % 40 9 42 9 46 1   PLATELETS Thousands/uL 234 224 268   NEUTROS PCT % 55 58 57   MONOS PCT % 8 7 8        Results from last 7 days  Lab Units 10/23/17  0539 10/21/17  0537 10/20/17  0357   SODIUM mmol/L 144 142 140   POTASSIUM mmol/L 4 2 3 8 3 5   CHLORIDE mmol/L 107 106 104   CO2 mmol/L 30 29 28   BUN mg/dL 11 12 17   CREATININE mg/dL 0 82 0 70 0 78   CALCIUM mg/dL 8 2* 8 7 9 3   TOTAL PROTEIN g/dL  --   --  7 2   BILIRUBIN TOTAL mg/dL  --   --  0 42   ALK PHOS U/L  --   --  52   ALT U/L  --   --  15   AST U/L  --   --  5   GLUCOSE RANDOM mg/dL 83 94 101       Results from last 7 days  Lab Units 10/23/17  0539 10/21/17  0537   MAGNESIUM mg/dL 2 3 2 3 Results from last 7 days  Lab Units 10/21/17  0537   PHOSPHORUS mg/dL 3 5        Results from last 7 days  Lab Units 10/23/17  0539   INR  1 08   PTT seconds 31         No results found for: TROPONINI     Imaging:  I have personally reviewed pertinent reports  Micro:  No results found for: Janice Vidal, SPUTUMCULTUR    Assessment: 43 y o  male w/ a h/o congenital hydrocephalus s/p multiple shunt and revisions, cerebral palsy, migraines, seizures who presents POD #0 s/p  shunt revision  Plan:                  Neuro: POD #0 s/p  shunt revision - Q1H neurochecks  CT in AM   NSX following   -  Seizures - Continue Depakote 1 g q8H, lamictal 200mg BID  Neurology following  CV: No active issues  Lung: H/o asthma - continue advair BID and albuterol PRN  GI: No active issues  Bowel Regimen  No history of GERD, D/C protonix  Start regular diet  FEN: D/C maintenance fluids  Replete lytes PRN  : No active issues  ID: No active issues  Monitor fever curve  Heme: No active issues  -  DVT proph - SubQ heparin  Endo: No active issues  Msk/Skin: No active issues  OOB as tolerated  Disposition: ICU care  VTE Pharmacologic Prophylaxis: Sequential compression device (Venodyne)  and Heparin  VTE Mechanical Prophylaxis: sequential compression device     Invasive lines and devices: Invasive Devices     Peripheral Intravenous Line            Peripheral IV 10/20/17 Right Antecubital 3 days    Peripheral IV 10/23/17 Left Hand less than 1 day                 Code Status: Level 1 - Full Code  POA:    POLST:       Given critical illness, patient length of stay will require greater than two midnights  Counseling / Coordination of Care  Total Critical Care time spent 45 minutes excluding procedures, teaching and family updates  Portions of the record may have been created with voice recognition software  Occasional wrong word or "sound a like" substitutions may have occurred due to the inherent limitations of voice recognition software  Read the chart carefully and recognize, using context, where substitutions have occurred          Hermila Case MD

## 2017-10-24 NOTE — PROGRESS NOTES
Progress Note - Neurosurgery   Nain Ingram 43 y o  male MRN: [id number]  Unit/Bed#: ICU 13 Encounter: [id number]    Assessment:  1  POD1 s/p revision of ventriculoperitoneal shunt indicated for obstruction  2  History of congenital hydrocephalus with several VPS insertions and revisions  3  History of left posterior parietal cyst s/p catheter insertion for drainage  4  History of seizure disorder  5  gurstman syndrome  6  History of asthma    Plan:  · Neuro exam: gurstman syndrome which causes patient to have difficulty with number identification/association, right left confusion, and difficulty identifying digits appropriately secondary to corpus callosum developmental abnormality  AAO, SANTANA equally, no sensory deficits  · Imaging reviewed personally and with attending:  · CT had 10/24: left parietal post-operative changes  2  shunt catheters and 1 catheter in previous cyst  Post-operative IPh and pneumocephalus in region of posterior VPS catheter  Left frontal  catheter terminates in right lateral ventricle  1 catheter in left lateral ventricle and one in left occipital lobe presumable at region of previous cyst  Presumable hemorrhagic cyst recurrence with fluid level  · Shunt serise 10/23: pending final read  · Patient with previous proximal occlusion of frontal catheter associated with [place] reservoir  New codman hakim shunt inserted with new distal catheter  Hakim valve set at 100mmHg  · Ongoing mediclal management by primary team, medical critical care/SLIM upon transfer to medical surgical unit  · Neurology consulted for AED adjustments  Had another breakthrough seizure on 10/22  Appreciate input  Follow-up with Dr Hall Meckel as outpatient  · Continue to hold AP/AC until cleared by nsx  · Pain control: headaches well controlled  Neck and abdomen pain only exacerbated with palpation     · DVT ppx: SCDs and mobilization  · No contraindications from nsx standpoint to transfer to med-surg unit, previously under SLIM  · Continue frequent neuro checks, Q4 upon transfer  · Recommend CTA vs arteriogram delayed if neuro exam remains stable to evaluate for hemorrhagic cyst    · Will change dressings tomorrow  · Nurse provider rounds completed with Jorge Looney RN  · Neurosurgery will continue to follow- call with questions/concerns  Subjective/Objective   Chief Complaint: " I went for a little walk but then I got dizzy "    Subjective: Patient admits to transient dizziness following a short walk today  Had a 5/10 headache earlier which has resolved  Admits to soreness in left neck and abdomen secondary to shunt tunneling  Denies CP, SOB, vision changes  Overall states he is "just about back to normal"  Objective: sitting up in recliner, in NAD    I/O       10/22 0701 - 10/23 0700 10/23 0701 - 10/24 0700 10/24 0701 - 10/25 0700    P  O  360 600     I V  (mL/kg) 700 (8) 3236 7 (36 6)     IV Piggyback  350     Total Intake(mL/kg) 1060 (12 1) 4186 7 (47 4)     Urine (mL/kg/hr)  3175 (1 5)     Stool  0 (0)     Blood  50 (0)     Total Output   3225      Net +1060 +961 7             Unmeasured Urine Occurrence 2 x 2 x     Unmeasured Stool Occurrence  1 x           Invasive Devices     Peripheral Intravenous Line            Peripheral IV 10/20/17 Right Antecubital 4 days    Peripheral IV 10/23/17 Left Hand less than 1 day                Physical Exam:  Vitals: Blood pressure 138/67, pulse 88, temperature 98 4 °F (36 9 °C), temperature source Oral, resp  rate 22, height 5' 8" (1 727 m), weight 88 4 kg (194 lb 14 2 oz), SpO2 92 %  ,Body mass index is 29 63 kg/m²  General appearance: alert, appears stated age, cooperative and no distress  Head: left parietal horseshoe incision with overlying telfa- stapled   CDI   Eyes stable left eye strabisms  Neck: tenderness on the left secondary to shunt catheter tunneling  Lungs: non labored breathing  Heart: regular heart rate  Neurologic:   Mental status: Alert, oriented, thought content appropriate, does serial addition but not subtraction, adds two dimes  Repetition intact, difficulty identifying fingers when touched but can identify toes (secondary to gurstman syndrome)  Cranial nerves: grossly intact with exception to chronic left eye strabismus (Cranial nerves II-XII)  Sensory: normal to LT in UE and LE  Motor: moving all extremities without focal weakness  5/5 in b/l UE and LE  Stable b/l UE action tremor  Coordination: difficulty localizing touch in hands secondary to gurstman sydnrome  No pronator drift or dysmetria  Lab Results:    Results from last 7 days  Lab Units 10/24/17  0439 10/23/17  0539 10/22/17  0919   WBC Thousand/uL 11 09* 9 59 8 71   HEMOGLOBIN g/dL 14 4 14 2 15 1   HEMATOCRIT % 40 8 40 9 42 9   PLATELETS Thousands/uL 243 234 224   NEUTROS PCT % 73 55 58   MONOS PCT % 10 8 7       Results from last 7 days  Lab Units 10/24/17  0439 10/23/17  0539 10/21/17  0537 10/20/17  0357   SODIUM mmol/L 140 144 142 140   POTASSIUM mmol/L 4 2 4 2 3 8 3 5   CHLORIDE mmol/L 105 107 106 104   CO2 mmol/L 29 30 29 28   BUN mg/dL 8 11 12 17   CREATININE mg/dL 0 71 0 82 0 70 0 78   CALCIUM mg/dL 8 6 8 2* 8 7 9 3   TOTAL PROTEIN g/dL  --   --   --  7 2   BILIRUBIN TOTAL mg/dL  --   --   --  0 42   ALK PHOS U/L  --   --   --  52   ALT U/L  --   --   --  15   AST U/L  --   --   --  5   GLUCOSE RANDOM mg/dL 91 83 94 101       Results from last 7 days  Lab Units 10/24/17  0439 10/23/17  0539 10/21/17  0537   MAGNESIUM mg/dL 2 0 2 3 2 3       Results from last 7 days  Lab Units 10/24/17  0439 10/21/17  0537   PHOSPHORUS mg/dL 3 1 3 5       Results from last 7 days  Lab Units 10/24/17  0439 10/23/17  0539   INR  1 10 1 08   PTT seconds 32 31       Imaging Studies: I have personally reviewed pertinent reports  and I have personally reviewed pertinent films in PACS    EKG, Pathology, and Other Studies: I have personally reviewed pertinent reports        VTE Pharmacologic Prophylaxis: Reason for no pharmacologic prophylaxis hemorrhagic cyst, POD 1 s/p shunt revision    VTE Mechanical Prophylaxis: sequential compression device and foot pump applied

## 2017-10-24 NOTE — PROGRESS NOTES
Transfer Note to [address] [place] [address] Valosin 43 y o  male MRN: [id number]  Unit/Bed#: ICU 13 Encounter: [id number]    Attending Physician: Mili Yanes DO      ______________________________________________________________________  Assessment and Plan:   Principal Problem:    Obstructed  shunt, initial encounter [facility])  Active Problems:    Cerebral palsy ([facility] [address] )    Migraine headache    Asthma    Seizure ([facility] [address] )     (ventriculoperitoneal) shunt status    Seizure-like activity ([facility] )  Resolved Problems:    * No resolved hospital problems  *        Neuro: POD #1 s/p  shunt revision - Q1H neurochecks   CT 10/24 Stable   NSX following, follow recommendations  Neuro exam unchanged  -  Seizures - Continue Depakote 1 g q8H, lamictal 200mg BID   Neurology following, follow up recommendations  - Headache - Tylenol PRN, Oxycodone 10 mg PRN moderate Pain, Dilaudid 1 mg PRN Severe Pain, fentanyl 25 mg PRN Breakthrough pain                 KG: No active issues                 Lung: H/o asthma - continue advair BID and albuterol PRN                 GI: No active issues   Bowel Regimen   No history of GERD   On regular diet   Nausea - Zofran PRN                  FEN: Replete electrolytes to maintain K >4, Mg >2                 : No active issues                 ID: No active issues   Monitor fever curve                  Heme: No active issues                 Endo: No active issues                 Msk/Skin: No active issues   OOB as tolerated                  Disposition: Will likely be stable for transfer later today      Code Status: Level 1 - Full Code    Counseling / Coordination of Care    ______________________________________________________________________    Chief Complaint: Headache    HPI:  Nathaniel Harp is a 43 y o  male w/ a h/o congenital hydrocephalus s/p multiple shunt and revisions, cerebral palsy, migraines, seizures who presented on 10/20 for headache and seizure like activity  The patient awoke from sleeping that evening and reported a frontal headache  It was described as typical for his headaches  The mother then reported b/l tonic-clonic activity of upper extremities that lasted for a few seconds  He had a similar presentation for which he was admitted to the hospital on 10/4  Patient had a CT head performed on 10/19 which showed no acute abnormality  He then had a shunt series performed which revealed "2 left-sided  shunt catheters appear unchanged, including discontinuity of one of the catheters in the left lateral neck   The tubing is unchanged through the visualized course in the neck, chest and abdomen   Both catheters terminate in the left abdomen "  Neurosurgery and neurology was consulted  Neurology recommended the patient increase his regimen to 1000 mg Depakote at 8AM, 500mg at 2PM, and 1000mg at 8PM   Continue lamictal 200mg BID  The patient ultimately went for a shunt revision on 10/23  They found that the  shunt was disconnected  The  shunt was successfully revised, details in neurosurgery note/Op note  He presented to the ICU post op  In the ICU he continued to complain about headaches, intermittent nausea  Neurologic exam was stable  He got a repeat CTH which was stable, Shunt series  He was seen by neurosurgery and deemed to be stable for transfer to the floor  ______________________________________________________________________    Physical Exam:     Constitutional: He is oriented to person, place, and time  He appears well-developed and well-nourished  No distress  HENT:   Dressing over L parietal skull, C/D/I, ttp over site without erthema or drainage   Eyes: Conjunctivae and EOM are normal  Pupils are equal, round, and reactive to light  Right eye exhibits no discharge  Left eye exhibits no discharge  Neck: Normal range of motion  Neck supple     Catheter palpable over L neck   Cardiovascular: Normal rate, regular rhythm and normal heart sounds  Pulmonary/Chest: Effort normal and breath sounds normal  No respiratory distress  Abdominal: Soft  Bowel sounds are normal  He exhibits no distension  Mildly tender diffusely  Dressing over epigastric region C/D/I   Musculoskeletal: Normal range of motion  He exhibits no edema  Neurological: He is alert and oriented to person, place, and time  Sensation and MS intact B/L UE/LE, no drift, normal finger to nose    ______________________________________________________________________  Vitals:    10/24/17 0800 10/24/17 0900 10/24/17 1002 10/24/17 1100   BP: 139/89  132/79 132/79   Pulse: 82 90 90 80   Resp: (!) 24 (!) 27 22 16   Temp: 99 °F (37 2 °C)      TempSrc: Oral      SpO2: 95% 94% 92% 92%   Weight:       Height:           Temperature:   Temp (24hrs), Av 5 °F (36 9 °C), Min:97 6 °F (36 4 °C), Max:99 °F (37 2 °C)    Current Temperature: 99 °F (37 2 °C)  Weights:   IBW: 68 4 kg    Body mass index is 29 63 kg/m²  Weight (last 2 days)     Date/Time   Weight    10/24/17 0550  88 4 (194 89)            Hemodynamic Monitoring:  N/A     Non-Invasive/Invasive Ventilation Settings:  Respiratory    Lab Data (Last 4 hours)    None         O2/Vent Data (Last 4 hours)    None              No results found for: PHART, GFE1BNH, PO2ART, RND4TYT, O8XYUBIW, BEART, SOURCE  SpO2: SpO2: 92 %  Intake and Outputs:  I/O       10/22 0701 - 10/23 0700 10/23 0701 - 10/24 0700 10/24 0701 - 10/25 07    P  O  360 600     I V  (mL/kg) 700 (8) 3236 7 (36 6)     IV Piggyback  350     Total Intake(mL/kg) 1060 (12 1) 4186 7 (47 4)     Urine (mL/kg/hr)  3175 (1 5)     Stool  0 (0)     Blood  50 (0)     Total Output   3225      Net +1060 +961 7             Unmeasured Urine Occurrence 2 x 2 x     Unmeasured Stool Occurrence  1 x           Nutrition:        Diet Orders            Start     Ordered    10/23/17 2045  Diet Regular; Regular House  Diet effective now     Question Answer Comment   Diet Type Regular Regular Regular House    RD to adjust diet per protocol? Yes        10/23/17 2045    10/20/17 1545  Room Service  Once     Question:  Type of Service  Answer:  Room Service - Appropriate with Assistance    10/20/17 1544          Labs:     Results from last 7 days  Lab Units 10/24/17  0439 10/23/17  0539 10/22/17  0919   WBC Thousand/uL 11 09* 9 59 8 71   HEMOGLOBIN g/dL 14 4 14 2 15 1   HEMATOCRIT % 40 8 40 9 42 9   PLATELETS Thousands/uL 243 234 224   NEUTROS PCT % 73 55 58   MONOS PCT % 10 8 7       Results from last 7 days  Lab Units 10/24/17  0439 10/23/17  0539 10/21/17  0537 10/20/17  0357   SODIUM mmol/L 140 144 142 140   POTASSIUM mmol/L 4 2 4 2 3 8 3 5   CHLORIDE mmol/L 105 107 106 104   CO2 mmol/L 29 30 29 28   BUN mg/dL 8 11 12 17   CREATININE mg/dL 0 71 0 82 0 70 0 78   CALCIUM mg/dL 8 6 8 2* 8 7 9 3   TOTAL PROTEIN g/dL  --   --   --  7 2   BILIRUBIN TOTAL mg/dL  --   --   --  0 42   ALK PHOS U/L  --   --   --  52   ALT U/L  --   --   --  15   AST U/L  --   --   --  5   GLUCOSE RANDOM mg/dL 91 83 94 101       Results from last 7 days  Lab Units 10/24/17  0439 10/23/17  0539 10/21/17  0537   MAGNESIUM mg/dL 2 0 2 3 2 3     Lab Results   Component Value Date    PHOS 3 1 10/24/2017    PHOS 3 5 10/21/2017        Results from last 7 days  Lab Units 10/24/17  0439 10/23/17  0539   INR  1 10 1 08   PTT seconds 32 31     No results found for: TROPONINI      ABG:No results found for: PHART, ZGE4JDG, PO2ART, KXE8LZC, Q5BYTLUX, BEART, SOURCE  Imaging:   CT HEAD: 1   Status post revision of left parietal ventriculoperitoneal shunt catheters  Blood products and air are present in the postoperative site  2   No acute intracranial abnormality    SHUNT SERIES XR:  Final read pending  I have personally reviewed pertinent reports  EKG: None  Micro:  No results found for: Merlin Pulse, SPUTUMCULTUR  Allergies:    Allergies   Allergen Reactions    Latex Hives     Medications:   Scheduled Meds:    chlorhexidine 15 mL Swish & Spit Q12H [facility]   cholecalciferol 1,000 Units Oral Daily   divalproex sodium 1,000 mg Oral Q8H [facility]   docusate sodium 100 mg Oral BID   fluticasone-salmeterol 1 puff Inhalation Q12H [facility]   lamoTRIgine 200 mg Oral BID   LORazepam 0 5 mg Intravenous Once   LORazepam 1 5 mg Intravenous Once   polyethylene glycol 17 g Oral Daily   senna 1 tablet Oral Daily     Continuous Infusions:   PRN Meds:    acetaminophen 650 mg Q6H PRN   albuterol 1 puff Q4H PRN   aluminum-magnesium hydroxide-simethicone 30 mL Q6H PRN   bisacodyl 10 mg Daily PRN   fentanyl citrate (PF) 25 mcg Q1H PRN   HYDROmorphone 1 mg Q4H PRN   magnesium hydroxide 30 mL Daily PRN   ondansetron 4 mg Q4H PRN   oxyCODONE 10 mg Q4H PRN     VTE Pharmacologic Prophylaxis: None  VTE Mechanical Prophylaxis: sequential compression device , Mobilization  Invasive lines and devices: Invasive Devices     Peripheral Intravenous Line            Peripheral IV 10/20/17 Right Antecubital 4 days    Peripheral IV 10/23/17 Left Hand less than 1 day                     Portions of the record may have been created with voice recognition software  Occasional wrong word or "sound a like" substitutions may have occurred due to the inherent limitations of voice recognition software  Read the chart carefully and recognize, using context, where substitutions have occurred      Jimmy Fernandez MD

## 2017-10-24 NOTE — PROGRESS NOTES
Progress Note - Critical Care   Nathaniel Harp 43 y o  male MRN: [id number]  Unit/Bed#: ICU 13 Encounter: [id number]    Attending Physician: Mili Yanes DO      ______________________________________________________________________  Assessment and Plan:   Principal Problem:    Obstructed  shunt, initial encounter [facility])  Active Problems:    Cerebral palsy ([facility] [address] )    Migraine headache    Asthma    Seizure ([facility] [address] )     (ventriculoperitoneal) shunt status    Seizure-like activity ([facility] )  Resolved Problems:    * No resolved hospital problems  *      38 y/o M w/ h/o congenital hydrocephalus s/p shunt revision D#1   Neuro: POD #1 s/p  shunt revision - Q1H neurochecks  CT 10/24 Stable  NSX following, follow recommendations  Neuro exam unchanged  -  Seizures - Continue Depakote 1 g q8H, lamictal 200mg BID  Neurology following, follow up recommendations  - Headache - Tylenol PRN, Oxycodone 10 mg PRN moderate Pain, Dilaudid 1 mg PRN Severe Pain, fentanyl 25 mg PRN Breakthrough pain                 CV: No active issues                 Lung: H/o asthma - continue advair BID and albuterol PRN                 GI: No active issues  Bowel Regimen  No history of GERD  On reg diet   Nausea - Zofran PRN                 FEN: Replete electrolytes to maintain K >4, Mg >2                 : No active issues                 ID: No active issues  Monitor fever curve                  Heme: No active issues              Endo: No active issues                 Msk/Skin: No active issues  OOB as tolerated                  Disposition: Will likely be stable for transfer later today  Code Status: Level 1 - Full Code    __________________________________________________________________    Chief Complaint: Frontal Headache    24 Hour Events:     No acute events  ______________________________________________________________________    Physical Exam   Constitutional: He is oriented to person, place, and time   He appears well-developed and well-nourished  No distress  HENT:   Dressing over L parietal skull, C/D/I, ttp over site without erthema or drainage   Eyes: Conjunctivae and EOM are normal  Pupils are equal, round, and reactive to light  Right eye exhibits no discharge  Left eye exhibits no discharge  Neck: Normal range of motion  Neck supple  Catheter palpable over L neck   Cardiovascular: Normal rate, regular rhythm and normal heart sounds  Pulmonary/Chest: Effort normal and breath sounds normal  No respiratory distress  Abdominal: Soft  Bowel sounds are normal  He exhibits no distension  Mildly tender diffusely  Dressing over epigastric region C/D/I   Musculoskeletal: Normal range of motion  He exhibits no edema  Neurological: He is alert and oriented to person, place, and time  Sensation and MS intact B/L UE/LE, no drift, normal finger to nose       Skin: Skin is warm and dry  Capillary refill takes less than 2 seconds  He is not diaphoretic        ______________________________________________________________________  Vitals:    10/24/17 0300 10/24/17 0400 10/24/17 0520 10/24/17 0550   BP:   138/67    Pulse: 94 86 88    Resp: 22 21 22    Temp:  98 4 °F (36 9 °C)     TempSrc:  Oral     SpO2: 96% 94% 92%    Weight:    88 4 kg (194 lb 14 2 oz)   Height:           Temperature:   Temp (24hrs), Av 4 °F (36 9 °C), Min:97 6 °F (36 4 °C), Max:98 9 °F (37 2 °C)    Current Temperature: 98 4 °F (36 9 °C)  Weights:   IBW: 68 4 kg    Body mass index is 29 63 kg/m²    Weight (last 2 days)     Date/Time   Weight    10/24/17 0550  88 4 (194 89)            Hemodynamic Monitoring:  N/A     Non-Invasive/Invasive Ventilation Settings:  Respiratory    Lab Data (Last 4 hours)    None         O2/Vent Data (Last 4 hours)    None              No results found for: PHART, VSH7QWH, PO2ART, MGR6FZL, H5DHLMWS, BEART, SOURCE  SpO2: SpO2: 92 %  Intake and Outputs:  I/O       10/22 0701 - 10/23 0700 10/23 0701 - 10/24 07 10/24 0701 - 10/25 0700    P  O  360 600     I V  (mL/kg) 700 (8) 3236 7 (36 6)     IV Piggyback  350     Total Intake(mL/kg) 1060 (12 1) 4186 7 (47 4)     Urine (mL/kg/hr)  3175 (1 5)     Stool  0 (0)     Blood  50 (0)     Total Output   3225      Net +1060 +961 7             Unmeasured Urine Occurrence 2 x 2 x     Unmeasured Stool Occurrence  1 x         Nutrition:        Diet Orders            Start     Ordered    10/23/17 2045  Diet Regular; Regular House  Diet effective now     Question Answer Comment   Diet Type Regular    Regular Regular House    RD to adjust diet per protocol?  Yes        10/23/17 2045    10/20/17 1545  Room Service  Once     Question:  Type of Service  Answer:  Room Service - Appropriate with Assistance    10/20/17 1544        Labs:     Results from last 7 days  Lab Units 10/24/17  0439 10/23/17  0539 10/22/17  0919   WBC Thousand/uL 11 09* 9 59 8 71   HEMOGLOBIN g/dL 14 4 14 2 15 1   HEMATOCRIT % 40 8 40 9 42 9   PLATELETS Thousands/uL 243 234 224   NEUTROS PCT % 73 55 58   MONOS PCT % 10 8 7       Results from last 7 days  Lab Units 10/24/17  0439 10/23/17  0539 10/21/17  0537 10/20/17  0357   SODIUM mmol/L 140 144 142 140   POTASSIUM mmol/L 4 2 4 2 3 8 3 5   CHLORIDE mmol/L 105 107 106 104   CO2 mmol/L 29 30 29 28   BUN mg/dL 8 11 12 17   CREATININE mg/dL 0 71 0 82 0 70 0 78   CALCIUM mg/dL 8 6 8 2* 8 7 9 3   TOTAL PROTEIN g/dL  --   --   --  7 2   BILIRUBIN TOTAL mg/dL  --   --   --  0 42   ALK PHOS U/L  --   --   --  52   ALT U/L  --   --   --  15   AST U/L  --   --   --  5   GLUCOSE RANDOM mg/dL 91 83 94 101       Results from last 7 days  Lab Units 10/24/17  0439 10/23/17  0539 10/21/17  0537   MAGNESIUM mg/dL 2 0 2 3 2 3     Lab Results   Component Value Date    PHOS 3 1 10/24/2017    PHOS 3 5 10/21/2017        Results from last 7 days  Lab Units 10/24/17  0439 10/23/17  0539   INR  1 10 1 08   PTT seconds 32 31     No results found for: TROPONINI      ABG:No results found for: PHART, VSN0UDQ, PO2ART, IIW0XQP, K2LTZGPC, BEART, SOURCE  Imaging: The overall size of the ventricular system has not significantly changed from the preoperative examination      Left frontal ventricular peritoneal shunt catheter with tip entering the frontal horn of the right lateral ventricle  Tip terminates in the frontal horn of the right lateral ventricle      There are 2 left parietal ventriculoperitoneal shunt catheters  The anteriormost catheter appears to terminate in the body of the left lateral ventricle  The more posterior catheter terminates in the left occipital lobe     I have personally reviewed pertinent reports  EKG: None  Micro:  No results found for: Eliseo Ashley SPUTUMCULTUR  Allergies: Allergies   Allergen Reactions    Latex Hives     Medications:   Scheduled Meds:  chlorhexidine 15 mL Swish & Spit Q12H [address]   cholecalciferol 1,000 Units Oral Daily   divalproex sodium 1,000 mg Oral Q8H [facility]   docusate sodium 100 mg Oral BID   fluticasone-salmeterol 1 puff Inhalation Q12H [address]   heparin (porcine) 5,000 Units Subcutaneous Q8H [address]   lamoTRIgine 200 mg Oral BID   LORazepam 0 5 mg Intravenous Once   LORazepam 1 5 mg Intravenous Once   polyethylene glycol 17 g Oral Daily   senna 1 tablet Oral Daily     Continuous Infusions:   PRN Meds:    acetaminophen 650 mg Q6H PRN   albuterol 1 puff Q4H PRN   aluminum-magnesium hydroxide-simethicone 30 mL Q6H PRN   bisacodyl 10 mg Daily PRN   fentanyl citrate (PF) 25 mcg Q1H PRN   HYDROmorphone 1 mg Q4H PRN   magnesium hydroxide 30 mL Daily PRN   ondansetron 4 mg Q4H PRN   oxyCODONE 10 mg Q4H PRN     VTE Pharmacologic Prophylaxis: Heparin  VTE Mechanical Prophylaxis: sequential compression device  Invasive lines and devices:   Invasive Devices     Peripheral Intravenous Line            Peripheral IV 10/20/17 Right Antecubital 4 days    Peripheral IV 10/23/17 Left Hand less than 1 day                     Portions of the record may have been created with voice recognition software  Occasional wrong word or "sound a like" substitutions may have occurred due to the inherent limitations of voice recognition software  Read the chart carefully and recognize, using context, where substitutions have occurred      Regina Burris MD

## 2017-10-24 NOTE — PLAN OF CARE
VS monitored and are VSS  Teaching geared to patients mental ability  Pain medications and treatments explained and administered as needed  Good PO intaketolerated  Standard skin and safety precautions are adequate

## 2017-10-24 NOTE — OCCUPATIONAL THERAPY NOTE
Occupational Therapy Cancellation    Orders received  Chart reviewed  Attempted to see pt this AM  RN Karen Kirk requesting to hold on therapy this AM 2* pt reported HA and recent return to bed  Will attempt to f/u with pt at a later time and will continue to follow pt on caseload       Edy Figueredo MS, OTR/L

## 2017-10-25 ENCOUNTER — APPOINTMENT (INPATIENT)
Dept: RADIOLOGY | Facility: HOSPITAL | Age: 42
DRG: 026 | End: 2017-10-25
Attending: NEUROLOGICAL SURGERY
Payer: MEDICARE

## 2017-10-25 ENCOUNTER — APPOINTMENT (INPATIENT)
Dept: RADIOLOGY | Facility: HOSPITAL | Age: 42
DRG: 026 | End: 2017-10-25
Payer: MEDICARE

## 2017-10-25 PROBLEM — G43.909 MIGRAINE HEADACHE: Chronic | Status: RESOLVED | Noted: 2017-10-04 | Resolved: 2017-10-25

## 2017-10-25 PROBLEM — R51.9 INTRACTABLE HEADACHE: Status: RESOLVED | Noted: 2017-10-04 | Resolved: 2017-10-25

## 2017-10-25 PROCEDURE — 74150 CT ABDOMEN W/O CONTRAST: CPT

## 2017-10-25 PROCEDURE — 97168 OT RE-EVAL EST PLAN CARE: CPT

## 2017-10-25 PROCEDURE — G8987 SELF CARE CURRENT STATUS: HCPCS

## 2017-10-25 PROCEDURE — G8989 SELF CARE D/C STATUS: HCPCS

## 2017-10-25 PROCEDURE — G8988 SELF CARE GOAL STATUS: HCPCS

## 2017-10-25 PROCEDURE — 74010 HB X-RAY EXAM OF ABDOMEN (ANTEROPOSTERIOR AND ADD'L OBLIQUE AND CONE VIEWS): CPT

## 2017-10-25 RX ORDER — SENNOSIDES 8.6 MG
1 TABLET ORAL 2 TIMES DAILY
Status: DISCONTINUED | OUTPATIENT
Start: 2017-10-25 | End: 2017-10-26 | Stop reason: HOSPADM

## 2017-10-25 RX ADMIN — LAMOTRIGINE 200 MG: 100 TABLET ORAL at 18:23

## 2017-10-25 RX ADMIN — DIVALPROEX SODIUM 1000 MG: 500 TABLET, DELAYED RELEASE ORAL at 13:15

## 2017-10-25 RX ADMIN — DIVALPROEX SODIUM 1000 MG: 500 TABLET, DELAYED RELEASE ORAL at 05:30

## 2017-10-25 RX ADMIN — LAMOTRIGINE 200 MG: 100 TABLET ORAL at 08:28

## 2017-10-25 RX ADMIN — ALBUTEROL SULFATE 1 PUFF: 90 AEROSOL, METERED RESPIRATORY (INHALATION) at 13:17

## 2017-10-25 RX ADMIN — FLUTICASONE PROPIONATE AND SALMETEROL 1 PUFF: 50; 100 POWDER RESPIRATORY (INHALATION) at 21:41

## 2017-10-25 RX ADMIN — FLUTICASONE PROPIONATE AND SALMETEROL 1 PUFF: 50; 100 POWDER RESPIRATORY (INHALATION) at 08:28

## 2017-10-25 RX ADMIN — DOCUSATE SODIUM 100 MG: 100 CAPSULE, LIQUID FILLED ORAL at 08:28

## 2017-10-25 RX ADMIN — VITAMIN D, TAB 1000IU (100/BT) 1000 UNITS: 25 TAB at 08:28

## 2017-10-25 RX ADMIN — SENNOSIDES 8.6 MG: 8.6 TABLET, FILM COATED ORAL at 18:23

## 2017-10-25 RX ADMIN — DIVALPROEX SODIUM 1000 MG: 500 TABLET, DELAYED RELEASE ORAL at 21:41

## 2017-10-25 RX ADMIN — DOCUSATE SODIUM 100 MG: 100 CAPSULE, LIQUID FILLED ORAL at 18:23

## 2017-10-25 RX ADMIN — ACETAMINOPHEN 650 MG: 325 TABLET, FILM COATED ORAL at 04:21

## 2017-10-25 RX ADMIN — SENNOSIDES 8.6 MG: 8.6 TABLET, FILM COATED ORAL at 08:28

## 2017-10-25 RX ADMIN — CHLORHEXIDINE GLUCONATE 15 ML: 1.2 RINSE ORAL at 08:28

## 2017-10-25 RX ADMIN — POLYETHYLENE GLYCOL 3350 17 G: 17 POWDER, FOR SOLUTION ORAL at 08:28

## 2017-10-25 NOTE — SOCIAL WORK
CM discussed Pt in care coordination rounds  Pt is S/P revision of ventriculoperitoneal shunt, Per OT D/C home with family support  No further needs identified at this time

## 2017-10-25 NOTE — OCCUPATIONAL THERAPY NOTE
[address] Evaluation     Patient Name: Elpidio Braga Date: 10/25/2017  Problem List  Patient Active Problem List   Diagnosis    [facility]y [facility]    Asthma    Seizure ([facility] )     (ventriculoperitoneal) shunt status    Seizure-like activity ([facility] )    Obstructed  shunt, initial encounter [facility])     Past Medical History  Past Medical History:   Diagnosis Date    Asthma     Cerebral palsy ([facility]     Congenital hydrocephalus ([facility] )     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[facility] )     Migraines     Seizures ([facility] )      Past Surgical History  Past Surgical History:   Procedure Laterality Date    BRAIN SURGERY      Multiple  shunt revisions    HERNIA REPAIR      AK CREATE SHUNT:VENTRIC-PERITONEAL Left 10/23/2017    Procedure: SHUNT VENTRICULAR-PERITONEAL revision;  Surgeon: Kakyay Street MD;  [facility]: [facility];  Service: Neurosurgery         10/25/17 1155   Note Type   Note type Re-eval   Restrictions/Precautions   Weight Bearing Precautions Per Order No   Other Precautions Seizure;Pain; Fall Risk;Cognitive   Pain Assessment   Pain Assessment [facility] Pain Intervention(s) Ambulation/increased activity;Repositioned;Distraction; Emotional support   Pain Rating: FLACC (Rest) - Face 1   Pain Rating: FLACC (Rest) - Legs 0   Pain Rating: FLACC (Rest) - Activity 0   Pain Rating: FLACC (Rest) - Cry 1   Pain Rating: FLACC (Rest) - Consolability 1   Score: FLACC (Rest) 3   Pain Rating: FLACC (Activity) - Face 1   Pain Rating: FLACC (Activity) - Legs 0   Pain Rating: FLACC (Activity) - Activity 0   Pain Rating: FLACC (Activity) - Cry 1   Pain Rating: FLACC (Activity) - Consolability 1   Score: FLACC (Activity) 3   Home Living   Type of Home House   Home Layout Able to live on main level with bedroom/bathroom; Two level  (1 MATHEW)   Bathroom Shower/Tub Tub/shower unit   Bathroom Toilet Standard   Bathroom Accessibility Accessible   Additional Comments Pt's mother reports Pt required supervision/set up for ADLS/IADLS and functional mobility/transfers w/o use of DME PTA  Prior Function   Level of [place] Needs assistance with IADLs  (supervision/set up)   Lives With [facility] Help From Family   ADL Assistance Needs assistance  (Supervision/set up w/ verbal cues as reminders)   IADLs Needs assistance  (Supervision/set up w/ verbal cues as reminders)   Falls in the last 6 months 0   Vocational Part time employment   Lifestyle   Autonomy Pt's mother reports supervision/set up and verbal cues for pt completion of ADLS/IADLS PTA  Pt independent in functional mobility/transfers PTA  PT does not drive and is only left alone for short periods of time  Pt's mother reports she is availabe for 24/7 supervision/assistance upon d/c  Reciprocal Relationships Pt has supportive mother and father who provide assistnace as needed  Pt lives w/ younger brother, sister in law, and mother currently  Service to Others Pt works part time as a bagger at [employer]  Intrinsic Gratification Pt enjoys watching TV and movies      Psychosocial   Psychosocial (WDL) WDL   Subjective   Subjective Pt became tearful and withdrawn when pt's mother discussed possibility of another stunt revision   ADL   Where Assessed Chair   Eating Assistance 5  Supervision/Setup   Grooming Assistance 5  Supervision/Setup   UB Bathing Assistance 5  Supervision/Setup   LB Bathing Assistance 5  Supervision/Setup   UB Dressing Assistance 5  Supervision/Setup   LB Dressing Assistance 5  Supervision/Setup   Toileting Assistance  5  Supervision/Setup   Functional Assistance 5  Supervision/Setup   Bed Mobility   Supine to Sit 5  Supervision   Sit to Supine 5  Supervision   Transfers   Sit to Stand 5  Supervision   Stand to Sit 5  Supervision   Functional Mobility   Functional Mobility 5  Supervision   Balance   Static Sitting Good   Dynamic Sitting Good   Static Standing Fair +   Dynamic Standing Fair   Ambulatory Fair - Activity Tolerance   Activity Tolerance Patient limited by fatigue;Patient limited by pain   Medical Staff Made Aware CM regarding d/c plan   RUE Assessment   RUE Assessment WFL   LUE Assessment   LUE Assessment WFL   Hand Function   Gross Motor Coordination Functional   Fine Motor Coordination Functional   Sensation   Light Touch No apparent deficits   Vision-Basic Assessment   Current Vision Wears glasses all the time   Cognition   Overall Cognitive Status Impaired   Arousal/Participation Alert; Cooperative   Attention Within functional limits   Orientation Level Oriented to person;Oriented to situation;Oriented to place; Disoriented to time; Appropriate for developmental age   Memory Decreased recall of recent events;Decreased short term memory;Decreased recall of precautions   Following Commands Follows one step commands without difficulty   Comments Pt mother reports pt is functioning at baseline cognitive level  Pt reports no changes s/p shunt revision  Pt's mother reports pt requires supervison/set up for tasks, inconsistently folllows 2-step commands, and requires cueing t/o tasks  Assessment   Assessment Pt is a 44 Y/O male who presented to [facility] w/ c/o migraine and episodes of seizure including an UE tonic-clonic seizure for ~1 minute w/ decreased responsiveness  OT initial evaluation performed on 10/20  Pt found to have disconnection of ventriculoperitoneal shunt w/ need for (L) shunt ventricular-peritoneal revision on 10/23  Pt seen for OT re-eval s/p shunt revision  Pt PMH includes congenital hydrocephalus, SZD, asthma, cerebral palsy, localization-related epilepsy, migraines, and h/o multiple shunt revisions  Pt's mother present for evaluation, stating pt is functioning close to baseline cognitively and functionally   PTA pt required supervision/set up for completion of ADL tasks w/ need for frequent cueing, follows 1 step commands w/o difficulty, works part time, does not drive, and was independent in functional mobility/transfers w/o use of DME  Pt's mother reports she will be home w/ the pt at all times following d/c for assistance/support  Currently, pt requires overall supervision/set up for ADLS and supervision for functional mobility/transfers w/o use of AD  Pt currently limited by the following: pain, decreased activity tolerance, fatigue, impaired insight/judgment/memory, decreased ADL status, and decreased functional mobility/transfers  Currently recommend pt d/c home w/ increased family support  Pt's mother reports she will provide 24 hour supervision  No further immediate acute care OT needs  D/C OT at this time     Goals   Patient Goals to not have surgery   Recommendation   OT Discharge Recommendation Home with family support   OT - OK to Discharge Yes  (when medically cleared)   Barthel Index   Feeding 10   Bathing 5   Grooming Score 5   Dressing Score 10   Bladder Score 10   Bowels Score 10   Toilet Use Score 10   Transfers (Bed/Chair) Score 15   Mobility (Level Surface) Score 0   Stairs Score 0   Barthel Index Score 75   Modified Can Scale   Modified [place] Scale 3       Documentation completed by Lauren Apgar, OTS  Occupational Therapy Student

## 2017-10-25 NOTE — PROGRESS NOTES
Progress Note - Neurosurgery   Vance Furth 43 y o  male MRN: [id number]  Unit/Bed#: [facility] 930-01 Encounter: [id number]    Assessment:  1  POD2 s/p revision of ventriculoperitoneal shunt indicated for obstruction  2  History of congenital hydrocephalus with several VPS insertions and revisions  3  History of left posterior parietal cyst s/p catheter insertion for drainage  4  History of seizure disorder  5  gurstman syndrome  6  History of asthma    Plan:  · Neuro exam: gurstman syndrome which causes patient to have difficulty with number identification/association, right left confusion, and difficulty identifying digits appropriately secondary to corpus callosum developmental abnormality  MAX ROBERTS equally, no sensory deficits  · Imaging reviewed personally and with attending:  · CT had 10/24: left parietal post-operative changes  2  shunt catheters and 1 catheter in previous cyst  Post-operative IPh and pneumocephalus in region of posterior VPS catheter  Left frontal  catheter terminates in right lateral ventricle  1 catheter in left lateral ventricle and one in left occipital lobe presumable at region of previous cyst  Presumable hemorrhagic cyst recurrence with fluid level  · Shunt series 10/23:  Discussed with radiologist   A bordered left frontal ventriculoperitoneal shunt catheter  To left parietal catheters  Lateral and more posterior catheter likely the terminating in mid occipital lobe previously and cystic lesion  Recently inserted all the ventriculoperitoneal shunt catheter a appears tightly coiled anterior aspect of mid abdomen  Concern for distal occlusion of the catheter, extraperitoneal position of catheter, at that point she may correlate to the cystic lesion in left parietal lobe  Alternative is postoperative changes following extracranial manipulation of catheters  · Repeat abdominal x-rays to be completed today    If distal peritoneal catheter is dispersed, no further surgical intervention indicated  If it appears similar to the prior study  Likely will order CT abdomen to evaluate for extraperitoneal placement  May require surgical revision  · Patient with previous proximal occlusion of frontal catheter associated with [place]  New [place] hakim shunt inserted with new distal catheter  Hakim valve set at 100mmHg  · Ongoing mediclal management by primary team, medical critical care/SLIM upon transfer to medical surgical unit  · Neurology consulted for AED adjustments  Had another breakthrough seizure on 10/22  Appreciate input  Follow-up with Dr Patric Gusman as outpatient  · Continue to hold AP/AC until cleared by nsx  · Pain control: headaches well controlled  Neck and abdomen pain only exacerbated with palpation  · DVT ppx: SCDs and mobilization, hold pharmacologic DVT prophylaxis until the possibility of return to the OR is determined  · No contraindications from nsx standpoint to transfer to med-surg unit, previously under SLIM  · Continue frequent neuro checks, call with change in exam  · Recommend CTA vs arteriogram delayed if neuro exam remains stable to evaluate for hemorrhagic cyst    · Nurse provider rounds completed with Carlos Iglesias RN  · Neurosurgery will continue to follow- call with questions/concerns  Subjective/Objective   Chief Complaint:  I am tired     Subjective:  Patient Mets to being tired and having a poor appetite  Mother states that last night prior to transfer out of ICU he developed gas pains which were significant, but resolved spontaneously  He was able to tolerate sure it last night and only 1/2 sausage this AM   Did not have anything to drink this morning, beside sips with meds  Denies abdominal pain at current  Has been passing flatus  No bowel movement  Urinating without difficulty  Denies headache, dizziness, vision changes, weakness, confusion    Mother states that he is not as brisk as she would like him to be, but improved since that initial admission  Was out of bed to chair last night, patient subjectively felt stable upon transfer  Patient denies nausea, vomiting, nuchal rigidity  Objective:  Lying in bed, no obvious distress    I/O       10/23 0701 - 10/24 0700 10/24 0701 - 10/25 0700 10/25 0701 - 10/26 0700    P  O  600 590     I V  (mL/kg) 3236 7 (36 6)      IV Piggyback 350      Total Intake(mL/kg) 4186 7 (47 4) 590 (6 7)     Urine (mL/kg/hr) 3175 (1 5) 325 (0 2)     Stool 0 (0)      Blood 50 (0)      Total Output 3225 325      Net +961 7 +265             Unmeasured Urine Occurrence 2 x 4 x     Unmeasured Stool Occurrence 1 x            Invasive Devices     Peripheral Intravenous Line            Peripheral IV 10/23/17 Left Hand 1 day                Physical Exam:  Vitals: Blood pressure 113/71, pulse 90, temperature 98 5 °F (36 9 °C), resp  rate 18, height 5' 8" (1 727 m), weight 87 9 kg (193 lb 12 6 oz), SpO2 96 %  ,Body mass index is 29 46 kg/m²  General appearance: alert, appears stated age, cooperative and no distress  Head:  Left-sided horseshoe shaped incision well approximated with staples  Dressing removed  No active drainage, erythema, fluctuance  Eyes:  Stable left-sided strabismus, EOMI, PERRL  Visual acuity intact in all fields  Neck: supple, full range of motion  No nuchal rigidity  Lungs: non labored breathing, asthma cough  Heart: regular heart rate  Abdomen:  Dressing removed  Well approximated with Steri-Strips which are slightly saturated and dry  Tenderness to palpation midline abdomen  Soft nondistended in 4 quadrants  Neurologic:   Mental status: Alert, oriented to place, hospital, and time  Not oriented to city  Follows commands briskly  Stable right left confusion  Difficulty with serial addition and subtraction  Object naming intact  Repetition intact     Cranial nerves: grossly intact (Cranial nerves II-XII)  Sensory: normal to light touch in bilateral upper and lower extremities  Motor: moving all extremities without focal weakness  Coordination:  No presented to drift  No dysmetria with finger-to-nose testing bilaterally  JPS intact in all extremities, but difficulty determining exactly which digit is being moved  Baseline  Lab Results:    Results from last 7 days  Lab Units 10/24/17  0439 10/23/17  0539 10/22/17  0919   WBC Thousand/uL 11 09* 9 59 8 71   HEMOGLOBIN g/dL 14 4 14 2 15 1   HEMATOCRIT % 40 8 40 9 42 9   PLATELETS Thousands/uL 243 234 224   NEUTROS PCT % 73 55 58   MONOS PCT % 10 8 7       Results from last 7 days  Lab Units 10/24/17  0439 10/23/17  0539 10/21/17  0537 10/20/17  0357   SODIUM mmol/L 140 144 142 140   POTASSIUM mmol/L 4 2 4 2 3 8 3 5   CHLORIDE mmol/L 105 107 106 104   CO2 mmol/L 29 30 29 28   BUN mg/dL 8 11 12 17   CREATININE mg/dL 0 71 0 82 0 70 0 78   CALCIUM mg/dL 8 6 8 2* 8 7 9 3   TOTAL PROTEIN g/dL  --   --   --  7 2   BILIRUBIN TOTAL mg/dL  --   --   --  0 42   ALK PHOS U/L  --   --   --  52   ALT U/L  --   --   --  15   AST U/L  --   --   --  5   GLUCOSE RANDOM mg/dL 91 83 94 101       Results from last 7 days  Lab Units 10/24/17  0439 10/23/17  0539 10/21/17  0537   MAGNESIUM mg/dL 2 0 2 3 2 3       Results from last 7 days  Lab Units 10/24/17  0439 10/21/17  0537   PHOSPHORUS mg/dL 3 1 3 5       Results from last 7 days  Lab Units 10/24/17  0439 10/23/17  0539   INR  1 10 1 08   PTT seconds 32 31       Imaging Studies: I have personally reviewed pertinent reports  and I have personally reviewed pertinent films in PACS    EKG, Pathology, and Other Studies: I have personally reviewed pertinent reports        VTE Pharmacologic Prophylaxis: Sequential compression device (Venodyne)     VTE Mechanical Prophylaxis: sequential compression device

## 2017-10-25 NOTE — PROGRESS NOTES
CT abdomen reviewed, appears ventricular catheter is intraperitoneal  May advance diet to clears and milkshake that mother desires to bring in  Continue to monitor neuro exam  Call with questions/concerns

## 2017-10-25 NOTE — PROGRESS NOTES
Rounding note with CHLOE Parham-met with patient and mother about need for x-ray of abdomen to determine placement of shunt and NPO status at present

## 2017-10-25 NOTE — PROGRESS NOTES
[facility] Progress Note  Patient: Taylor Kent 43 y o  male   MRN: [id number]  PCP: Alba Queen,   Unit/Bed#: [facility] 930-01 Encounter: [id number]  Date Of Visit: 10/25/17    Assessment/Plan:  ·  shunt malfunction/obstruction - s/p revision POD #2  Management per Neurosurgery  Patient is scheduled for abdominal x-ray today to evaluate shunt placement  He is to remain NPO pending imaging studies per Neurosurgery  · Seizure disorder - Continue home medications with Depakote, Lamictal   · Headache - patient currently without any complaints of headache  · History asthma - no active issues  Continue Advair and p r n  Albuterol  · Cerebral palsy - continue supportive care  · Constipation - continue bowel regimen    VTE Pharmacologic Prophylaxis:   Pharmacologic: Pharmacologic VTE Prophylaxis contraindicated due to Status post recent shunt surgery  Mechanical VTE Prophylaxis in Place: Yes    Patient Centered Rounds: I have performed bedside rounds with nursing staff today  Discussions with Specialists or Other Care Team Provider:  Nursing    Education and Discussions with Family / Patient:  Patient and mother at the bedside, all questions answered    Current Length of Stay: 5 day(s)    Current Patient Status: Inpatient   Certification Statement: The patient will continue to require additional inpatient hospital stay due to  shunt malfunction    Discharge Plan:  Pending neurosurgical clearance    Code Status: Level 1 - Full Code      Subjective:   Patient seen and evaluated  He denies any complaints  No headache, no chest pains or shortness of breath, no fever or chills  He has not had a bowel movement since admission  He denies any abdominal pain, no nausea vomiting, no changes in vision or speech  No focal extremity weakness      Objective:     Vitals:   Temp (24hrs), Av 2 °F (37 3 °C), Min:98 5 °F (36 9 °C), Max:99 8 °F (37 7 °C)    HR:  [86-98] 90  Resp:  [17-29] 18  BP: (113143)/(67-83) 113/71  SpO2:  [92 %-96 %] 96 %  Body mass index is 29 46 kg/m²  Input and Output Summary (last 24 hours): Intake/Output Summary (Last 24 hours) at 10/25/17 1133  Last data filed at 10/25/17 0608   Gross per 24 hour   Intake              350 ml   Output                0 ml   Net              350 ml       Physical Exam:  General Appearance:    Alert, cooperative, no distress, appropriately responsive    Head:    Dressing over left parietal area intact   Eyes:    Conjunctiva/corneas clear, EOM's intact   Neck:   Supple, palpable catheter left neck area   Lungs:     Clear to auscultation bilaterally, respirations unlabored, no crackles or wheeze     Heart:    Regular rate and rhythm, S1 and S2    Abdomen:     Soft, tender over incision site, nondistended   Extremities:   Extremities normal, atraumatic, no cyanosis or edema   Neurologic:  nonfocal         Additional Data:     Labs:      Results from last 7 days  Lab Units 10/24/17  0439   WBC Thousand/uL 11 09*   HEMOGLOBIN g/dL 14 4   HEMATOCRIT % 40 8   PLATELETS Thousands/uL 243   NEUTROS PCT % 73   LYMPHS PCT % 16   MONOS PCT % 10   EOS PCT % 1       Results from last 7 days  Lab Units 10/24/17  0439  10/20/17  0357   SODIUM mmol/L 140  < > 140   POTASSIUM mmol/L 4 2  < > 3 5   CHLORIDE mmol/L 105  < > 104   CO2 mmol/L 29  < > 28   BUN mg/dL 8  < > 17   CREATININE mg/dL 0 71  < > 0 78   CALCIUM mg/dL 8 6  < > 9 3   TOTAL PROTEIN g/dL  --   --  7 2   BILIRUBIN TOTAL mg/dL  --   --  0 42   ALK PHOS U/L  --   --  52   ALT U/L  --   --  15   AST U/L  --   --  5   GLUCOSE RANDOM mg/dL 91  < > 101   < > = values in this interval not displayed  Results from last 7 days  Lab Units 10/24/17  0439   INR  1 10       * I Have Reviewed All Lab Data Listed Above  * Additional Pertinent Lab Tests Reviewed:  [facility] Admission Reviewed    Cultures:   Blood Culture: No results found for: BLOODCX  Urine Culture: No results found for: URINECX  Sputum Culture: No components found for: SPUTUMCX  Wound Culture: No results found for: WOUNDCULT    Last 24 Hours Medication List:     chlorhexidine 15 mL Swish & Spit Q12H [facility]   cholecalciferol 1,000 Units Oral Daily   divalproex sodium 1,000 mg Oral Q8H [facility]   docusate sodium 100 mg Oral BID   fluticasone-salmeterol 1 puff Inhalation Q12H [facility]   lamoTRIgine 200 mg Oral BID   LORazepam 0 5 mg Intravenous Once   LORazepam 1 5 mg Intravenous Once   polyethylene glycol 17 g Oral Daily   senna 1 tablet Oral Daily        Today, Patient Was Seen By: Isis Marcum MD    ** Please Note: Dragon 360 Dictation voice to text software may have been used in the creation of this document   **

## 2017-10-26 ENCOUNTER — HOSPITAL ENCOUNTER (EMERGENCY)
Facility: HOSPITAL | Age: 42
Discharge: HOME | End: 2017-10-27
Attending: EMERGENCY MEDICINE | Admitting: EMERGENCY MEDICINE
Payer: MEDICARE

## 2017-10-26 DIAGNOSIS — Z98.2 S/P VP SHUNT: ICD-10-CM

## 2017-10-26 DIAGNOSIS — R56.9 SEIZURE (HCC): Primary | ICD-10-CM

## 2017-10-26 LAB
ATRIAL RATE: 96 BPM
P AXIS: 62 DEGREES
PR INTERVAL: 154 MS
QRS AXIS: 98 DEGREES
QRSD INTERVAL: 84 MS
QT INTERVAL: 332 MS
QTC INTERVAL: 419 MS
T WAVE AXIS: 18 DEGREES
VENTRICULAR RATE: 96 BPM

## 2017-10-26 PROCEDURE — 93005 ELECTROCARDIOGRAM TRACING: CPT

## 2017-10-26 PROCEDURE — G8979 MOBILITY GOAL STATUS: HCPCS

## 2017-10-26 PROCEDURE — 97164 PT RE-EVAL EST PLAN CARE: CPT

## 2017-10-26 PROCEDURE — G8978 MOBILITY CURRENT STATUS: HCPCS

## 2017-10-26 RX ORDER — POLYETHYLENE GLYCOL 3350 17 G/17G
17 POWDER, FOR SOLUTION ORAL DAILY PRN
Qty: 14 EACH | Refills: 0 | Status: SHIPPED | OUTPATIENT
Start: 2017-10-26 | End: 2018-02-06

## 2017-10-26 RX ORDER — CHLORHEXIDINE GLUCONATE 0.12 MG/ML
15 RINSE ORAL EVERY 12 HOURS SCHEDULED
Qty: 120 ML | Refills: 0 | Status: SHIPPED | OUTPATIENT
Start: 2017-10-26 | End: 2018-02-06

## 2017-10-26 RX ORDER — LAMOTRIGINE 200 MG/1
200 TABLET ORAL 2 TIMES DAILY
Qty: 60 TABLET | Refills: 0 | Status: SHIPPED | OUTPATIENT
Start: 2017-10-26 | End: 2018-04-02 | Stop reason: SDUPTHER

## 2017-10-26 RX ORDER — ACETAMINOPHEN 325 MG/1
TABLET ORAL
Status: COMPLETED
Start: 2017-10-26 | End: 2017-10-26

## 2017-10-26 RX ORDER — DOCUSATE SODIUM 100 MG/1
100 CAPSULE, LIQUID FILLED ORAL 2 TIMES DAILY
Qty: 10 CAPSULE | Refills: 0 | Status: SHIPPED | OUTPATIENT
Start: 2017-10-26 | End: 2018-02-06

## 2017-10-26 RX ORDER — SENNOSIDES 8.6 MG
1 TABLET ORAL 2 TIMES DAILY
Qty: 60 EACH | Refills: 0 | Status: SHIPPED | OUTPATIENT
Start: 2017-10-26 | End: 2018-02-06

## 2017-10-26 RX ORDER — DIVALPROEX SODIUM 500 MG/1
1000 TABLET, DELAYED RELEASE ORAL EVERY 8 HOURS SCHEDULED
Qty: 180 TABLET | Refills: 0 | Status: SHIPPED | OUTPATIENT
Start: 2017-10-26 | End: 2018-02-12 | Stop reason: HOSPADM

## 2017-10-26 RX ADMIN — FLUTICASONE PROPIONATE AND SALMETEROL 1 PUFF: 50; 100 POWDER RESPIRATORY (INHALATION) at 09:01

## 2017-10-26 RX ADMIN — DIVALPROEX SODIUM 1000 MG: 500 TABLET, DELAYED RELEASE ORAL at 05:57

## 2017-10-26 RX ADMIN — ACETAMINOPHEN 650 MG: 325 TABLET, FILM COATED ORAL at 02:03

## 2017-10-26 RX ADMIN — ACETAMINOPHEN 650 MG: 325 TABLET ORAL at 02:03

## 2017-10-26 RX ADMIN — DIVALPROEX SODIUM 1000 MG: 500 TABLET, DELAYED RELEASE ORAL at 13:33

## 2017-10-26 RX ADMIN — CHLORHEXIDINE GLUCONATE 15 ML: 1.2 RINSE ORAL at 09:01

## 2017-10-26 RX ADMIN — SENNOSIDES 8.6 MG: 8.6 TABLET, FILM COATED ORAL at 09:01

## 2017-10-26 RX ADMIN — VITAMIN D, TAB 1000IU (100/BT) 1000 UNITS: 25 TAB at 09:01

## 2017-10-26 RX ADMIN — DOCUSATE SODIUM 100 MG: 100 CAPSULE, LIQUID FILLED ORAL at 09:01

## 2017-10-26 RX ADMIN — LAMOTRIGINE 200 MG: 100 TABLET ORAL at 09:01

## 2017-10-26 NOTE — SOCIAL WORK
Cm reviewed patient during care coordination rounds  Patient is medically stable for d/c today  Cm contacted patient's mother who is in agreement with discharge home after speaking to MD about patient's seizure medications  Cm contacted medical team about patient's mother's concerns  Cm informed by medical team that conversation occurred and patient's mother remains in agreement with discharge plan  Cm informed RN about location of mother's car for transport home  CM reviewed d/c planning process including the following: identifying help at home, patient preference for d/c planning needs, Discharge Lounge, [facility] Meds to Bed program, availability of treatment team to discuss questions or concerns patient and/or family may have regarding understanding medications and recognizing signs and symptoms once discharged  CM also encouraged patient to follow up with all recommended appointments after discharge  Patient advised of importance for patient and family to participate in managing patients medical well being

## 2017-10-26 NOTE — DISCHARGE SUMMARY
Discharge Summary - [address] [facility]    Patient Information: Ciarra Haley 43 y o  male MRN: [id number]  [facility] Encounter: [id number]    Discharging Physician / Practitioner: Jean-Paul Larson MD  PCP: Angelito Laguerre DO  Admission Date: 10/20/2017  Discharge Date: 10/26/17    Reason for Admission:   shunt malfunction/ seizure-like activity    Discharge Diagnoses:   ·  shunt malfunction/obstruction   · Seizure disorder   · Headache   · History asthma   · Cerebral palsy   · Constipation -    Consultations During Hospital Stay:  · Neurology   · Neurosurgery    Procedures Performed:   ·   shunt revision on 10/23/17    Significant findings:  ·  CT abdomen - There are 4 ventriculoperitoneal shunt catheters, all of which in the peritoneal cavity  The most recently inserted catheter does have an unusually tight coiled appearance (series 2 image 32 through 37 ) There is no fluid collection surrounding this catheter tip  Hospital Course:   Ciarra Haley is a 43 y o  male patient who originally presented to the hospital on 10/20/2017 due to  Seizure-like activity  Patient has a history of cerebral palsy, seizure disorder and is status post  shunt  Following presentation, he was evaluated by the neurosurgical team and his seizure management was optimized per the Neurology team   He underwent the above procedure for revision of his  shunt on 10/23/17  And was transferred to the critical care unit for postop monitoring  Patient remained stable and was eventually transferred out of the unit on 10/24/17  He was cleared for discharge from the neurosurgical standpoint on 10/26/17 following CT of the abdomen pelvis to evaluate shunt placement  He is to follow up with the neurosurgeon for an appointment on 12/5/17 at 11:30 a m  He will also follow up with the neurologist Dr Patric Gusman  The neurologist office will call him to schedule an appointment    I did discuss with the neurologist Dr Shahid Lindquist  On the day of discharge regarding Lamictal and Depakote dosing  Recommendations are for the patient to continue on current hospital dose and follow up with the neurologist for further dose adjustments as indicated outpatient  Condition at Discharge: stable     Discharge Day Visit / Exam:     See same-day progress note       Discharge instructions/Information to patient and family:   See after visit summary for information provided to patient and family  Provisions for Follow-Up Care:  See after visit summary for information related to follow-up care and any pertinent home health orders  Disposition: Home    Discussed with patient's mother at the bedside on the day of discharge  She was updated on clinical status, discharge plans and seizure medications  Discharge Statement:  I spent >40 minutes discharging the patient  This time was spent on the day of discharge  I had direct contact with the patient on the day of discharge  Greater than 50% of the total time was spent examining patient, answering all patient questions, arranging and discussing plan of care with patient as well as directly providing post-discharge instructions  Additional time then spent on discharge activities  Discharge Medications:  See after visit summary for reconciled discharge medications provided to patient and family  ** Please Note: Dragon 360 Dictation voice to text software may have been used in the creation of this document   **

## 2017-10-26 NOTE — PROGRESS NOTES
[facility] Internal Medicine Progress Note  Patient: Ladean Kocher 43 y o  male   MRN: [id number]  PCP: Lavonia Lesch, DO  Unit/Bed#: [facility] 930-01 Encounter: [id number]  Date Of Visit: 10/26/17    Assessment/Plan:  ·  shunt malfunction/obstruction - s/p revision POD #3  Management per Neurosurgery     · Seizure disorder - Continue home medications with Depakote, Lamictal   · Headache - resolved  · History asthma - no active issues  Continue Advair and p r n  Albuterol  · Cerebral palsy - continue supportive care  · Constipation - continue bowel regimen    VTE Pharmacologic Prophylaxis:   Pharmacologic: Pharmacologic VTE Prophylaxis contraindicated due to Status post recent shunt surgery  Mechanical VTE Prophylaxis in Place: Yes    Patient Centered Rounds: I have performed bedside rounds with nursing staff today  Discussions with Specialists or Other Care Team Provider:  Nursing/CM    Education and Discussions with Family / Patient:  Patient    Current Length of Stay: 6 day(s)    Current Patient Status: Inpatient   Certification Statement: The patient will continue to require additional inpatient hospital stay due to  shunt malfunction    Discharge Plan:  Pending neurosurgical clearance    Code Status: Level 1 - Full Code      Subjective:   Patient is without any new complaints this morning  He denies any headache, no chest pain or shortness of breath, no fever or chills  Objective:     Vitals:   Temp (24hrs), Av 3 °F (36 8 °C), Min:98 2 °F (36 8 °C), Max:98 5 °F (36 9 °C)    HR:  [83-96] 83  Resp:  [18] 18  BP: (123-132)/(66-75) 123/70  SpO2:  [96 %-98 %] 97 %  Body mass index is 29 46 kg/m²  Input and Output Summary (last 24 hours):        Intake/Output Summary (Last 24 hours) at 10/26/17 1121  Last data filed at 10/26/17 0900   Gross per 24 hour   Intake             1560 ml   Output                0 ml   Net             1560 ml       Physical Exam:  General Appearance:    Alert, cooperative, no distress, appropriately responsive    Head:    Left parietal scalp staples intact, no bleeding, erythema or discharge    Eyes:   Conjunctiva/corneas clear, EOM's intact   Neck:   Supple   Lungs:     Clear to auscultation bilaterally, respirations unlabored, no crackles or wheeze     Heart:    Regular rate and rhythm, S1 and S2    Abdomen:     Soft, non-tender, appropriately tender over incision site, nondistended   Extremities:   No edema   Neurologic:  Alert and appropriately responsive, moves all extremities          Additional Data:     Labs:      Results from last 7 days  Lab Units 10/24/17  0439   WBC Thousand/uL 11 09*   HEMOGLOBIN g/dL 14 4   HEMATOCRIT % 40 8   PLATELETS Thousands/uL 243   NEUTROS PCT % 73   LYMPHS PCT % 16   MONOS PCT % 10   EOS PCT % 1       Results from last 7 days  Lab Units 10/24/17  0439  10/20/17  0357   SODIUM mmol/L 140  < > 140   POTASSIUM mmol/L 4 2  < > 3 5   CHLORIDE mmol/L 105  < > 104   CO2 mmol/L 29  < > 28   BUN mg/dL 8  < > 17   CREATININE mg/dL 0 71  < > 0 78   CALCIUM mg/dL 8 6  < > 9 3   TOTAL PROTEIN g/dL  --   --  7 2   BILIRUBIN TOTAL mg/dL  --   --  0 42   ALK PHOS U/L  --   --  52   ALT U/L  --   --  15   AST U/L  --   --  5   GLUCOSE RANDOM mg/dL 91  < > 101   < > = values in this interval not displayed  Results from last 7 days  Lab Units 10/24/17  0439   INR  1 10       * I Have Reviewed All Lab Data Listed Above  * Additional Pertinent Lab Tests Reviewed:  All Labs Within Last 24 Hours Reviewed    Cultures:   Blood Culture: No results found for: BLOODCX  Urine Culture: No results found for: URINECX  Sputum Culture: No components found for: SPUTUMCX  Wound Culture: No results found for: WOUNDCULT    Last 24 Hours Medication List:     chlorhexidine 15 mL Swish & Spit Q12H [address]   cholecalciferol 1,000 Units Oral Daily   divalproex sodium 1,000 mg Oral Q8H [address]   docusate sodium 100 mg Oral BID   fluticasone-salmeterol 1 puff Inhalation Q12H [address] lamoTRIgine 200 mg Oral BID   LORazepam 0 5 mg Intravenous Once   LORazepam 1 5 mg Intravenous Once   polyethylene glycol 17 g Oral Daily   senna 1 tablet Oral BID        Today, Patient Was Seen By: Sunita Castellano MD    ** Please Note: Dragon 360 Dictation voice to text software may have been used in the creation of this document   **

## 2017-10-26 NOTE — PHYSICAL THERAPY NOTE
PHYSICAL THERAPY RE-EVALUATION    Patient Name: Nain Ingram  VKKOH'D Date: 10/26/2017     10/26/17 1301   Note Type   Note type Re-eval   Pain Assessment   Pain Assessment No/denies pain   Pain Score No Pain   Restrictions/Precautions   Weight Bearing Precautions Per Order No   Other Precautions Seizure; Fall Risk;Telemetry   General   Additional Pertinent History s/p shunt revision 10/23   Family/Caregiver Present Yes  (mother)   Cognition   Overall Cognitive Status Impaired   Arousal/Participation Cooperative   Attention Within functional limits   RLE Assessment   RLE Assessment X   Strength RLE   RLE Overall Strength 4/5  (grossly)   LLE Assessment   LLE Assessment X   Strength LLE   LLE Overall Strength 4+/5  (grossly)   Bed Mobility   Supine to Sit 5  Supervision   Additional items HOB elevated; Bedrails; Increased time required;Verbal cues   Sit to Supine 5  Supervision   Additional items HOB elevated; Bedrails; Increased time required;Verbal cues   Transfers   Sit to Stand 5  Supervision   Additional items Increased time required; Impulsive;Verbal cues   Stand to Sit 5  Supervision   Additional items Increased time required; Impulsive;Verbal cues   Stand pivot 5  Supervision   Additional items Increased time required;Verbal cues   Ambulation/Elevation   Gait pattern Improper Weight shift;Decreased foot clearance; Inconsistent garcia; Short stride   Gait Assistance 5  Supervision   Additional items Verbal cues   Assistive Device None   Distance 140` x2   Stair Management Assistance 5  Supervision   Additional items Verbal cues   Stair Management Technique Alternating pattern; One rail L   Number of Stairs 4   Balance   Static Sitting Good   Dynamic Sitting Good   Static Standing Fair +   Dynamic Standing Fair   Ambulatory Fair   Endurance Deficit   Endurance Deficit Yes   Endurance Deficit Description limited ambulation distance   Activity Tolerance   Activity Tolerance Patient limited by fatigue;Patient tolerated treatment well   Nurse Made Aware Per Neurosurgery, pt appropriate to re-evaluate   Assessment   Prognosis Good   Problem List Decreased strength;Decreased endurance; Impaired balance;Decreased mobility; Decreased coordination;Decreased cognition; Impaired judgement;Decreased safety awareness   Assessment Pt tolerated re-evaluation well s/p shunt revision  Able to ambulate with SBA and negotiate 4 steps with SBA and hand rail  Inconsistent garcia and multiple gait deviations noted, however no LOB noted  Per mother, pt is close to his baseline  Educated mother on importance of providing SBA during ambulation and mother agrees and understands  Will continue to benefit from ongoing skilled PT to maximize his functional mobility and increase his level of independence  Anticipating pt will be able to go home with family support  Mother reports she does not believe he currently has needs for home PT/OPPT  Goals   Patient Goals to go home   STG Expiration Date 11/04/17   Short Term Goal #1 Pt will be able to: (1) perform bed mobility with mod I (2) perform sit to stand with mod I (3) ambulate 200` with mod I and least restrictive AD (4) increase standing balance by 1 grade   Treatment Day 0   Plan   Treatment/Interventions Functional transfer training;LE strengthening/ROM; Elevations; Therapeutic exercise; Endurance training;Patient/family training;Equipment eval/education; Bed mobility;Gait training   PT Frequency 5x/wk   Recommendation   Recommendation Home with family support   PT - OK to Discharge Yes   Barthel Index   Feeding 10   Bathing 5   Grooming Score 5   Dressing Score 10   Bladder Score 10   Bowels Score 10   Toilet Use Score 5   Transfers (Bed/Chair) Score 10   Mobility (Level Surface) Score 10   Stairs Score 5   Barthel Index Score 80   [place], PT,DPT

## 2017-10-26 NOTE — PROGRESS NOTES
Progress Note - Neurosurgery   Wisam Linares 43 y o  male MRN: [id number]  Unit/Bed#: [facility] 930-01 Encounter: [id number]    Assessment:  1  POD3 s/p revision of ventriculoperitoneal shunt indicated for obstruction  2  History of congenital hydrocephalus with several VPS insertions and revisions  3  History of left posterior parietal cyst s/p catheter insertion for drainage  4  History of seizure disorder  5  gerstman syndrome  6  History of asthma    Plan: 42 yo M hx of CP with shunt malfunction now s/p revision  · Neuro exam: stable  · Imaging reviewed personally and with attending:  · CT had 10/24: left parietal post-operative changes  2  shunt catheters and 1 catheter in previous cyst  Post-operative IPh and pneumocephalus in region of posterior VPS catheter  Left frontal  catheter terminates in right lateral ventricle  1 catheter in left lateral ventricle and one in left occipital lobe presumable at region of previous cyst or result of extracranial shunt manipulation  · Shunt series 10/23:  Discussed with radiologist   Aborted left frontal ventriculoperitoneal shunt catheter  Two left parietal catheters  Lateral and more posterior catheter likely the terminating in mid occipital lobe previously and cystic lesion  Recently inserted ventriculoperitoneal shunt catheter appears tightly coiled anterior aspect of mid abdomen  · CT abdomen pelvis 10/25 reveals intraperitoneal placement of most recent VPS catheter  · Patient with previous proximal occlusion of frontal catheter associated with [place] reservoir  New codman hakim shunt inserted with new distal catheter  Hakim valve set at 100mmHg  · Ongoing mediclal management by primary team, medical critical care/SLIM upon transfer to medical surgical unit  · Neurology consulted for AED adjustments  Had another breakthrough seizure on 10/22  Appreciate input  Follow-up with Dr Cassidy Orellana as outpatient     · Clear for DVT ppx if patient is not palpation around incision  Otherwise nondistended, non tender  Neurologic:   Mental status: Alert, oriented x3, thought content appropriate  Spells appropriately, does simple serial addition, follows commands briskly  Baseline right left confusion and digit confusion  Cranial nerves: grossly intact with exception of baseline left-sided strabismus (Cranial nerves II-XII)  Sensory: normal to light touch in bilateral upper and lower extremities  Motor: moving all extremities without focal weakness  Coordination: finger to nose normal bilaterally, no drift bilaterally  Lab Results:    Results from last 7 days  Lab Units 10/24/17  0439 10/23/17  0539 10/22/17  0919   WBC Thousand/uL 11 09* 9 59 8 71   HEMOGLOBIN g/dL 14 4 14 2 15 1   HEMATOCRIT % 40 8 40 9 42 9   PLATELETS Thousands/uL 243 234 224   NEUTROS PCT % 73 55 58   MONOS PCT % 10 8 7       Results from last 7 days  Lab Units 10/24/17  0439 10/23/17  0539 10/21/17  0537 10/20/17  0357   SODIUM mmol/L 140 144 142 140   POTASSIUM mmol/L 4 2 4 2 3 8 3 5   CHLORIDE mmol/L 105 107 106 104   CO2 mmol/L 29 30 29 28   BUN mg/dL 8 11 12 17   CREATININE mg/dL 0 71 0 82 0 70 0 78   CALCIUM mg/dL 8 6 8 2* 8 7 9 3   TOTAL PROTEIN g/dL  --   --   --  7 2   BILIRUBIN TOTAL mg/dL  --   --   --  0 42   ALK PHOS U/L  --   --   --  52   ALT U/L  --   --   --  15   AST U/L  --   --   --  5   GLUCOSE RANDOM mg/dL 91 83 94 101       Results from last 7 days  Lab Units 10/24/17  0439 10/23/17  0539 10/21/17  0537   MAGNESIUM mg/dL 2 0 2 3 2 3       Results from last 7 days  Lab Units 10/24/17  0439 10/21/17  0537   PHOSPHORUS mg/dL 3 1 3 5       Results from last 7 days  Lab Units 10/24/17  0439 10/23/17  0539   INR  1 10 1 08   PTT seconds 32 31       Imaging Studies: I have personally reviewed pertinent reports  and I have personally reviewed pertinent films in PACS    EKG, Pathology, and Other Studies: I have personally reviewed pertinent reports        VTE Pharmacologic Prophylaxis:   Clear for heparin or Lovenox from neurosurgical standpoint    VTE Mechanical Prophylaxis: sequential compression device

## 2017-10-26 NOTE — DISCHARGE INSTRUCTIONS
Ventriculoperitoneal Shunt Discharge Instructions   Monitor incisions daily  Keep incision clean and dry   May shower and wash hair 72 hours after surgery   Avoid rubbing the incision, but gently massage hair   Do not use a hair dryer, and avoid hair products such as mousse, oils, and gels  Do not brush your hair away from the incision since this will put strain on the suture line   Do not dye or perm hair until cleared by MD Nora Dale Please remove dressing within 1-2 days after surgery   No soaking in tub   May walk as tolerated: 3 short walks daily   Avoid heavy lifting, pushing or pulling over 10lbs for 2 weeks   Do not drive until cleared by MD/PA   Coumadin, ASA, Plavix may be restarted once cleared by MD Nora Dale If you ever require a MRI a skull X-ray must be done before and after the MRI  There may be a need to reprogram your shunt, so contact the office   Follow-up for a 2 week incision check and staple removal as scheduled  Follow-up for a 6 week post-operative visit with a repeat CT head to be completed prior to your visit  **Please contact MD if incision becomes red, swelling, and tender or has increased drainage  Or if you experience increased headaches, difficulties walking, nausea/vomiting, changes in vision, and increased drowsiness or temp>101   **

## 2017-10-26 NOTE — PLAN OF CARE
Problem: PHYSICAL THERAPY ADULT  Goal: Performs mobility at highest level of function for planned discharge setting  See evaluation for individualized goals  Treatment/Interventions: Functional transfer training, LE strengthening/ROM, Elevations, Therapeutic exercise, Endurance training, Patient/family training, Equipment eval/education, Bed mobility, Gait training          See flowsheet documentation for full assessment, interventions and recommendations  Outcome: Progressing  Prognosis: Good  Problem List: Decreased strength, Decreased endurance, Impaired balance, Decreased mobility, Decreased coordination, Decreased cognition, Impaired judgement, Decreased safety awareness  Assessment: Pt tolerated re-evaluation well s/p shunt revision  Able to ambulate with SBA and negotiate 4 steps with SBA and hand rail  Inconsistent garcia and multiple gait deviations noted, however no LOB noted  Per mother, pt is close to his baseline  Educated mother on importance of providing SBA during ambulation and mother agrees and understands  Will continue to benefit from ongoing skilled PT to maximize his functional mobility and increase his level of independence  Anticipating pt will be able to go home with family support  Mother reports she does not believe he currently has needs for home PT/OPPT  Barriers to Discharge: None  Barriers to Discharge Comments: pt to go home with family support as confirmed by mother  Recommendation: Home with family support     PT - OK to Discharge: Yes    See flowsheet documentation for full assessment

## 2017-10-27 ENCOUNTER — APPOINTMENT (EMERGENCY)
Dept: RADIOLOGY | Facility: HOSPITAL | Age: 42
End: 2017-10-27
Payer: MEDICARE

## 2017-10-27 VITALS
OXYGEN SATURATION: 96 % | SYSTOLIC BLOOD PRESSURE: 122 MMHG | TEMPERATURE: 98.1 F | HEART RATE: 78 BPM | RESPIRATION RATE: 18 BRPM | BODY MASS INDEX: 29.46 KG/M2 | WEIGHT: 193.78 LBS | DIASTOLIC BLOOD PRESSURE: 64 MMHG

## 2017-10-27 LAB — VALPROATE SERPL-MCNC: 83 UG/ML (ref 50–100)

## 2017-10-27 PROCEDURE — 70250 X-RAY EXAM OF SKULL: CPT

## 2017-10-27 PROCEDURE — 74000 HB X-RAY EXAM OF ABDOMEN (SINGLE ANTEROPOSTERIOR VIEW): CPT

## 2017-10-27 PROCEDURE — 36415 COLL VENOUS BLD VENIPUNCTURE: CPT | Performed by: EMERGENCY MEDICINE

## 2017-10-27 PROCEDURE — 70450 CT HEAD/BRAIN W/O DYE: CPT

## 2017-10-27 PROCEDURE — 99285 EMERGENCY DEPT VISIT HI MDM: CPT

## 2017-10-27 PROCEDURE — 71020 HB CHEST X-RAY 2VW FRONTAL&LATL: CPT

## 2017-10-27 PROCEDURE — 80164 ASSAY DIPROPYLACETIC ACD TOT: CPT | Performed by: EMERGENCY MEDICINE

## 2017-10-27 NOTE — ED NOTES
Charge RN spoke with mother, who is concerned about patient's care at this time  Mother concerned because patient was d/c'd from inpatient earlier today after having new shunt placed  Concerned that patient has not been seen by physician at this time, and patient had [facility] Seizure tonight  Pt  Has known hx of seizures  RN apologized for wait time at this time  Informed mother, that on nursing standpoint, there are no further actions that can be taken by nursing staff at this time until patient is seen and evaluated by physician  EKG was performed, and shown to attending physician  Nothing concerning noted with EKG  RN informed mother that there has been a 3 hour wait time in the waiting room at this time, and the emergency department is full  There are multiple other patients waiting to be seen before her son  RN apologized for inconvenience at this time         Sherry Nunez RN  10/27/17 9539

## 2017-10-27 NOTE — ED ATTENDING ATTESTATION
Aure Srinivasan MD, saw and evaluated the patient  I have discussed the patient with the resident/non-physician practitioner and agree with the resident's/non-physician practitioner's findings, Plan of Care, and MDM as documented in the resident's/non-physician practitioner's note, except where noted  All available labs and Radiology studies were reviewed  At this point I agree with the current assessment done in the Emergency Department  I have conducted an independent evaluation of this patient including a focused history of:    Emergency Department Note- Fleet Jeans 43 y o  male MRN: [id number]    Unit/Bed#: ED 01 Encounter: [id number]    Fleet Jeans is a 43 y o  male who presents with   Chief Complaint   Patient presents with    Seizure - Prior Hx Of     pt was discharged from the floor here today  Pt had shunts placed in his brain on Monday  Pt was at home in bed and states he felt a metalic taste in his mouth and burning in his R hand followed by a "grand mal seizure" per pt's mother  Pt has a hx of grad mal seizures  Per pt's mother the seizure lasted for "30 to 40 seconds"          History of Present Illness   HPI:  Fleet Jeans is a 43 y o  male who presents for evaluation of:  Recurrent generalized seizure  Patient recently had increase in his seizure medication  The patient was just discharged from [facility] after revision of his ventriculoperitoneal shunt  Review of the patient's medical records is notable for the patient having had his ventricular peritoneal shunt revised several days ago  The patient was discharged from the hospital yesterday    Review of Systems    Historical Information   Past Medical History:   Diagnosis Date    Asthma     Cerebral palsy ([address] )     Congenital hydrocephalus ([address] )     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[address] )     Migraines     Seizures ([address] )      Past Surgical History:   Procedure Laterality Date    BRAIN SURGERY Multiple  shunt revisions    HERNIA REPAIR      NE CREATE SHUNT:VENTRIC-PERITONEAL Left 10/23/2017    Procedure: SHUNT VENTRICULAR-PERITONEAL revision;  Surgeon: Alicia Laughlin MD;  [facility];  Service: Neurosurgery     Social History   History   Alcohol Use No     Comment: N/A     History   Drug Use No     Comment: N/A     History   Smoking Status    Never Smoker   Smokeless Tobacco    Never Used     Comment: N/A     Family History: non-contributory    Meds/Allergies   all medications and allergies reviewed  Allergies   Allergen Reactions    Latex Hives       Objective   First Vitals:   Blood Pressure: 144/76 (10/26/17 2020)  Pulse: 92 (10/26/17 2020)  Temperature: 98 1 °F (36 7 °C) (10/26/17 2025)  Temp Source: Oral (10/26/17 2025)  Respirations: 18 (10/26/17 2020)  Weight - Scale: 87 9 kg (193 lb 12 6 oz) (10/26/17 2315)  SpO2: 98 % (10/26/17 2017)    Current Vitals:   Blood Pressure: 122/64 (10/27/17 0219)  Pulse: 78 (10/27/17 0219)  Temperature: 98 1 °F (36 7 °C) (10/26/17 2025)  Temp Source: Oral (10/26/17 2025)  Respirations: 18 (10/27/17 0219)  Weight - Scale: 87 9 kg (193 lb 12 6 oz) (10/26/17 2315)  SpO2: 96 % (10/27/17 0219)      Intake/Output Summary (Last 24 hours) at 10/27/17 1535  Last data filed at 10/26/17 2017   Gross per 24 hour   Intake              200 ml   Output                0 ml   Net              200 ml       Invasive Devices     Peripheral Intravenous Line            Peripheral IV -- days    Peripheral IV 10/26/17 Left Hand less than 1 day                Physical Exam   Abdominal: Soft  Bowel sounds are normal    Musculoskeletal: Normal range of motion  He exhibits no deformity  Neurological: He is alert  The patient is oriented to his name only; he is also oriented to hospital but not name of hospital   Skin: Skin is warm and dry  Psychiatric:   Decision making capacity is impaired by chronic developmental delay     Nursing note and vitals reviewed  Medical Decision Makin  Recurrent seizure episode:  Plan to check CT scan of the head to assess for recurrent malfunction of the ventriculoperitoneal shunt; plan to check a repeat ventricular peritoneal shunt series  Plan to check a valproic acid level to assess for therapeutic level  Recent Results (from the past 36 hour(s))   ECG 12 lead    Collection Time: 10/26/17  8:54 PM   Result Value Ref Range    Ventricular Rate 96 BPM    Atrial Rate 96 BPM    RI Interval 154 ms    QRSD Interval 84 ms    QT Interval 332 ms    QTC Interval 419 ms    P Axis 62 degrees    QRS Axis 98 degrees    T Wave Axis 18 degrees   Valproic acid level, total    Collection Time: 10/27/17  1:28 AM   Result Value Ref Range    Valproic Acid, Total 83 50 - 100 ug/mL     XR shunt series   Final Result      Intact Codman Hakim Programmable  shunt catheter with pressure setting of approximately 100 mm of water  Again the distal tubing is tightly coiled in the left mid abdomen  Workstation performed: [id number]         CT head without contrast   Final Result      Minimal interval decreased left parietal intraparenchymal hemorrhage and pneumocephalus since 2017  No new acute superimposed process  Findings are consistent with the preliminary report from [facility] which was provided shortly after completion of the exam                       Workstation performed: [id number]               Portions of the record may have been created with voice recognition software  Occasional wrong word or "sound a like" substitutions may have occurred due to the inherent limitations of voice recognition software  Read the chart carefully and recognize, using context, where substitutions have occurred

## 2017-10-27 NOTE — ED PROVIDER NOTES
History  Chief Complaint   Patient presents with    Seizure - Prior Hx Of     pt was discharged from the floor here today  Pt had shunts placed in his brain on Monday  Pt was at home in bed and states he felt a metalic taste in his mouth and burning in his R hand followed by a "grand mal seizure" per pt's mother  Pt has a hx of grad mal seizures  Per pt's mother the seizure lasted for "27 to 36 seconds"      41-year-old man with a history of cerebral palsy, congenital hydrocephalus, and epilepsy presents for evaluation of a seizure  Patient was discharged today after a 1 week stay for new seizure activity requiring  shunt revision by Dr Jose Glasgow and medication management by Neurology  He reports that after dinner tonight he was in his room when he developed fuzziness in his hands which is a known aura preceding his seizures  Patient subsequently had a tonic clonic seizure that lasted for approximately 30-40 seconds  His mother states that all 4 extremities were shaking, his eyes rolled back in his head, and he was unresponsive  She notes increased sleepiness that is still present  No falls or trauma sustained  No tongue biting or loss of bladder/bowel function  No medication noncompliance  On arrival, patient is afebrile and tachypneic to 21 with otherwise normal vital signs  Physical exam shows the a left temporal incision which is clean, dry, and intact  The patient has a nonfocal neurologic exam   Will speak with Neurosurgery regarding workup and disposition  Prior to Admission Medications   Prescriptions Last Dose Informant Patient Reported? Taking? albuterol (PROAIR HFA) 90 mcg/act inhaler 10/26/2017 at 0800  Yes Yes   Sig: ProAir  (90 Base) MCG/ACT Inhalation Aerosol Solution  INHALE 1 TO 2 PUFFS EVERY 4 TO 6 HOURS AS NEEDED  Quantity: 1;  Refills: 5       Myah Wilkinson DO;  Started 23-Dec-2015  Sandra Plummer  5 GM Inhaler   chlorhexidine (PERIDEX) 0 12 % solution 10/26/2017 at Unknown time  No Yes   Sig: Apply 15 mL to the mouth or throat every 12 (twelve) hours   cholecalciferol (VITAMIN D3) 1,000 units tablet 10/26/2017 at 0800  Yes Yes   Sig: Take 1,000 Units by mouth daily Pt takes 2,000 units per day   divalproex sodium (DEPAKOTE) 500 mg EC tablet 10/26/2017 at 2147  No Yes   Sig: Take 2 tablets by mouth every 8 (eight) hours   docusate sodium (COLACE) 100 mg capsule 10/25/2017 at Unknown time  No Yes   Sig: Take 1 capsule by mouth 2 (two) times a day   fluticasone-salmeterol (ADVAIR DISKUS) 100-50 mcg/dose 10/26/2017 at 0800  Yes Yes   Sig: Advair Diskus 100-50 MCG/DOSE Inhalation Aerosol Powder Breath Activated  TAKE 1 PUFF TWICE A DAY   Quantity: 1;  Refills: 4       Cherylene Cirri DO;  Started 23-Dec-2015  [id number] Aerosol Powder Breath Activated Disp Pack   lamoTRIgine (LaMICtal) 200 MG tablet 10/26/2017 at 2147  No Yes   Sig: Take 1 tablet by mouth 2 (two) times a day   polyethylene glycol (MIRALAX) 17 g packet 10/26/2017 at Unknown time  No Yes   Sig: Take 17 g by mouth daily as needed (Constipation)   rizatriptan (MAXALT-MLT) 10 MG disintegrating tablet 10/26/2017 at 1730  Yes Yes   Sig: Rizatriptan Benzoate 10 MG Oral Tablet Dispersible  TAKE 1 TABLET AT ONSET OF HEADACHE  MAY REPEAT EVERY 2 HOURS AS NEEDED  MAXIMUM 3 TABLETS IN 24 HOURS     Quantity: 9;  Refills: 5       Haris Face M D ;  Started 5-Feb-2016  Active   [facility]) 8 6 mg 10/25/2017 at Unknown time  No Yes   Sig: Take 1 tablet by mouth 2 (two) times a day      Facility-Administered Medications: None       Past Medical History:   Diagnosis Date    Asthma     Cerebral palsy ([address] )     Congenital hydrocephalus ([address] )     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[address] )     Migraines     [facility]        Past Surgical History:   Procedure Laterality Date    BRAIN SURGERY      Multiple  shunt revisions    HERNIA REPAIR      TX CREATE SHUNT:VENTRIC-PERITONEAL Left 10/23/2017    Procedure: SHUNT VENTRICULAR-PERITONEAL revision;  Surgeon: Heather Shaffer MD;  [facility];  Service: Neurosurgery       Family History   Problem Relation Age of Onset    Family history unknown: Yes     I have reviewed and agree with the history as documented  Social History   Substance Use Topics    Smoking status: Never Smoker    Smokeless tobacco: Never Used      Comment: N/A    Alcohol use No      Comment: N/A        Review of Systems   Constitutional: Negative for chills and fever  HENT: Negative for rhinorrhea and sore throat  Eyes: Negative for photophobia and visual disturbance  Respiratory: Negative for cough and shortness of breath  Cardiovascular: Negative for chest pain and leg swelling  Gastrointestinal: Negative for abdominal pain, diarrhea, nausea and vomiting  Genitourinary: Negative for dysuria, frequency and hematuria  Musculoskeletal: Negative for back pain and neck pain  Skin: Negative for rash and wound  Neurological: Negative for light-headedness and headaches         Physical Exam  ED Triage Vitals   Temperature Pulse Respirations Blood Pressure SpO2   10/26/17 2025 10/26/17 2020 10/26/17 2020 10/26/17 2020 10/26/17 2017   98 1 °F (36 7 °C) 92 18 144/76 98 %      Temp Source Heart Rate Source Patient Position - Orthostatic VS BP Location FiO2 (%)   10/26/17 2025 10/26/17 2315 10/26/17 2315 10/26/17 2020 --   Oral Monitor Sitting Right arm       Pain Score       10/26/17 2020       2           Orthostatic Vital Signs  Vitals:    10/26/17 2315 10/27/17 0044 10/27/17 0045 10/27/17 0219   BP: 129/80 133/69 124/61 122/64   Pulse: 84 86 82 78   Patient Position - Orthostatic VS: Sitting Lying Lying Lying       Physical Exam    ED Medications  Medications - No data to display    Diagnostic Studies  Results Reviewed     Procedure Component Value Units Date/Time    Valproic acid level, total [[id number]]  (Normal) Collected:  10/27/17 0128    Lab Status:  Final result Specimen: Blood from Arm, Left Updated:  10/27/17 0152     Valproic Acid, Total 83 ug/mL                  XR shunt series   Final Result by Valentin Laura DO (10/27 4124)      Intact Codman Hakim Programmable  shunt catheter with pressure setting of approximately 100 mm of water  Again the distal tubing is tightly coiled in the left mid abdomen  Workstation performed: [id number]         CT head without contrast   Final Result by Kristy Bower MD (10/27 9955)      Minimal interval decreased left parietal intraparenchymal hemorrhage and pneumocephalus since October 24, 2017  No new acute superimposed process  Findings are consistent with the preliminary report from [facility] which was provided shortly after completion of the exam                       Workstation performed: [id number]               Procedures  Procedures      Phone Consults  ED Phone Contact    ED Course  ED Course as of Oct 27 1930   Fri Oct 27, 2017   0045 Spoke with Dr Sherice Martinze of neurosurgery who recommended CT head with shunt series  He recommended discharge with outpatient follow up for unchanged findings unless I hear otherwise from him after the read  MDM  Number of Diagnoses or Management Options  S/P  shunt:   Seizure [facility]):   Diagnosis management comments: CT head negative for acute changes  Shunt series unremarkable  Dr Sherice Martinez of neurosurgery recommended discharge with outpatient follow-up  No further episodes of seizures while in the ED  Patient's Depakote level within normal limits      CritCare Time    Disposition  Final diagnoses:   Seizure ([address] )   S/P  shunt     Time reflects when diagnosis was documented in both MDM as applicable and the Disposition within this note     Time User Action Codes Description Comment    10/27/2017  2:45 AM David Bailon Add [R56 9] Seizure ([address] )     10/27/2017  2:45 AM David Bailon Add [Z98 2] S/P  shunt       ED Disposition ED Disposition Condition Comment    Discharge  [address] discharge to home/self care  Condition at discharge: Good        Follow-up Information     Follow up With Specialties Details Why Contact Info    Kaykay Street MD Neurosurgery Schedule an appointment as soon as possible for a visit For further evaluation and treatment [address] [place] [address]  [address] [place] [address]  [phone]          Discharge Medication List as of 10/27/2017  2:48 AM      CONTINUE these medications which have NOT CHANGED    Details   albuterol (PROAIR HFA) 90 mcg/act inhaler ProAir  (90 Base) MCG/ACT Inhalation Aerosol Solution  INHALE 1 TO 2 PUFFS EVERY 4 TO 6 HOURS AS NEEDED  Quantity: 1;  Refills: 5       Myah Wilkinson DO;  Started 23-Dec-2015  Sarah Coup  5 GM Inhaler, Historical Med      chlorhexidine (PERIDEX) 0 12 % solution Apply 15 mL to the mouth or throat every 12 (twelve) hours, Starting Thu 10/26/2017, Normal      cholecalciferol (VITAMIN D3) 1,000 units tablet Take 1,000 Units by mouth daily Pt takes 2,000 units per day, Historical Med      divalproex sodium (DEPAKOTE) 500 mg EC tablet Take 2 tablets by mouth every 8 (eight) hours, Starting Thu 10/26/2017, Normal      docusate sodium (COLACE) 100 mg capsule Take 1 capsule by mouth 2 (two) times a day, Starting Thu 10/26/2017, Normal      fluticasone-salmeterol (ADVAIR DISKUS) 100-50 mcg/dose Advair Diskus 100-50 MCG/DOSE Inhalation Aerosol Powder Breath Activated  TAKE 1 PUFF TWICE A DAY   Quantity: 1;  Refills: 4       Murray Anna DO;  Started 23-Dec-2015  [id number] Aerosol Powder Breath Activated Disp Pack, Historical Med      lamoTRIgine (LaMICtal) 200 MG tablet Take 1 tablet by mouth 2 (two) times a day, Starting Thu 10/26/2017, Normal      polyethylene glycol (MIRALAX) 17 g packet Take 17 g by mouth daily as needed (Constipation), Starting Thu 10/26/2017, Normal      rizatriptan (MAXALT-MLT) 10 MG disintegrating tablet Rizatriptan Benzoate 10 MG Oral Tablet Dispersible  TAKE 1 TABLET AT ONSET OF HEADACHE  MAY REPEAT EVERY 2 HOURS AS NEEDED  MAXIMUM 3 TABLETS IN 24 HOURS  Quantity: 9;  Refills: 5       Ila SAEED ;  Started 5-Feb-2016  Active, Historical Med      [facility]) 8 6 mg Take 1 tablet by mouth 2 (two) times a day, Starting Thu 10/26/2017, Normal           No discharge procedures on file  ED Provider  Attending physically available and evaluated Bella Guerra  I managed the patient along with the ED Attending      Electronically Signed by         Erika Walter MD  Resident  10/27/17 3975

## 2017-10-27 NOTE — DISCHARGE INSTRUCTIONS
Your CAT scan showed no new changes from prior studies  Your Depakote level was within the normal range  Please call Dr Muriel Springer office to schedule a follow up appointment tomorrow  Return to the ER for new or worrisome symptoms  Epilepsy   WHAT YOU NEED TO KNOW:   Epilepsy is a brain disorder that causes seizures  It is also called a seizure disorder  A seizure means an abnormal area in your brain sometimes sends bursts of electrical activity  A seizure may start in one part of your brain, or both sides may be affected  Depending on the type of seizure, you may have movements you cannot control, lose consciousness, or stare straight ahead  You may be confused or tired after the seizure  A seizure may last a few seconds or longer than 5 minutes  A birth defect, tumor, stroke, dementia, injury, or infection may cause epilepsy  The cause of your epilepsy may not be known  If your seizures are not controlled, epilepsy may become life-threatening  DISCHARGE INSTRUCTIONS:   Call 911 for any of the following:   · Your seizure lasts longer than 5 minutes  · You have trouble breathing after a seizure  · You have diabetes or are pregnant and have a seizure  · You have a seizure in water, such as a swimming pool or bathtub  Return to the emergency department if:   · You have a second seizure within 24 hours of the first      · You are injured during a seizure  Contact your healthcare provider if:   · You feel you are not able to cope with your condition  · Your seizures start to happen more often  · You are confused longer than usual after a seizure  · You are planning to get pregnant or are currently pregnant  · You have questions or concerns about your condition or care  Medicines:   · Antiseizure medicine  may control or prevent another seizure  Do not stop taking this medicine  Another person may need to give you rescue medicine to stop a seizure at home   Ask your healthcare provider for more information about rescue medicine  · Take your medicine as directed  Contact your healthcare provider if you think your medicine is not helping or if you have side effects  Tell him of her if you are allergic to any medicine  Keep a list of the medicines, vitamins, and herbs you take  Include the amounts, and when and why you take them  Bring the list or the pill bottles to follow-up visits  Carry your medicine list with you in case of an emergency  Follow up with your neurologist as directed: You may need tests to check the level of antiseizure medicine in your blood  Your neurologist may need to change or adjust your medicine  Write down your questions so you remember to ask them during your visits  What you can do to prevent a seizure: You may not be able to prevent every seizure  The following can help you manage triggers that may make a seizure start:  · Take your medicine every day at the same time  This will also help prevent medicine side effects  Set an alarm to help remind you to take your medicine every day  · Manage stress  Stress can be a trigger for epilepsy  Exercise can help you reduce stress  Talk to your healthcare provider about exercise that is safe for you  Illness can be a form of stress  Eat a variety of healthy foods and drink plenty of liquids during an illness  Talk to your healthcare provider about other ways to manage stress  · Set a regular sleep schedule  A lack of sleep can trigger a seizure  Try to go to sleep and wake up at the same time every day  Keep your bedroom quiet and dark  Talk to your healthcare provider if you are having trouble sleeping  · Limit or do not drink alcohol as directed  Alcohol can trigger a seizure, especially if you drink a large amount at one time  A drink of alcohol is 12 ounces of beer, 1½ ounces of liquor, or 5 ounces of wine  Talk to your healthcare provider about a safe amount of alcohol for you   Your provider may recommend that you do not drink any alcohol  Tell him or her if you need help to quit drinking  What you can do to manage epilepsy:   · Keep a seizure diary  This can help you find your triggers and avoid them  Write down the dates of your seizures, where you were, and what you were doing  Include how you felt before and after  Possible triggers include illness, lack of sleep, hormonal changes, alcohol, drugs, lights, or stress  · Record any auras you have before a seizure  An aura is a sign that you are about to have a seizure  Auras happen before certain types of seizures that are in only 1 part of the brain  The aura may happen seconds before a seizure, or up to an hour before  You may feel, see, hear, or smell something  Examples include part of your body becoming hot  You may see a flash of light or hear something  You may have anxiety or déjà vu  If you have an aura, include it in your seizure diary  · Create a care plan  Tell family, friends, and coworkers about your epilepsy  Give them instructions that tell them how they can keep you safe if you have a seizure  · Find support  You may be referred to a psychologist or   Ask your healthcare provider about support groups for people with epilepsy  · Ask what safety precautions you should take  Talk with your healthcare provider about driving  You may not be able to drive until you are seizure-free for a period of time  You will need to check the law where you live  Also talk to your healthcare provider about swimming and bathing  You may drown or develop life-threatening heart or lung damage if you have a seizure in water  · Carry medical alert identification  Wear medical alert jewelry or carry a card that says you have epilepsy  Ask your healthcare provider where to get these items  How others can keep you safe during a seizure:  Give the following instructions to family, friends, and coworkers:  · Do not panic      · Do not hold me down or put anything in my mouth  · Gently guide me to the floor or a soft surface  · Place me on my side to help prevent me from swallowing saliva or vomit  · Protect me from injury  Remove sharp or hard objects from the area surrounding me, or cushion my head  · Loosen the clothing around my head and neck  · Time how long my seizure lasts  Call 911 if my seizure lasts longer than 5 minutes or if I have a second seizure  · Stay with me until my seizure ends  Let me rest until I am fully awake  · Perform CPR if I stop breathing or you cannot feel my pulse  · Do not give me anything to eat or drink until I am fully awake  © 2017 [facility] Information is for End User's use only and may not be sold, redistributed or otherwise used for commercial purposes  All illustrations and images included in CareNotes® are the copyrighted property of A CHRISTOPHE KULKARNI , [employer]  or Yovani Simmons  The above information is an  only  It is not intended as medical advice for individual conditions or treatments  Talk to your doctor, nurse or pharmacist before following any medical regimen to see if it is safe and effective for you

## 2017-10-27 NOTE — ED NOTES
Pt's mother concerned pt late for his night time medications  Dr Azalia Koyanagi notified   Per nursing communication from Dr Azalia Koyanagi, pt took his night time home doses of depderek and BEATRIZ Osman  10/26/17 6835

## 2017-10-29 VITALS
HEART RATE: 81 BPM | TEMPERATURE: 98.8 F | HEIGHT: 68 IN | WEIGHT: 193.78 LBS | RESPIRATION RATE: 18 BRPM | SYSTOLIC BLOOD PRESSURE: 125 MMHG | BODY MASS INDEX: 29.37 KG/M2 | DIASTOLIC BLOOD PRESSURE: 72 MMHG | OXYGEN SATURATION: 95 %

## 2017-11-01 ENCOUNTER — APPOINTMENT (OUTPATIENT)
Dept: LAB | Age: 42
End: 2017-11-01
Payer: MEDICARE

## 2017-11-01 ENCOUNTER — ALLSCRIPTS OFFICE VISIT (OUTPATIENT)
Dept: OTHER | Facility: OTHER | Age: 42
End: 2017-11-01

## 2017-11-01 ENCOUNTER — TRANSCRIBE ORDERS (OUTPATIENT)
Dept: ADMINISTRATIVE | Age: 42
End: 2017-11-01

## 2017-11-01 ENCOUNTER — GENERIC CONVERSION - ENCOUNTER (OUTPATIENT)
Dept: OTHER | Facility: OTHER | Age: 42
End: 2017-11-01

## 2017-11-01 DIAGNOSIS — G40.109 LOCALZ-RLTD SYMPTOMATIC EPILEPSY W SMPL PART SZ, NONINTRACT, WO STATUS (HCC): ICD-10-CM

## 2017-11-01 LAB — VALPROATE SERPL-MCNC: 72 UG/ML (ref 50–100)

## 2017-11-01 PROCEDURE — 80175 DRUG SCREEN QUAN LAMOTRIGINE: CPT

## 2017-11-01 PROCEDURE — 80164 ASSAY DIPROPYLACETIC ACD TOT: CPT

## 2017-11-01 PROCEDURE — 36415 COLL VENOUS BLD VENIPUNCTURE: CPT

## 2017-11-03 ENCOUNTER — LAB CONVERSION - ENCOUNTER (OUTPATIENT)
Dept: OTHER | Facility: OTHER | Age: 42
End: 2017-11-03

## 2017-11-03 LAB — LAMOTRIGINE SERPL-MCNC: 12.9 UG/ML (ref 2–20)

## 2017-11-06 ENCOUNTER — GENERIC CONVERSION - ENCOUNTER (OUTPATIENT)
Dept: OTHER | Facility: OTHER | Age: 42
End: 2017-11-06

## 2017-11-07 ENCOUNTER — HOSPITAL ENCOUNTER (OUTPATIENT)
Dept: RADIOLOGY | Facility: HOSPITAL | Age: 42
Discharge: HOME | End: 2017-11-07
Attending: NEUROLOGICAL SURGERY
Payer: MEDICARE

## 2017-11-07 DIAGNOSIS — Z98.2 VP (VENTRICULOPERITONEAL) SHUNT STATUS: ICD-10-CM

## 2017-11-07 PROCEDURE — 70450 CT HEAD/BRAIN W/O DYE: CPT

## 2017-11-08 ENCOUNTER — TRANSCRIBE ORDERS (OUTPATIENT)
Dept: ADMINISTRATIVE | Age: 42
End: 2017-11-08

## 2017-11-08 ENCOUNTER — GENERIC CONVERSION - ENCOUNTER (OUTPATIENT)
Dept: OTHER | Facility: OTHER | Age: 42
End: 2017-11-08

## 2017-11-13 ENCOUNTER — APPOINTMENT (OUTPATIENT)
Dept: RADIOLOGY | Age: 42
End: 2017-11-13
Payer: MEDICARE

## 2017-11-13 ENCOUNTER — TRANSCRIBE ORDERS (OUTPATIENT)
Dept: URGENT CARE | Age: 42
End: 2017-11-13

## 2017-11-13 DIAGNOSIS — Z48.89 ENCOUNTER FOR OTHER SPECIFIED SURGICAL AFTERCARE (CODE): ICD-10-CM

## 2017-11-13 PROCEDURE — 74010 HB X-RAY EXAM OF ABDOMEN (ANTEROPOSTERIOR AND ADD'L OBLIQUE AND CONE VIEWS): CPT

## 2017-11-15 ENCOUNTER — GENERIC CONVERSION - ENCOUNTER (OUTPATIENT)
Dept: OTHER | Facility: OTHER | Age: 42
End: 2017-11-15

## 2017-11-17 ENCOUNTER — GENERIC CONVERSION - ENCOUNTER (OUTPATIENT)
Dept: OTHER | Facility: OTHER | Age: 42
End: 2017-11-17

## 2017-12-03 DIAGNOSIS — Q03.9 CONGENITAL HYDROCEPHALUS (HCC): ICD-10-CM

## 2017-12-03 DIAGNOSIS — Z48.89 ENCOUNTER FOR OTHER SPECIFIED SURGICAL AFTERCARE (CODE): ICD-10-CM

## 2017-12-03 DIAGNOSIS — G93.0 CEREBRAL CYSTS: ICD-10-CM

## 2017-12-03 DIAGNOSIS — I62.9 NONTRAUMATIC INTRACRANIAL HEMORRHAGE (HCC): ICD-10-CM

## 2017-12-04 ENCOUNTER — HOSPITAL ENCOUNTER (OUTPATIENT)
Dept: RADIOLOGY | Facility: HOSPITAL | Age: 42
Discharge: HOME | End: 2017-12-04
Payer: MEDICARE

## 2017-12-04 DIAGNOSIS — G93.0 CEREBRAL CYSTS: ICD-10-CM

## 2017-12-04 DIAGNOSIS — Z48.89 ENCOUNTER FOR OTHER SPECIFIED SURGICAL AFTERCARE (CODE): ICD-10-CM

## 2017-12-04 DIAGNOSIS — I62.9 NONTRAUMATIC INTRACRANIAL HEMORRHAGE (HCC): ICD-10-CM

## 2017-12-04 PROCEDURE — 70496 CT ANGIOGRAPHY HEAD: CPT

## 2017-12-04 RX ADMIN — IOHEXOL 85 ML: 350 INJECTION, SOLUTION INTRAVENOUS at 10:58

## 2017-12-05 ENCOUNTER — ALLSCRIPTS OFFICE VISIT (OUTPATIENT)
Dept: OTHER | Facility: OTHER | Age: 42
End: 2017-12-05

## 2017-12-06 NOTE — PROGRESS NOTES
Assessment  1  Encounter for postoperative care (V58 49) (Z48 89)   2  Congenital hydrocephalus (742 3) (Q03 9)    Plan  Congenital hydrocephalus, Encounter for postoperative care    · * CT HEAD WO CONTRAST; Status:Hold For - Scheduling; Requested for:60Fmj9182;    Perform:[facility]; WOB:[id number]; Ordered;hydrocephalus, Encounter for postoperative care; Ordered By:Radha Reeves;   · Follow-up visit in 6 weeks Evaluation and Treatment  Follow-up  Status: Hold For -Scheduling  Requested for: 30MRM9233   Ordered;Congenital hydrocephalus, Encounter for postoperative care; Ordered By: Jerry Musa Performed:  Due: 21TTU5612    Discussion/Summary    This is a 80-year-old gentleman who is shunt dependent since birth he underwent a shunt revision which appears to be functioning well  I have recommended repeat CT scan without contrast in 6 weeks time to ensure that a small contrast-enhancing mass posterior to the shunt tract does not change in size  The fact that there is no edema adjacent to this strongly suggested there is no sign of infection  He will see neurology later on this month and they will adjust his medications accordingly  Chief Complaint  Patient presents for 6 weeks post-op with CTA Head s/p Left shunt ventricular-peritoneal revision by Dr Royal Bates on 10/23/17      Post-Op  Post-Op Crani-Brain:  Nael Shafer is status post shunt procedure and shunt revision  History of Present Illness:  Patient is a 41y old M who presented to the hospital on 10/20/17 due to seizure-like activity  Patient has a history of cerebral palsy, seizure disorder and is status post   shunt  He was 34 week preemie  Birth weight was 4 lb Was intubated in the NICU  He had RDS  He had developmental delay  He was referred to a    His anterior fontanelle is bulging his forehead was large with widening of the metopic suture  This was in 1975    There were no CT scans in [place] then  He was sent to [facility] in the [place]  Head CT head  Diagnosed with hydrocephalus  Had left parietal  shunt the next day  Milestones slowly improved  First few words were age [age]  He was crawling aged and walking aged [age]  Continued PT and OT and developmental inputs  May have had several other revisions  Aged [age] needed a revision  He had gone into a coma This was in [facility]  Apparently attempted revision of the shunt was carried out  There was intraventricular and intraparenchymal hemorrhage resulting in [place], per mom  He developed  right-sided weakness he was slow to recover needed further rehab  Left-sided shunt was abandoned  A left frontal Rickham reservoir and ventricular catheter and possible ventriculoperitoneal shunt was placed  Subsequently cyst developed  Likely an area of encysted encephalomalacia after the hematoma had broken down  Had cyst peritoneal shunt  His last shunt revision was over 30 years ago  Patient was evaluated while admitted by neurosurgery and we did not see any functional shunt system of the three possible choices The ventricles are small but patent  -CT head 10/4: no acute intraparenchymal brain abnormality, encephalomalacia in left parietal region  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  Left frontal ventricular catherter attached to Rickham reservoir which is bur hole sunken type in good position within the right anterior horn  It drains back to 10 cm length of tubing that does not connector to anything  Ventricles look well decompressed  Left posterior parietal ventricular catheter remnant which is connected to a valve with a reservoir in the scalp  The distal shunt tubing that is meant to be connected to the parietal reservoir is disconnected and is migrated anteriorly about 2 cm  This suggests longstanding disconnection   Left lateral parietal ventricular catheter which extends to region of posterior horn, but is within parenchymal this is likely the form 0 cysto peritoneal shunt from prior hematoma at time of prior shunt revision aged [age]  when he was in a coma  -Xray shunt series 10/4: The left frontal ventricular catheter  appears attached to a Rickham reservoir   -CT head 10/19: stable, essentially the same from above  -XR shunt series 10/20: 2  shunt catheters unchanged, including discontinuity of catheter in left lateral neck  Patient underwent a  shunt revision on 10/23/17  Patient seen on 11/8/17 for his 2 weeks post-op  The CT head (11/7/17) was reviewed with Rowena Grant and his mother and demonstrates evolving small amount of intraparenchymal /subarachnoid hemorrhage around the operative bed and involuting edema and pneumocephalus  No new hemorrhage or hydrocephalus  No territorial infarction  Patient was provided with referral for CT/CTA head to have completed prior to his 6 week post-op visit to evaluate hemorrhagic cyst per follow up recommendation during hospitalization  The patient reports cognitive difficulties-- (memory difficulty, trouble following directions)-- and-- the patient did have a seizure postoperatively and was taken back to the emergency room  Portion he was not seen immediately but was found to be at his baseline neurologically  His anti epilepsy medicines were increased  , but-- no dizziness,-- no headache,-- no visual complaints,-- no neck stiffness,-- no fever,-- no vertigo,-- no facial weakness,-- no wound drainage,-- no ataxia,-- no upper extremity weakness,-- no lower extremity weakness-- and-- no photophobia--   The patient presents with complaints of speech disturbances, described as inability to find words (has to take his time to find what he is trying to say)  Physical Examination:   Test Results:   Assessment:   Plan:        Review of Systems   Constitutional: No fever or chills, feels well, no tiredness, no recent weight gain or weight loss    Eyes: No complaints of eye pain, no red eyes, no discharge from eyes, no itchy eyes  ENT: no complaints of earache, no hearing loss, no nosebleeds, no nasal discharge, no sore throat, no hoarseness  Cardiovascular: No complaints of slow heart rate, no fast heart rate, no chest pain, no palpitations, no leg claudication, no lower extremity  Respiratory: No complaints of shortness of breath, no wheezing, no cough, no SOB on exertion, no orthopnea or PND  Gastrointestinal: No complaints of abdominal pain, no constipation, no nausea or vomiting, no diarrhea or bloody stools  Genitourinary: No complaints of dysuria, no incontinence, no hesitancy, no nocturia, no genital lesion, no testicular pain  Musculoskeletal: No complaints of arthralgia, no myalgias, no joint swelling or stiffness, no limb pain or swelling  Integumentary: No complaints of skin rash or skin lesions, no itching, no skin wound, no dry skin  Neurological: memory loss and speech difficulties, but-- as noted in HPI  Psychiatric: Is not suicidal, no sleep disturbances, no anxiety or depression, no change in personality, no emotional problems  Endocrine: No complaints of proptosis, no hot flashes, no muscle weakness, no erectile dysfunction, no deepening of the voice, no feelings of weakness  Hematologic/Lymphatic: No complaints of swollen glands, no swollen glands in the neck, does not bleed easily, no easy bruising  Active Problems  1  Aftercare following surgery (V58 89) (Z48 89)   2  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)   3  Depression screening (V79 0) (Z13 89)   4  Encounter for staple removal (V58 32) (Z48 02)   5  Headache (784 0) (R51)   6  Hydrocephalus (331 4) (G91 9)   7  Intracranial hemorrhage (432 9) (I62 9)   8  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)   9  Long term use of drug (V58 69) (Z79 899)   10  Migraine with aura and without status migrainosus, not intractable (346 00) (G43 109)   11  Mild cerebral palsy (343 9) (G80 9)   12   Mild intermittent asthma without complication (207 87) (P22 43)   13  Other cerebral palsy (343 8) (G80 8)   14  Other specified disorders of brain (348 89) (G93 89)   15  Rash of face (782 1) (R21)   16  Seborrheic dermatitis (690 10) (L21 9)   17  Sinus congestion (478 19) (R09 81)   18  Situational anxiety (300 09) (F41 8)   19  Transition of care   20  Vitamin D deficiency (268 9) (E55 9)    Social History     · Never a smoker    Current Meds   1  Advair Diskus 100-50 MCG/DOSE Inhalation Aerosol Powder Breath Activated; INHALE 1 PUFF BY MOUTH TWICE DAILY; Therapy: 47OEB8993 to (Rhianna Maria)  Requested for: 27Oct2017; Last Rx:27Oct2017 Ordered   2  ALPRAZolam 0 25 MG Oral Tablet; 1 TAB  PO BEFORE TRAVEL - CAN REPEAT  Q8 HOURS AS NEEDED; Therapy: 37XIH3550 to (Evaluate:58Hmp8200); Last Rx:01Nov2017 Ordered   3  Divalproex Sodium 500 MG Oral Tablet Delayed Release; 2 tablets in am , 2 tablets at noon  and 2 tablets in evening  Requested for: 51SEA2935; Last Rx:06Nov2017 Ordered   4  Hydrocortisone 2 5 % External Cream; APPLY SPARINGLY TO AFFECTED AREA(S) TWICE DAILY; Therapy: 77CTA4640 to (Evaluate:08Nov2017)  Requested for: 45PEK5357; Last Rx:01Nov2017 Ordered   5  LamoTRIgine 200 MG Oral Tablet; TAKE 1 TABLET TWICE DAILY  Requested for: 52WHY1969; Last Rx:06Nov2017 Ordered   6  ProAir  (90 Base) MCG/ACT Inhalation Aerosol Solution; INHALE 1 TO 2 PUFFS EVERY 4 TO 6 HOURS AS NEEDED; Therapy: 54VCA1330 to (Evaluate:49Kro2957)  Requested for: 56Ona7390; Last Rx:77Rkw9280 Ordered   7  Rizatriptan Benzoate 10 MG Oral Tablet Disintegrating; TAKE 1 TABLET AT ONSET OF HEADACHE  MAY REPEAT EVERY 2 HOURS AS NEEDED  MAXIMUM 3 TABLETS IN 24 HOURS; Therapy: 21VUA8215 to (Evaluate:73Axn3135)  Requested for: 66NDI8806; Last Rx:27Jan2017 Ordered   8  Vitamin D 2000 UNIT Oral Capsule; take 1 capsule daily; Therapy: 38MDE4328 to (CCYVSWDT:16EGR5266)  Requested for: 72DYM0181; Last Rx:27Jan2017 Ordered    Allergies  1  Latex Gloves MISC  2   Animal dander - Cats   3  Grass    Vitals   Recorded: [id number] 11:54AM   Temperature 97 F   Heart Rate 82   Respiration 12   Systolic 895   Diastolic 92   Height 5 ft 10 in   Weight 198 lb    BMI Calculated 28 41   BSA Calculated 2 08   Pain Scale 0       Physical Exam   ( Incisions clean and dry and well healed  he is typically able to follow 3 step commands without difficulty however current he is able to do 2 step commands  He is tremulo)  Results/Data   I personally reviewed the CT scan of the brain in detail with the patient  CT Scan Review a CT scan of the brain is reviewed which demonstrates the ventricles to be small  There is a CT a component of this which demonstrates a contrast-enhancing region just behind the shunt tract this is unchanged  Future Appointments    Date/Time Provider Specialty Site   02/02/2018 10:00 AM SHAD Floyd   Neu[facility] [place] [facility]       Signatures   Electronically signed by : SHAD Cantu ; Dec  5 2017 12:47PM EST                       (Author)

## 2018-01-09 NOTE — RESULT NOTES
Discussion/Summary  Discussed with Dr Vishal Sharp the 11/13/17 Xray Abdomen result and he advised if patient clinically stable follow up as established on 12/5/17  I called patient at listed home# [phone] -- his mother Cheri Wise) picked up  I discussed result of Abdominal x-ray with Italo Dickinson  Italo Peed  reports [place] has been doing well  She reports he has no nausea or vomiting  She reports he has not had any complaints of abdominal pain  Italo Peed reports Jose D's appetite is good as well  Given Jose D's mother reports he is doing well clinically will maintain follow up as scheduled with Dr Vishal Sharp on 12/5/17  Italo Alok was advised to contact our office or have [place] present to ER if he experiences nausea / vomiting, abdominal pain, or neurological change  Italo Peed expressed understanding and agreement  Verified Results  XR ABDOMEN AP & OBLIQUE 21MRZ2207 12:59PM [place] Order Number: [id number]   Performing Comments: eval peritoneal shunt catheter     Test Name Result Flag Reference   XR ABDOMEN AP & OBLIQUE (Report)     ABDOMEN     INDICATION: Evaluate the PA shunt for position change     COMPARISON: October 25, 2017     VIEWS: AP supine and lateral     IMAGES: 3     FINDINGS:     3 ventricle peritoneal signs are identified  The midline shunt remains wound up, unchanged in position  The shunt which extends from midline over the chest to the lateral abdominal wall is unchanged in position  However, there is kink within the    looped portion of the catheter  This may been present on the previous study, but has increased in severity  The shunt which extends from the lateral chest wall towards the midline is unchanged in position  There is a nonobstructive bowel gas pattern  No discernible free air on this supine study  Upright or left lateral decubitus imaging is more sensitive to detect subtle free air in the appropriate setting       No pathologic calcifications or soft tissue masses  Visualized lung bases are clear  Visualized osseous structures are unremarkable for the patient's age  IMPRESSION:     No change in the position of the ventriculoperitoneal shunts  The lateral shunt within the abdomen demonstrates a kink         ##sigslh##sigslh                  Workstation performed: [id number]     Signed by:   Praveen Dickens MD   11/16/17       Signatures   Electronically signed by : Sammy Ivy, [facility]; Nov 17 2017  4:54PM EST                       (Author)

## 2018-01-10 ENCOUNTER — HOSPITAL ENCOUNTER (OUTPATIENT)
Dept: RADIOLOGY | Facility: HOSPITAL | Age: 43
Discharge: HOME | End: 2018-01-10
Attending: NEUROLOGICAL SURGERY
Payer: MEDICARE

## 2018-01-10 DIAGNOSIS — Q03.9 CONGENITAL HYDROCEPHALUS (HCC): ICD-10-CM

## 2018-01-10 DIAGNOSIS — Z48.89 ENCOUNTER FOR OTHER SPECIFIED SURGICAL AFTERCARE (CODE): ICD-10-CM

## 2018-01-10 PROCEDURE — 70450 CT HEAD/BRAIN W/O DYE: CPT

## 2018-01-10 NOTE — RESULT NOTES
Verified Results  (1) VALPROIC ACID (DEPAKOTE) 18UIO9799 09:04AM Mary Escobar    Order Number: [id number]     Test Name Result Flag Reference   VALPROIC ACID 72 ug/mL

## 2018-01-10 NOTE — MISCELLANEOUS
Message     Recorded as Task   Date: 11/09/2017 07:23 AM, Created By: Cecile Jiang   Task Name: Care Coordination   Assigned To: Brenda Cisneros   Regarding Patient: Nikki Chan, Status: Active   CommentCosette Idalia - 09 Nov 2017 7:23 AM     TASK CREATED  Please seee 11/8/17 Neurosurgery office note for details  Pt  2 weeks s/p  shunt revision  He wishes to resume working as soon as possible  He works bagging groceries at [employer]  When may Estephanie Pemberton resume working? And if he can resume working any restruction? Adi New - 09 Nov 2017 10:21 AM     TASK REPLIED TO: Previously Assigned To Adi Virgen                No problem with his resuming work if he is asymptomatic  No restrictions   Brenda Cisneros - 09 Nov 2017 5:14 PM     TASK EDITED  Reviewed Dr Keyshawn Mckenna recommendations above  I called Estephanie Pemberton (phone# [phone] = Faizan Jeffery's mother) -- no answer;  receive message reporting "mailbox full and not accepting messages at this time"  Brenda Cisneros - 15 Nov 2017 6:26 PM     TASK EDITED  I called Estephanie Pemberton (phone# [phone]  = Faizan Jeffery's mother) and his mother picked up  Relayed to patient's mother that Estephanie Pemberton may resume working (no restrictions) if he is asymptomatic  Patient's mother reports  he is asymptomatic  She reports she will relay the information to Estephanie Barfieldzoila and she will contact his work to see about him possibly returning back to work next week          Signatures   Electronically signed by : Marilee Liriano, [facility]; Nov 15 2017  6:27PM EST                       (Author)

## 2018-01-12 VITALS
RESPIRATION RATE: 16 BRPM | WEIGHT: 210 LBS | DIASTOLIC BLOOD PRESSURE: 62 MMHG | HEIGHT: 70 IN | BODY MASS INDEX: 30.06 KG/M2 | OXYGEN SATURATION: 98 % | HEART RATE: 79 BPM | SYSTOLIC BLOOD PRESSURE: 114 MMHG

## 2018-01-15 NOTE — MISCELLANEOUS
Assessment    1  Rash of face (782 1) (R21)   2  Transition of care   3  Seborrheic dermatitis (690 10) (L21 9)   4  Mild intermittent asthma without complication (003 33) (Y26 48)   5  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)    Plan  Depression screening    · *VB-Depression Screening; Status:Permanent Deferral - Medical Deferral,Patient has  Cerebal Palsey; Perform: In Office; ZYA:[id number]; Last Updated By:Rosey Saucedo; 11/1/2017 10:34:02 AM;Ordered; For:Depression screening; Ordered By:Myah Wilkinson;  Mild intermittent asthma without complication    · Advair Diskus 100-50 MCG/DOSE Inhalation Aerosol Powder Breath Activated;  INHALE 1 PUFF BY MOUTH TWICE DAILY   Rx By: Michael Burkett; Dispense: 30 Days ; #:1 X 60 Aerosol Powder Breath Activated Disp Pack; Refill: 4; For: Mild intermittent asthma without complication; RAULITO = N; Verified Transmission to [address] [place] [address]; Last Updated By: System [email or web address]; 10/27/2017 8:26:43 AM  Rash of face    · Hydrocortisone 2 5 % External Cream; APPLY SPARINGLY TO AFFECTED AREA(S)  TWICE DAILY   Rx By: Michael Burkett; Dispense: 7 Days ; #:1 X 30 GM Tube; Refill: 0; For: Rash of face; RAULITO = N; Verified Transmission to [address] [place] [address]; Last Updated By: System, [employer]; 11/1/2017 10:49:55 AM  Situational anxiety    · ALPRAZolam 0 25 MG Oral Tablet; 1 TAB  PO BEFORE TRAVEL - CAN REPEAT   Q8 HOURS AS NEEDED   Rx By: Michael Burkett; Dispense: 30 Days ; #:60 Tablet; Refill: 0; For: Situational anxiety; RAULITO = N; Print Rx    Discussion/Summary  Discussion Summary:   REFILL XANAX FOR PRN ANXIETY AND SLEEP  ADD TRIAMCINOLONE TO SCALP- AVOID SURGICAL SITE  FOLLO W UP WITH NEUROLOGY  FOLLOW UP WITH NEUROSURGERY  THAI COKER 8-9 MONTHS  IMM UP TO DATE  LABS REVIEWED  SHUNT STABLE  NO SZ AT PRESENT  XANAX TO HELP WITH SLEEP   FOLLOW UP IN 8-9 MONTHS     Counseling Documentation With Imm: The patient, patient's family was counseled regarding diagnostic results, instructions for management, risk factor reductions, prognosis, patient and family education, risks and benefits of treatment options, importance of compliance with treatment  Chief Complaint  Chief Complaint Free Text Note Form: PATIENT [facility] FOLLOW UP  HE HAD REPLACEMENT OF  SHUNT - HE WAS D/C AND HAD GRAND MAL SZ AND HAD TO RETURN TO HOSPITAL;    SHUNT REVISION 10/23/17; HE WAS ADMITTED 10/20 AND D/C 10/26      History of Present Illness  TCM Communication [facility]: The patient is being contacted for follow-up after hospitalization  Hospital course was discussed with the inpatient physician and records were reviewed  He was hospitalized at [place] [address]  The date of admission: 10/20/17, date of discharge: 10/26/17  Diagnosis:  SHUNT MALFUNCTION/OBSTRUCTION, SEIZURE DISORDER, HEADACHE, HISTORY OF ASTHMA, CEREBRAL PALSY, CONSTIPATION  He was discharged to home  Medications were not reviewed today  He scheduled a follow up appointment  Counseling was provided to patient's family  MOTHER MIKEY  Topics counseled included importance of compliance with treatment  Communication performed and completed by Brian Avendaño   Seizure Disorder (Brief): The patient is being seen for worsening symptoms of and MEDS WERE ADJUSTED IN HOSPTIAL seizure disorder  Typical seizure symptoms:  generalized motor seizure and HAD MINI SZ; HAD ONE GRAND MAL SZ, but no aura, no lip smacking, no automatisms, no blank stare, no altered mental status, no loss of consciousness, no gaze deviated to the left and no gaze deviated to the right  Typical associated symptoms:  no bladder incontinence, no bowel incontinence, no amnesia for the seizure event, no postictal confusion, no postictal (Elias's) paralysis, no headache and no ataxia  Current treatment includes divalproex (Depakote), anticonvulsants and  SHUNT   By report, there is good compliance with treatment, good tolerance of treatment and good symptom control  Anxiety Disorder (Follow-Up): The patient is being seen for follow-up of MORE TROUBLE FALLING ASLEEP- [address]  The patient reports no change in the condition  He has no comorbid illnesses  He has had no significant interval events  Interval symptoms:  worsened anxiety, denies difficulty concentrating, denies restlessness, denies panic attacks and denies sleep disruption  Associated symptoms: no grandiosity, no racing thoughts, no periods of euphoria and no hallucinations  Medications:  the patient is adherent to his medication regimen  The patient is not currently on medication for this problem  Rash (Brief): The patient is being seen for RASH ON FACE ABOVE RIGHT EYE ; SEBORRHEA ON SCALP AND ABOVE EYES B/L a rash  Symptoms: rash, localized rash and pruritus, but no widespread rash, no migratory rash, no patchy rash, no fever, no malaise, no fatigue, no headache, no nail changes and no hair changes  Symptom Cluster Details: he reports the symptoms are worsening  Associated Symptoms: no arthralgias, no myalgias, no rhinorrhea, no sore throat, no neck stiffness, no lightheadedness, no cough and no wheezing  HPI: PT WITH REPLACEMENT OF  SHUNT- WAS D/C AND THEN HAD GRAND MAL   HE HAS FOLLOW UP WITH NERUOLGOY, CT SCAN, SUTURE REMOVAL AND FOLLOW UP WITH NEUROSURGEON      Review of Systems  Complete-Male:   Constitutional: WDWN MALE IN NAD - HE HAS MENTAL CAPACITY 6AND 15YEARS OLD, but No fever or chills, feels well, no tiredness, no recent weight gain or weight loss, not feeling poorly and not feeling tired  Eyes: eyesight problems and WEARS GLASSES, but No complaints of eye pain, no red eyes, no discharge from eyes, no itchy eyes, no eye pain and eyes not red     ENT: REDNESS RIGHT SIDE OF FACE AND AROUND HIS EYE;, but no complaints of earache, no hearing loss, no nosebleeds, no nasal discharge, no sore throat, no hoarseness, no earache, no nosebleeds, no hearing loss and no nasal discharge  Cardiovascular: No complaints of slow heart rate, no fast heart rate, no chest pain, no palpitations, no leg claudication, no lower extremity, the heart rate was not slow, no chest pain, no intermittent leg claudication, the heart rate was not fast, no palpitations and no extremity edema  Respiratory: No complaints of shortness of breath, no wheezing, no cough, no SOB on exertion, no orthopnea or PND, no shortness of breath, no cough, no wheezing and no shortness of breath during exertion  Gastrointestinal: No complaints of abdominal pain, no constipation, no nausea or vomiting, no diarrhea or bloody stools, no abdominal pain and no constipation  Genitourinary: No complaints of dysuria, no incontinence, no hesitancy, no nocturia, no genital lesion, no testicular pain, no dysuria, no urinary hesitancy and no nocturia  Musculoskeletal: No complaints of arthralgia, no myalgias, no joint swelling or stiffness, no limb pain or swelling, no arthralgias, no joint swelling, no myalgias and no joint stiffness  Integumentary: No complaints of skin rash or skin lesions, no itching, no skin wound, no dry skin, no rashes, no itching, no skin lesions and no skin wound  Neurological: No compliants of headache, no confusion, no convulsions, no numbness or tingling, no dizziness or fainting, no limb weakness, no difficulty walking, no headache, no numbness, no confusion and no dizziness  Psychiatric: SITUATIONAL ANXIETY, but Is not suicidal, no sleep disturbances, no anxiety or depression, no change in personality, no emotional problems, no anxiety and no depression  Endocrine: No complaints of proptosis, no hot flashes, no muscle weakness, no erectile dysfunction, no deepening of the voice, no feelings of weakness, no muscle weakness and no erectile dysfunction     Hematologic/Lymphatic: No complaints of swollen glands, no swollen glands in the neck, does not bleed easily, no easy bruising, no tendency for easy bleeding and no tendency for easy bruising  ROS Reviewed:   ROS reviewed  Active Problems     1  Headache (784 0) (R51)   2  Hydrocephalus (331 4) (G91 9)   3  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)   4  Long term use of drug (V58 69) (Z79 899)   5  Migraine with aura and without status migrainosus, not intractable (346 00) (G43 109)   6  Mild cerebral palsy (343 9) (G80 9)   7  Mild intermittent asthma without complication (675 73) (W56 12)   8  Other cerebral palsy (343 8) (G80 8)   9  Other specified disorders of brain (348 89) (G93 89)   10  Sinus congestion (478 19) (R09 81)   11  Situational anxiety (300 09) (F41 8)   12  Vitamin D deficiency (268 9) (E55 9)    Encounter for preventive health examination (V70 0) (Z00 00)       Medicare annual wellness visit, subsequent (V70 0) (Z00 00)       Need for influenza vaccination (V04 81) (Z23)          Past Medical History    1  History of influenza vaccination (V49 89) (Z92 29)   2  History of Mild intermittent asthma without complication (095 00) (E62 73)    Surgical History    1  History of Creation Of Ventriculo-Peritoneal CSF Shunt   2  History of Eye Surgery   3  History of Hernia Repair   4  History of Replacement Of Ventricular CSF Catheter  Surgical History Reviewed: The surgical history was reviewed and updated today  Family History  Mother    1  Family history of hypercholesterolemia (V18 19) (Z83 42)   2  Family history of hypertension (V17 49) (Z82 49)   3  Family history of malignant neoplasm of breast (V16 3) (Z80 3)  Family History Reviewed: The family history was reviewed and updated today  Social History    · Never a smoker  Social History Reviewed: The social history was reviewed and updated today  The social history was reviewed and is unchanged  Current Meds   1   ALPRAZolam 0 25 MG Oral Tablet; 1 TAB  PO BEFORE TRAVEL - CAN REPEAT  Q8   HOURS AS NEEDED; Therapy: 11ITF8068 to (Evaluate:22Jan2016) Recorded   2  Divalproex Sodium 500 MG Oral Tablet Delayed Release; 2 tablets in am , 2 tablets at   noon  and 2 tablets in evening; Therapy: (Recorded:01Nov2017) to Recorded   3  LamoTRIgine 100 MG Oral Tablet; 2 tablets in morning and 2 tablets in evening; Therapy: (Recorded:01Nov2017) to Recorded   4  LORazepam 1 MG Oral Tablet; 1 TAB THREE TIMES A DAY; Therapy: (Recorded:48Pvj4330) to Recorded   5  ProAir  (90 Base) MCG/ACT Inhalation Aerosol Solution; INHALE 1 TO 2 PUFFS   EVERY 4 TO 6 HOURS AS NEEDED; Therapy: 38RQY8655 to (Evaluate:50Lxo5466)  Requested for: 72Lpe8738; Last   Rx:98Ibi4642 Ordered   6  Rizatriptan Benzoate 10 MG Oral Tablet Disintegrating; TAKE 1 TABLET AT ONSET OF   HEADACHE  MAY REPEAT EVERY 2 HOURS AS NEEDED  MAXIMUM 3 TABLETS IN 24   HOURS; Therapy: 90FOI3742 to (Evaluate:46Udu4996)  Requested for: 75UKW6837; Last   Rx:27Jan2017 Ordered   7  Vitamin D 2000 UNIT Oral Capsule; take 1 capsule daily; Therapy: 00CWH4095 to 96 210157)  Requested for: 90DKG3721; Last   ML:[id number] Ordered  Medication List Reviewed: The medication list was reviewed and updated today  Allergies    1  Latex Gloves MISC    2  Animal dander - Cats   3  Grass    Vitals  Signs   Recorded: 19COA8112 10:23AM   Temperature: 97 4 F  Heart Rate: 80  Respiration: 18  Systolic: 975  Diastolic: 64  Height: 5 ft 10 in  Weight: 197 lb   BMI Calculated: 28 27  BSA Calculated: 2 07    Physical Exam    Constitutional   General appearance: No acute distress, well appearing and well nourished  WDWN MALE WITH GLASSES-ACCOMPANIED BY HIS MOTHER  Eyes   Conjunctiva and lids: Abnormal   LEFT EYE WITH LATERAL MOVEMENT  Pupils and irises: Equal, round and reactive to light  Ears, Nose, Mouth, and Throat   External inspection of ears and nose: Normal     Otoscopic examination: Tympanic membrance translucent with normal light reflex   Canals patent without erythema  TM CLEAR RIGHT TM CLEAR  Nasal mucosa, septum, and turbinates: Normal without edema or erythema  Oropharynx: Normal with no erythema, edema, exudate or lesions  SOME POST NASAL DRIP  Pulmonary   Respiratory effort: No increased work of breathing or signs of respiratory distress  Auscultation of lungs: Clear to auscultation, equal breath sounds bilaterally, no wheezes, no rales, no rhonci  Auscultation of the lungs revealed no expiratory wheezing, normal expiratory time and no inspiratory wheezing  no rales or crackles were heard bilaterally  no rhonchi  no friction rub  no wheezing  no diminished breath sounds  no bronchial breath sounds  CLEAR B/L  Cardiovascular   Palpation of heart: Normal PMI, no thrills  Auscultation of heart: Normal rate and rhythm, normal S1 and S2, without murmurs  The heart rate was normal  The rhythm was regular  Heart sounds: normal S1, normal S2, no S3 and no S4  no murmurs were heard  Examination of extremities for edema and/or varicosities: Normal     Carotid pulses: Normal     Abdomen   Abdomen: Non-tender, no masses  HEALING WOUND MID ABDOMEN; NORMAL BS  Liver and spleen: No hepatomegaly or splenomegaly  Lymphatic   Palpation of lymph nodes in neck: No lymphadenopathy  Musculoskeletal   Gait and station: Normal     Digits and nails: Normal without clubbing or cyanosis  Inspection/palpation of joints, bones, and muscles: Normal     Skin   Skin and subcutaneous tissue: Abnormal   SEBORRHEA ON SCALP AND ABOVE RIGHT EYE; HEALING WOUND ON LEFT TEMPORAL AREA-HEALING;    Neurologic   Cranial nerves: Cranial nerves 2-12 intact      Psychiatric PATIENT AWAK AND ALERT - HE IS ABLE TO HAVE A CONVERSATION - MOTHER REPORTS MENTAL CAPACITY OF AGE 12         Message   Recorded as Task   Date: 10/26/2017 04:45 PM, Created By: System   Task Name: Hospital [facility]   Assigned To: Wesley Hilliard   Regarding Patient: Lala Apodaca, Status: Active Comment:    System - 26 Oct 2017 4:45 PM     Patient discharged from hospital   Patient Name: Yuriy Campos  Patient YOB: 1975  Discharge Date: 10/26/2017  Facility: [facility] 27 Oct 2017 8:21 AM     TASK REASSIGNED: Previously Assigned To Bryon Crawford     Future Appointments    Date/Time Provider Specialty Site   11/06/2017 09:00 AM SHAD Sanchez  Neurology [place] [address]   02/02/2018 10:00 AM SHAD Sanchez  Neurology [address]   11/08/2017 03:00 PM Darline Lemos [facility]   12/05/2017 11:30 AM SHAD Krause  Neurosurgery [facility]     Signatures   Electronically signed by :  Don Weiss DO; Nov 1 2017 11:02AM EST                       (Author)

## 2018-01-16 ENCOUNTER — ALLSCRIPTS OFFICE VISIT (OUTPATIENT)
Dept: OTHER | Facility: OTHER | Age: 43
End: 2018-01-16

## 2018-01-16 DIAGNOSIS — Q03.9 CONGENITAL HYDROCEPHALUS (HCC): ICD-10-CM

## 2018-01-16 NOTE — PROCEDURES
Procedures by Betty Anna MD at  10/20/2017  4:29 PM      Author:  Betty Anna MD Service:  Neurosurgery Author Type:  Physician    Filed:  10/20/2017  6:37 PM Date of Service:  10/20/2017  4:29 PM Status:  Signed    :  Betty Anna MD (Physician)        Pre-procedure Diagnoses:       1  Shunt malfunction, initial encounter [T85 758A]                Post-procedure Diagnoses:       1  Shunt malfunction, initial encounter [T84 558A]                Procedures:       1  SHUNT TAP [[id number] (Custom)]                 Procedure Note    Date of procedure 10/20/2017  1345 hours    Title of procedure:  Left parietal existing shunt reservoir tap    Surgeon: Dr Hailey Pena MD  Assistant none    No qualified resident was available to assist    Anesthesia none    Preoperative diagnosis:  Shunt dysfunction  Disconnection left parietal  shunt system  Separate cysto peritoneal shunt system disconnected- nonfunctioning  Separate left frontal Rickham reservoir with blind ending distal tubing-non functional  Ex-32 week preemie  Hydrocephalus  Placement of left parietal  shunt system aged five months, [address]  Seizures seizures aged age [age]  Mild cerebral palsy  Delayed developmental milestones: first words and walking aged [age]  Prior shunt dysfunction  Intraventricular and intraparenchymal hemorrhage aged [age] at time of shunt revision  Placement of a Rickham reservoir aged [age]  Further revision and left  shunt system aged 5  Recent episodic headaches, sudden sharp the last 1-2 hrs, X4 over two weeks  Recent seizures progressing to GM 10/04 and 10/19/2017    Postop diagnosis:   Same    The risks, benefits and complications of surgery were explained in detail to Taylor Kent and his mother Megan Du  including hemorrhage, infection, CSF leakage, seizures wound problems, pain, weakness, numbness, dysesthesiae,  paralysis, coma, and death       Also, the possibility of further surgery being required was emphasized  This could be revision removal adjustment or externalization of shunt system (s)    Other potential medical complications were outlined, including deep venous thrombosis, pulmonary embolism, pneumonia, urinary tract infection, myocardial infarction,  and stroke  The need for physical therapy, occupational therapy, and rehabilitation was also mentioned  The alternatives to surgery were discussed  Franco Russo and his mother Petra Bernal asked relevant questions and asked that we proceed with left parietal reservoir shunt tap  Procedure and Technique: The area of the dome of the reservoir was clipped  The area was prepped with Betadine  X 3 which was allowed to dry    A time-out was occasioned with his primary nurse Shanell Mccullough  The patient was correctly identified  The imaging was reviewed in the room  The consented procedure were confirmed  All were in agreement  The dome of the left parietal shunt reservoir was accessed with a 25 gauge butterfly  There was free ascent of CSF up the tube to a level of 5-6 cm of water  A small sample of CSF was removed at this point  The runoff was observed to go down  It went down very quickly  Too quickly suggestive of a disconnected distal shunt proximal peritoneal catheter tubing in my estimate    The rapid runoff confirmed that the shunt was disconnected and CSF was likely draining into likely pseudo sheath around shunt which had stretched or migrated away from the distal end of the catheter below the shunt reservoir    See shunt survey and my evaluation in consult    Further CSF, which was clear, could be aspirated  About 3-1/2 cc in total   Thereafter it was difficult to aspirate any further CSF  This is likely because the left lateral ventricle is quite small  The CSF was sent for surveillance panel  The preliminary results showed CSF glucose of 57 CSF protein of 70    G Gram stain showed no polys and no bacteria 1+ mono nuclear cells    The butterfly needle was withdrawn and the exit site compressed with dry gauze for 2 minutes    He tolerated the test well    Conclusion:  patent proximal ventricular catheter in left lateral ventricle  Accelerated distal runoff into soft tissues or stretched pseudo sheath around disconnected shunt suggestive of disconnection as seen on shunt survey    Recommend distal shunt revision with replacement of valve to a new a programmable valve and new distal peritoneal tubing  Following discussion Lianne Crigler and his mother were in agreement with this recommendation    We will look to find an OR slot in the near future possibly Monday    10/20/2017 6:33 PM    Johnie Bosworth D Cathlene Coup, MD, Attending Neurological Surgeon                 Received Loc Grant MD  Oct 20 2017  6:37PM [facility] Standard Time

## 2018-01-16 NOTE — PROGRESS NOTES
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)   3  Long term use of drug (V58 69) (Z79 899)   4  Medicare annual wellness visit, subsequent (V70 0) (Z00 00)    Plan  Localization-related epilepsy    · (1) VALPROIC ACID (DEPAKOTE); Status:Active; Requested for:26Apr2018; Long term use of drug, Migraine with aura and without status migrainosus, not intractable,  Vitamin D deficiency    · (1) CBC/PLT/DIFF; Status:Active; Requested for:05Ygh7303; Long term use of drug, Vitamin D deficiency    · (1) VITAMIN D 25-HYDROXY; Status:Active; Requested for:26Apr2018;   Mild intermittent asthma without complication, Vitamin D deficiency    · (1) COMPREHENSIVE METABOLIC PANEL; Status:Active; Requested for:26Apr2018;   Need for influenza vaccination    · Fluzone Quadrivalent Intramuscular Suspension    Discussion/Summary    SZ DISORDER- CONTINU MEDS- CHECK LABS AND LEVELS  STRABISMUS-REFER TO OPHTHO  FLU SHOT  LABS REVIEWED- STABLE  LABS IN 6 MONTHS WITH -RE-EVAL  NO CHANGE IN MEDS  Impression: Subsequent Annual Wellness Visit, with preventive exam as well as age and risk appropriate counseling completed  Cardiovascular screening and counseling: screening is current, counseling was given on maintaining a healthy diet, counseling was given on maintaining a healthy weight and Dx - V81 2 Screen for CV Disorder  Diabetes screening and counseling: counseling was given on maintaining a healthy diet, counseling was given on maintaining a healthy weight, counseling was given on ways to improve physical activity and Dx - V77 1 Screen for DM  Colorectal cancer screening and counseling: screening not indicated and Dx - V76 51 Screen for CRC  Prostate cancer screening and counseling: screening not indicated and Dx - V76 44 Screen PSA  Osteoporosis screening and counseling: screening not indicated     Abdominal aortic aneurysm screening and counseling: screening not indicated and Dx - V81 2 Screen for CV Disorder  Glaucoma screening and counseling: ophthalmologist referral and Dx - V80 1 Screen for Glaucoma  Hepatitis C Screening:  The patient declinesHepatitis C screening  Immunizations: influenza vaccine is up to date this year, influenza vaccination is recommended annually and hepatitis B vaccination series is not indicated at this time due to the patient's low risk of kirill the disease  Advance Directive Planning: he was encouraged to follow-up with me to discuss his questions and/or decisions  Patient Discussion: plan discussed with the patient, follow-up visit needed in one year  Chief Complaint  PATIENT HERE WITH HIS MOTHER- HE HAS STABISMUS- NEEDS FOLLOW UP;      History of Present Illness  Asthma (Follow-Up): The patient's long-term asthma pattern has been classified as mild persistent   The patient states he has been doing well with his asthma control since the last visit  He has no comorbid illnesses  Interval Symptoms:       HPI: SONIA FEELS WELL  HE LIVES WHTI HIS MOTHER; USING HIS INAHLER- DOING WELL  NO SEIZURES  HE JUST TUNRED 42- WORKING AT [employer]   HE LOST WEIGHT AND DOING WELL;       Welcome to Medicare and Wellness Visits: The patient is being seen for the subsequent annual wellness visit  Medicare Screening and Risk Factors   Hospitalizations: no previous hospitalizations  Once per lifetime medicare screening tests: ECG has not been done  Medicare Screening Tests Risk Questions   Abdominal aortic aneurysm risk assessment: none indicated  Osteoporosis risk assessment: none indicated  HIV risk assessment: none indicated  Drug and Alcohol Use: The patient has never smoked cigarettes  The patient reports never drinking alcohol  He has never used illicit drugs  Diet and Physical Activity: Current diet includes well balanced meals  He exercises daily  Exercise: walking     Mood Disorder and Cognitive Impairment Screening: He denies feeling down, depressed, or hopeless over the past two weeks  He denies feeling little interest or pleasure in doing things over the past two weeks  Cognitive impairment screening: denies difficulty learning/retaining new information, denies difficulty handling complex tasks, denies difficulty with spatial ability and orientation, denies difficulty with language and denies difficulty with behavior  Functional Ability/Level of Safety: Hearing is normal bilaterally  The patient is currently able to participate in social activities with limitations and unable to drive, but able to do activities of daily living without limitations and able to do instrumental activities of daily living without limitations  Activities of daily living details: does not need help using the phone, no transportation help needed, does not need help shopping, no meal preparation help needed, does not need help doing housework, does not need help doing laundry, does not need help managing medications and does not need help managing money  Advance Directives: Advance directives: no living will and no durable power of  for health care directives  I agree with the patient's decisions  Co-Managers and Medical Equipment/Suppliers: See Patient Care Team       Vitamin D Deficiency Screening Pathway: The patient is being seen for follow-up of vitamin D deficiency  Medication Check: The history is obtained from the patient, the patient's family and the patient's caregiver  The patient is being seen for a medication check for routine follow-up  There were no side effects noted  The patient was compliant  Since the last visit, there have been no events or diagnostic studies  Symptomatic changes were not noted  Since the last visit, there has been no home monitoring  No additional therapy or lifestyle changes are being done  Seizure Disorder (Follow-Up):  The patient is being seen for follow-up from urgent care for seizure disorder  Recently, seizures have been well controlled  Recent seizure symptoms:  no aura, no lip smacking, no automatisms and no blank stare  Review of Systems    Constitutional: negative, no fever and no chills  Head and Face: negative  Eyes: STABISMUS LEFT EYE, but as noted in HPI, no eye pain, eyes not red, no itching of the eyes and no blurred vision  ENT: negative, no nasal congestion, no nasal discharge, no earache and no hearing loss  Cardiovascular: negative, no chest pain, no palpitations, the heart is not racing and no lightheadedness  Respiratory: negative, no shortness of breath and no wheezing  Gastrointestinal: negative, no abdominal pain, no abdominal bloating, no nausea and no vomiting  Genitourinary: negative and no urinary frequency  Musculoskeletal: negative, no diffuse joint pain and no generalized muscle aches  Integumentary and Breasts: negative, no rashes and no skin lesions  Neurological: negative, no headache, no confusion, no paresthesias and no saddle paresthesia  Psychiatric: negative, no insomnia, no anxiety and no depression  Endocrine: negative and no muscle weakness  Hematologic and Lymphatic: negative, no swollen glands and no swollen glands in the neck  Over the past 2 weeks, how often have you been bothered by the following problems? 1 ) Little interest or pleasure in doing things? Not at all    2 ) Feeling down, depressed or hopeless? Not at all    3 ) Trouble falling asleep or sleeping too much? Not at all    4 ) Feeling tired or having little energy? Not at all    5 ) Poor appetite or overeating? Not at all    6 ) Feeling bad about yourself, or that you are a failure, or have let yourself or your family down? Not at all    7 ) Trouble concentrating on things, such as reading a newspaper or watching television?  Not at all    8 ) Moving or speaking so slowly that other people could have noticed, or the opposite, moving or speaking faster than usual? Not at all  How difficult have these problems made it for you to do your work, take care of things at home, or get along with people? 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)   3  Long term use of drug (V58 69) (Z79 899)   4  Migraine with aura and without status migrainosus, not intractable (346 00) (G43 109)   5  Mild cerebral palsy (343 9) (G80 9)   6  Mild intermittent asthma without complication (195 01) (Q14 22)   7  Sinus congestion (478 19) (R09 81)   8  Situational anxiety (300 09) (F41 8)   9  Vitamin D deficiency (268 9) (E55 9)    Past Medical History    · History of Mild intermittent asthma without complication (716 13) (U72 91)    The active problems and past medical history were reviewed and updated today  Surgical History    · History of Creation Of Ventriculo-Peritoneal CSF Shunt   · History of Eye Surgery   · History of Hernia Repair   · History of Replacement Of Ventricular CSF Catheter    The surgical history was reviewed and updated today  Family History  Mother    · Family history of hypercholesterolemia (V18 19) (Z83 42)   · Family history of hypertension (V17 49) (Z82 49)   · Family history of malignant neoplasm of breast (V16 3) (Z80 3)    The family history was reviewed and updated today  Social History    · Never a smoker  The social history was reviewed and updated today  The social history was reviewed and is unchanged  Current Meds   1  Advair Diskus 100-50 MCG/DOSE Inhalation Aerosol Powder Breath Activated; INHALE   1 PUFF BY MOUTH TWICE DAILY; Therapy: 85NYZ0025 to (Evaluate:89Sae0371)  Requested for: 22Nov2016; Last   Rx:22Nov2016 Ordered   2  ALPRAZolam 0 25 MG Oral Tablet; 1 TAB  PO BEFORE TRAVEL - CAN REPEAT  Q8   HOURS AS NEEDED; Therapy: 26VYG8300 to (Evaluate:22Jan2016) Recorded   3   Divalproex Sodium 500 MG Oral Tablet Delayed Release; 1 tab in am, 1 tab at noon, 1   tab in pm  Requested for: 86RVK9845; Last PP:82EIL6886 Ordered   4  LamoTRIgine 100 MG Oral Tablet; TAKE 2 TABLETS BY MOUTH EVERY MORNING   AND 1 AND 1/2 TABLET BY MOUTH EVERY EVENING; Therapy: 28INQ6455 to (Evaluate:26Mar2018)  Requested for: 80JHE8453; Last   Rx:31Mar2017 Ordered   5  LORazepam 1 MG Oral Tablet; 1 TAB THREE TIMES A DAY; Therapy: (Recorded:57Qlb7546) to Recorded   6  ProAir  (90 Base) MCG/ACT Inhalation Aerosol Solution; INHALE 1 TO 2 PUFFS   EVERY 4 TO 6 HOURS AS NEEDED; Therapy: 12UPJ0738 to (Evaluate:79Tvh8364)  Requested for: 18Yjl9454; Last   Rx:72Mcw0668 Ordered   7  Rizatriptan Benzoate 10 MG Oral Tablet Disintegrating; TAKE 1 TABLET AT ONSET OF   HEADACHE  MAY REPEAT EVERY 2 HOURS AS NEEDED  MAXIMUM 3 TABLETS IN 24   HOURS; Therapy: 90KXX0141 to (Evaluate:50Tft1326)  Requested for: 64LCH4215; Last   Rx:27Jan2017 Ordered   8  Vitamin D (Ergocalciferol) 81279 UNIT Oral Capsule; 1 CAPSULE WEEKLY FOR THE   NEXT 8 WEEKS; Therapy: 49GGD8146 to (Evaluate:53Yhv9232)  Requested for: 21Mar2017; Last   Rx:27Jan2017; Status: ACTIVE - Renewal Denied Ordered   9  Vitamin D 2000 UNIT Oral Capsule; take 1 capsule daily; Therapy: 23UXN1983 to )  Requested for: 07LUE0582; Last   Rx:27Jan2017 Ordered    The medication list was reviewed and updated today  Allergies    1  Latex Gloves MISC    2  Animal dander - Cats   3  Grass    Immunizations   1 2    Influenza  2015 03-Nov-2016  (41y)     Vitals  Signs    Temperature: 96 2 F  Heart Rate: 76  Respiration: 16  Systolic: 777  Diastolic: 78  BP Cuff Size: Large  Height: 5 ft 10 in  Weight: 195 lb 9 6 oz  BMI Calculated: 28 07  BSA Calculated: 2 07    Physical Exam    Constitutional   General appearance: No acute distress, well appearing and well nourished  WDWN MALE WITH STRABISMUS LEFT EYE     Head and Face   Head and face: Normal     Eyes   Conjunctiva and lids: No erythema, swelling or discharge  Pupils and irises: Abnormal     Ophthalmoscopic examination: Normal fundi and optic discs  Ears, Nose, Mouth, and Throat   External inspection of ears and nose: Normal     Otoscopic examination: Tympanic membranes translucent with normal light reflex  Canals patent without erythema  Hearing: Normal     Nasal mucosa, septum, and turbinates: Normal without edema or erythema  Lips, teeth, and gums: Normal, good dentition  Oropharynx: Normal with no erythema, edema, exudate or lesions  Neck   Neck: Supple, symmetric, trachea midline, no masses  Thyroid: Normal, no thyromegaly  Pulmonary   Respiratory effort: No increased work of breathing or signs of respiratory distress  Percussion of chest: Normal     Auscultation of lungs: Clear to auscultation  Auscultation of the lungs revealed no expiratory wheezing, normal expiratory time and no inspiratory wheezing  no rales or crackles were heard bilaterally  no rhonchi  no friction rub  no wheezing  no diminished breath sounds  no bronchial breath sounds  Cardiovascular   Palpation of heart: Normal PMI, no thrills  Auscultation of heart: Normal rate and rhythm, normal S1 and S2, no murmurs  Carotid pulses: 2+ bilaterally  Examination of extremities for edema and/or varicosities: Normal     Lymphatic   Palpation of lymph nodes in neck: No lymphadenopathy  Musculoskeletal   Gait and station: Normal   MILD CP  Inspection/palpation of digits and nails: Normal without clubbing or cyanosis  Skin   Skin and subcutaneous tissue: Normal without rashes or lesions  Palpation of skin and subcutaneous tissue: Normal turgor  Neurologic   Cranial nerves: Cranial nerves 2-12 intact  Cortical function: Normal mental status  Reflexes: 2+ and symmetric      Psychiatric   Judgment and insight: Abnormal   LIMITED INSIGHT;       Future Appointments    Date/Time Provider Specialty Site   02/02/2018 10:00 AM [facility]  Ne[facility] [place] [facility]     Signatures   Electronically signed by :  Don Weiss DO; Sep 27 2017  7:40AM EST                       (Author)

## 2018-01-17 NOTE — RESULT NOTES
Verified Results  (1) LAMICTAL [id number] 09:04AM [facility]    Order Number: [id number]     Test Name Result Flag Reference   LAMICTAL 12 9 ug/mL  2 0 - 20 0   Detection Limit = 1 0    Performed at:  [address]  [id number]  : Leland Brown MD, Phone:  [phone]

## 2018-01-17 NOTE — RESULT NOTES
Discussion/Summary   Labs are stable  Verified Results  (1) CBC/PLT/DIFF 69Teo8117 10:36AM [facility]: [id number]     Test Name Result Flag Reference   WBC COUNT 8 04 Thousand/uL  4 31-10 16   RBC COUNT 4 95 Million/uL  3 88-5 62   HEMOGLOBIN 15 5 g/dL  12 0-17 0   HEMATOCRIT 43 7 %  36 5-49 3   MCV 88 fL  82-98   MCH 31 3 pg  26 8-34 3   MCHC 35 5 g/dL  31 4-37 4   RDW 12 7 %  11 6-15 1   MPV 10 0 fL  8 9-12 7   PLATELET COUNT 533 Thousands/uL  149-390   nRBC AUTOMATED 0 /100 WBCs     NEUTROPHILS RELATIVE PERCENT 55 %  43-75   LYMPHOCYTES RELATIVE PERCENT 31 %  14-44   MONOCYTES RELATIVE PERCENT 10 %  4-12   EOSINOPHILS RELATIVE PERCENT 4 %  0-6   BASOPHILS RELATIVE PERCENT 0 %  0-1   NEUTROPHILS ABSOLUTE COUNT 4 34 Thousands/? ??L  1 85-7 62   LYMPHOCYTES ABSOLUTE COUNT 2 46 Thousands/? ??L  0 60-4 47   MONOCYTES ABSOLUTE COUNT 0 81 Thousand/? ??L  0 17-1 22   EOSINOPHILS ABSOLUTE COUNT 0 32 Thousand/? ??L  0 00-0 61   BASOPHILS ABSOLUTE COUNT 0 03 Thousands/? ??L  0 00-0 10     (1) COMPREHENSIVE METABOLIC PANEL 89IAN5377 47:96ML [facility] Order Number: [id number]     Test Name Result Flag Reference   SODIUM 144 mmol/L  136-145   POTASSIUM 4 2 mmol/L  3 5-5 3   CHLORIDE 107 mmol/L  100-108   CARBON DIOXIDE 31 mmol/L  21-32   ANION GAP (CALC) 6 mmol/L  4-13   BLOOD UREA NITROGEN 13 mg/dL  5-25   CREATININE 0 78 mg/dL  0 60-1 30   Standardized to IDMS reference method   CALCIUM 8 9 mg/dL  8 3-10 1   BILI, TOTAL 0 41 mg/dL  0 20-1 00   ALK PHOSPHATAS 52 U/L     ALT (SGPT) 14 U/L  12-78   Specimen collection should occur prior to Sulfasalazine and/or Sulfapyridine administration due to the potential for falsely depressed results  AST(SGOT) 5 U/L  5-45   Specimen collection should occur prior to Sulfasalazine administration due to the potential for falsely depressed results     ALBUMIN 3 5 g/dL  3 5-5 0   TOTAL PROTEIN 6 7 g/dL  6 4-8 2   eGFR 80 ml/min/1 73sq m     [facility] Disease Education Program recommendations are as follows:  GFR calculation is accurate only with a steady state creatinine  Chronic Kidney disease less than 60 ml/min/1 73 sq  meters  Kidney failure less than 15 ml/min/1 73 sq  meters  GLUCOSE FASTING 92 mg/dL  65-99   Specimen collection should occur prior to Sulfasalazine administration due to the potential for falsely depressed results  Specimen collection should occur prior to Sulfapyridine administration due to the potential for falsely elevated results  (1) LIPID PANEL, FASTING 17Esj3131 10:36AM [facility]    [facility] Order Number: [id number]     Test Name Result Flag Reference   CHOLESTEROL 162 mg/dL     HDL,DIRECT 40 mg/dL  40-60   Specimen collection should occur prior to Metamizole administration due to the potential for falsley depressed results  LDL CHOLESTEROL CALCULATED 96 mg/dL  0-100   Triglyceride:        Normal ??? ??? ??? ??? ??? ??? ??? <150 mg/dl   ??? ??? ???Borderline High ??? ??? 150-199 mg/dl   ??? ??? ? ?? High ??? ??? ??? ??? ??? ??? ??? 200-499 mg/dl   ??? ??? ? ??Very High ??? ??? ??? ??? ??? >499 mg/dl      Cholesterol:       Desirable ??? ??? ??? ??? <200 mg/dl   ??? ??? Borderline High ??? 200-239 mg/dl   ??? ??? High ??? ??? ??? ??? ??? ??? >239 mg/dl      HDL Cholesterol:       High ??? ???>59 mg/dL   ??? ??? Low ??? ??? <41 mg/dL      This screening LDL is a calculated result  It does not have the accuracy of the Direct Measured LDL in the monitoring of patients with hyperlipidemia and/or statin therapy  Direct Measure LDL (DYI623) must be ordered separately in these patients  TRIGLYCERIDES 128 mg/dL  <=150   Specimen collection should occur prior to N-Acetylcysteine or Metamizole administration due to the potential for falsely depressed results       (1) TSH 74Xxr6132 10:36AM [facility] [place]    Order Number: [id number]     Test Name Result Flag Reference   TSH 3 230 uIU/mL  0 358-3 740   Patients undergoing fluorescein dye angiography may retain small amounts of fluorescein in the body for 48-72 hours post procedure  Samples containing fluorescein can produce falsely depressed TSH values  If the patient had this procedure,a specimen should be resubmitted post fluorescein clearance  (1) VITAMIN D 25-HYDROXY 81Udj7271 10:36AM Shelby Silva    Order Number: [id number]     Test Name Result Flag Reference   VIT D 25-HYDROX 37 2 ng/mL  30 0-100 0   This assay is a certified procedure of the CDC Vitamin D Standardization Certification Program (VDSCP)     Deficiency <20ng/ml   Insufficiency 20-30ng/ml   Sufficient  ng/ml     *Patients undergoing fluorescein dye angiography may retain small amounts of fluorescein in the body for 48-72 hours post procedure  Samples containing fluorescein can produce falsely elevated Vitamin D values  If the patient had this procedure, a specimen should be resubmitted post fluorescein clearance       (1) VALPROIC ACID (DEPAKOTE) 07Qpf4886 10:36AM S[facility] Manrique Order Number: [id number]     Test Name Result Flag Reference   VALPROIC ACID 67 ug/mL

## 2018-01-17 NOTE — PROGRESS NOTES
Assessment   1  Aftercare following surgery (V58 89) (Z48 89)   2  Congenital hydrocephalus (742 3) (Q03 9)    Plan   Congenital hydrocephalus    · * CT HEAD WO CONTRAST; Status:Hold For - Scheduling; Requested YNU:[id number]; Perform:[facility] Radiology; FUH:[id number];BMXVHJM; For:Congenital hydrocephalus; Ordered By:Solitario Reeves;   · Follow-up visit in 1 year Evaluation and Treatment  Follow-up  Status: Complete  Done:    [id number]   Ordered; For: Congenital hydrocephalus; Ordered By: Gail Burns Performed:  Due: 81XVH9543; Last Updated By: Mayra Crystal; 1/16/2018 11:53:46 AM    Discussion/Summary      This is a 49-year-old mentally challenged gentleman who is status post shunt revision  In general he is doing very well  I have recommended continued yearly evaluations given the complexity of his shunt system, previous shunt systems and difficult to diagnosis failures  Chief Complaint   Patient presents for 6 weeks f/u with CT head s/p  shunt      Post-Op   Post-Op Crani-Brain:      Aracelis Axon is status post shunt procedure (10/23/17 (DKO) SHUNT VENTRICULAR-PERITONEAL revision (Left))  Patient is a 41y old M who presented to the hospital on 10/20/17 due to seizure-like activity  Patient has a history of cerebral palsy, seizure disorder and is status post  shunt  He was 34 week preemie  Birth weight was 4 lb Was intubated in the NICU  He had RDS  Patient denies any headaches nausea vomiting had developmental delay  He was referred to a   His anterior fontanelle is bulging his forehead was large with widening of the metopic suture   was in 1975  There were no CT scans in [place] then  He was sent to [facility] in the [place]  Head CT head  Diagnosed with hydrocephalus  Had left parietal  shunt the next day  slowly improved  First few words were age [age]  He was crawling aged and walking aged [age]  Continued PT and OT and developmental inputs   May have had several other revisions  [age] needed a revision  He had gone into a coma This was in [facility]  Apparently attempted revision of the shunt was carried out  There was intraventricular and intraparenchymal hemorrhage resulting in [place], per mom  He developed right-sided weakness he was slow to recover needed further rehab    shunt was abandoned  A left frontal Rickham reservoir and ventricular catheter and possible ventriculoperitoneal shunt was placed  Subsequently cyst developed  Likely an area of encysted encephalomalacia after the hematoma had broken down  Had cyst peritoneal shunt  last shunt revision was over 30 years ago  was evaluated while admitted by neurosurgery and we did not see any functional shunt system of the three possible choices The ventricles are small but patent  head 10/4: no acute intraparenchymal brain abnormality, encephalomalacia in left parietal region  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  Left frontal ventricular catherter attached to Rickham reservoir which is bur hole sunken type in good position within the right anterior horn  It drains back to 10 cm length of tubing that does not connector to anything  Ventricles look well decompressed  Left posterior parietal ventricular catheter remnant which is connected to a valve with a reservoir in the scalp  The distal shunt tubing that is meant to be connected to the parietal reservoir is disconnected and is migrated anteriorly about 2 cm  This suggests longstanding disconnection  Left lateral parietal ventricular catheter which extends to region of posterior horn, but is within parenchymal this is likely the form 0 cysto peritoneal shunt from prior hematoma at time of prior shunt revision aged [age] when he was in a coma  shunt series 10/4: The left frontal ventricular catheter appears attached to a Rickham reservoir   head 10/19: stable, essentially the same from above  shunt series 10/20: 2  shunt catheters unchanged, including discontinuity of catheter in left lateral neck  underwent a  shunt revision on 10/23/17  seen on 11/8/17 for his 2 weeks post-op  The CT head (11/7/17) was reviewed with Codi Pedro and his mother and demonstrates evolving small amount of intraparenchymal /subarachnoid hemorrhage around the operative bed and involuting edema and pneumocephalus  No new hemorrhage or hydrocephalus  No territorial infarction  Patient was provided with referral for CT/CTA head to have completed prior to his 6 week post-op visit to evaluate hemorrhagic cyst per follow up recommendation during hospitalization  History of Present Illness: The patient reports no dizziness,-- no headache,-- no speech disturbances,-- no visual complaints,-- no neck stiffness,-- no fever,-- no vertigo,-- no facial weakness,-- no cognitive difficulties,-- no wound drainage,-- no ataxia,-- no upper extremity weakness,-- no lower extremity weakness,-- no photophobia-- and-- no cognition difficulties  Physical Examination:    Test Results:    Assessment:    Plan:         Review of Systems        Constitutional: No fever or chills, feels well, no tiredness, no recent weight gain or weight loss  Eyes: No complaints of eye pain, no red eyes, no discharge from eyes, no itchy eyes  ENT: no complaints of earache, no hearing loss, no nosebleeds, no nasal discharge, no sore throat, no hoarseness  Cardiovascular: No complaints of slow heart rate, no fast heart rate, no chest pain, no palpitations, no leg claudication, no lower extremity  Respiratory: No complaints of shortness of breath, no wheezing, no cough, no SOB on exertion, no orthopnea or PND  Gastrointestinal: No complaints of abdominal pain, no constipation, no nausea or vomiting, no diarrhea or bloody stools  Genitourinary: No complaints of dysuria, no incontinence, no hesitancy, no nocturia, no genital lesion, no testicular pain        Musculoskeletal: No complaints of arthralgia, no myalgias, no joint swelling or stiffness, no limb pain or swelling  Integumentary: No complaints of skin rash or skin lesions, no itching, no skin wound, no dry skin  Neurological: No compliants of headache, no confusion, no convulsions, no numbness or tingling, no dizziness or fainting, no limb weakness, no difficulty walking  Psychiatric: Is not suicidal, no sleep disturbances, no anxiety or depression, no change in personality, no emotional problems  Endocrine: No complaints of proptosis, no hot flashes, no muscle weakness, no erectile dysfunction, no deepening of the voice, no feelings of weakness  Hematologic/Lymphatic: No complaints of swollen glands, no swollen glands in the neck, does not bleed easily, no easy bruising  Active Problems   1  Aftercare following surgery (V58 89) (Z48 89)   2  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)   3  Congenital hydrocephalus (742 3) (Q03 9)   4  Depression screening (V79 0) (Z13 89)   5  Encounter for postoperative care (V58 49) (Z48 89)   6  Encounter for staple removal (V58 32) (Z48 02)   7  Headache (784 0) (R51)   8  Hydrocephalus (331 4) (G91 9)   9  Intracranial hemorrhage (432 9) (I62 9)   10  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)   11  Long term use of drug (V58 69) (Z79 899)   12  Migraine with aura and without status migrainosus, not intractable (346 00) (G43 109)   13  Mild cerebral palsy (343 9) (G80 9)   14  Mild intermittent asthma without complication (069 09) (S08 01)   15  Other cerebral palsy (343 8) (G80 8)   16  Other specified disorders of brain (348 89) (G93 89)   17  Rash of face (782 1) (R21)   18  Seborrheic dermatitis (690 10) (L21 9)   19  Sinus congestion (478 19) (R09 81)   20  Situational anxiety (300 09) (F41 8)   21  Transition of care   22  Vitamin D deficiency (268 9) (E55 9)    Social History    · Never a smoker    Current Meds    1   Advair Diskus 100-50 MCG/DOSE Inhalation Aerosol Powder Breath Activated; INHALE 1     PUFF BY MOUTH TWICE DAILY; Therapy: 71SLQ6472 to (Lissett Cruz)  Requested for: 27Oct2017; Last     Rx:27Oct2017 Ordered   2  ALPRAZolam 0 25 MG Oral Tablet; 1 TAB  PO BEFORE TRAVEL - CAN REPEAT  Q8     HOURS AS NEEDED; Therapy: 91JDV6545 to (Evaluate:21Phf6623); Last Rx:01Nov2017 Ordered   3  Divalproex Sodium 500 MG Oral Tablet Delayed Release; 2 tablets in am , 2 tablets at     noon  and 2 tablets in evening  Requested for: 84HRN6571; Last Rx:06Nov2017 Ordered   4  Hydrocortisone 2 5 % External Cream; APPLY SPARINGLY TO AFFECTED AREA(S)     TWICE DAILY; Therapy: 04LKT2132 to (Evaluate:08Nov2017)  Requested for: 10UEO2449; Last     Rx:01Nov2017 Ordered   5  LamoTRIgine 200 MG Oral Tablet; TAKE 1 TABLET TWICE DAILY  Requested for:     45QMI0947; Last Rx:06Nov2017 Ordered   6  ProAir  (90 Base) MCG/ACT Inhalation Aerosol Solution; INHALE 1 TO 2 PUFFS     EVERY 4 TO 6 HOURS AS NEEDED; Therapy: 46NZG0436 to (Evaluate:44Yue1767)  Requested for: 24Oon8724; Last     Rx:13Fjn6869 Ordered   7  Rizatriptan Benzoate 10 MG Oral Tablet Disintegrating; TAKE 1 TABLET AT ONSET OF     HEADACHE  MAY REPEAT EVERY 2 HOURS AS NEEDED  MAXIMUM 3 TABLETS IN 24     HOURS; Therapy: 78CWP4432 to (Evaluate:47Qsh1149)  Requested for: 81UJW3558; Last     Rx:27Jan2017 Ordered   8  Vitamin D 2000 UNIT Oral Capsule; take 1 capsule daily; Therapy: 57YMR9359 to (JOTWANFI:71AHE4460)  Requested for: 89KDP2783; Last     Rx:27Jan2017 Ordered    Allergies   1  Latex Gloves MISC  2  Animal dander - Cats   3  Grass    Vitals    Recorded: [id number] 11:25AM   Temperature 96 7 F   Heart Rate 86   Respiration 14   Systolic 453   Diastolic 94   Height 5 ft 10 in   Weight 198 lb    BMI Calculated 28 41   BSA Calculated 2 08   Pain Scale 0     Physical Exam    (Awake alert and oriented  incisions are well healed  his power is 5/5 bilaterally  sensation is intact   He is able to ambulate without difficulty although cannot perform tandem gait  his Romberg is negative)  Results/Data        I personally reviewed the CT scan of the brain in detail with the patient  CT Scan Review CT scan of the brain is carefully reviewed  This demonstrates continued scar in the region of the previous large cyst  The ventricles are small        Signatures    Electronically signed by : SHAD Nix ; Jan 16 2018 12:22PM EST                       (Author)

## 2018-01-22 VITALS
HEART RATE: 80 BPM | TEMPERATURE: 97.4 F | RESPIRATION RATE: 18 BRPM | WEIGHT: 197 LBS | SYSTOLIC BLOOD PRESSURE: 126 MMHG | HEIGHT: 70 IN | BODY MASS INDEX: 28.2 KG/M2 | DIASTOLIC BLOOD PRESSURE: 64 MMHG

## 2018-01-22 VITALS
TEMPERATURE: 96.2 F | HEIGHT: 70 IN | HEART RATE: 76 BPM | SYSTOLIC BLOOD PRESSURE: 130 MMHG | DIASTOLIC BLOOD PRESSURE: 78 MMHG | RESPIRATION RATE: 16 BRPM | WEIGHT: 195.6 LBS | BODY MASS INDEX: 28 KG/M2

## 2018-01-22 VITALS
RESPIRATION RATE: 10 BRPM | DIASTOLIC BLOOD PRESSURE: 80 MMHG | TEMPERATURE: 97.9 F | HEIGHT: 70 IN | SYSTOLIC BLOOD PRESSURE: 120 MMHG | WEIGHT: 199 LBS | BODY MASS INDEX: 28.49 KG/M2

## 2018-01-22 VITALS
BODY MASS INDEX: 28.27 KG/M2 | WEIGHT: 197.5 LBS | HEART RATE: 88 BPM | HEIGHT: 70 IN | SYSTOLIC BLOOD PRESSURE: 118 MMHG | DIASTOLIC BLOOD PRESSURE: 66 MMHG

## 2018-01-23 VITALS
HEIGHT: 70 IN | SYSTOLIC BLOOD PRESSURE: 138 MMHG | HEART RATE: 86 BPM | TEMPERATURE: 96.7 F | RESPIRATION RATE: 14 BRPM | WEIGHT: 198 LBS | DIASTOLIC BLOOD PRESSURE: 94 MMHG | BODY MASS INDEX: 28.35 KG/M2

## 2018-01-23 VITALS
SYSTOLIC BLOOD PRESSURE: 140 MMHG | RESPIRATION RATE: 12 BRPM | HEIGHT: 70 IN | TEMPERATURE: 97 F | BODY MASS INDEX: 28.35 KG/M2 | WEIGHT: 198 LBS | HEART RATE: 82 BPM | DIASTOLIC BLOOD PRESSURE: 92 MMHG

## 2018-02-01 ENCOUNTER — OFFICE VISIT (OUTPATIENT)
Dept: URGENT CARE | Age: 43
End: 2018-02-01
Payer: MEDICARE

## 2018-02-01 VITALS
SYSTOLIC BLOOD PRESSURE: 123 MMHG | WEIGHT: 194 LBS | TEMPERATURE: 98.1 F | OXYGEN SATURATION: 98 % | HEIGHT: 69 IN | DIASTOLIC BLOOD PRESSURE: 72 MMHG | RESPIRATION RATE: 16 BRPM | BODY MASS INDEX: 28.73 KG/M2 | HEART RATE: 63 BPM

## 2018-02-01 DIAGNOSIS — J01.90 ACUTE NON-RECURRENT SINUSITIS, UNSPECIFIED LOCATION: Primary | ICD-10-CM

## 2018-02-01 DIAGNOSIS — J06.9 ACUTE UPPER RESPIRATORY INFECTION: ICD-10-CM

## 2018-02-01 PROCEDURE — G0463 HOSPITAL OUTPT CLINIC VISIT: HCPCS | Performed by: FAMILY MEDICINE

## 2018-02-01 PROCEDURE — 99213 OFFICE O/P EST LOW 20 MIN: CPT | Performed by: FAMILY MEDICINE

## 2018-02-01 RX ORDER — AZITHROMYCIN 250 MG/1
TABLET, FILM COATED ORAL
Qty: 6 TABLET | Refills: 0 | Status: SHIPPED | OUTPATIENT
Start: 2018-02-01 | End: 2018-02-06

## 2018-02-01 RX ORDER — ALPRAZOLAM 0.25 MG/1
0.25 TABLET ORAL 3 TIMES DAILY PRN
COMMUNITY
Start: 2015-12-23 | End: 2018-08-15 | Stop reason: SDUPTHER

## 2018-02-01 NOTE — PATIENT INSTRUCTIONS
Z-Matthias as directed 2 initially then 1 daily until finished (please take probiotics)  Tylenol, ibuprofen (Advil/Motrin) as needed  Cold/cough/sinus medication as needed (Flonase nasal spray 1 spray in each nostril twice a day)  Recheck/follow-up with family physician as needed

## 2018-02-01 NOTE — PROGRESS NOTES
[facility]        NAME: Geri Mcdonald is a 43 y o  male  : 1975    MRN: [id number]  DATE: 2018  TIME: 12:52 PM    Assessment and Plan   Acute non-recurrent sinusitis, unspecified location [J01 90]  1  Acute non-recurrent sinusitis, unspecified location  azithromycin (ZITHROMAX) 250 mg tablet   2  Acute upper respiratory infection           Patient Instructions      Patient Instructions   Z-Matthias as directed 2 initially then 1 daily until finished (please take probiotics)  Tylenol, ibuprofen (Advil/Motrin) as needed  Cold/cough/sinus medication as needed (Flonase nasal spray 1 spray in each nostril twice a day)  Recheck/follow-up with family physician as needed  To present to the ER if symptoms worsen  Chief Complaint     Chief Complaint   Patient presents with    Nasal Congestion    Cold Like Symptoms         History of Present Illness   Geri Mcdonald presents to the clinic c/o    Congestion, cough, sinus pressure        Review of Systems   Review of Systems   HENT: Positive for congestion and sinus pressure  Respiratory: Positive for cough  Skin: Negative  Neurological: Negative  Current Medications     Long-Term Prescriptions   Medication Sig Dispense Refill    ALPRAZolam (XANAX) 0 25 mg tablet Take by mouth      cholecalciferol (VITAMIN D3) 1,000 units tablet Take 1,000 Units by mouth daily Pt takes 2,000 units per day      divalproex sodium (DEPAKOTE) 500 mg EC tablet Take 2 tablets by mouth every 8 (eight) hours 180 tablet 0    lamoTRIgine (LaMICtal) 200 MG tablet Take 1 tablet by mouth 2 (two) times a day 60 tablet 0    rizatriptan (MAXALT-MLT) 10 MG disintegrating tablet Rizatriptan Benzoate 10 MG Oral Tablet Dispersible  TAKE 1 TABLET AT ONSET OF HEADACHE  MAY REPEAT EVERY 2 HOURS AS NEEDED  MAXIMUM 3 TABLETS IN 24 HOURS     Quantity: 9;  Refills: 5       Mary SAEED ;  Started 2016  Active      docusate sodium (COLACE) 100 mg capsule Take 1 capsule by mouth 2 (two) times a day 10 capsule 0    polyethylene glycol (MIRALAX) 17 g packet Take 17 g by mouth daily as needed (Constipation) 14 each 0    senna (SENOKOT) 8 6 mg Take 1 tablet by mouth 2 (two) times a day 60 each 0       Current Allergies     Allergies as of 02/01/2018 - Reviewed 02/01/2018   Allergen Reaction Noted    Latex Hives 12/23/2015    Other  12/23/2015    Pollen extract  12/23/2015            The following portions of the patient's history were reviewed and updated as appropriate: allergies, current medications, past family history, past medical history, past social history, past surgical history and problem list     Objective   /72   Pulse 63   Temp 98 1 °F (36 7 °C)   Resp 16   Ht 5' 9" (1 753 m)   Wt 88 kg (194 lb)   SpO2 98%   BMI 28 65 kg/m²        Physical Exam     Physical Exam   Constitutional: He is oriented to person, place, and time  He appears well-developed and well-nourished  HENT:   Head: Normocephalic and atraumatic  Right Ear: External ear normal    Left Ear: External ear normal    Nasal congestion with tenderness over the sinuses   Neck: Normal range of motion  Neck supple  Cardiovascular: Normal rate and regular rhythm  Pulmonary/Chest: Effort normal and breath sounds normal    Neurological: He is alert and oriented to person, place, and time  Skin: Skin is warm  Good turgor   Psychiatric: He has a normal mood and affect  His behavior is normal    Nursing note and vitals reviewed

## 2018-02-06 ENCOUNTER — APPOINTMENT (EMERGENCY)
Dept: RADIOLOGY | Facility: HOSPITAL | Age: 43
DRG: 982 | End: 2018-02-06
Payer: MEDICARE

## 2018-02-06 ENCOUNTER — HOSPITAL ENCOUNTER (INPATIENT)
Facility: HOSPITAL | Age: 43
LOS: 6 days | Discharge: HOME | DRG: 982 | End: 2018-02-12
Attending: EMERGENCY MEDICINE | Admitting: INTERNAL MEDICINE
Payer: MEDICARE

## 2018-02-06 ENCOUNTER — TELEPHONE (OUTPATIENT)
Dept: NEUROLOGY | Facility: CLINIC | Age: 43
End: 2018-02-06

## 2018-02-06 DIAGNOSIS — K59.00 CONSTIPATION: ICD-10-CM

## 2018-02-06 DIAGNOSIS — T85.618A SHUNT MALFUNCTION, INITIAL ENCOUNTER: Primary | ICD-10-CM

## 2018-02-06 DIAGNOSIS — G44.52 NEW DAILY PERSISTENT HEADACHE: ICD-10-CM

## 2018-02-06 DIAGNOSIS — R05.9 COUGH: ICD-10-CM

## 2018-02-06 DIAGNOSIS — Z98.2 S/P VP SHUNT: ICD-10-CM

## 2018-02-06 DIAGNOSIS — R56.9 SEIZURE-LIKE ACTIVITY (HCC): ICD-10-CM

## 2018-02-06 DIAGNOSIS — R51.9 ACUTE HEADACHE: ICD-10-CM

## 2018-02-06 DIAGNOSIS — G44.59 OTHER COMPLICATED HEADACHE SYNDROME: ICD-10-CM

## 2018-02-06 PROBLEM — J45.20 MILD INTERMITTENT ASTHMA WITHOUT COMPLICATION: Status: ACTIVE | Noted: 2018-02-06

## 2018-02-06 LAB
ALBUMIN SERPL BCP-MCNC: 3.6 G/DL (ref 3.5–5)
ALP SERPL-CCNC: 53 U/L (ref 46–116)
ALT SERPL W P-5'-P-CCNC: 23 U/L (ref 12–78)
AMMONIA PLAS-SCNC: 51 UMOL/L (ref 11–35)
ANION GAP SERPL CALCULATED.3IONS-SCNC: 6 MMOL/L (ref 4–13)
AST SERPL W P-5'-P-CCNC: 10 U/L (ref 5–45)
BASOPHILS # BLD AUTO: 0.02 THOUSANDS/ΜL (ref 0–0.1)
BASOPHILS NFR BLD AUTO: 0 % (ref 0–1)
BILIRUB SERPL-MCNC: 0.49 MG/DL (ref 0.2–1)
BUN SERPL-MCNC: 13 MG/DL (ref 5–25)
CALCIUM SERPL-MCNC: 9.2 MG/DL (ref 8.3–10.1)
CHLORIDE SERPL-SCNC: 102 MMOL/L (ref 100–108)
CO2 SERPL-SCNC: 31 MMOL/L (ref 21–32)
CREAT SERPL-MCNC: 0.74 MG/DL (ref 0.6–1.3)
EOSINOPHIL # BLD AUTO: 0.15 THOUSAND/ΜL (ref 0–0.61)
EOSINOPHIL NFR BLD AUTO: 2 % (ref 0–6)
ERYTHROCYTE [DISTWIDTH] IN BLOOD BY AUTOMATED COUNT: 12.4 % (ref 11.6–15.1)
GFR SERPL CREATININE-BSD FRML MDRD: 114 ML/MIN/1.73SQ M
GLUCOSE SERPL-MCNC: 88 MG/DL (ref 65–140)
HCT VFR BLD AUTO: 44.6 % (ref 36.5–49.3)
HGB BLD-MCNC: 15.6 G/DL (ref 12–17)
LACTATE SERPL-SCNC: 1.4 MMOL/L (ref 0.5–2)
LYMPHOCYTES # BLD AUTO: 2.75 THOUSANDS/ΜL (ref 0.6–4.47)
LYMPHOCYTES NFR BLD AUTO: 29 % (ref 14–44)
MCH RBC QN AUTO: 31 PG (ref 26.8–34.3)
MCHC RBC AUTO-ENTMCNC: 35 G/DL (ref 31.4–37.4)
MCV RBC AUTO: 89 FL (ref 82–98)
MONOCYTES # BLD AUTO: 0.96 THOUSAND/ΜL (ref 0.17–1.22)
MONOCYTES NFR BLD AUTO: 10 % (ref 4–12)
NEUTROPHILS # BLD AUTO: 5.48 THOUSANDS/ΜL (ref 1.85–7.62)
NEUTS SEG NFR BLD AUTO: 59 % (ref 43–75)
NRBC BLD AUTO-RTO: 0 /100 WBCS
PLATELET # BLD AUTO: 254 THOUSANDS/UL (ref 149–390)
PMV BLD AUTO: 9.8 FL (ref 8.9–12.7)
POTASSIUM SERPL-SCNC: 3.5 MMOL/L (ref 3.5–5.3)
PROT SERPL-MCNC: 6.8 G/DL (ref 6.4–8.2)
RBC # BLD AUTO: 5.03 MILLION/UL (ref 3.88–5.62)
SODIUM SERPL-SCNC: 139 MMOL/L (ref 136–145)
VALPROATE SERPL-MCNC: 92 UG/ML (ref 50–100)
WBC # BLD AUTO: 9.43 THOUSAND/UL (ref 4.31–10.16)

## 2018-02-06 PROCEDURE — 99223 1ST HOSP IP/OBS HIGH 75: CPT | Performed by: PHYSICIAN ASSISTANT

## 2018-02-06 PROCEDURE — 85025 COMPLETE CBC W/AUTO DIFF WBC: CPT | Performed by: EMERGENCY MEDICINE

## 2018-02-06 PROCEDURE — 71046 X-RAY EXAM CHEST 2 VIEWS: CPT

## 2018-02-06 PROCEDURE — 80164 ASSAY DIPROPYLACETIC ACD TOT: CPT | Performed by: EMERGENCY MEDICINE

## 2018-02-06 PROCEDURE — 70490 CT SOFT TISSUE NECK W/O DYE: CPT

## 2018-02-06 PROCEDURE — 70250 X-RAY EXAM OF SKULL: CPT

## 2018-02-06 PROCEDURE — 70450 CT HEAD/BRAIN W/O DYE: CPT

## 2018-02-06 PROCEDURE — 80053 COMPREHEN METABOLIC PANEL: CPT | Performed by: EMERGENCY MEDICINE

## 2018-02-06 PROCEDURE — 80175 DRUG SCREEN QUAN LAMOTRIGINE: CPT | Performed by: EMERGENCY MEDICINE

## 2018-02-06 PROCEDURE — 96374 THER/PROPH/DIAG INJ IV PUSH: CPT

## 2018-02-06 PROCEDURE — 82140 ASSAY OF AMMONIA: CPT | Performed by: EMERGENCY MEDICINE

## 2018-02-06 PROCEDURE — 83605 ASSAY OF LACTIC ACID: CPT | Performed by: EMERGENCY MEDICINE

## 2018-02-06 PROCEDURE — 74018 RADEX ABDOMEN 1 VIEW: CPT

## 2018-02-06 PROCEDURE — 71250 CT THORAX DX C-: CPT

## 2018-02-06 PROCEDURE — 36415 COLL VENOUS BLD VENIPUNCTURE: CPT | Performed by: EMERGENCY MEDICINE

## 2018-02-06 PROCEDURE — 74150 CT ABDOMEN W/O CONTRAST: CPT

## 2018-02-06 PROCEDURE — 99285 EMERGENCY DEPT VISIT HI MDM: CPT

## 2018-02-06 PROCEDURE — 99222 1ST HOSP IP/OBS MODERATE 55: CPT | Performed by: INTERNAL MEDICINE

## 2018-02-06 RX ORDER — DIVALPROEX SODIUM 500 MG/1
1000 TABLET, DELAYED RELEASE ORAL EVERY 8 HOURS SCHEDULED
Status: DISCONTINUED | OUTPATIENT
Start: 2018-02-06 | End: 2018-02-07

## 2018-02-06 RX ORDER — ALBUTEROL SULFATE 90 UG/1
2 AEROSOL, METERED RESPIRATORY (INHALATION) EVERY 4 HOURS PRN
Status: DISCONTINUED | OUTPATIENT
Start: 2018-02-06 | End: 2018-02-12 | Stop reason: HOSPADM

## 2018-02-06 RX ORDER — KETOROLAC TROMETHAMINE 30 MG/ML
15 INJECTION, SOLUTION INTRAMUSCULAR; INTRAVENOUS ONCE
Status: COMPLETED | OUTPATIENT
Start: 2018-02-06 | End: 2018-02-06

## 2018-02-06 RX ORDER — OXYCODONE HYDROCHLORIDE 5 MG/1
5 TABLET ORAL EVERY 4 HOURS PRN
Status: DISCONTINUED | OUTPATIENT
Start: 2018-02-06 | End: 2018-02-07

## 2018-02-06 RX ORDER — ALPRAZOLAM 0.25 MG/1
0.25 TABLET ORAL 3 TIMES DAILY PRN
Status: DISCONTINUED | OUTPATIENT
Start: 2018-02-06 | End: 2018-02-12 | Stop reason: HOSPADM

## 2018-02-06 RX ORDER — OXYCODONE HYDROCHLORIDE 10 MG/1
10 TABLET ORAL EVERY 4 HOURS PRN
Status: DISCONTINUED | OUTPATIENT
Start: 2018-02-06 | End: 2018-02-07

## 2018-02-06 RX ORDER — LORAZEPAM 1 MG/1
1 TABLET ORAL EVERY 8 HOURS PRN
COMMUNITY
End: 2018-02-12 | Stop reason: HOSPADM

## 2018-02-06 RX ORDER — LAMOTRIGINE 100 MG/1
200 TABLET ORAL 2 TIMES DAILY
Status: DISCONTINUED | OUTPATIENT
Start: 2018-02-06 | End: 2018-02-12 | Stop reason: HOSPADM

## 2018-02-06 RX ORDER — ACETAMINOPHEN 325 MG/1
975 TABLET ORAL EVERY 8 HOURS
Status: DISCONTINUED | OUTPATIENT
Start: 2018-02-06 | End: 2018-02-11

## 2018-02-06 RX ORDER — MELATONIN
2000 DAILY
Status: DISCONTINUED | OUTPATIENT
Start: 2018-02-07 | End: 2018-02-12 | Stop reason: HOSPADM

## 2018-02-06 RX ADMIN — OXYCODONE HYDROCHLORIDE 5 MG: 5 TABLET ORAL at 22:43

## 2018-02-06 RX ADMIN — HYDROMORPHONE HYDROCHLORIDE 0.5 MG: 1 INJECTION, SOLUTION INTRAMUSCULAR; INTRAVENOUS; SUBCUTANEOUS at 23:38

## 2018-02-06 RX ADMIN — ACETAMINOPHEN 975 MG: 325 TABLET, FILM COATED ORAL at 21:03

## 2018-02-06 RX ADMIN — ALPRAZOLAM 0.25 MG: 0.25 TABLET ORAL at 23:33

## 2018-02-06 RX ADMIN — DIVALPROEX SODIUM 1000 MG: 500 TABLET, DELAYED RELEASE ORAL at 21:03

## 2018-02-06 RX ADMIN — LAMOTRIGINE 200 MG: 100 TABLET ORAL at 21:03

## 2018-02-06 RX ADMIN — KETOROLAC TROMETHAMINE 15 MG: 30 INJECTION, SOLUTION INTRAMUSCULAR at 15:45

## 2018-02-06 RX ADMIN — FLUTICASONE PROPIONATE AND SALMETEROL 1 PUFF: 50; 100 POWDER RESPIRATORY (INHALATION) at 21:06

## 2018-02-06 NOTE — ED NOTES
Mother states "previous to his shunt revision all of his medications were doubled due to increased seizure activity    After the revision he is no longer having seizure activity but he is having more side effects from the depakote   I know he needs his medications adjusted and I know the fastest way to do this is to come to the ER instead of waiting to seeing the neurologist"      Pollo Duncan, RN  02/06/18 2059

## 2018-02-06 NOTE — ASSESSMENT & PLAN NOTE
· On Rizotriptan 10mg prn HA  · Given Toradol in the ED, states it helped but headache has returned   · Tylenol 975 mg t i d

## 2018-02-06 NOTE — PLAN OF CARE
DISCHARGE PLANNING     Discharge to home or other facility with appropriate resources Progressing        Knowledge Deficit     Patient/family/caregiver demonstrates understanding of disease process, treatment plan, medications, and discharge instructions Progressing        NEUROSENSORY - ADULT     Achieves stable or improved neurological status Progressing     Absence of seizures Progressing     Remains free of injury related to seizures activity Progressing     Achieves maximal functionality and self care Progressing        PAIN - ADULT     Verbalizes/displays adequate comfort level or baseline comfort level Progressing        Potential for Falls     Patient will remain free of falls Progressing        SAFETY ADULT     Maintain or return to baseline ADL function Progressing     Maintain or return mobility status to optimal level Progressing

## 2018-02-06 NOTE — ASSESSMENT & PLAN NOTE
· R/o obstruction/kink  · History of congenital hydrocephalus, s/p  shunt with multiple revisions and replacements; last shunt procedure 10/23/17 (DKO) shunt ventricular-peritoneal revision (Left)   · Seen by Neurosurgery, will assess for kink in shunt tomorrow  · Neurology and neurosurgery follow-up  · NPO after midnight

## 2018-02-06 NOTE — TELEPHONE ENCOUNTER
Patient's mother called concerned that the patient is over medicated  She reports the patient had a shunt revision and at that time neurosurgery increased his medication  She read down the medication insert and reports he has all of these side effects: headache, insomnia, eye pain, back pain, tremor, paranoia  Denies breakthrough seizures  You have a 30 minute slot on Friday, would you like to see the patient during that time?       Depakote  500mg  2 tabs TID  Lamictal 200mg 1 tab BID

## 2018-02-06 NOTE — ED PROVIDER NOTES
History  Chief Complaint   Patient presents with    Medication Management     tremors from depakote, anxiety and insomnia, patient reports head and back pain and inability to express thoughts  patients mother states she believes the depakote dose he is currently taking is too high after the recent shunt revision causing increased side effects of depakote     43year old male with history of cerebral palsy presents for evaluation of headache and left thoracic back pain for the past 2 weeks  Headache is bilateral/frontal, sharp with radiation to the eyes  Slight improvement with tylenol last taken around 10 am this morning  Patient also noted to be more irritable and anxious per mother with difficulty sleeping at night  She also notes that patient has developed a mild facial tick over the past week  According to patient's mother, he had been having daily seizures requiring increase in his antiepileptics up until October 2017 at which point a shunt revision was performed  Following the shunt revision, patient's seizure activity ceased, but his medications were not adjusted  Patient went to his PCP on 2/1/2018 and was diagnosed with sinusitis which was treated with azithromycin  No change in symptoms with antibiotics which he completed  Patient denies nasal congestion, cough, fever, chills, nausea or vomiting  History provided by:  Patient and parent  Headache   Pain location:  Frontal  Quality:  Sharp  Radiates to:  Eyes  Pain severity now: severe  Pain scale at highest: severe    Onset quality:  Gradual  Duration:  2 weeks  Timing:  Constant (initially intermittent, now constant)  Progression:  Waxing and waning  Chronicity:  New  Relieved by:  Acetaminophen  Worsened by:  Nothing  Ineffective treatments:  None tried  Associated symptoms: no abdominal pain, no congestion, no cough, no diarrhea, no fatigue, no fever, no myalgias, no nausea, no neck pain, no neck stiffness, no seizures, no sore throat, no vomiting and no weakness    Risk factors comment:  Shunt      Prior to Admission Medications   Prescriptions Last Dose Informant Patient Reported? Taking? ALPRAZolam (XANAX) 0 25 mg tablet 2/5/2018 at Unknown time  Yes Yes   Sig: Take by mouth   albuterol (PROAIR HFA) 90 mcg/act inhaler Past Month at Unknown time  Yes Yes   Sig: ProAir  (90 Base) MCG/ACT Inhalation Aerosol Solution  INHALE 1 TO 2 PUFFS EVERY 4 TO 6 HOURS AS NEEDED  Quantity: 1;  Refills: 5       Myah Wilkinson DO;  Started 23-Dec-2015  Stephanie Forrest  5 GM Inhaler   cholecalciferol (VITAMIN D3) 1,000 units tablet 2/6/2018 at Unknown time  Yes Yes   Sig: Take 1,000 Units by mouth daily Pt takes 2,000 units per day   divalproex sodium (DEPAKOTE) 500 mg EC tablet 2/6/2018 at Unknown time  No Yes   Sig: Take 2 tablets by mouth every 8 (eight) hours   fluticasone-salmeterol (ADVAIR DISKUS) 100-50 mcg/dose 2/6/2018 at Unknown time  Yes Yes   Sig: Advair Diskus 100-50 MCG/DOSE Inhalation Aerosol Powder Breath Activated  TAKE 1 PUFF TWICE A DAY   Quantity: 1;  Refills: 4       Fiorella Aguilar DO;  Started 23-Dec-2015  [id number] Aerosol Powder Breath Activated Disp Pack   lamoTRIgine (LaMICtal) 200 MG tablet 2/6/2018 at Unknown time  No Yes   Sig: Take 1 tablet by mouth 2 (two) times a day   rizatriptan (MAXALT-MLT) 10 MG disintegrating tablet Past Week at Unknown time  Yes Yes   Sig: Rizatriptan Benzoate 10 MG Oral Tablet Dispersible  TAKE 1 TABLET AT ONSET OF HEADACHE  MAY REPEAT EVERY 2 HOURS AS NEEDED  MAXIMUM 3 TABLETS IN 24 HOURS     Quantity: 9;  Refills: 5       Billie Friendly M D ;  Started [id number]  Active      Facility-Administered Medications: None       Past Medical History:   Diagnosis Date    Asthma     [facility] [facility]     [facility]en[facility]cephalus [facility] [facility]     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[facility] [address] [facility]     Migraines     Seizures (HCC)        Past Surgical History:   Procedure Laterality Date    BRAIN SURGERY      Multiple  shunt revisions  HERNIA REPAIR      DC CREATE SHUNT:VENTRIC-PERITONEAL Left 10/23/2017    Procedure: SHUNT VENTRICULAR-PERITONEAL revision;  Surgeon: Faizan Castle MD;  Location: [facility];  Service: Neurosurgery       Family History   Problem Relation Age of Onset    Family history unknown: Yes     I have reviewed and agree with the history as documented  Social History   Substance Use Topics    Smoking status: Never Smoker    Smokeless tobacco: Never Used      Comment: N/A    Alcohol use No      Comment: N/A        Review of Systems   Constitutional: Negative for appetite change, diaphoresis, fatigue and fever  HENT: Negative for congestion, rhinorrhea and sore throat  Respiratory: Negative for cough, chest tightness and shortness of breath  Cardiovascular: Negative for chest pain, palpitations and leg swelling  Gastrointestinal: Negative for abdominal pain, constipation, diarrhea, nausea and vomiting  Genitourinary: Negative for difficulty urinating, dysuria, frequency and hematuria  Musculoskeletal: Negative for myalgias, neck pain and neck stiffness  Skin: Negative for pallor  Neurological: Positive for headaches  Negative for seizures, syncope and weakness  Psychiatric/Behavioral: Positive for agitation and sleep disturbance  All other systems reviewed and are negative  Physical Exam  ED Triage Vitals [02/06/18 1204]   Temperature Pulse Respirations Blood Pressure SpO2   97 9 °F (36 6 °C) 79 18 142/72 95 %      Temp Source Heart Rate Source Patient Position - Orthostatic VS BP Location FiO2 (%)   Oral Monitor Sitting Right arm --      Pain Score       Worst Possible Pain           Orthostatic Vital Signs  Vitals:    02/06/18 1204 02/06/18 1320 02/06/18 1456   BP: 142/72 134/81 145/74   Pulse: 79 82 69   Patient Position - Orthostatic VS: Sitting Lying Lying       Physical Exam   Constitutional: He appears well-developed and well-nourished  Non-toxic appearance  No distress     HENT: Head: Normocephalic and atraumatic  Eyes: EOM are normal  Pupils are equal, round, and reactive to light  Neck: Normal range of motion  No tracheal deviation present  No thyromegaly present  Cardiovascular: Normal rate, regular rhythm, normal heart sounds and intact distal pulses  Pulmonary/Chest: Effort normal and breath sounds normal    Abdominal: Soft  Bowel sounds are normal  He exhibits no distension  There is no tenderness  Musculoskeletal: He exhibits no edema  Lymphadenopathy:     He has no cervical adenopathy  Neurological: He is alert  Skin: Skin is warm and dry  He is not diaphoretic  Nursing note and vitals reviewed  ED Medications  Medications   ketorolac (TORADOL) injection 15 mg (15 mg Intravenous Given 2/6/18 2725)       Diagnostic Studies  Results Reviewed     Procedure Component Value Units Date/Time    Lamotrigine level [[id number]] Collected:  02/06/18 1622    Lab Status:   In process Specimen:  Blood from Arm, Right Updated:  02/06/18 1630    Ammonia [[id number]]  (Abnormal) Collected:  02/06/18 1328    Lab Status:  Final result Specimen:  Blood from Arm, Right Updated:  02/06/18 1411     Ammonia 51 (H) umol/L     Comprehensive metabolic panel [[id number]] Collected:  02/06/18 1328    Lab Status:  Final result Specimen:  Blood from Arm, Right Updated:  02/06/18 1407     Sodium 139 mmol/L      Potassium 3 5 mmol/L      Chloride 102 mmol/L      CO2 31 mmol/L      Anion Gap 6 mmol/L      BUN 13 mg/dL      Creatinine 0 74 mg/dL      Glucose 88 mg/dL      Calcium 9 2 mg/dL      AST 10 U/L      ALT 23 U/L      Alkaline Phosphatase 53 U/L      Total Protein 6 8 g/dL      Albumin 3 6 g/dL      Total Bilirubin 0 49 mg/dL      eGFR 114 ml/min/1 73sq m     Narrative:         National Kidney Disease Education Program recommendations are as follows:  GFR calculation is accurate only with a steady state creatinine  Chronic Kidney disease less than 60 ml/min/1 73 sq  meters  Kidney failure less than 15 ml/min/1 73 sq  meters  Valproic acid level, total [[id number]]  (Normal) Collected:  02/06/18 1328    Lab Status:  Final result Specimen:  Blood from Arm, Right Updated:  02/06/18 1407     Valproic Acid, Total 92 ug/mL     Lactic acid, plasma [[id number]]  (Normal) Collected:  02/06/18 1328    Lab Status:  Final result Specimen:  Blood from Arm, Right Updated:  02/06/18 1406     LACTIC ACID 1 4 mmol/L     Narrative:         Result may be elevated if tourniquet was used during collection  CBC and differential [[id number]]  (Normal) Collected:  02/06/18 1328    Lab Status:  Final result Specimen:  Blood from Arm, Right Updated:  02/06/18 1349     WBC 9 43 Thousand/uL      RBC 5 03 Million/uL      Hemoglobin 15 6 g/dL      Hematocrit 44 6 %      MCV 89 fL      MCH 31 0 pg      MCHC 35 0 g/dL      RDW 12 4 %      MPV 9 8 fL      Platelets 271 Thousands/uL      nRBC 0 /100 WBCs      Neutrophils Relative 59 %      Lymphocytes Relative 29 %      Monocytes Relative 10 %      Eosinophils Relative 2 %      Basophils Relative 0 %      Neutrophils Absolute 5 48 Thousands/µL      Lymphocytes Absolute 2 75 Thousands/µL      Monocytes Absolute 0 96 Thousand/µL      Eosinophils Absolute 0 15 Thousand/µL      Basophils Absolute 0 02 Thousands/µL     POCT urinalysis dipstick [[id number]]     Lab Status:  No result Specimen:  Urine                  XR shunt series   Final Result by Grace Aguilar DO (02/06 0460)      Left sided shunt catheter set at 100 mm of water  As the catheter exits the calvarium, prior to the valve, the shunt tubing appears discontinuous  Additional shunt catheters are abandoned  I personally discussed this study with Abdirashid Mcghee on 2/6/2018 at 3:39 PM                                Workstation performed: [id number]         CT head without contrast   Final Result by Jacque Fields MD (02/06 5197)      1  No acute intracranial abnormality     2  3 left-sided ventriculostomy catheters again noted, stable in position  Stable appearance of the ventricles and sulci  Workstation performed: [id number]         CT chest and abdomen wo contrast    (Results Pending)   CT soft tissue neck wo contrast    (Results Pending)         Procedures  Procedures      Phone Consults  ED Phone Contact    ED Course  ED Course                                MDM  Number of Diagnoses or Management Options  Acute headache: new and requires workup  Shunt malfunction, initial encounter: new and requires workup  Diagnosis management comments: 43year old male presents for evaluation of headaches, anxiety, insomnia and new facial tick for the past 2 weeks  Recent antibiotics for sinusitis  Labs unremarkable with the exception of a mild elevation in ammonia  Interruption in shunt on shunt series  Neurosurgery consulted  CT head unchanged  CT neck/chest/abdomen to further evaluate shunt per neurosurgery recommendation  Patient admitted for further evaluation and management  Amount and/or Complexity of Data Reviewed  Clinical lab tests: ordered and reviewed  Tests in the radiology section of CPT®: ordered and reviewed  Discuss the patient with other providers: yes    Patient Progress  Patient progress: stable    CritCare Time    Disposition  Final diagnoses:   Shunt malfunction, initial encounter   Acute headache     Time reflects when diagnosis was documented in both MDM as applicable and the Disposition within this note     Time User Action Codes Description Comment    2/6/2018  3:50 PM Adilene Richard Add [[id number]] Shunt malfunction, initial encounter     2/6/2018  3:50 PM Denisha Maldonado Add [R51] Acute headache       ED Disposition     ED Disposition Condition Comment    Admit  Case was discussed with SCOTT and the patient's admission status was agreed to be Admission Status: inpatient status to the service of Dr Olivia Mora           Follow-up Information    None       Patient's Medications Discharge Prescriptions    No medications on file     No discharge procedures on file  ED Provider  Attending physically available and evaluated [employer]  I managed the patient along with the ED Attending      Electronically Signed by         Angela Maldonado MD  02/06/18 7052

## 2018-02-06 NOTE — ASSESSMENT & PLAN NOTE
· History of congenital hydrocephalus, s/p  shunt with multiple revisions and replacements; last shunt procdure 10/23/17 (DKO) SHUNT VENTRICULAR-PERITONEAL revision (Left)

## 2018-02-06 NOTE — TELEPHONE ENCOUNTER
Mother called back to inform you she is taking pt to [facility] because he is in a lot of pain  Having pain in his back, head, BL eyes  Mother feels this is the quickest way to have levels drawn and to have pain addressed

## 2018-02-06 NOTE — ASSESSMENT & PLAN NOTE
· Mild cerebral palsy with a history of congenital hydrocephalus, s/p  shunt with multiple revisions and replacements   · AAOx[employer], ambulatory, works at [employer]   · Supportive care

## 2018-02-06 NOTE — ED ATTENDING ATTESTATION
Jennifer Amaro MD, saw and evaluated the patient  I have discussed the patient with the resident/non-physician practitioner and agree with the resident's/non-physician practitioner's findings, Plan of Care, and MDM as documented in the resident's/non-physician practitioner's note, except where noted  All available labs and Radiology studies were reviewed  At this point I agree with the current assessment done in the Emergency Department  I have conducted an independent evaluation of this patient a history and physical is as follows:    Patient presents to the emergency department with his mother who provides the majority of the history  The mother reports that some months ago the patient had a shunt revision and that resulted in decreased seizures and the  patient has had no seizures since then  Prior to the shunt revision which was done for increasing seizures, the patient had his and  Anti epileptic medication  increased and these medication adjustments were not changed after the shunt revision  Since the increased dose of his seizure medications the mother has noticed that the  Patient has had tremors that have been gradually increasing and he has had several weeks of a gradually increasing mild frontal headache  There has been no fevers or injury  The patient complains of no neck pain  The patient has also had decreased sleep for the last 2 days  The patient's mental status has not changed  Physical exam demonstrates a pleasant nontoxic male who appears to be in no discomfort  HEENT exam is normal   The neck was supple nontender  Lungs are clear with equal breath sounds  The heart had a regular rate rhythm  Abdomen is soft and nontender  The patient had no rash  The patient had slight weakness on the left compared to the right which the patient's mother reports is normal for him given his cerebral birth trauma      Critical Care Time  CritCare Time    Procedures

## 2018-02-06 NOTE — H&P
H&P- [place] Goldie 1975, 43 y o  male MRN: [id number]    Unit/Bed#: [facility] 710-01 Encounter: [id number]    Primary Care Provider: Luz Marina Marvin DO   Date and time admitted to hospital: 2/6/2018 11:58 AM        * Obstructed  shunt (HCC)   Assessment & Plan    · R/o obstruction/kink  · History of congenital hydrocephalus, s/p  shunt with multiple revisions and replacements; last shunt procedure 10/23/17 (DKO) shunt ventricular-peritoneal revision (Left)   · Seen by Neurosurgery, will assess for kink in shunt tomorrow  · Neurology and neurosurgery follow-up  · NPO after midnight        Other complicated headache syndrome   Assessment & Plan    · On Rizotriptan 10mg prn HA  · Given Toradol in the ED, states it helped but headache has returned   · Tylenol 975 mg t i d        Seizure ([facility], [place] [address] )   Assessment & Plan    · Home meds: Lamictal 200 b i d  and Depakote 1000 mg t i d   · Valproic level 92, normal  · Continue home meds  · Seizure precautions  · Neurology follow-up        Cerebral palsy ([facility], [place] [address] )   Assessment & Plan    · Mild cerebral palsy with a history of congenital hydrocephalus, s/p  shunt with multiple revisions and replacements   · AAOx3, ambulatory, works at [employer]   · Supportive care          Mild intermittent asthma without complication   Assessment & Plan    · Stable, mild-intermittent, no acute exacerbation  · Continue Advair              VTE Prophylaxis: ambulate  / reason for no mechanical VTE prophylaxis Low risk   Code Status:  Full code  POLST: POLST is not applicable to this patient  Discussion with family:  Mother    Anticipated Length of Stay:  Patient will be admitted on an Inpatient basis with an anticipated length of stay of  greater than 2 midnights  Justification for Hospital Stay:  Possible shunt revision    Total Time for Visit, including Counseling / Coordination of Care: 1 hour    Greater than 50% of this total time spent on direct patient counseling and coordination of care  Chief Complaint:  Tremors, anxiety, insomnia, head and back pain; inability to express thoughts  His mother believes the depakote dose he is currently taking is too high causing increased side effects of depakote    History of Present Illness:    Cait Blount is a 43 y o  male who presents with Tremors, anxiety, insomnia, headache, back pain and inability to express thoughts  PMH: cerebral palsy, congenital hydrocephalus, seizure disorder, migraine and asthma  History of multiple revisions and replacements  Last hospitalization 10/20-10/26/17 for  shunt malfunction and multiple breakthrough seizures in the setting of a malfunctioning  shunt S/p shunt procedure 10/23/17 (DKO) shunt ventricular-peritoneal (left)  He was placed on a higher dose of Lamotrigine 200mg bid and double the dose of Depakote 1000mg TID  His mother attributes his symptoms to the increased dose of Depakote  Valproic acid level 92 on admission  Neurosurgery and neuro consult ordered  Followed closely by Neurology and Neurosurgery  Review of Systems:    Review of Systems   Constitutional: Negative  HENT: Negative  Eyes: Negative  Respiratory: Negative  Cardiovascular: Negative  Gastrointestinal: Negative  Genitourinary: Negative  Musculoskeletal: Positive for back pain  Neurological: Positive for headaches          Facial tick, unable to express himself   Psychiatric/Behavioral:        Anxious, insomnia       Past Medical and Surgical History:     Past Medical History:   Diagnosis Date    Asthma     Cerebral palsy ([address] )     Congenital hydrocephalus ([address] )     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[address] )     Migraines     Seizures ([facility] [address]        Past Surgical History:   Procedure Laterality Date    BRAIN SURGERY      Multiple  shunt revisions    HERNIA REPAIR      AL CREATE SHUNT:VENTRIC-PERITONEAL Left 10/23/2017    Procedure: SHUNT VENTRICULAR-PERITONEAL revision;  Surgeon: Sera Carbajal MD;  Location: [facility];  Service: Neurosurgery       Meds/Allergies:    Prior to Admission medications    Medication Sig Start Date End Date Taking? Authorizing Provider   albuterol (PROAIR HFA) 90 mcg/act inhaler ProAir  (90 Base) MCG/ACT Inhalation Aerosol Solution  INHALE 1 TO 2 PUFFS EVERY 4 TO 6 HOURS AS NEEDED  Quantity: 1;  Refills: 5       Myah Wilkinson DO;  Started 23-Dec-2015  Curtis Pardo  5 GM Inhaler 12/23/15  Yes Historical Provider, MD   ALPRAZolam Levar Lions) 0 25 mg tablet Take by mouth 12/23/15  Yes Historical Provider, MD   cholecalciferol (VITAMIN D3) 1,000 units tablet Take 1,000 Units by mouth daily Pt takes 2,000 units per day   Yes Historical Provider, MD   divalproex sodium (DEPAKOTE) 500 mg EC tablet Take 2 tablets by mouth every 8 (eight) hours 10/26/17  Yes Angela Melton MD   fluticasone-salmeterol (ADVAIR DISKUS) 100-50 mcg/dose Advair Diskus 100-50 MCG/DOSE Inhalation Aerosol Powder Breath Activated  TAKE 1 PUFF TWICE A DAY   Quantity: 1;  Refills: 4       Ravinder Bell DO;  Started 23-Dec-2015  [id number] Aerosol Powder Breath Activated Disp Pack 12/23/15  Yes Historical Provider, MD   lamoTRIgine (LaMICtal) 200 MG tablet Take 1 tablet by mouth 2 (two) times a day 10/26/17  Yes Angela Melton MD   rizatriptan (MAXALT-MLT) 10 MG disintegrating tablet Rizatriptan Benzoate 10 MG Oral Tablet Dispersible  TAKE 1 TABLET AT ONSET OF HEADACHE  MAY REPEAT EVERY 2 HOURS AS NEEDED  MAXIMUM 3 TABLETS IN 24 HOURS     Quantity: 9;  Refills: 5       Nguyen SAEED ;  Started [id number]  Active 2/5/16  Yes Historical Provider, MD   docusate sodium (COLACE) 100 mg capsule Take 1 capsule by mouth 2 (two) times a day 10/26/17 2/6/18 Yes Angela Melton MD   azithromycin (ZITHROMAX) 250 mg tablet Take 2 tablets today then 1 tablet daily x 4 days 2/1/18 2/6/18  Jose Crystal DO   chlorhexidine (PERIDEX) 0 12 % solution Apply 15 mL to the mouth or throat every 12 (twelve) hours 10/26/17 2/6/18  Ping Vogel MD   polyethylene glycol (MIRALAX) 17 g packet Take 17 g by mouth daily as needed (Constipation) 10/26/17 2/6/18  Ping Vogel MD   senna (SENOKOT) 8 6 mg Take 1 tablet by mouth 2 (two) times a day 10/26/17 2/6/18  Ping Vogel MD     I have reviewed home medications with patient family member  Allergies: Allergies   Allergen Reactions    Latex Hives    Other      Cats, seasonal    Pollen Extract        Social History:     Marital Status: Single   Occupation: works at [employer]  Patient Pre-hospital Living Situation:  Lives with parents  Patient Pre-hospital Level of Mobility:  Ambulatory  Patient Pre-hospital Diet Restrictions:  None  Substance Use History:   History   Alcohol Use No     Comment: N/A     History   Smoking Status    Never Smoker   Smokeless Tobacco    Never Used     Comment: N/A     History   Drug Use No     Comment: N/A       Family History:    non-contributory    Physical Exam:     Vitals:   Blood Pressure: 136/67 (02/06/18 1700)  Pulse: 75 (02/06/18 1700)  Temperature: 97 9 °F (36 6 °C) (02/06/18 1204)  Temp Source: Oral (02/06/18 1204)  Respirations: 18 (02/06/18 1700)  Height: 5' 6" (167 6 cm) (02/06/18 1700)  Weight - Scale: 84 7 kg (186 lb 11 7 oz) (02/06/18 1700)  SpO2: 95 % (02/06/18 1700)    Physical Exam   Constitutional: He is oriented to person, place, and time  He appears well-developed and well-nourished  No distress  HENT:   Healed scars from shunt   Neck: Neck supple  No JVD present  No thyromegaly present  Cardiovascular: Normal rate, regular rhythm and intact distal pulses  Exam reveals no gallop and no friction rub  No murmur heard  Pulmonary/Chest: Effort normal and breath sounds normal  No respiratory distress  He has no wheezes  He has no rales  He exhibits no tenderness  Abdominal: Soft  Bowel sounds are normal  He exhibits no distension and no mass   There is no tenderness  There is no rebound and no guarding  Musculoskeletal: He exhibits no edema, tenderness or deformity  Neurological: He is alert and oriented to person, place, and time  Strong B/L upper and lower extremities 5/5, PERRL  L eye intermittent lateral deviation-chronic   Skin: Skin is warm and dry  No rash noted  He is not diaphoretic  No erythema  No pallor  Psychiatric: He has a normal mood and affect  His behavior is normal  Judgment and thought content normal      Additional Data:     Lab Results: I have personally reviewed pertinent reports  Results from last 7 days  Lab Units 02/06/18  1328   WBC Thousand/uL 9 43   HEMOGLOBIN g/dL 15 6   HEMATOCRIT % 44 6   PLATELETS Thousands/uL 254   NEUTROS PCT % 59   LYMPHS PCT % 29   MONOS PCT % 10   EOS PCT % 2       Results from last 7 days  Lab Units 02/06/18  1328   SODIUM mmol/L 139   POTASSIUM mmol/L 3 5   CHLORIDE mmol/L 102   CO2 mmol/L 31   BUN mg/dL 13   CREATININE mg/dL 0 74   CALCIUM mg/dL 9 2   TOTAL PROTEIN g/dL 6 8   BILIRUBIN TOTAL mg/dL 0 49   ALK PHOS U/L 53   ALT U/L 23   AST U/L 10   GLUCOSE RANDOM mg/dL 88           Imaging: I have personally reviewed pertinent reports  CT soft tissue neck wo contrast   Final Result by Mayte Livingston DO (02/06 1714)      Normal CT of the neck  Of the 3 shunt catheters identified within the chest and abdomen, only a single shunt catheter extending superiorly  This shunt catheter extends above the scope of this examination  The other catheters terminate within the soft tissues  Workstation performed: [id number]         CT chest and abdomen wo contrast   Final Result by Mayte Livingston DO (02/06 1709)      There are 3 shunt catheters identified within the left neck, chest wall and abdominal wall, terminating within the midabdomen    The most laterally located of these within the abdomen appears to terminate within the left neck and does not clearly extend    above the scope of this exam       4 mm left lower lobe pulmonary nodule, series 8 image 36  Based on current Fleischner Society 2017 Guidelines on incidental pulmonary nodule, no routine follow-up is needed if the patient is considered low risk for lung cancer  If the patient is    considered high risk for lung cancer, 12 month follow-up non-contrast chest CT is recommended  Workstation performed: [id number]         XR shunt series   Final Result by Dima Alcala DO (02/06 5850)      Left sided shunt catheter set at 100 mm of water  As the catheter exits the calvarium, prior to the valve, the shunt tubing appears discontinuous  Additional shunt catheters are abandoned  I personally discussed this study with Rebecca Doll on 2/6/2018 at 3:39 PM                                Workstation performed: [id number]         CT head without contrast   Final Result by Vic Mcdonald MD (02/06 1365)      1  No acute intracranial abnormality  2  3 left-sided ventriculostomy catheters again noted, stable in position  Stable appearance of the ventricles and sulci  Workstation performed: [id number]             EKG, Pathology, and Other Studies Reviewed on Admission:   · CT head, neck, chest/abd, head, shunt series    Allscripts / Epic Records Reviewed: Yes     ** Please Note: This note has been constructed using a voice recognition system   **

## 2018-02-06 NOTE — ASSESSMENT & PLAN NOTE
· Home meds: Lamictal 200 b i d  and Depakote 1000 mg t i d   · Valproic level 92, normal  · Continue home meds  · Seizure precautions  · Neurology follow-up

## 2018-02-07 ENCOUNTER — ANESTHESIA (INPATIENT)
Dept: PERIOP | Facility: HOSPITAL | Age: 43
DRG: 982 | End: 2018-02-07
Payer: MEDICARE

## 2018-02-07 ENCOUNTER — APPOINTMENT (INPATIENT)
Dept: RADIOLOGY | Facility: HOSPITAL | Age: 43
DRG: 982 | End: 2018-02-07
Attending: NEUROLOGICAL SURGERY
Payer: MEDICARE

## 2018-02-07 ENCOUNTER — ANESTHESIA EVENT (INPATIENT)
Dept: PERIOP | Facility: HOSPITAL | Age: 43
DRG: 982 | End: 2018-02-07
Payer: MEDICARE

## 2018-02-07 PROBLEM — T85.618A SHUNT MALFUNCTION: Status: ACTIVE | Noted: 2018-02-06

## 2018-02-07 PROBLEM — R51.9 ACUTE HEADACHE: Status: ACTIVE | Noted: 2018-02-06

## 2018-02-07 LAB
ANION GAP SERPL CALCULATED.3IONS-SCNC: 8 MMOL/L (ref 4–13)
APPEARANCE CSF: NORMAL
BASOPHILS # BLD AUTO: 0.02 THOUSANDS/ΜL (ref 0–0.1)
BASOPHILS NFR BLD AUTO: 0 % (ref 0–1)
BILIRUB UR QL STRIP: NEGATIVE
BUN SERPL-MCNC: 18 MG/DL (ref 5–25)
CALCIUM SERPL-MCNC: 8.8 MG/DL (ref 8.3–10.1)
CHLORIDE SERPL-SCNC: 106 MMOL/L (ref 100–108)
CLARITY UR: CLEAR
CO2 SERPL-SCNC: 29 MMOL/L (ref 21–32)
COLOR UR: YELLOW
CREAT SERPL-MCNC: 0.73 MG/DL (ref 0.6–1.3)
EOSINOPHIL # BLD AUTO: 0.23 THOUSAND/ΜL (ref 0–0.61)
EOSINOPHIL NFR BLD AUTO: 2 % (ref 0–6)
ERYTHROCYTE [DISTWIDTH] IN BLOOD BY AUTOMATED COUNT: 12.6 % (ref 11.6–15.1)
GFR SERPL CREATININE-BSD FRML MDRD: 114 ML/MIN/1.73SQ M
GLUCOSE CSF-MCNC: 57 MG/DL (ref 50–80)
GLUCOSE SERPL-MCNC: 84 MG/DL (ref 65–140)
GLUCOSE UR STRIP-MCNC: NEGATIVE MG/DL
GRAM STN SPEC: NORMAL
HCT VFR BLD AUTO: 44.3 % (ref 36.5–49.3)
HGB BLD-MCNC: 15.5 G/DL (ref 12–17)
HGB UR QL STRIP.AUTO: NEGATIVE
INR PPP: 1.09 (ref 0.86–1.16)
KETONES UR STRIP-MCNC: NEGATIVE MG/DL
LAMOTRIGINE SERPL-MCNC: 16 UG/ML (ref 2–20)
LEUKOCYTE ESTERASE UR QL STRIP: NEGATIVE
LYMPHOCYTES # BLD AUTO: 3.5 THOUSANDS/ΜL (ref 0.6–4.47)
LYMPHOCYTES NFR BLD AUTO: 31 % (ref 14–44)
MCH RBC QN AUTO: 31.2 PG (ref 26.8–34.3)
MCHC RBC AUTO-ENTMCNC: 35 G/DL (ref 31.4–37.4)
MCV RBC AUTO: 89 FL (ref 82–98)
MONOCYTES # BLD AUTO: 1.51 THOUSAND/ΜL (ref 0.17–1.22)
MONOCYTES NFR BLD AUTO: 13 % (ref 4–12)
NEUTROPHILS # BLD AUTO: 6.04 THOUSANDS/ΜL (ref 1.85–7.62)
NEUTS SEG NFR BLD AUTO: 54 % (ref 43–75)
NITRITE UR QL STRIP: NEGATIVE
NRBC BLD AUTO-RTO: 0 /100 WBCS
PH UR STRIP.AUTO: 7 [PH] (ref 4.5–8)
PLATELET # BLD AUTO: 255 THOUSANDS/UL (ref 149–390)
PMV BLD AUTO: 10.1 FL (ref 8.9–12.7)
POTASSIUM SERPL-SCNC: 4.1 MMOL/L (ref 3.5–5.3)
PROT CSF-MCNC: 42 MG/DL (ref 15–45)
PROT UR STRIP-MCNC: NEGATIVE MG/DL
PROTHROMBIN TIME: 14.1 SECONDS (ref 12.1–14.4)
RBC # BLD AUTO: 4.97 MILLION/UL (ref 3.88–5.62)
SODIUM SERPL-SCNC: 143 MMOL/L (ref 136–145)
SP GR UR STRIP.AUTO: 1.01 (ref 1–1.03)
TOTAL CELLS COUNTED BLD: NO
UROBILINOGEN UR QL STRIP.AUTO: 0.2 E.U./DL
WBC # BLD AUTO: 11.38 THOUSAND/UL (ref 4.31–10.16)
WBC # CSF AUTO: 0 /UL (ref 0–5)

## 2018-02-07 PROCEDURE — 99232 SBSQ HOSP IP/OBS MODERATE 35: CPT | Performed by: NURSE PRACTITIONER

## 2018-02-07 PROCEDURE — 81003 URINALYSIS AUTO W/O SCOPE: CPT | Performed by: PHYSICIAN ASSISTANT

## 2018-02-07 PROCEDURE — 85610 PROTHROMBIN TIME: CPT | Performed by: NURSE PRACTITIONER

## 2018-02-07 PROCEDURE — 99222 1ST HOSP IP/OBS MODERATE 55: CPT | Performed by: PHYSICIAN ASSISTANT

## 2018-02-07 PROCEDURE — 70250 X-RAY EXAM OF SKULL: CPT

## 2018-02-07 PROCEDURE — 89051 BODY FLUID CELL COUNT: CPT | Performed by: NEUROLOGICAL SURGERY

## 2018-02-07 PROCEDURE — 80048 BASIC METABOLIC PNL TOTAL CA: CPT | Performed by: NURSE PRACTITIONER

## 2018-02-07 PROCEDURE — 82945 GLUCOSE OTHER FLUID: CPT | Performed by: NEUROLOGICAL SURGERY

## 2018-02-07 PROCEDURE — 99232 SBSQ HOSP IP/OBS MODERATE 35: CPT | Performed by: NEUROLOGICAL SURGERY

## 2018-02-07 PROCEDURE — 8C01X6J COLLECTION OF CEREBROSPINAL FLUID FROM INDWELLING DEVICE IN NERVOUS SYSTEM: ICD-10-PCS | Performed by: NEUROLOGICAL SURGERY

## 2018-02-07 PROCEDURE — 84157 ASSAY OF PROTEIN OTHER: CPT | Performed by: NEUROLOGICAL SURGERY

## 2018-02-07 PROCEDURE — 85025 COMPLETE CBC W/AUTO DIFF WBC: CPT | Performed by: NURSE PRACTITIONER

## 2018-02-07 PROCEDURE — 61070 BRAIN CANAL SHUNT PROCEDURE: CPT | Performed by: NEUROLOGICAL SURGERY

## 2018-02-07 PROCEDURE — 87070 CULTURE OTHR SPECIMN AEROBIC: CPT | Performed by: NEUROLOGICAL SURGERY

## 2018-02-07 RX ORDER — CHLORHEXIDINE GLUCONATE 0.12 MG/ML
15 RINSE ORAL EVERY 12 HOURS SCHEDULED
Status: COMPLETED | OUTPATIENT
Start: 2018-02-07 | End: 2018-02-07

## 2018-02-07 RX ORDER — SODIUM CHLORIDE 9 MG/ML
75 INJECTION, SOLUTION INTRAVENOUS CONTINUOUS
Status: DISCONTINUED | OUTPATIENT
Start: 2018-02-07 | End: 2018-02-10

## 2018-02-07 RX ORDER — DIVALPROEX SODIUM 500 MG/1
500 TABLET, DELAYED RELEASE ORAL EVERY 8 HOURS SCHEDULED
Status: DISCONTINUED | OUTPATIENT
Start: 2018-02-07 | End: 2018-02-12 | Stop reason: HOSPADM

## 2018-02-07 RX ORDER — OXYCODONE HYDROCHLORIDE 10 MG/1
10 TABLET ORAL EVERY 4 HOURS PRN
Status: DISCONTINUED | OUTPATIENT
Start: 2018-02-07 | End: 2018-02-11

## 2018-02-07 RX ORDER — OXYCODONE HYDROCHLORIDE 5 MG/1
5 TABLET ORAL EVERY 4 HOURS PRN
Status: DISCONTINUED | OUTPATIENT
Start: 2018-02-07 | End: 2018-02-12 | Stop reason: HOSPADM

## 2018-02-07 RX ADMIN — CHLORHEXIDINE GLUCONATE 15 ML: 1.2 RINSE ORAL at 17:18

## 2018-02-07 RX ADMIN — SODIUM CHLORIDE 75 ML/HR: 0.9 INJECTION, SOLUTION INTRAVENOUS at 22:44

## 2018-02-07 RX ADMIN — DIVALPROEX SODIUM 500 MG: 500 TABLET, DELAYED RELEASE ORAL at 14:00

## 2018-02-07 RX ADMIN — FLUTICASONE PROPIONATE AND SALMETEROL 1 PUFF: 50; 100 POWDER RESPIRATORY (INHALATION) at 21:00

## 2018-02-07 RX ADMIN — LAMOTRIGINE 200 MG: 100 TABLET ORAL at 20:58

## 2018-02-07 RX ADMIN — OXYCODONE HYDROCHLORIDE 10 MG: 10 TABLET ORAL at 20:57

## 2018-02-07 RX ADMIN — HYDROMORPHONE HYDROCHLORIDE 0.5 MG: 1 INJECTION, SOLUTION INTRAMUSCULAR; INTRAVENOUS; SUBCUTANEOUS at 21:52

## 2018-02-07 RX ADMIN — ALPRAZOLAM 0.25 MG: 0.25 TABLET ORAL at 21:46

## 2018-02-07 RX ADMIN — LAMOTRIGINE 200 MG: 100 TABLET ORAL at 09:58

## 2018-02-07 RX ADMIN — OXYCODONE HYDROCHLORIDE 5 MG: 5 TABLET ORAL at 06:56

## 2018-02-07 RX ADMIN — FLUTICASONE PROPIONATE AND SALMETEROL 1 PUFF: 50; 100 POWDER RESPIRATORY (INHALATION) at 10:00

## 2018-02-07 RX ADMIN — VITAMIN D, TAB 1000IU (100/BT) 2000 UNITS: 25 TAB at 09:58

## 2018-02-07 RX ADMIN — DIVALPROEX SODIUM 1000 MG: 500 TABLET, DELAYED RELEASE ORAL at 06:50

## 2018-02-07 RX ADMIN — DIVALPROEX SODIUM 500 MG: 500 TABLET, DELAYED RELEASE ORAL at 21:07

## 2018-02-07 RX ADMIN — SODIUM CHLORIDE 75 ML/HR: 0.9 INJECTION, SOLUTION INTRAVENOUS at 10:00

## 2018-02-07 RX ADMIN — OXYCODONE HYDROCHLORIDE 10 MG: 10 TABLET ORAL at 09:58

## 2018-02-07 NOTE — SOCIAL WORK
MCG Guide Used for Initial Round: Shunt, Ventriculoperitoneal for Hydrocephalus RRG  Optimal GLOS: 1  Hospital Day: 1 day  DC Readiness:   Discharge Readiness  Return to top of Shunt, Ventriculoperitoneal for Hydrocephalus RRG - ISC  · Discharge readiness is indicated by patient meeting Recovery Milestones, including ALL of the following:  ? Neurologic status at baseline or stable and acceptable for next level of care  ? No evidence of infection, or treatment at next level of care arranged  ? Hemodynamic stability  ? Pain absent or managed  ? Activity back to baseline  ? Oral hydration, medications, and diet  ? Discharge plans and education understood  ? Complications of procedure  § Complications may include:  § CNS complications (eg, subdural hygroma or hematoma)  § Shunt infection, malfunction, catheter misplacement, or CSF leakage  § Abdominal pain, bowel obstruction or perforation  § Anticipate possible reoperation, including shunt replacement  § Expect brief to moderate stay extension   Brief (1 to 3 days      Identified Barriers: Followed by neuro sx - poss disconnection of  Shunt at [facility]  May need surgical intervention    Discussion Date (Time): 02/07/18 with DEEDEE León

## 2018-02-07 NOTE — CONSULTS
Consultation - Neurosurgery   Paulo Abreu 43 y o  male MRN: [id number]  Unit/Bed#: [facility] 710-01 Encounter: [id number]      Inpatient consult to Neurosurgery  Consult performed by: Sandra Molina ordered by: Janiya Orlando          Assessment/Plan     Assessment:  1  Headache  2  S/p  shunt  3  Seizure disorder  4  Cerebral palsy    Plan:  44 y/o male with extensive surgical history regarding shunt with recent revision 10/23/17 who p/w headaches  · Exam reveals mild left sided weakness 4+/5  AAO  SANTANA  FC  No drift  + nystagmus  · Incisions CDI and healing well  No signs of infection  · Images reviewed personally and by attending  Final results as below  · CT head without 2/6/2018: No hydrocephalus  Stable ventricular size  No acute intracranial abnormalities  · Shunt series 2/6/18: Left Hakim shunt set to 100mm H2O with concern for disconnection at level of lizett hole  Additional abandoned catheters present  · Recommend further CT imaging of shunt  · Will consider shunt tap in AM pending results of further imaging  · Recommend neurology consults regarding AED as mother states increased tremors and new jaw movements  · Place NPO after midnight for possible need for shunt revision pending further work-up  · Continue home medications  · Pain control per primary team  Would appreciate neurology input regarding HA management  Patient follows with Dr Shree Atkinson and has prescription for Rizotripatan  · Will continue to follow  Call with questions/concerns  History of Present Illness     HPI: Paulo Abreu is a 43 y o   male with PMH including Cerebral palsy, seizure disorder, asthma who presents with complaint of progressive headache  History obtained from patient ans his mother  Patient noted to have bifrontal intermittent mild headache x 3-4 weeks  Headache has become severe x3 days with limited improved with medications  Associated photophobia without phonophobia    Mother is concerned to give him prescription headache medications as she feels she is over medicating him  She has also noted slowed thought processing and increased difficulty expressing thoughts  Increased intention tremors per mother with new jaw movements  Patient has been wandering the house at night 2/2 pain with limited sleep  Decreased appetite  Continues to work at [employer]  Of note, patient was followed outpatient with last visit with Dr Miguel Lane on 1/16/18 at which time he was doing very well and scheduled for yearly follow-up  In history, patient was 34 wk preemie weighing 4lbs requiring intubated in NICU  He had developmental delayed and diagnosed with hydrocephalus requiring VPS placement with multiple subsequent revisions  Please see neurosurgery consult 10/5/17 for detailed developmental and surgical history  Review of Systems   Constitutional: Negative for activity change, fatigue and fever  HENT: Negative for hearing loss, trouble swallowing and voice change  Eyes: Positive for photophobia  Negative for visual disturbance  Respiratory: Negative for cough and shortness of breath  Cardiovascular: Negative for chest pain and leg swelling  Gastrointestinal: Negative for abdominal pain, nausea and vomiting  Genitourinary: Negative for decreased urine volume and difficulty urinating  Musculoskeletal: Negative for back pain, gait problem and neck pain  Skin: Negative for pallor, rash and wound  Neurological: Positive for tremors, speech difficulty and headaches  Negative for dizziness, seizures, weakness, light-headedness and numbness  Psychiatric/Behavioral: Positive for decreased concentration  Negative for agitation and confusion         Historical Information   Past Medical History:   Diagnosis Date    Asthma     Cerebral palsy ([facility] )     Congenital hydrocephalus ([facility] )     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HCC)     Migraines     Seizures [facility])      Past Surgical History:   Procedure Laterality Date    BRAIN SURGERY      Multiple  shunt revisions    HERNIA REPAIR      [place] CREATE SHUNT:VENTRIC-PERITONEAL Left 10/23/2017    Procedure: SHUNT VENTRICULAR-PERITONEAL revision;  Surgeon: Tamiko Shin MD;  Location: [facility];  Service: Neurosurgery     History   Alcohol Use No     Comment: N/A     History   Drug Use No     Comment: N/A     History   Smoking Status    Never Smoker   Smokeless Tobacco    Never Used     Comment: N/A     Family History   Problem Relation Age of Onset    Hyperlipidemia Mother     Hypertension Mother     Breast cancer Mother        Meds/Allergies   all current active meds have been reviewed, current meds:   Current Facility-Administered Medications   Medication Dose Route Frequency    acetaminophen (TYLENOL) tablet 975 mg  975 mg Oral Q8H    albuterol (PROVENTIL HFA,VENTOLIN HFA) inhaler 2 puff  2 puff Inhalation Q4H PRN    ALPRAZolam (XANAX) tablet 0 25 mg  0 25 mg Oral TID PRN    [START ON 2/7/2018] cholecalciferol (VITAMIN D3) tablet 2,000 Units  2,000 Units Oral Daily    divalproex sodium (DEPAKOTE) EC tablet 1,000 mg  1,000 mg Oral Q8H [address]    fluticasone-salmeterol (ADVAIR) 100-50 mcg/dose inhaler 1 puff  1 puff Inhalation Q12H [address]    HYDROmorphone (DILAUDID) injection 0 5 mg  0 5 mg Intravenous Q1H PRN    lamoTRIgine (LaMICtal) tablet 200 mg  200 mg Oral BID    oxyCODONE (ROXICODONE) immediate release tablet 10 mg  10 mg Oral Q4H PRN    oxyCODONE (ROXICODONE) IR tablet 5 mg  5 mg Oral Q4H PRN    and PTA meds:   Prior to Admission Medications   Prescriptions Last Dose Informant Patient Reported? Taking?    ALPRAZolam (XANAX) 0 25 mg tablet 2/5/2018 at Unknown time  Yes Yes   Sig: Take 0 25 mg by mouth 3 (three) times a day as needed     Cholecalciferol (VITAMIN D3) 2000 units capsule 2/6/2018 at Unknown time  Yes Yes   Sig: Take 2,000 Units by mouth daily Pt takes 2,000 units per day     LORazepam (ATIVAN) 1 mg tablet   Yes Yes   Sig: Take 1 mg by mouth every 8 (eight) hours as needed for anxiety   albuterol (PROAIR HFA) 90 mcg/act inhaler Past Month at Unknown time  Yes Yes   Sig: ProAir  (90 Base) MCG/ACT Inhalation Aerosol Solution  INHALE 1 TO 2 PUFFS EVERY 4 TO 6 HOURS AS NEEDED  Quantity: 1;  Refills: 5       Myah Wilkinson DO;  Started 23-Dec-2015  Stephanie Forrest  5 GM Inhaler   divalproex sodium (DEPAKOTE) 500 mg EC tablet 2/6/2018 at Unknown time  No Yes   Sig: Take 2 tablets by mouth every 8 (eight) hours   fluticasone-salmeterol (ADVAIR DISKUS) 100-50 mcg/dose 2/6/2018 at Unknown time  Yes Yes   Sig: Advair Diskus 100-50 MCG/DOSE Inhalation Aerosol Powder Breath Activated  TAKE 1 PUFF TWICE A DAY   Quantity: 1;  Refills: 4       Fiorella Aguilar DO;  Started 23-Dec-2015  [id number] Aerosol Powder Breath Activated Disp Pack   lamoTRIgine (LaMICtal) 200 MG tablet 2/6/2018 at Unknown time  No Yes   Sig: Take 1 tablet by mouth 2 (two) times a day   rizatriptan (MAXALT-MLT) 10 MG disintegrating tablet Past Week at Unknown time  Yes Yes   Sig: Rizatriptan Benzoate 10 MG Oral Tablet Dispersible  TAKE 1 TABLET AT ONSET OF HEADACHE  MAY REPEAT EVERY 2 HOURS AS NEEDED  MAXIMUM 3 TABLETS IN 24 HOURS  Quantity: 9;  Refills: 5       Billie Friendly M D ;  Started [id number]  Active      Facility-Administered Medications: None     Allergies   Allergen Reactions    Latex Hives    Other      Cats, seasonal    Pollen Extract        Objective   I/O       02/05 0701 - 02/06 0700 02/06 0701 - 02/07 0700    P  O   240    Total Intake(mL/kg)  240 (2 8)    Net   +240                Physical Exam   Constitutional: He is oriented to person, place, and time  He appears well-developed and well-nourished  No distress  HENT:   Head: Normocephalic and atraumatic  Left parietal incision healing well without erythema, edema or drainage  Eyes: Conjunctivae are normal  Pupils are equal, round, and reactive to light   No scleral icterus  Neck: Normal range of motion  Neck supple  Cardiovascular: Normal rate  Pulmonary/Chest: Effort normal  No respiratory distress  Abdominal: Soft  He exhibits no distension  There is no tenderness  Midline subxiphoid incision healing well without erythema, edema or drainage   Musculoskeletal: He exhibits no tenderness  Neurological: He is oriented to person, place, and time  He has an abnormal Finger-Nose-Finger Test    Skin: Skin is warm and dry  Psychiatric: He has a normal mood and affect  His speech is delayed  He is slowed  Neurologic Exam     Mental Status   Oriented to person, place, and time  Follows 2 step commands  Attention: normal  Concentration: normal    Speech: (Slow, mild dysarthria)  Level of consciousness: alert  Knowledge: good  Normal comprehension  Cranial Nerves     CN III, IV, VI   Pupils are equal, round, and reactive to light  Nystagmus: none   Upgaze: normal  Conjugate gaze: absent    CN V   Facial sensation intact  CN VII   Facial expression full, symmetric  CN VIII   Hearing: intact    CN XI   Right trapezius strength: normal  Left trapezius strength: normal    CN XII   Tongue: not atrophic  Fasciculations: absent  Tongue deviation: none    Motor Exam   Muscle bulk: normal  Overall muscle tone: normal  Right arm pronator drift: absent  Left arm pronator drift: absent    Strength   Strength 5/5 except as noted  LUE/LLE 5-/5     Sensory Exam   Light touch normal      Gait, Coordination, and Reflexes     Coordination   Finger to nose coordination: abnormal    Tremor   Resting tremor: absent  Intention tremor: present  Action tremor: left arm and right arm    Reflexes   Right ankle clonus: absent  Left ankle clonus: absent      Vitals:Blood pressure 136/67, pulse 75, temperature 97 9 °F (36 6 °C), temperature source Oral, resp  rate 18, height 5' 6" (1 676 m), weight 84 7 kg (186 lb 11 7 oz), SpO2 95 %  ,Body mass index is 30 14 kg/m²       Lab Results:     Results from last 7 days  Lab Units 02/06/18  1328   WBC Thousand/uL 9 43   HEMOGLOBIN g/dL 15 6   HEMATOCRIT % 44 6   PLATELETS Thousands/uL 254   NEUTROS PCT % 59   MONOS PCT % 10       Results from last 7 days  Lab Units 02/06/18  1328   SODIUM mmol/L 139   POTASSIUM mmol/L 3 5   CHLORIDE mmol/L 102   CO2 mmol/L 31   BUN mg/dL 13   CREATININE mg/dL 0 74   CALCIUM mg/dL 9 2   TOTAL PROTEIN g/dL 6 8   BILIRUBIN TOTAL mg/dL 0 49   ALK PHOS U/L 53   ALT U/L 23   AST U/L 10   GLUCOSE RANDOM mg/dL 88                 No results found for: TROPONINT  ABG:No results found for: PHART, POU6RDX, PO2ART, JRQ5BWE, A4ZYGLXA, BEART, SOURCE    Imaging Studies: I have personally reviewed pertinent reports  and I have personally reviewed pertinent films in PACS    Ct Head Without Contrast    Result Date: 2/6/2018  Narrative: CT BRAIN - WITHOUT CONTRAST INDICATION:  Headache, shunt  COMPARISON:  Head CT from 1/10/2018 TECHNIQUE:  CT examination of the brain was performed  In addition to axial images, coronal reformatted images were created and submitted for interpretation  Radiation dose length product (DLP) for this visit:  1037 65 mGy-cm   This examination, like all CT scans performed in the [facility], was performed utilizing techniques to minimize radiation dose exposure, including the use of iterative reconstruction and automated exposure control  IMAGE QUALITY:  Diagnostic  FINDINGS:  PARENCHYMA:  No intracranial mass, mass effect or midline shift  No CT signs of acute infarction  There is no parenchymal hemorrhage  Mild encephalomalacia in the left parietal lobe adjacent to the shunt tracts, stable  VENTRICLES AND EXTRA-AXIAL SPACES:  3 left-sided ventriculostomy catheters are again noted  Ventriculostomy catheter noted via a left frontal approach, tip crosses midline into the right frontal horn  This is unchanged  There are again 2 left sided parietal shunt catheters    One of the left parietal catheters terminates at the lateral margin of the left ventricular body  The 2nd left parietal catheter tip terminates at the left posterior horn  The position is unchanged  No hydrocephalus  Stable size of the ventricles and sulci  VISUALIZED ORBITS AND PARANASAL SINUSES:  Unremarkable  CALVARIUM AND EXTRACRANIAL SOFT TISSUES:  Several lizett holes noted in the left calvarium compatible with shunt catheter tracts  Additional lizett hole noted in the left parietal region without shunt catheter, stable  Mild hyperdense material at the level  of the empty left parietal lizett hole, stable, may be related to postsurgical changes/calcification  Impression: 1  No acute intracranial abnormality  2  3 left-sided ventriculostomy catheters again noted, stable in position  Stable appearance of the ventricles and sulci  Workstation performed: [id number]     Xr Shunt Series    Result Date: 2/6/2018  Narrative: SHUNT SERIES INDICATION:  Evaluate shunt  COMPARISON:  October 27, 2017 VIEWS: AP and lateral projections of the skull, chest and abdomen IMAGES:  10 FINDINGS: Left side Codman Hakim programmable  shunt catheter is identified  The shunt is set at approximately 100 mm of water  This is unchanged from the prior study  As the tubing exits the outer table of the calvarium, the tubing appears discontinuous  The remainder of the tubing post valve appears continuous along the left anterior chest terminating in the left upper quadrant of the abdomen  2 additional shunt catheters are noted which are abandoned  Impression: Left sided shunt catheter set at 100 mm of water  As the catheter exits the calvarium, prior to the valve, the shunt tubing appears discontinuous  Additional shunt catheters are abandoned     I personally discussed this study with Antonia Dalton on 2/6/2018 at 3:39 PM   Workstation performed: [id number]       EKG, Pathology, and Other Studies: I have personally reviewed pertinent reports  VTE Prophylaxis: Sequential compression device Winfred Holter)     Code Status: Level 1 - Full Code  Advance Directive and Living Will:      Power of :    POLST:      Counseling / Coordination of Care  I spent 45 minutes with the patient

## 2018-02-07 NOTE — TELEPHONE ENCOUNTER
It would appear that he was admitted to the hospital  It would be good for me to see him back in follow up soon, but when would depend on when he is discharged

## 2018-02-07 NOTE — RESTORATIVE TECHNICIAN NOTE
Restorative Specialist Mobility Note       Activity: Ambulate in elena, Ambulate in room, Bathroom privileges, Chair, Dangle, Stand at bedside (Educated/encouraged pt to ambulate w/assistance 3-4 x's/day  Bed alarm on   Pt callbell, phone/tray within reach )     Assistive Device: None       Duyen Sierra BS, Restorative Technician, [employer] [facility]

## 2018-02-07 NOTE — SOCIAL WORK
Met with pt and his mom and explained CM role  Pt lives with his mom, dad, nephew, niece, sister in law, and brother in a 2 story house with 4 MATHEW and Br and BD on 2nd floor  Pt was independent PTA and does not drive  Pt's mom and dad provide him with transportation  Pt's PCP is Dr Preeti Uriarte  No DME avail  No hx of HHC or rehab  No hx of mental health issues or drug use  Pt has a prescription plan and uses [facility] on [address] in [place] for his prescriptions  Primary contact is pt's mom Keeley Washington or pt's dad Vidya Junior, contact # [phone]  Pt's mom will provide pt with transportation home upon discharge  CM reviewed d/c planning process including the following: identifying help at home, patient preference for d/c planning needs, Discharge Lounge, [facility] Meds to Bed program, availability of treatment team to discuss questions or concerns patient and/or family may have regarding understanding medications and recognizing signs and symptoms once discharged  CM also encouraged patient to follow up with all recommended appointments after discharge  Patient advised of importance for patient and family to participate in managing patients medical well being

## 2018-02-07 NOTE — PROGRESS NOTES
Progress Note - Neurosurgery   Loyda Rich 43 y o  male MRN: [id number]  Unit/Bed#: [facility] 710-01 Encounter: [id number]    Assessment:  Preop Diagnosis:   1  Possible shunt dysfunction left-sided  shunt system, distal  2  Coiled upper epigastric intraperitoneal distal shunt tubing  3  No pseudocyst on CT scan  4  Prior shunt revisions x 7  5  Last distal  shunt revision with placement of Codman Hakim programmable valve system set at 10 centimeters of water opening pressure 10/23/2017  6  Patent ventricular catheter left trigone of ventricle to Codman reservoir and valve unit, with catheter or in continuity down to upper epigastrium  7  2 nonfunctional ventricular catheters 1 frontal 1 occipital  8  Ventricles remain same as previous scans non dilated  9  Concern for distal peritoneal runoff dysfunction with early loculation coiled upper epigastric distal shunt tubing adjacent to transverse colon  10  Seizure disorder  11  Ex preemie  12  Cerebral palsy  13   shunt tap left reservoir of Codman programmable valve system today shows no proximal obstruction and clear CSF  14   shunt tap today consistent with some functional delay in distal runoff likely related to early loculation coiled upper epigastric distal shunt tubing adjacent to transverse colon        Plan:  1  Remain NPO except for  Pain / seizure meds with sips of water  2  Add on OR schedule today for distal shunt revision with exploratory laparoscopy and distal shunt revision with Dr Reji Staton from General Surgery and myself  3  If there are extensive adhesions seen and found at laparoscopy and no satisfactory terminus for the distal peritoneal tubing then  may need open laparotomy  4  Peridex mouthwash and chlorhexidine body wash  5  Check UA   6  Consent on chart    Impression  27-year-old man with prior shunt revisions x 7 now with dysfunctional qualitative drainage from distal coiled catheter in upper epigastric    Suspect early loculation and question pseudo cyst development and adhesions    For exploratory  Laparoscopy and unravel of coiled distal peritoneal shunt tubing in upper epigastric with placement in new terminus that has good chance of continuing reabsorption of draining CSF    The proximal evaluation of the shunt system shows good function    There is no disconnection at the bur hole level  as speculated on by Radiology : a right angled non barium impregnated connector was used here to use existing working ventricular catheter  Discussed with Dr Francesco Fonseca who did the previous surgery 10/23/2017    Awaiting OR time on add on schedule with Dr Demond Olson from [facility] hrs  :  OR had to be postponed  Definite OR time tonight cannot be guaranteed  because of anesthesia and OR team unavailability at this time because of emergencies  Discussed with Dr Ravin Brown  Will postpone until tomorrow    OR schedule will put on first case add on list for tomorrow    He will be allowed to eat  Made NPO again at midnight    Discussed the new developments with Mr Moshe Olmos and his mother Santiago Wilks  Apologized for his long-time waiting without food  Mom getting something for him from the cafeteria        Subjective/Objective   Chief Complaint:  Headache 2 weeks    Subjective:  Headache was better - gone - after proximal shunt removal of 5 cc of CSF  after left occipito parietal shunt tap earlier today    Objective:   Pleasant alert  Talkative  Happy headache relieved after shunt tap today    Good upgaze EOM full  no diplopia    Facial sensation is normal     Facial sensation symmetry is equal and symmetric    Increased tone all 4 extremities,     Good use and power upper and lower extremities 5/5    Await preliminary results on CSF - see below    Intake/Output       02/07/18 0701 - 02/08/18 0700      [id number] 1684-8501 Total       Intake    P O   221  -- 221    Total Intake 221 -- 221       Output    Total Output -- -- -- Net I/O     221 -- 221          Invasive Devices     Peripheral Intravenous Line            Peripheral IV 02/06/18 Right Antecubital 1 day                Physical Exam:  As above    Vitals: Blood pressure 142/78, pulse 90, temperature 97 5 °F (36 4 °C), temperature source Oral, resp  rate 18, height 5' 6" (1 676 m), weight 84 7 kg (186 lb 11 7 oz), SpO2 95 %  ,Body mass index is 30 14 kg/m²  Hemodynamic Monitoring: MAP:      Lab Results: I have personally reviewed pertinent results  CSF WBC 0 CSF glucose 57 CSF protein 42    Negative Gram stain      Admission on 02/06/2018   Component Date Value    WBC 02/06/2018 9 43     RBC 02/06/2018 5 03     Hemoglobin 02/06/2018 15 6     Hematocrit 02/06/2018 44 6     MCV 02/06/2018 89     MCH 02/06/2018 31 0     MCHC 02/06/2018 35 0     RDW 02/06/2018 12 4     MPV 02/06/2018 9 8     Platelets 52/16/8440 254     nRBC 02/06/2018 0     Neutrophils Relative 02/06/2018 59     Lymphocytes Relative 02/06/2018 29     Monocytes Relative 02/06/2018 10     Eosinophils Relative 02/06/2018 2     Basophils Relative 02/06/2018 0     Neutrophils Absolute 02/06/2018 5 48     Lymphocytes Absolute 02/06/2018 2 75     Monocytes Absolute 02/06/2018 0 96     Eosinophils Absolute 02/06/2018 0 15     Basophils Absolute 02/06/2018 0 02     Sodium 02/06/2018 139     Potassium 02/06/2018 3 5     Chloride 02/06/2018 102     CO2 02/06/2018 31     Anion Gap 02/06/2018 6     BUN 02/06/2018 13     Creatinine 02/06/2018 0 74     Glucose 02/06/2018 88     Calcium 02/06/2018 9 2     AST 02/06/2018 10     ALT 02/06/2018 23     Alkaline Phosphatase 02/06/2018 53     Total Protein 02/06/2018 6 8     Albumin 02/06/2018 3 6     Total Bilirubin 02/06/2018 0 49     eGFR 02/06/2018 114     LACTIC ACID 02/06/2018 1 4     Valproic Acid, Total 02/06/2018 92     Ammonia 02/06/2018 51*    Sodium 02/07/2018 143     Potassium 02/07/2018 4 1     Chloride 02/07/2018 106     CO2 02/07/2018 29     Anion Gap 02/07/2018 8     BUN 02/07/2018 18     Creatinine 02/07/2018 0 73     Glucose 02/07/2018 84     Calcium 02/07/2018 8 8     eGFR 02/07/2018 114     Protime 02/07/2018 14 1     INR 02/07/2018 1 09     WBC 02/07/2018 11 38*    RBC 02/07/2018 4 97     Hemoglobin 02/07/2018 15 5     Hematocrit 02/07/2018 44 3     MCV 02/07/2018 89     MCH 02/07/2018 31 2     MCHC 02/07/2018 35 0     RDW 02/07/2018 12 6     MPV 02/07/2018 10 1     Platelets 85/47/1420 255     nRBC 02/07/2018 0     Neutrophils Relative 02/07/2018 54     Lymphocytes Relative 02/07/2018 31     Monocytes Relative 02/07/2018 13*    Eosinophils Relative 02/07/2018 2     Basophils Relative 02/07/2018 0     Neutrophils Absolute 02/07/2018 6 04     Lymphocytes Absolute 02/07/2018 3 50     Monocytes Absolute 02/07/2018 1 51*    Eosinophils Absolute 02/07/2018 0 23     Basophils Absolute 02/07/2018 0 02     Glucose, CSF 02/07/2018 57     Protein, CSF 02/07/2018 42        Imaging Studies: I have personally reviewed pertinent films in PACS with a Radiologist     EKG, Pathology, and Other Studies: I have personally reviewed pertinent reports  VTE Pharmacologic Prophylaxis: Sequential compression device (Venodyne)     VTE Mechanical Prophylaxis: sequential compression device      2:50 PM 2/7/2018    Hazel Maza MD, Attending Neurological Surgeon

## 2018-02-07 NOTE — ASSESSMENT & PLAN NOTE
· History of congenital hydrocephalus, s/p  shunt with multiple revisions and replacements; last shunt procedure 10/23/17 (DKO) shunt ventricular-peritoneal revision (Left)   · Seen by Neurosurgery: Left Hakim shunt set to 100mm H2O with concern for disconnection at level of lizett hole  · Neurosurgery consult appreciated; d/w neurosx PA (Hilary) today, CT scans will be reviewed again and they will follow patient; maintain NPO  · NPO since mn; will start IVFluids

## 2018-02-07 NOTE — ASSESSMENT & PLAN NOTE
· On Rizotriptan 10mg prn HA  · Patient had excruciating HA last night resulting in anxiety and insomnia, was given  Xanax, oxycodone and dilaudid, HA resolved and he was able to sleep  · Continues to have mild HA  · Continue Tylenol 975 mg t i d; add p r n oral opioid and IV opioid for breakthrough pain

## 2018-02-07 NOTE — ASSESSMENT & PLAN NOTE
· Mild cerebral palsy with a history of congenital hydrocephalus, s/p  shunt with multiple revisions and replacements   · AAOx3, ambulatory, works at [employer]   · Supportive care

## 2018-02-07 NOTE — OP NOTE
OPERATIVE REPORT  PATIENT NAME: Kathy Yoder    :  1975  MRN: [id number]  Pt Location:  [facility] Room 4    SURGERY DATE: 2018    Surgeon(s) and Role:  Panel 1:     * Nasir Esparza MD - Primary    Panel 2:     * Angelina Farias MD - Primary    Co- Surgeons        Preop Diagnosis:  Other complicated headache syndrome [G44 59]  Acute headache [R51]  Seizure-like activity ([address] ) [R56 9]  Shunt malfunction, initial encounter [T82 858A]    Postop diagnoses:    Same  Distal shunt obstruction from multiple adhesions within intra abdominal cavity    Procedure(s) (LRB):  revision ventriculo-perioteneal shunt (Left)  Abdominal shunt revision (N/A)    Title of procedure:    Diagnostic laparoscopy, & lysis of multiple adhesions,   open laparotomy in 3 locations,   & additional lysis of thick multiple adhesions   opening of new undisturbed terminus for distal end of  shunt tubing and unravelling laying of confined loculated midline epigastric shunt tubing and repositioning above the dome of the right lobe of the liver and right lateral subcostal region of the peritoneal cavity       Specimen(s):  CSF directly aspirated from the distal end of unravelled Codman  tubing    Prelimary results showed:  CSF WBC of 0 CSF glucose of 59 CSF protein is 17  These were similar results to CSF aspiration from reservoir shunt system on 2018    Estimated Blood Loss:   50 mL    Drains:  None     Anesthesia Type:  Dr Willy Thakur    Operative Indications: Other complicated headache syndrome [G44 59]  Acute headache [R51]  Seizure-like activity ([address] ) [R56 9]  Shunt malfunction, initial encounter [T81 058A]  Distal shunt obstruction from multiple adhesions within intra abdominal cavity  This 41-year-old man is a ex preemie  He was found to have developmental delay and hydrocephalus was identified at 5 months  He required  shunt system    He subsequently needed further revisions    Later diagnosed with cerebral palsy Also has seizure disorder    Since then he has had 7 prior revisions  There are many nonfunctioning remnants of his past shunt procedures seen on shunt survey x-rays        The last shunt revision was on 10/23/2016  A new distal Codman Hakim system was connected up to an patent short length of ventricular tubing coming from the left posterior horn of the ventricle and the distal end was placed below prior upper epigastric abdominal incision in a midline epigastric incision above the umbilicus    He has done fairly well in terms of headaches for about 3 months  Was back working 5 days a week at [employer]  Then in January 2018 developed some further episode headaches and now presents with 2 weeks of worse continuous headache requiring narcotic medication   The  ventricles were unchanged in size on CT scan  The functional left parieto-occipital short length of ventricular catheter was noted to be connected up via non barium impregnated right-angled connector to the proximal end of the [address] programmable shunt system  A distal dysfunctional obstruction was suspected as the shunt survey showed the distal peritoneal tubing coiled up in a tight oval knot in the intra abdominal intraperitoneal midline epigastric region near the incision site  This is suggestive of frozen" abdomen from  multiple intraperitoneal adhesions from his prior surgeries    The proximal Hakim shunt reservoir had been previously accessed by shunt tap on 2/7/2018  CSF analysis was benign  No sign of infection       There was no proximal obstruction of the shunt system There was some initial flow down the distal end of the shunt system but not at the optimal rate    So distal shunt obstruction and dysfunction in drainage of the peritoneal end of the  shunt system was strongly suspected    Distal peritoneal tubing was "knotted in a ball" and confined likely because because of surrounding intra peritoneal dense adhesions and loculation    That is why the help of Dr Michele Benz from General Surgery was requesting  We will concerned that there may be significant loculations or adhesions within the abdomen requiring a much more extensive general surgical exploration of the abdomen then we were accustomed to dealing with  Dr Emile Gomez and I reviewed all his imaging and and new presentation discussed his further management with Dr Michele Benz  The risks, benefits and complications of surgery had been explained in detail to Hiwot Elder and his mother Anastasia Guidry including hemorrhage, infection, CSF leakage, wound problems, pain, weakness, numbness, dysesthesiae, paralysis, coma, and death  Also, the possibility of further surgery being required was emphasized  This might be to place the distal end of the shunt to alternative terminus such as the right atrium  This might happen if adequate reabsorption in the supra hepatic space and right para colic gutter did not occur    Other potential medical complications were outlined, including deep venous thrombosis, pulmonary embolism, pneumonia, urinary tract infection, myocardial infarction,  and stroke  The need for physical therapy, occupational therapy, and rehabilitation was also mentioned  The alternatives to surgery were discussed  Hiwot Elder and Anastasia Guidry asked relevant questions and asked that we proceed with arrangements for surgery  Operative Findings: There were extensive adhesions throughout the abdomen in the regions that we explored below the umbilicus and the above the umbilicus     ie " frozen abdomen"    The only direction where there was some laparoscopically visible region of some normality was in the low pelvis    And alternative terminus for the distal end the shunt was chosen to be above the right lobe of the liver    During dissection to anterior inferior aspect of the liver edge previous old encased peritoneal tubing of inner coil type from at least 15 years ago was enountered noted to be encased in pseudo sheath and adhesions and ignored as not relevant to present procedure    A right upper quadrant subcostal incision was made to access the peritoneal cavity lateral to the rectus muscle and through the external oblique muscle  Previously undisturbed region of the intra-abdominal cavity above the right dome of the liver was opened up  And palpated and inspected  A long blunt tipped uterine packing forceps was able to be placed under direct vision above the liver and rotated easily    This was the final terminus of 21 cm of distal end of the peritoneal tubing    Complications:   No complications from current surgery    Procedure and Technique:  The patient was brought into Operating Room 4 and general anesthesia was carried out by Dr Darien Orourke    Whole of the left side of the hemicranium neck upper abdomen and chest and abdomen was prepped in the usual sterile standard fashion after some clipping of head hair being carried out where his prior left parieto-occipital incision from his revision last year 10/23/2017 was carried out  Isolation 10 10 drapes were placed first and later abdominal draped and from above long U drape followed by Jose Houston drape    This was in  case  some proximal revision or access was required    A time-out was occasion with his primary nurse Stephie Song and with anesthesia and with Dr Ramy Villalpando  Patient was correctly identified the procedure and consent confirmed  The images in the room were reviewed  All were in agreement    His preoperative antibiotics had been given    I assisted Dr Melba Smith as co-surgeon throughout the procedure as the abdomen was found to be frozen"    There were multiple adhesions encasing and connecting the small bowel identified below the umbilicus as well as in the upper epigastrium and also towards the sub hepatic region where the third incision eventually had to be made    My assistance was necessary to prevent bowel perforation in view of all the extensive adhesions    Please see additional details of each incision and  the findings in Dr Rajesh Ferro operative note also    First incision was sub umbilical:  The initial incision was subumbilical to see if we could identify the peritoneal cavity that would be suitable for laparoscopic insulation of air and unravel inning of the tangled loculated midline epigastric distal peritoneal tubing of the  shunt system    Despite multiple attempts to find a safe location to enter the peritoneal cavity using the 10 mm laparoscopic trocar  On directing the trocar inferiorly more normal peritoneum with more normal yellow omental fat was seen down in the upper pelvic region  Further dissection down here would involve an extension of extensive opening of the midline abdomen below the umbilicus  We doubted that a satisfactory terminus could be found down here    Second incision was supra umbilical midline:  inferior to previous more recent mid epigastric incision from October 2017 when the last shunt revision was    Again there were multiple adhesions  After tedious careful dissection upwards the tangled Codman peritoneal tubing was identified and parts of this were brought down using a non toothed large DeBakey forceps    After further dissection the complete tangled collection of distal tubing was able to be withdrawn and extended out into a clean ABD pad there was clear CSF draining from this  A 18  Gauge blunt needle was inserted within the lumen and CSF aspirated for CSF panel  See preliminary results above  The tubing was kept capped and protected throughout the case until a final destination terminus for it was located    We explored the left side of the epigastrium again there dense adhesions here      We explored the right periumbilical region again dense adhesions    We elected to look for a satisfactory terminus full reabsorption of CSF in a more undisturbed region of the abdomen which would be the area above the right lobe of the liver    Third incision was transverse and subcostal: below the inferior margin of the tenth rib in the mid axillary line    The right rectus muscle was identified and retracted medially and the external oblique was opened up with sharp and blunt dissection so that a Boogie retractor could be placed below the subcostal margin  Then after dividing some adhesions it was possible to have a good view above the right dome of the liver  Bi manual palpation and blunt dissection a suitable space was opened up above the right lobe of the liver and to the lateral aspect of the parietal body wall  A long blunt tip uterine forceps could be placed up here and rotated easily  We felt the right supra hepatic space next to the parietal subcostal peritoneal body wall would be a satisfactory terminus for the distal end of the shunt tubing  A satisfactory tunnel to divert the peritoneal tubing from the midline epigastric incision to the subcostal intra abdominal incision was developed with blunt and sharp dissection and  curved large Gina forceps was then used to take the distal end of the peritoneal tubing across to the sub costal incision  We then took direct measurements of the length of tubing that would sit comfortably in the right supra hepatic space  This was 21 cm      So 21 cm of peritoneal tubing from the midline epigastric location was measured out and the distal end of the peritoneal catheter shortened and the remnant discarded    Then using the the blunt uterine forceps I placed the distal end of the peritoneal catheter over the right dome of the liver and rotated uterine forceps so that the distal end of the 2 tubing moved laterally into the right supra hepatic gutter next to the parietal peritoneal aspect of right subcostal body wall     We were satisfied with the distal location of the peritoneal tubing  We inspected each of the incisions for hemostasis  Small bleeder in the left side of the midline fracture opening in the  subumbilical incision was cauterized  We inspected each of the incisions carefully to make sure there was no leakage from any area of the encased small bowel      CLOSURES:  Each of the 3 incisions was closed separately:  Dr Columba Walter carried out the peritoneal and muscle layer, subcutaneous and skin incision closures himself     The deeper parietal and muscle layers were closed with 0 Vicryl sutures followed by 3 0 Vicryl subcutaneous and 4 0 Monocryl sutures followed by Dermabond     I was present for the entire procedure apart from closure of the abdominal incisions performed by Dr Columba Walter,    Patient Disposition:  extubated and stable, back to neurological baseline    SIGNATURE: Shannon Wilkerson MD  DATE: Feb 8th  TIME: 10:21 PM

## 2018-02-07 NOTE — ASSESSMENT & PLAN NOTE
· Valproic level 92 - neurology decreased dose to 500mg Q8hr  · Lamictal level 16 0 - WNL  · Patient still with side-to-side jaw movement and UE tremor, worse with movement   Per my discussion with neurology MIK this is improved from yesterday

## 2018-02-07 NOTE — PHYSICIAN ADVISOR
This patient is a 43 y o  y/o male who is admitted to the hospital for Medication Management (tremors from depakote, anxiety and insomnia, patient reports head and back pain and inability to express thoughts  patients mother states she believes the depakote dose he is currently taking is too high after the recent shunt revision causing increased side effects of depakote)   The patient presented to the ED on 2/6/18 at 1145 and was admitted to the hospital on 2/6/2018 1158  History of Present Illness includes: the patient presented with tremors, anxiety, insomnia and inability to express thoughts  he has a history of breakthrough seizures in the setting of a malfunctioning  shunt  On admission it was noted the patient's Depakote level was 92  Vital signs in the ER are as follows   ED Triage Vitals [02/06/18 1204]   Temperature Pulse Respirations Blood Pressure SpO2   97 9 °F (36 6 °C) 79 18 142/72 95 %      Temp Source Heart Rate Source Patient Position - Orthostatic VS BP Location FiO2 (%)   Oral Monitor Sitting Right arm --      Pain Score       Worst Possible Pain           On exam, the patient has healed scars from shunt and the neuro exam is nonfocal   The patient is 5 out of 5 muscle strength in the upper and lower extremities  The patient is alert and oriented ×3  Hemoglobin is 15 6, creatinine is 0 74, CO2 is 31    This patient is being admitted secondary to obstructive  shunt, headache,and seizures  The plan of care includes seizure precautions, Neurology consultation, neurosurgery consultation to assess for kink and  shunt, nothing by mouth, intravenous Toradol, repeat labs    This patient is appropriate for inpatient admission as his length of stay is expected to be at least 2 midnights

## 2018-02-07 NOTE — CONSULTS
Consultation - Neurology   Jessica Cedillo 43 y o  male MRN: [id number]  Unit/Bed#: [facility] 710-01 Encounter: [id number]      Assessment/Plan   1  Tremor  -Present in BUE  Mild at rest, worse with action   -Jaw tremor noted with b/l lateral movements when patient speaks  Not present at rest   -Both likely related to Depakote use  Mother states Loreto Spore worsened after dose increase and jaw tremor started a few weeks ago  -Valproic acid level, total: 92  -Lamotrigine level pending  -No seizure activity noted since last admission (10/20/17)  -Will decrease Depakote from 1000mg TID to 500mg TID  -Monitor for seizure like activity    2  Headache  -Currently 2/10  -Likely secondary to shunt malfunction  -Will reassess after shunt revision    3   Shunt  -Questionable distal obstruction  Neurosurgery to assess with laparoscopy with possible conversion to open later today  History of Present Illness     Reason for Consult / Principal Problem: Seizure Medications and Headache    HPI: Jessica Cedillo is a 43 y o  male with past medical history of congenital hydrocephalus, mild cebebral palsy with mild cognitive impairment, status post  shunt with multiple revisions of replacements and localization related epilepsy who presented to the ED with complaints of headache for 2 weeks  Neurology consulted for increase in tremors, questioning relation to AEDs, and headache management  Patient follows with Dr Fahad Lord as outpatient  Last seen 11/06/2017  Seen at that time for follow-up of hospital admission where he had multiple breakthrough seizures in the setting of malfunctioning  shunt and headache  Depakote was increased to 1000 mg 3 times a day and lamotrigine 200 mg twice a day and with no further seizure activity and headaches improved    Prior to above the admission patient had worsening headaches and increased frequency and smaller seizures described as staring, unresponsiveness, bilateral arm shaking, and rapid eye blinking  Since this time, patient has not had any further seizure activity  Mother states since increase in Depakote and lamotrigine patient has had increase in bilateral upper extremity tremor, to the extent where he can hardly hold a fork  She also notes new jaw tremor starting about 2 weeks ago  States she has been doing some reading and believes this is related to increase in Depakote  Has had a headache for the past 2 weeks  Today on exam, patient is resting comfortably in bed with mother at bedside  States headache is much better, now at 2/10  Headache location is bilateral frontal and described as achy with intermittent sharp radiation to eyes  He has been taking his prescribed rizatriptan at home with no relief  Only other complaint is tremors as described above  Denies CP, SOB, headache, dizziness, vision changes, N/V, abdominal pain, weakness or numbness  Inpatient consult to Neurology  Consult performed by: Jo Colin  Consult ordered by: Denis Reed          Review of Systems  12 point ROS performed, as stated above, all others negative      Historical Information   Past Medical History:   Diagnosis Date    Asthma     Cerebral [facility] )     Congenital hydrocephalus (HCC)     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[facility] )     Migraines     Seizures (HCC)      Past Surgical History:   Procedure Laterality Date    BRAIN SURGERY      Multiple  shunt revisions    HERNIA REPAIR      SC CREATE SHUNT:VENTRIC-PERITONEAL Left 10/23/2017    Procedure: SHUNT VENTRICULAR-PERITONEAL revision;  Surgeon: Vera Williamson MD;  Location: [facility];  Service: Neurosurgery     Social History   History   Alcohol Use No     Comment: N/A     History   Drug Use No     Comment: N/A     History   Smoking Status    Never Smoker   Smokeless Tobacco    Never Used     Comment: N/A     Family History:   Family History   Problem Relation Age of Onset    Hyperlipidemia Mother     Hypertension Mother     Breast cancer Mother        Review of previous medical records was completed  Meds/Allergies   all current active meds have been reviewed and current meds:   Current Facility-Administered Medications   Medication Dose Route Frequency    acetaminophen (TYLENOL) tablet 975 mg  975 mg Oral Q8H    albuterol (PROVENTIL HFA,VENTOLIN HFA) inhaler 2 puff  2 puff Inhalation Q4H PRN    ALPRAZolam (XANAX) tablet 0 25 mg  0 25 mg Oral TID PRN    cholecalciferol (VITAMIN D3) tablet 2,000 Units  2,000 Units Oral Daily    divalproex sodium (DEPAKOTE) EC tablet 1,000 mg  1,000 mg Oral Q8H [address]    fluticasone-salmeterol (ADVAIR) 100-50 mcg/dose inhaler 1 puff  1 puff Inhalation Q12H [address]    HYDROmorphone (DILAUDID) injection 0 5 mg  0 5 mg Intravenous Q4H PRN    lamoTRIgine (LaMICtal) tablet 200 mg  200 mg Oral BID    oxyCODONE (ROXICODONE) immediate release tablet 10 mg  10 mg Oral Q4H PRN    oxyCODONE (ROXICODONE) IR tablet 5 mg  5 mg Oral Q4H PRN    sodium chloride 0 9 % infusion  75 mL/hr Intravenous Continuous       Allergies   Allergen Reactions    Latex Hives    Other      Cats, seasonal    Pollen Extract        Objective   Vitals:Blood pressure 140/79, pulse 73, temperature 97 6 °F (36 4 °C), temperature source Oral, resp  rate 18, height 5' 6" (1 676 m), weight 84 7 kg (186 lb 11 7 oz), SpO2 97 %  ,Body mass index is 30 14 kg/m²  Intake/Output Summary (Last 24 hours) at 02/07/18 1208  Last data filed at 02/07/18 0759   Gross per 24 hour   Intake              590 ml   Output              200 ml   Net              390 ml       Invasive Devices: Invasive Devices     Peripheral Intravenous Line            Peripheral IV 02/06/18 Right Antecubital less than 1 day                Physical Exam   Constitutional: He appears well-developed and well-nourished  No distress  HENT:   Head: Normocephalic and atraumatic  Neck: Normal range of motion  Neck supple     Cardiovascular: Normal rate and regular rhythm  Pulmonary/Chest: Effort normal and breath sounds normal    Abdominal: Soft  Bowel sounds are normal    Neurological:   Reflex Scores:       Tricep reflexes are 2+ on the right side and 2+ on the left side  Bicep reflexes are 2+ on the right side and 2+ on the left side  Brachioradialis reflexes are 2+ on the right side and 2+ on the left side  Patellar reflexes are 2+ on the right side and 2+ on the left side  Achilles reflexes are 2+ on the right side and 2+ on the left side  Skin: Skin is warm and dry  He is not diaphoretic  Neurologic Exam     Mental Status   Patient is alert and oriented  Able to tell me where he is and why he is here  He has good insight into his illnesses  Has mentation of about a 8year-old  Unable to perform simple calculations, has never been able to  Able to spell cat forwards and backwards  Attention and concentration intact  The able to repeat, name objects, and write  Speech fluent  No dysarthria or word-finding difficulty  Cranial Nerves   Cranial nerves II through XII intact  He has a dysconjugate, chronic  No diplopia  Side-to-side jaw tremor/jerking noted as patient speaks  No focal or lateralizing deficits  Motor Exam   Full strength on left throughout  4+-5/5 on right  Increased tone in the right lower extremity  Pronator drift absent bilaterally  Sensory Exam   Light touch intact throughout  Slight decrease in temperature sensation on the right upper and lower extremity compared to left  Gait, Coordination, and Reflexes     Reflexes   Right brachioradialis: 2+  Left brachioradialis: 2+  Right biceps: 2+  Left biceps: 2+  Right triceps: 2+  Left triceps: 2+  Right patellar: 2+  Left patellar: 2+  Right achilles: 2+  Left achilles: 2+  Right plantar: equivocal  Left plantar: normal  Finger-to-nose normal bilaterally   Mild resting tremor bilateral upper extremities noted, worsened with action and worsened at end of movement  Gait deferred  Lab Results: I have personally reviewed pertinent reports  Imaging Studies: I have personally reviewed pertinent reports  and I have personally reviewed pertinent films in PACS     CT head wo contrast 2/6/18: IMPRESSION:   1   No acute intracranial abnormality  2  3 left-sided ventriculostomy catheters again noted, stable in position  Stable appearance of the ventricles and sulci  XR shunt series 2/6/18: IMPRESSION:   Left sided shunt catheter set at 100 mm of water  As the catheter exits the calvarium, prior to the valve, the shunt tubing appears discontinuous  Additional shunt catheters are abandoned  EKG, Pathology, and Other Studies: I have personally reviewed pertinent reports

## 2018-02-07 NOTE — PROGRESS NOTES
Rounded with Fariba from [facility]  See provider orders and progress notes for updates  Will continue to monitor

## 2018-02-07 NOTE — CASE MANAGEMENT
Initial Clinical Review    Admission: Date/Time/Statement: 2/6/18 @ 1633     Orders Placed This Encounter   Procedures    Inpatient Admission (expected length of stay for this patient is greater than two midnights)     Standing Status:   Standing     Number of Occurrences:   1     Order Specific Question:   Admitting Physician     Answer:   Hoda Bradshaw [[id number]]     Order Specific Question:   Level of Care     Answer:   Med Surg [16]     Order Specific Question:   Estimated length of stay     Answer:   More than 2 Midnights     Order Specific Question:   Certification     Answer:   I certify that inpatient services are medically necessary for this patient for a duration of greater than two midnights  See H&P and MD Progress Notes for additional information about the patient's course of treatment  ED: Date/Time/Mode of Arrival:   ED Arrival Information     Expected Arrival Acuity Means of Arrival Escorted By Service Admission Type    - 2/6/2018 11:45 Urgent Walk-In Self General Medicine Urgent    Arrival Complaint    seizure, r/o od          Chief Complaint:   Chief Complaint   Patient presents with    Medication Management     tremors from depakote, anxiety and insomnia, patient reports head and back pain and inability to express thoughts  patients mother states she believes the depakote dose he is currently taking is too high after the recent shunt revision causing increased side effects of depakote       History of Illness:     Da Dupree is a 43 y o  male who presents with Tremors, anxiety, insomnia, headache, back pain and inability to express thoughts  PMH: cerebral palsy, congenital hydrocephalus, seizure disorder, migraine and asthma  History of multiple revisions and replacements   Last hospitalization 10/20-10/26/17 for  shunt malfunction and multiple breakthrough seizures in the setting of a malfunctioning  shunt S/p shunt procedure 10/23/17 (DKO) shunt ventricular-peritoneal (left)  He was placed on a higher dose of Lamotrigine 200mg bid and double the dose of Depakote 1000mg TID  His mother attributes his symptoms to the increased dose of Depakote  Valproic acid level 92 on admission  Neurosurgery and neuro consult ordered          ED Vital Signs:   ED Triage Vitals [02/06/18 1204]   Temperature Pulse Respirations Blood Pressure SpO2   97 9 °F (36 6 °C) 79 18 142/72 95 %      Temp Source Heart Rate Source Patient Position - Orthostatic VS BP Location FiO2 (%)   Oral Monitor Sitting Right arm --      Pain Score       Worst Possible Pain        Wt Readings from Last 1 Encounters:   02/06/18 84 7 kg (186 lb 11 7 oz)       Victoriano   02/07/18 0800 4 5 6 15   02/06/18 1717 4 5 6 15           Abnormal Labs/Diagnostic Test Results:      Ammonia 51 (H) 11 - 35 umol/L           CT chest and abdomen wo contrast   Final (02/06 1709)       There are 3 shunt catheters identified within the left neck, chest wall and abdominal wall, terminating within the midabdomen  The most laterally located of these within the abdomen appears to terminate within the left neck and does not clearly extend    above the scope of this exam        4 mm left lower lobe pulmonary nodule, series 8 image 36  Based on current Fleischner Society 2017 Guidelines on incidental pulmonary nodule, no routine follow-up is needed if the patient is considered low risk for lung cancer  If the patient is    considered high risk for lung cancer, 12 month follow-up non-contrast chest CT is recommended  XR shunt series   Final  DO (02/06 1540)       Left sided shunt catheter set at 100 mm of water  As the catheter exits the calvarium, prior to the valve, the shunt tubing appears discontinuous  Additional shunt catheters are abandoned           ED Treatment:   Medication Administration from 02/06/2018 1145 to 02/06/2018 1655       Date/Time Order Dose Route Action     02/06/2018 1545 ketorolac (TORADOL) injection 15 mg 15 mg Intravenous Given          Past Medical/Surgical History:    Active Ambulatory Problems     Diagnosis Date Noted    Cerebral palsy ([facility] ) 10/04/2017    Asthma 10/04/2017    Seizure ([facility] ) 10/20/2017     (ventriculoperitoneal) shunt status 10/20/2017    Seizure-like activity ([facility] ) 10/20/2017    Obstructed  shunt ([facility] ) 10/23/2017     Resolved Ambulatory Problems     Diagnosis Date Noted    Intractable headache 10/04/2017    Migraine headache 10/04/2017     Past Medical History:    Asthma    Cerebral palsy ([facility] )    Congenital hydrocephalus (HCC)    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[facility] )    Migraines    [facility]       Admitting Diagnosis: Seizure ([facility] ) [R56 9]  Acute headache [R51]  Shunt malfunction, initial encounter [T85 658A]    Age/Sex: 43 y o  male    Assessment/Plan:    Obstructed  shunt (HCC)   Assessment & Plan     · R/o obstruction/kink  · History of congenital hydrocephalus, s/p  shunt with multiple revisions and replacements; last shunt procedure 10/23/17 (DKO) shunt ventricular-peritoneal revision (Left)   · Seen by Neurosurgery, will assess for kink in shunt tomorrow  · Neurology and neurosurgery follow-up  · NPO after midnight          Other complicated headache syndrome   Assessment & Plan     · On Rizotriptan 10mg prn HA  · Given Toradol in the ED, states it helped but headache has returned   · Tylenol 975 mg t i d          Seizure (HCC)   Assessment & Plan     · Home meds: Lamictal 200 b i d  and Depakote 1000 mg t i d   · Valproic level 92, normal  · Continue home meds  · Seizure precautions  · Neurology follow-up          Cerebral palsy ([facility] )   Assessment & Plan     · Mild cerebral palsy with a history of congenital hydrocephalus, s/p  shunt with multiple revisions and replacements   · AAOx3, ambulatory, works at [employer]   · Supportive care             Mild intermittent asthma without complication   Assessment & Plan     · Stable, mild-intermittent, no acute exacerbation  · Continue Advair       Neuro surgery:      Preop Diagnosis:   1  Possible shunt dysfunction left-sided  shunt system, distal  2  Coiled upper epigastric intraperitoneal distal shunt tubing  3  No pseudocyst on CT scan  4  Prior shunt revisions x 7  5  Last distal  shunt revision with placement of Codman Hakim programmable valve system set at 10 centimeters of water opening pressure 10/23/2017  6  Patent ventricular catheter left trigone of ventricle to Codman reservoir and valve unit, with catheter or in continuity down to upper epigastrium  7  2 nonfunctional ventricular catheters 1 frontal 1 occipital  8  Ventricles remain same as previous scans non dilated  9  Concern for distal peritoneal runoff dysfunction with early loculation coiled upper epigastric distal shunt tubing adjacent to transverse colon  10  Seizure disorder  11  Ex preemie  12   Cerebral palsy     Postop Diagnosis: same    Title of procedure:  Ventriculoperitoneal shunt tap of left parieto-occipital Codman Hakim reservoir     [place] is set at 100 millimeters of water opening pressure the same as when  shunt was placed on 10/23/2017     Date: 02/07/18   Procedures:       revision ventriculo-perioteneal shunt (Left Head)      Abdominal shunt revision (N/A Abdomen)   Anesthesia type: General   Diagnosis:       Other complicated headache syndrome [G44 59]      Acute headache [R51]      Seizure-like activity ([address] ) [R56 9]      Shunt malfunction, initial encounter [T85 077Q]     Procedure(s) (LRB):  revision ventriculo-perioteneal shunt (Left)  Abdominal shunt revision     Estimated Blood Loss:   50 mL        Admission Orders:    Ambulate    Npo sips pre op   Surgery  2-7          Scheduled Meds:   Current Facility-Administered Medications:  acetaminophen 975 mg Oral Q8H   albuterol 2 puff Inhalation Q4H PRN   ALPRAZolam 0 25 mg Oral TID PRN   chlorhexidine 15 mL Swish & Spit Q12H [facility]   cholecalciferol 2,000 Units Oral Daily   divalproex sodium 500 mg Oral Q8H [address]   fluticasone-salmeterol 1 puff Inhalation Q12H ALEX   HYDROmorphone 0 5 mg Intravenous Q4H PRN   lamoTRIgine 200 mg Oral BID   oxyCODONE 10 mg Oral Q4H PRN   oxyCODONE 5 mg Oral Q4H PRN   sodium chloride 75 mL/hr Intravenous Continuous     Continuous Infusions:   sodium chloride 75 mL/hr Last Rate: 75 mL/hr (02/07/18 1000)     PRN Meds: albuterol    ALPRAZolam    HYDROmorphone    oxyCODONE    oxyCODONE

## 2018-02-07 NOTE — ASSESSMENT & PLAN NOTE
· Home meds: Lamictal 200 b i d  and Depakote 1000 mg t i d   · Mother wants to see neurology to discuss decreasing dose of Depakote   · Valproic level 92, normal  · Lamictal level ordered and in process  · Continue home meds  · Seizure precautions  · Neurology follow-up

## 2018-02-07 NOTE — ASSESSMENT & PLAN NOTE
· Suspect may be malfunctioning, causing HA     · History of congenital hydrocephalus, s/p  shunt with multiple revisions and replacements; last shunt procedure 10/23/17; neurosurgery performed shunt tap yesterday - CSF with no bacteria/polys/growth  · Neurosurgery and neurology following  · NPO since midnight; hopefully to OR today

## 2018-02-07 NOTE — PROCEDURES
Procedures Operative Note    02/07/18    43 y o  male Portillo Zaragoza     [facility] 710-01    Preop Diagnosis:   1  Possible shunt dysfunction left-sided  shunt system, distal  2  Coiled upper epigastric intraperitoneal distal shunt tubing  3  No pseudocyst on CT scan  4  Prior shunt revisions x 7  5  Last distal  shunt revision with placement of Codman Hakim programmable valve system set at 10 centimeters of water opening pressure 10/23/2017  6  Patent ventricular catheter left trigone of ventricle to Codman reservoir and valve unit, with catheter or in continuity down to upper epigastrium  7  2 nonfunctional ventricular catheters 1 frontal 1 occipital  8  Ventricles remain same as previous scans non dilated  9  Concern for distal peritoneal runoff dysfunction with early loculation coiled upper epigastric distal shunt tubing adjacent to transverse colon  10  Seizure disorder  11  Ex preemie  12  Cerebral palsy    Postop Diagnosis: same    Title of procedure:  Ventriculoperitoneal shunt tap of left parieto-occipital [facility] is set at 100 millimeters of water opening pressure the same as when  shunt was placed on 10/23/2017    Surgeon: Liz Escobar MD    Assistants: None    No qualified resident was available to assist with this case  Indications: This 17-year-old young man known to me from his prior admission and shunt revision back on 10/23/2017 has been doing well until about 2 weeks ago  He then reports onset of headache that is gradually built up this last week  Headache has become much more severe and constant over the last week    No medication seems to relieve it  It is non positional   He has been going to work as a  for [employer] until Saturday - 4 days ago    He is requiring regular narcotic medication    He is showing signs of pain with marked frontal headache waxing and waning  with a baseline dull headache    However his ventricles are unchanged from prior CT scans  Small normal for him  There is concern for a distal obstruction of the peritoneal catheter  The distal end of the catheter does cold coiled up in a confined area 3 x 3 centimeters in the intra peritoneal upper epigastric region adjacent to the transverse colon    No evidence of pseudocyst that can be seen on CT scan    To be sure there is no element of proximal obstruction or valve dysfunction a  shunt tap of the reservoir above the [employer] valve is indicated    The risks, benefits and complications of surgery were explained in detail to Charter Lupillo and  his mother Josemanuel Castillo  including hemorrhage, infection, seizures CSF leakage, wound problems, pain, weakness, numbness, dysesthesiae, paralysis, coma, and death  Also, the possibility of further surgery being required was emphasized  Other potential medical complications were outlined, including deep venous thrombosis, pulmonary embolism, pneumonia, urinary tract infection, myocardial infarction,  and stroke  The need for physical therapy, occupational therapy, and rehabilitation was also mentioned  The alternatives to surgery were discussed  [employer] and his mother Josemanuel Castillo asked relevant questions and asked that we proceed with arrangements for surgery  Anesthesia:  none    Procedure Details:  Mr Erica Santiago was positioned supine with 45 head of bed up   The dome of the reservoir was located in the left occipital parietal region on level behind the top of the pinna      A small amount of hair around was clipped  The dome of the reservoir was prepped with Betadine in the usual sterile  standard fashion and this was allowed to dry  A time-out was occasioned  with patient's primary nurse Demetrio Bell The identity of the patient was confirmed  The imaging was reviewed  The consent was reviewed  She was in  agreement    Then, using a 25 gauge pediatric butterfly the dome of the reservoir was accessed    CSF was immediately seen to flow up the straightened out butterfly tubing    The opening pressure was assessed at  12 cm of water  The distal runoff was assessed with  meniscus initially started off moderately steadily then slowed down and then settled out at around 7 or 8 centimeters above the reservoir    Then  3  ml  of CSF was withdrawn and sent for laboratory analysis  See below  CSF could be easily aspirated    So, distal runoff down the butterfly tubing was assessed as initial good flow down to 10 cm of water  Then slower and settling out at 5-6 cm  Centimeters  Removed further 2 cc clear CSF  Tested distal runoff again    The distal runoff settled at  4 cm above the dome of the reservoir  It should be lower than this after CSF has been removed even  allowing for the valve setting of 100 centimeters water     Conclusion no proximal shunt dysfunction  There is flow distally but probably suboptimal    Early loculation or pseudocyst suspected around coiled up epigastric distal peritoneal tubing impairing free drainage within the peritoneal cavity    Estimated Blood Loss:  None           Specimens: CSF panel sent for CSF hematology with CSF white cell count and CSF red cell count; CSF chemistry for CSF glucose and protein 42 ; and CSF microbiology with Gram stain and culture    Drains: None           Total IV Fluids: N/a              Findings: As above  Complications:  None    I discussed the results and findings with Mr Joseph Tinajero and his mother Gerry Rivas    I reviewed his presentation, Imaging and symptoms and studies so far with Dr Jered Parikh and Dr Livier Taylor from General Surgery    Further plans:   We will put him on the add on schedule this afternoon for diagnostic laparoscopy and unraveling coiled up upper epigastric intraperitoneal distal shunt tubing and revision of the distal peritoneal shunt tubing to a different terminus location within the peritoneal cavity  - possibly above the liver or possibly down the right para colic gutter    Hopefully this revision may be able to be obtained by laparoscopic ports means rather than open laparotomy      Dr Jo Mejía reviewed all the images with Dr Анна Briceno and myself    However  what exactly will need to be done with the distal shunt revision depends upon the findings at the time of surgery

## 2018-02-07 NOTE — CONSULTS
Consultation - General Surgery   Timi Dalton 43 y o  male MRN: [id number]  Unit/Bed#: [facility] 710-01 Encounter: [id number]    Assessment/Plan     Assessment:  43 y o  M w/ CP and congenital hydrocephalus w/ h/o multiple  shunts and subsequent revisions  His most recent shunt revision was in October; there is evidence on imaging that he has distal shunt malfunction  We have been consulted to assist with revision    Plan:  General surgery will be available for shunt revision  Procedure discussed with patient who is agreeable with proceeding   All questions answered    History of Present Illness     HPI: Timi Dalton is a 43 y o  M w/ CP and congenital hydrocephalus w/ h/o multiple  shunts and subsequent revisions  His most recent shunt revision was in October  He presented yesterday with headaches and tremors  His mother believed at the time that this was primarily due to depakote  He underwent CTAP that demonstrated findings of distal  shunt obstruction  He underwent  shunt tap today that showed sluggish distal flow  We have been consulted for assistance with shunt revision     Consults    Review of Systems   Constitutional: Negative  HENT: Negative  Eyes: Negative  Respiratory: Negative  Cardiovascular: Negative  Gastrointestinal: Negative  Endocrine: Negative  Genitourinary: Negative  Musculoskeletal: Negative  Neurological: Positive for tremors and headaches  Hematological: Negative  Psychiatric/Behavioral: Negative          Historical Information   Past Medical History:   Diagnosis Date    Asthma     [facility]     Congenital hydrocephalus ([address] )     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[address] )     Migraines     Seizures ([address] )      Past Surgical History:   Procedure Laterality Date    BRAIN SURGERY      Multiple  shunt revisions    HERNIA REPAIR      ND CREATE SHUNT:VENTRIC-PERITONEAL Left 10/23/2017    Procedure: SHUNT VENTRICULAR-PERITONEAL revision; Surgeon: Florecita Brown MD;  Location: [facility];  Service: Neurosurgery     Social History   History   Alcohol Use No     Comment: N/A     History   Drug Use No     Comment: N/A     History   Smoking Status    Never Smoker   Smokeless Tobacco    Never Used     Comment: N/A     Family History: non-contributory    Meds/Allergies   all current active meds have been reviewed  Allergies   Allergen Reactions    Latex Hives    Other      Cats, seasonal    Pollen Extract        Objective   First Vitals:   Blood Pressure: 142/72 (02/06/18 1204)  Pulse: 79 (02/06/18 1204)  Temperature: 97 9 °F (36 6 °C) (02/06/18 1204)  Temp Source: Oral (02/06/18 1204)  Respirations: 18 (02/06/18 1204)  Height: 5' 9" (175 3 cm) (02/06/18 1204)  Weight - Scale: 84 7 kg (186 lb 11 7 oz) (02/06/18 1700)  SpO2: 95 % (02/06/18 1204)    Current Vitals:   Blood Pressure: 142/78 (02/07/18 1443)  Pulse: 90 (02/07/18 1443)  Temperature: 97 5 °F (36 4 °C) (02/07/18 1443)  Temp Source: Oral (02/07/18 1443)  Respirations: 18 (02/07/18 1443)  Height: 5' 6" (167 6 cm) (02/06/18 1700)  Weight - Scale: 84 7 kg (186 lb 11 7 oz) (02/06/18 1700)  SpO2: 95 % (02/07/18 1443)      Intake/Output Summary (Last 24 hours) at 02/07/18 1526  Last data filed at 02/07/18 1200   Gross per 24 hour   Intake              811 ml   Output              200 ml   Net              611 ml       Invasive Devices     Peripheral Intravenous Line            Peripheral IV 02/06/18 Right Antecubital 1 day                Physical Exam   Constitutional: He is oriented to person, place, and time  He appears well-developed  No distress  HENT:   Head: Normocephalic  Neck: Normal range of motion  Cardiovascular: Normal rate and regular rhythm  Pulmonary/Chest: Effort normal    Abdominal: Soft  Bowel sounds are normal    Musculoskeletal: Normal range of motion  Neurological: He is alert and oriented to person, place, and time  Skin: Skin is warm and dry     Psychiatric: He has a normal mood and affect  Lab Results:   I have personally reviewed pertinent lab results  , CBC:   Lab Results   Component Value Date    WBC 11 38 (H) 02/07/2018    HGB 15 5 02/07/2018    HCT 44 3 02/07/2018    MCV 89 02/07/2018     02/07/2018    MCH 31 2 02/07/2018    MCHC 35 0 02/07/2018    RDW 12 6 02/07/2018    MPV 10 1 02/07/2018    NRBC 0 02/07/2018   , CMP:   Lab Results   Component Value Date     02/07/2018    K 4 1 02/07/2018     02/07/2018    CO2 29 02/07/2018    ANIONGAP 8 02/07/2018    BUN 18 02/07/2018    CREATININE 0 73 02/07/2018    GLUCOSE 84 02/07/2018    CALCIUM 8 8 02/07/2018    EGFR 114 02/07/2018   , Coagulation:   Lab Results   Component Value Date    INR 1 09 02/07/2018     Imaging: I have personally reviewed pertinent reports  and I have personally reviewed pertinent films in PACS  EKG, Pathology, and Other Studies: I have personally reviewed pertinent reports     and I have personally reviewed pertinent films in PACS

## 2018-02-07 NOTE — PROGRESS NOTES
Progress Note - Shannan Amezcua 1975, 43 y o  male MRN: [id number]    Unit/Bed#: [facility] 710-01 Encounter: [id number]    Primary Care Provider: Luz Marina Marvin DO   Date and time admitted to hospital: 2/6/2018 11:58 AM        * Obstructed  shunt ([facility] [address] )   Assessment & Plan    · History of congenital hydrocephalus, s/p  shunt with multiple revisions and replacements; last shunt procedure 10/23/17 (DKO) shunt ventricular-peritoneal revision (Left)   · Seen by Neurosurgery: Left Hakim shunt set to 100mm H2O with concern for disconnection at level of lizett hole  · Neurosurgery consult appreciated; d/w neurosx PA (Hilary) today, CT scans will be reviewed again and they will follow patient; maintain NPO  · NPO since mn; will start IVFluids        Other complicated headache syndrome   Assessment & Plan    · On Rizotriptan 10mg prn HA  · Continues to have mild HA  · Continue Tylenol 975 mg t i d; add p r n oral opioid and IV opioid for breakthrough pain        Seizure (HCC)   Assessment & Plan    · Home meds: Lamictal 200 b i d  and Depakote 1000 mg t i d   · Valproic level 92, normal  · Lamictal level ordered and in process  · Continue home meds  · Seizure precautions  · Neurology follow-up        Cerebral palsy ([facility] )   Assessment & Plan    · Mild cerebral palsy with a history of congenital hydrocephalus, s/p  shunt with multiple revisions and replacements   · AAOx3, ambulatory, works at [employer]   · Supportive care        Mild intermittent asthma without complication   Assessment & Plan    · Stable, mild-intermittent, no acute exacerbation  · Continue Advair            VTE Pharmacologic Prophylaxis:   Pharmacologic: ambulatory, low risk  Mechanical VTE Prophylaxis in Place: No    Patient Centered Rounds: I have performed bedside rounds with nursing staff today      Discussions with Specialists or Other Care Team Provider: neurosurgery and Neurology    Education and Discussions with Family / Patient: mother at bedside    Time Spent for Care: 1 hour  More than 50% of total time spent on counseling and coordination of care as described above  Current Length of Stay: 1 day(s)    Current Patient Status: Inpatient   Certification Statement: The patient will continue to require additional inpatient hospital stay due to Evaluation of  shunt possible obstruction requiring surgical revision    Discharge Plan: not stable at present time    Code Status: Level 1 - Full Code      Subjective:   Mild headache     Patient had excruciating HA last night resulting in anxiety and insomnia, was given  Xanax, oxycodone and dilaudid, HA resolved and he was able to sleep  Continues to c/o mild HA and back pain  Mother is concerned with his Depakote dose and would like to see Neurology to discuss decreasing the dose  She states the he was having breakthrough seizures In October because of a shunt malfuntion, seizures resolved after his shunt revision  but the Depakote was increased on discharge by Neurology  Lamictal dose was also increased at d/c  She attributes his signs and symptoms to the increased Depakote level stating he has increased HA, tremors, and lateral jaw swaying left to right; Valproic level 92, therapeutic  Neurology consult pending  Objective:     Vitals:   Temp (24hrs), Av 6 °F (36 4 °C), Min:97 5 °F (36 4 °C), Max:97 6 °F (36 4 °C)    HR:  [69-86] 73  Resp:  [18] 18  BP: (134-145)/(67-85) 140/79  SpO2:  [95 %-99 %] 97 %  Body mass index is 30 14 kg/m²  Input and Output Summary (last 24 hours): Intake/Output Summary (Last 24 hours) at 18 1259  Last data filed at 18 0759   Gross per 24 hour   Intake              590 ml   Output              200 ml   Net              390 ml       Physical Exam:     Physical Exam   Constitutional: He is oriented to person, place, and time  He appears well-developed and well-nourished  No distress     Eyes: Pupils are equal, round, and reactive to light  Right eye exhibits no discharge  Left eye exhibits no discharge  + nystagmus   Neck: Neck supple  No JVD present  No thyromegaly present  Cardiovascular: Normal rate, regular rhythm, normal heart sounds and intact distal pulses  Exam reveals no gallop and no friction rub  No murmur heard  Pulmonary/Chest: Effort normal and breath sounds normal  No respiratory distress  He has no wheezes  He has no rales  He exhibits no tenderness  Abdominal: Soft  Bowel sounds are normal  He exhibits no distension and no mass  There is no tenderness  There is no rebound and no guarding  Musculoskeletal: Normal range of motion  He exhibits no edema, tenderness or deformity  Neurological: He is alert and oriented to person, place, and time  Jaw sways side to side; strength B/L UE & LE 5/5   Skin: Skin is warm and dry  No rash noted  He is not diaphoretic  No erythema  No pallor  Psychiatric: He has a normal mood and affect  His behavior is normal  Judgment and thought content normal        Additional Data:     Labs:      Results from last 7 days  Lab Units 02/07/18  0542   WBC Thousand/uL 11 38*   HEMOGLOBIN g/dL 15 5   HEMATOCRIT % 44 3   PLATELETS Thousands/uL 255   NEUTROS PCT % 54   LYMPHS PCT % 31   MONOS PCT % 13*   EOS PCT % 2       Results from last 7 days  Lab Units 02/07/18  0542 02/06/18  1328   SODIUM mmol/L 143 139   POTASSIUM mmol/L 4 1 3 5   CHLORIDE mmol/L 106 102   CO2 mmol/L 29 31   BUN mg/dL 18 13   CREATININE mg/dL 0 73 0 74   CALCIUM mg/dL 8 8 9 2   TOTAL PROTEIN g/dL  --  6 8   BILIRUBIN TOTAL mg/dL  --  0 49   ALK PHOS U/L  --  53   ALT U/L  --  23   AST U/L  --  10   GLUCOSE RANDOM mg/dL 84 88       Results from last 7 days  Lab Units 02/07/18  0542   INR  1 09       * I Have Reviewed All Lab Data Listed Above  * Additional Pertinent Lab Tests Reviewed:  Ivonne [facility] Admission Reviewed    Imaging:    Imaging Reports Reviewed Today Include: CT scans, shunt series  Imaging Personally Reviewed by Myself Includes:  None    Recent Cultures (last 7 days):           Last 24 Hours Medication List:     Current Facility-Administered Medications:  acetaminophen 975 mg Oral Q8H Zac Rain MD    albuterol 2 puff Inhalation Q4H PRN Zac Rain MD    ALPRAZolam 0 25 mg Oral TID PRN Zac Rain MD    cholecalciferol 2,000 Units Oral Daily Zac Rain MD    divalproex sodium 1,000 mg Oral [facility] Zac Rain MD    fluticasone-salmeterol 1 puff Inhalation Q12H [address] Zac Rain MD    HYDROmorphone 0 5 mg Intravenous Q4H PRN Miles Lyn PA-C    lamoTRIgine 200 mg Oral BID Zac Rain MD    oxyCODONE 10 mg Oral Q4H PRN Miles Lyn PA-C    oxyCODONE 5 mg Oral Q4H PRN Miles Lyn PA-C    sodium chloride 75 mL/hr Intravenous Continuous Miles Lyn PA-C Last Rate: 75 mL/hr (02/07/18 1000)        Today, Patient Was Seen By: DEEDEE Fang    ** Please Note: Dictation voice to text software may have been used in the creation of this document   **

## 2018-02-07 NOTE — ASSESSMENT & PLAN NOTE
· With a history of hydrocephalus, s/p  shunt with multiple revisions and replacements   · Continue supportive care

## 2018-02-07 NOTE — ASSESSMENT & PLAN NOTE
· Patient needed IV dilaudid for breakthrough today  · Cause of headache is unclear, but per my discussion with both neurology and neurosurgical teams, felt likely secondary to shunt malfunction, likely distal  · Plan is for laparoscopic revision of distal portion of  shunt catheter in OR today; patient is an add-on  · Follow up improvement in headache post-procedurally

## 2018-02-08 ENCOUNTER — APPOINTMENT (INPATIENT)
Dept: RADIOLOGY | Facility: HOSPITAL | Age: 43
DRG: 982 | End: 2018-02-08
Payer: MEDICARE

## 2018-02-08 PROBLEM — Z98.2 S/P VP SHUNT: Status: ACTIVE | Noted: 2017-10-23

## 2018-02-08 PROBLEM — R51.9 HEADACHE: Status: ACTIVE | Noted: 2018-02-06

## 2018-02-08 PROBLEM — G40.909 SEIZURE DISORDER (HCC): Status: ACTIVE | Noted: 2017-10-20

## 2018-02-08 PROBLEM — R25.1 TREMOR: Status: ACTIVE | Noted: 2017-10-20

## 2018-02-08 PROBLEM — M54.9 LEFT-SIDED BACK PAIN: Status: ACTIVE | Noted: 2018-02-08

## 2018-02-08 PROBLEM — J45.909 ASTHMA WITHOUT ACUTE EXACERBATION: Status: ACTIVE | Noted: 2017-10-04

## 2018-02-08 LAB
APPEARANCE CSF: CLEAR
GLUCOSE CSF-MCNC: 59 MG/DL (ref 50–80)
GLUCOSE SERPL-MCNC: 147 MG/DL (ref 65–140)
GRAM STN SPEC: NORMAL
PROT CSF-MCNC: 17 MG/DL (ref 15–45)
RBC # CSF MANUAL: 0 UL (ref 0–10)
TOTAL CELLS COUNTED BLD: NO
WBC # CSF AUTO: 0 /UL (ref 0–5)

## 2018-02-08 PROCEDURE — 87070 CULTURE OTHR SPECIMN AEROBIC: CPT | Performed by: NEUROLOGICAL SURGERY

## 2018-02-08 PROCEDURE — 0DNU0ZZ RELEASE OMENTUM, OPEN APPROACH: ICD-10-PCS | Performed by: SURGERY

## 2018-02-08 PROCEDURE — 87102 FUNGUS ISOLATION CULTURE: CPT | Performed by: NEUROLOGICAL SURGERY

## 2018-02-08 PROCEDURE — 62230 REPLACE/REVISE BRAIN SHUNT: CPT | Performed by: NEUROLOGICAL SURGERY

## 2018-02-08 PROCEDURE — 82948 REAGENT STRIP/BLOOD GLUCOSE: CPT

## 2018-02-08 PROCEDURE — 84157 ASSAY OF PROTEIN OTHER: CPT | Performed by: NEUROLOGICAL SURGERY

## 2018-02-08 PROCEDURE — 74018 RADEX ABDOMEN 1 VIEW: CPT

## 2018-02-08 PROCEDURE — 82945 GLUCOSE OTHER FLUID: CPT | Performed by: NEUROLOGICAL SURGERY

## 2018-02-08 PROCEDURE — 99232 SBSQ HOSP IP/OBS MODERATE 35: CPT | Performed by: NEUROLOGICAL SURGERY

## 2018-02-08 PROCEDURE — 99232 SBSQ HOSP IP/OBS MODERATE 35: CPT | Performed by: PHYSICIAN ASSISTANT

## 2018-02-08 PROCEDURE — 0DNW0ZZ RELEASE PERITONEUM, OPEN APPROACH: ICD-10-PCS | Performed by: SURGERY

## 2018-02-08 PROCEDURE — 0WWG0JZ REVISION OF SYNTHETIC SUBSTITUTE IN PERITONEAL CAVITY, OPEN APPROACH: ICD-10-PCS | Performed by: NEUROLOGICAL SURGERY

## 2018-02-08 PROCEDURE — 89051 BODY FLUID CELL COUNT: CPT | Performed by: NEUROLOGICAL SURGERY

## 2018-02-08 PROCEDURE — 89050 BODY FLUID CELL COUNT: CPT | Performed by: NEUROLOGICAL SURGERY

## 2018-02-08 PROCEDURE — 0WJG4ZZ INSPECTION OF PERITONEAL CAVITY, PERCUTANEOUS ENDOSCOPIC APPROACH: ICD-10-PCS | Performed by: SURGERY

## 2018-02-08 RX ORDER — GLYCOPYRROLATE 0.2 MG/ML
INJECTION INTRAMUSCULAR; INTRAVENOUS AS NEEDED
Status: DISCONTINUED | OUTPATIENT
Start: 2018-02-08 | End: 2018-02-08 | Stop reason: SURG

## 2018-02-08 RX ORDER — FENTANYL CITRATE/PF 50 MCG/ML
25 SYRINGE (ML) INJECTION
Status: DISCONTINUED | OUTPATIENT
Start: 2018-02-08 | End: 2018-02-08

## 2018-02-08 RX ORDER — ONDANSETRON 2 MG/ML
INJECTION INTRAMUSCULAR; INTRAVENOUS AS NEEDED
Status: DISCONTINUED | OUTPATIENT
Start: 2018-02-08 | End: 2018-02-08 | Stop reason: SURG

## 2018-02-08 RX ORDER — ONDANSETRON 2 MG/ML
4 INJECTION INTRAMUSCULAR; INTRAVENOUS ONCE AS NEEDED
Status: DISCONTINUED | OUTPATIENT
Start: 2018-02-08 | End: 2018-02-08

## 2018-02-08 RX ORDER — CHLORHEXIDINE GLUCONATE 0.12 MG/ML
15 RINSE ORAL ONCE
Status: COMPLETED | OUTPATIENT
Start: 2018-02-08 | End: 2018-02-08

## 2018-02-08 RX ORDER — FENTANYL CITRATE 50 UG/ML
INJECTION, SOLUTION INTRAMUSCULAR; INTRAVENOUS AS NEEDED
Status: DISCONTINUED | OUTPATIENT
Start: 2018-02-08 | End: 2018-02-08 | Stop reason: SURG

## 2018-02-08 RX ORDER — SODIUM CHLORIDE 9 MG/ML
20 INJECTION, SOLUTION INTRAVENOUS CONTINUOUS
Status: DISCONTINUED | OUTPATIENT
Start: 2018-02-08 | End: 2018-02-08

## 2018-02-08 RX ORDER — BUPIVACAINE HYDROCHLORIDE AND EPINEPHRINE 2.5; 5 MG/ML; UG/ML
INJECTION, SOLUTION EPIDURAL; INFILTRATION; INTRACAUDAL; PERINEURAL AS NEEDED
Status: DISCONTINUED | OUTPATIENT
Start: 2018-02-08 | End: 2018-02-08 | Stop reason: HOSPADM

## 2018-02-08 RX ORDER — PROPOFOL 10 MG/ML
INJECTION, EMULSION INTRAVENOUS AS NEEDED
Status: DISCONTINUED | OUTPATIENT
Start: 2018-02-08 | End: 2018-02-08 | Stop reason: SURG

## 2018-02-08 RX ORDER — ROCURONIUM BROMIDE 10 MG/ML
INJECTION, SOLUTION INTRAVENOUS AS NEEDED
Status: DISCONTINUED | OUTPATIENT
Start: 2018-02-08 | End: 2018-02-08 | Stop reason: SURG

## 2018-02-08 RX ORDER — SODIUM CHLORIDE 9 MG/ML
INJECTION, SOLUTION INTRAVENOUS CONTINUOUS PRN
Status: DISCONTINUED | OUTPATIENT
Start: 2018-02-08 | End: 2018-02-08 | Stop reason: SURG

## 2018-02-08 RX ADMIN — SODIUM CHLORIDE: 0.9 INJECTION, SOLUTION INTRAVENOUS at 19:17

## 2018-02-08 RX ADMIN — ROCURONIUM BROMIDE 10 MG: 10 INJECTION INTRAVENOUS at 20:30

## 2018-02-08 RX ADMIN — PROPOFOL 200 MG: 10 INJECTION, EMULSION INTRAVENOUS at 19:14

## 2018-02-08 RX ADMIN — FENTANYL CITRATE 50 MCG: 50 INJECTION, SOLUTION INTRAMUSCULAR; INTRAVENOUS at 19:20

## 2018-02-08 RX ADMIN — DEXAMETHASONE SODIUM PHOSPHATE 10 MG: 10 INJECTION INTRAMUSCULAR; INTRAVENOUS at 19:30

## 2018-02-08 RX ADMIN — ROCURONIUM BROMIDE 40 MG: 10 INJECTION INTRAVENOUS at 19:14

## 2018-02-08 RX ADMIN — DIVALPROEX SODIUM 500 MG: 500 TABLET, DELAYED RELEASE ORAL at 05:51

## 2018-02-08 RX ADMIN — HYDROMORPHONE HYDROCHLORIDE 0.5 MG: 1 INJECTION, SOLUTION INTRAMUSCULAR; INTRAVENOUS; SUBCUTANEOUS at 20:30

## 2018-02-08 RX ADMIN — CHLORHEXIDINE GLUCONATE 15 ML: 1.2 RINSE ORAL at 18:25

## 2018-02-08 RX ADMIN — OXYCODONE HYDROCHLORIDE 5 MG: 5 TABLET ORAL at 09:05

## 2018-02-08 RX ADMIN — FENTANYL CITRATE 50 MCG: 50 INJECTION, SOLUTION INTRAMUSCULAR; INTRAVENOUS at 19:14

## 2018-02-08 RX ADMIN — HYDROMORPHONE HYDROCHLORIDE 0.4 MG: 1 INJECTION, SOLUTION INTRAMUSCULAR; INTRAVENOUS; SUBCUTANEOUS at 23:04

## 2018-02-08 RX ADMIN — LAMOTRIGINE 200 MG: 100 TABLET ORAL at 09:05

## 2018-02-08 RX ADMIN — SODIUM CHLORIDE: 0.9 INJECTION, SOLUTION INTRAVENOUS at 18:00

## 2018-02-08 RX ADMIN — HYDROMORPHONE HYDROCHLORIDE 0.4 MG: 1 INJECTION, SOLUTION INTRAMUSCULAR; INTRAVENOUS; SUBCUTANEOUS at 23:20

## 2018-02-08 RX ADMIN — HYDROMORPHONE HYDROCHLORIDE 0.5 MG: 1 INJECTION, SOLUTION INTRAMUSCULAR; INTRAVENOUS; SUBCUTANEOUS at 10:31

## 2018-02-08 RX ADMIN — OXYCODONE HYDROCHLORIDE 5 MG: 5 TABLET ORAL at 01:27

## 2018-02-08 RX ADMIN — DIVALPROEX SODIUM 500 MG: 500 TABLET, DELAYED RELEASE ORAL at 14:37

## 2018-02-08 RX ADMIN — GLYCOPYRROLATE 0.5 MG: 0.2 INJECTION, SOLUTION INTRAMUSCULAR; INTRAVENOUS at 22:00

## 2018-02-08 RX ADMIN — CEFAZOLIN SODIUM 2000 MG: 1 SOLUTION INTRAVENOUS at 19:25

## 2018-02-08 RX ADMIN — HYDROMORPHONE HYDROCHLORIDE 0.5 MG: 1 INJECTION, SOLUTION INTRAMUSCULAR; INTRAVENOUS; SUBCUTANEOUS at 19:25

## 2018-02-08 RX ADMIN — HYDROMORPHONE HYDROCHLORIDE 0.5 MG: 1 INJECTION, SOLUTION INTRAMUSCULAR; INTRAVENOUS; SUBCUTANEOUS at 22:10

## 2018-02-08 RX ADMIN — OXYCODONE HYDROCHLORIDE 10 MG: 10 TABLET ORAL at 14:48

## 2018-02-08 RX ADMIN — NEOSTIGMINE METHYLSULFATE 3 MG: 1 INJECTION, SOLUTION INTRAMUSCULAR; INTRAVENOUS; SUBCUTANEOUS at 22:00

## 2018-02-08 RX ADMIN — ACETAMINOPHEN 975 MG: 325 TABLET, FILM COATED ORAL at 01:28

## 2018-02-08 RX ADMIN — FLUTICASONE PROPIONATE AND SALMETEROL 1 PUFF: 50; 100 POWDER RESPIRATORY (INHALATION) at 09:07

## 2018-02-08 RX ADMIN — HYDROMORPHONE HYDROCHLORIDE 0.4 MG: 1 INJECTION, SOLUTION INTRAMUSCULAR; INTRAVENOUS; SUBCUTANEOUS at 23:09

## 2018-02-08 RX ADMIN — ONDANSETRON 4 MG: 2 INJECTION INTRAMUSCULAR; INTRAVENOUS at 21:05

## 2018-02-08 RX ADMIN — LIDOCAINE HYDROCHLORIDE 100 MG: 20 INJECTION, SOLUTION INTRAVENOUS at 19:14

## 2018-02-08 RX ADMIN — VITAMIN D, TAB 1000IU (100/BT) 2000 UNITS: 25 TAB at 09:07

## 2018-02-08 NOTE — ASSESSMENT & PLAN NOTE
· Discussed with neurology today  Repeat valproic acid level in 1 week (check on 2/15/18)  · Valproic acid level was 92   Neurology decreased dose to 500mg Q8hr

## 2018-02-08 NOTE — RESTORATIVE TECHNICIAN NOTE
Restorative Specialist Mobility Note       Activity: Ambulate in elena, Ambulate in room, Bathroom privileges, Chair, Dangle, Stand at bedside (Educated/encouraged pt to ambulate w/assistance 3-4 x's/day  Bed alarm on   Pt callbell, phone/tray within reach )     Assistive Device: None       Robson BLOUNT, Restorative Technician, [employer]

## 2018-02-08 NOTE — PROGRESS NOTES
Progress Note - Edwin Baldwin 1975, 43 y o  male MRN: [id number]    Unit/Bed#: [facility] 710-01 Encounter: [id number]    Primary Care Provider: Jerel Stevens DO   Date and time admitted to hospital: 2/6/2018 11:58 AM        * Headache   Assessment & Plan    · Patient needed IV dilaudid for breakthrough today  · Cause of headache is unclear, but per my discussion with both neurology and neurosurgical teams, felt likely secondary to shunt malfunction, likely distal  · Plan is for laparoscopic revision of distal portion of  shunt catheter in OR today; patient is an add-on  · Follow up improvement in headache post-procedurally         S/P  shunt   Assessment & Plan    · Suspect may be malfunctioning, causing HA  · History of congenital hydrocephalus, s/p  shunt with multiple revisions and replacements; last shunt procedure 10/23/17; neurosurgery performed shunt tap yesterday - CSF with no bacteria/polys/growth  · Neurosurgery and neurology following  · NPO since midnight; hopefully to OR today        Left-sided back pain   Assessment & Plan    · Patient reports this has been going on for a long time but is unable to clarify how long  His mother reports pain is not relieved with tylenol/ibuprofen  Patient does report pain is relieved with the Dilaudid  · Unclear etiology  Consider related to one of his left-sided shunts? CT chest with no pneumonia/effusion  No acute osseous abnormality  The patient denies any dysuria  Seizure disorder [facility])   Assessment & Plan    · Discussed with neurology today  Repeat valproic acid level in 1 week (check on 2/15/18)  · Valproic acid level was 92  Neurology decreased dose to 500mg Q8hr        Tremor   Assessment & Plan    · Valproic level 92 - neurology decreased dose to 500mg Q8hr  · Lamictal level 16 0 - WNL  · Patient still with side-to-side jaw movement and UE tremor, worse with movement   Per my discussion with neurology PAROSE this is improved from yesterday        Cerebral palsy ([facility] [address] )   Assessment & Plan    · With a history of hydrocephalus, s/p  shunt with multiple revisions and replacements   · Continue supportive care          VTE Pharmacologic Prophylaxis:   Pharmacologic: patient is ambulatory  Also plan is to go to OR today as above  Mechanical VTE Prophylaxis in Place: Yes    Patient Centered Rounds: I have performed bedside rounds with nursing staff today  Gabriela Hernandez    Discussions with Specialists or Other Care Team Provider: Nurse, , neurology PA-C and neurosurgery PA-C    Education and Discussions with Family / Patient: Patient and his mother present at bedside    Time Spent for Care: 30 minutes  More than 50% of total time spent on counseling and coordination of care as described above  Current Length of Stay: 2 day(s)    Current Patient Status: Inpatient   Certification Statement: The patient will continue to require additional inpatient hospital stay due to plan as per above, to OR hopefully today    Discharge Plan: Pending clinical course    Code Status: Level 1 - Full Code      Subjective:   Mr Trinh Quan reports that he had a headache earlier which resolved with dilaudid but is returning  He denies fever, chills, CP, SOB, abdominal pain  Objective:     Vitals:   Temp (24hrs), Av 3 °F (36 8 °C), Min:98 1 °F (36 7 °C), Max:98 5 °F (36 9 °C)    HR:  [78-85] 78  Resp:  [18-19] 18  BP: (130-167)/(73-95) 130/73  SpO2:  [95 %-98 %] 98 %  Body mass index is 30 14 kg/m²  Input and Output Summary (last 24 hours): Intake/Output Summary (Last 24 hours) at 18 1517  Last data filed at 18 0807   Gross per 24 hour   Intake          1193 75 ml   Output              700 ml   Net           493 75 ml       Physical Exam:     Physical Exam   Constitutional: He is oriented to person, place, and time  Patient seen lying on his back in bed with his mother present at bedside   He is very pleasant and cooperative Cardiovascular: Normal rate and regular rhythm  Pulmonary/Chest: Effort normal and breath sounds normal  No respiratory distress  Abdominal: Soft  Bowel sounds are normal    Musculoskeletal:   Left posterior lateral chest wall tenderness   Neurological: He is alert and oriented to person, place, and time  Side-to-side jaw movement  B/l UE tremor noted with movement - greatly improves/maybe even resolves with rest   Skin: Skin is warm  Vitals reviewed  Additional Data:     Labs:      Results from last 7 days  Lab Units 02/07/18  0542   WBC Thousand/uL 11 38*   HEMOGLOBIN g/dL 15 5   HEMATOCRIT % 44 3   PLATELETS Thousands/uL 255   NEUTROS PCT % 54   LYMPHS PCT % 31   MONOS PCT % 13*   EOS PCT % 2       Results from last 7 days  Lab Units 02/07/18  0542 02/06/18  1328   SODIUM mmol/L 143 139   POTASSIUM mmol/L 4 1 3 5   CHLORIDE mmol/L 106 102   CO2 mmol/L 29 31   BUN mg/dL 18 13   CREATININE mg/dL 0 73 0 74   CALCIUM mg/dL 8 8 9 2   TOTAL PROTEIN g/dL  --  6 8   BILIRUBIN TOTAL mg/dL  --  0 49   ALK PHOS U/L  --  53   ALT U/L  --  23   AST U/L  --  10   GLUCOSE RANDOM mg/dL 84 88       Results from last 7 days  Lab Units 02/07/18  0542   INR  1 09       * I Have Reviewed All Lab Data Listed Above  * Additional Pertinent Lab Tests Reviewed:  All Labs Within Last 24 Hours Reviewed    Imaging:    Imaging Reports Reviewed Today Include: CT chest/abdomen, skull XR, shunt series  Imaging Personally Reviewed by Myself Includes:  None    Recent Cultures (last 7 days):       Results from last 7 days  Lab Units 02/07/18  1356   GRAM STAIN RESULT  No Polys or Bacteria seen       Last 24 Hours Medication List:     Current Facility-Administered Medications:  acetaminophen 975 mg Oral Q8H Onesimo Davenport MD    albuterol 2 puff Inhalation Q4H PRN Onesimo Davenport MD    ALPRAZolam 0 25 mg Oral TID PRN Onesimo Davenport MD    chlorhexidine 15 mL Swish & Spit Once Hilary Reeves PA-C    cholecalciferol 2,000 Units Oral Daily Oswald Fontana MD    divalproex sodium 500 mg Oral Q8H [facility] Kim Khan PA-C    fluticasone-salmeterol 1 puff Inhalation Q12H [facility] Oswald Fontana MD    HYDROmorphone 0 5 mg Intravenous Q4H PRN Prieto Oliva PA-C    lamoTRIgine 200 mg Oral BID Oswald Fontana MD    oxyCODONE 10 mg Oral Q4H PRN Prieto Oliva PA-C    oxyCODONE 5 mg Oral Q4H PRN Prieto Oliva PA-C    sodium chloride 75 mL/hr Intravenous Continuous Prieto Oliva PA-C Last Rate: 75 mL/hr (02/07/18 1795)        Today, Patient Was Seen By: Aníbal Cardenas PA-C    ** Please Note: Dictation voice to text software may have been used in the creation of this document   **

## 2018-02-08 NOTE — PROGRESS NOTES
Progress Note - General Surgery   Sherry Chand 43 y o  male MRN: [id number]  Unit/Bed#: [facility] 710-01 Encounter: [id number]    Assessment:  42M w/CP and congenital hydrocephalus with a history of  shunt placement s/p multiple revisions, presents w/distal shunt malfunction  Plan:  - to OR today as an add on for shunt revision  - plan per primary      Subjective/Objective   Subjective: complains of headache, otherwise doing well    Objective:    Blood pressure 167/95, pulse 85, temperature 98 1 °F (36 7 °C), temperature source Oral, resp  rate 19, height 5' 6" (1 676 m), weight 84 7 kg (186 lb 11 7 oz), SpO2 95 %  ,Body mass index is 30 14 kg/m²  I/O last 24 hours: In: 1193 8 [P O :240;  I V :953 8]  Out: 1000 [Urine:1000]    Invasive Devices     Peripheral Intravenous Line            Peripheral IV 02/06/18 Right Antecubital 1 day                Physical Exam:   NAD  Norm resp effort  RRR  Abd soft, NT/ND  -c/c/e    Lab, Imaging and other studies:  Lab Results   Component Value Date    WBC 11 38 (H) 02/07/2018    HGB 15 5 02/07/2018    HCT 44 3 02/07/2018    MCV 89 02/07/2018     02/07/2018      Lab Results   Component Value Date    GLUCOSE 84 02/07/2018    CALCIUM 8 8 02/07/2018     02/07/2018    K 4 1 02/07/2018    CO2 29 02/07/2018     02/07/2018    BUN 18 02/07/2018    CREATININE 0 73 02/07/2018       VTE Pharmacologic Prophylaxis: Heparin  VTE Mechanical Prophylaxis: sequential compression device

## 2018-02-08 NOTE — PLAN OF CARE
DISCHARGE PLANNING     Discharge to home or other facility with appropriate resources Progressing        DISCHARGE PLANNING - CARE MANAGEMENT     Discharge to post-acute care or home with appropriate resources Progressing        Knowledge Deficit     Patient/family/caregiver demonstrates understanding of disease process, treatment plan, medications, and discharge instructions Progressing        NEUROSENSORY - ADULT     Achieves stable or improved neurological status Progressing     Absence of seizures Progressing     Remains free of injury related to seizures activity Progressing     Achieves maximal functionality and self care Progressing        PAIN - ADULT     Verbalizes/displays adequate comfort level or baseline comfort level Progressing        Potential for Falls     Patient will remain free of falls Progressing        SAFETY ADULT     Maintain or return to baseline ADL function Progressing     Maintain or return mobility status to optimal level Progressing

## 2018-02-08 NOTE — SOCIAL WORK
[facility] Extended Stay  Optimal GLOS: 1  Hospital Day: 2 days   DC Readiness:   Discharge Readiness  Return to top of Shunt, Ventriculoperitoneal for Hydrocephalus RRG - ISC  · Discharge readiness is indicated by patient meeting Recovery Milestones, including ALL of the following:  ? Neurologic status at baseline or stable and acceptable for next level of care  ? No evidence of infection, or treatment at next level of care arranged  ? Hemodynamic stability  ? Pain absent or managed  ? Activity back to baseline  ? Oral hydration, medications, and diet  ? Discharge plans and education understood  ? Complications of procedure  § Complications may include:  § CNS complications (eg, subdural hygroma or hematoma)  § Shunt infection, malfunction, catheter misplacement, or CSF leakage  § Abdominal pain, bowel obstruction or perforation  § Anticipate possible reoperation, including shunt replacement  § Expect brief to moderate stay extension   Brief (1 to 3 days)     Identified barriers for extended stay: Plan for OR today for shunt revision    Discussion Date (Time): 02/08/18 with Analy Stoddard PA-C

## 2018-02-08 NOTE — ASSESSMENT & PLAN NOTE
· Patient reports this has been going on for a long time but is unable to clarify how long  His mother reports pain is not relieved with tylenol/ibuprofen  Patient does report pain is relieved with the Dilaudid  · Unclear etiology  Consider related to one of his left-sided shunts? CT chest with no pneumonia/effusion  No acute osseous abnormality  The patient denies any dysuria

## 2018-02-08 NOTE — PROGRESS NOTES
Per Dr Anthony Dobson pt is rescheduled for shunt revision tomorrow  Pt is allowed to eat dinner and than will be NPO after midnight tonight  His mother is going down to cafeteria to get him dinner   Nasal swab, peridex, and CHG bath to be done in the AM

## 2018-02-08 NOTE — PROGRESS NOTES
Progress Note - Neurosurgery   Sharyle Fare 43 y o  male MRN: [id number]  Unit/Bed#: [facility] 710-01 Encounter: [id number]    Assessment:  1  Suspected distal shunt dysfunction s/p left sided  shunt insertion  2  Coiled upper epigastric intraperitoneal distal shunt tubing  3  History of 7 shunt revisions last revision 10/23/17  4  Stable ventricular size  5  Seizure disorder  6  Cerebral palsy  7  S/p  shunt tap left reservoir of codman programmable valve system with no proximal obstruction and clear CSF    Plan:  · Neuro exam: baseline  · Continue to monitor-call with exam change  · Imaging reviewed personally and with attending:  · CT head:  · Shunt series:  · Ongoing medical management by primary team, SCOTT (discussed with Jayesh Duran PA-C)  · NPO  · Pre-op labs completed  · Pain control: headaches managed with dilauid and oxycodone  · Hold AP/AC for OR  · Neurology consulted for AED input  Appreciate recommendations  · Plan for OR for distal shunt revision with general surgery and neurosurgery  Plan for exploratory laparoscopy- assess for adhesions  Consent on chart    Subjective/Objective   Chief Complaint: " I had a headache earlier "    Subjective: patient reports severe headache and mid back pain earlier- now improved  Denies vision changes, dizziness, sleepiness  Per mom- tremors have improved except for when he gets nervous  No abdominal pain  Objective: lying in bed, in NAD    I/O       02/06 0701 - 02/07 0700 02/07 0701 - 02/08 0700 02/08 0701 - 02/09 0700    P  O  590 240 0    I V  (mL/kg)  953 8 (11 3)     Total Intake(mL/kg) 590 (7) 1193 8 (14 1) 0 (0)    Urine (mL/kg/hr) 200 1000 (0 5)     Total Output 200 1000      Net +390 +193 8 0           Unmeasured Urine Occurrence 1 x 2 x           Invasive Devices     Peripheral Intravenous Line            Peripheral IV 02/06/18 Right Antecubital 2 days                Physical Exam:  Vitals: Blood pressure 147/73, pulse 80, temperature 97 8 °F (36 6 °C), temperature source Oral, resp  rate 20, height 5' 6" (1 676 m), weight 84 7 kg (186 lb 11 7 oz), SpO2 93 %  ,Body mass index is 30 14 kg/m²  General appearance: alert, appears stated age, cooperative and no distress  Head: incisions CDI  Eyes: EOMI, PERRL, acuity intact in all fields  Neck: supple  Lungs: non labored breathing  Heart: regular heart rate  Abdomen: non-distended, non-tender  Neurologic:   Mental status: Alert, oriented x3, 2+2+2 completed, object naming intact  Stable left/right confusion  Cranial nerves: grossly intact (Cranial nerves II-XII) baseline strabismus  Sensory: normal to LT in UE and LE  Motor: moving all extremities without focal weakness  Coordination: finger to nose normal bilaterally, no drift bilaterally      Lab Results:    Results from last 7 days  Lab Units 02/07/18  0542 02/06/18  1328   WBC Thousand/uL 11 38* 9 43   HEMOGLOBIN g/dL 15 5 15 6   HEMATOCRIT % 44 3 44 6   PLATELETS Thousands/uL 255 254   NEUTROS PCT % 54 59   MONOS PCT % 13* 10       Results from last 7 days  Lab Units 02/07/18  0542 02/06/18  1328   SODIUM mmol/L 143 139   POTASSIUM mmol/L 4 1 3 5   CHLORIDE mmol/L 106 102   CO2 mmol/L 29 31   BUN mg/dL 18 13   CREATININE mg/dL 0 73 0 74   CALCIUM mg/dL 8 8 9 2   TOTAL PROTEIN g/dL  --  6 8   BILIRUBIN TOTAL mg/dL  --  0 49   ALK PHOS U/L  --  53   ALT U/L  --  23   AST U/L  --  10   GLUCOSE RANDOM mg/dL 84 88               Results from last 7 days  Lab Units 02/07/18  0542   INR  1 09     No results found for: TROPONINT  ABG:No results found for: PHART, HGB6BHS, PO2ART, XKT8QXQ, K2PWBUML, BEART, SOURCE      Imaging Studies: I have personally reviewed pertinent reports  and I have personally reviewed pertinent films in PACS    EKG, Pathology, and Other Studies: I have personally reviewed pertinent reports        VTE Pharmacologic Prophylaxis: Sequential compression device (Venodyne)     VTE Mechanical Prophylaxis: sequential compression device

## 2018-02-08 NOTE — PROGRESS NOTES
Assisted Dr [facility] with ventriculoperitoneal shunt tap of left parieto-occipital Codman Hakim reservoir at bedside  Sterile procedure utilized  Lab specimens sent down to the lab  Will continue to monitor

## 2018-02-08 NOTE — ANESTHESIA PREPROCEDURE EVALUATION
Review of Systems/Medical History  Patient summary reviewed  Chart reviewed      Cardiovascular  Negative cardio ROS Exercise tolerance: good,     Pulmonary  Asthma: Asthma type of rescue: daily inhaler,        GI/Hepatic            Endo/Other     GYN       Hematology   Musculoskeletal       Neurology  Seizures well controlled,  Headaches,   Comment: Oct 2017  Constant headaches    worsening Psychology           Physical Exam    Airway    Mallampati score: II  TM Distance: >3 FB  Neck ROM: full     Dental       Cardiovascular  Comment: Negative ROS, Cardiovascular exam normal    Pulmonary  Pulmonary exam normal     Other Findings        Anesthesia Plan  ASA Score- 3     Anesthesia Type- general with ASA Monitors  Additional Monitors:   Airway Plan: ETT  Plan Factors-    Induction- intravenous  Postoperative Plan- Plan for postoperative opioid use  Informed Consent- Anesthetic plan and risks discussed with patient

## 2018-02-08 NOTE — PROGRESS NOTES
Progress Note - Neurology   Shannan Amezcua 43 y o  male MRN: [id number]  Unit/Bed#: [facility] 710-01 Encounter: [id number]    Assessment/Plan:  1  Tremor  -Mild bilateral upper extremity tremor, worse with action and at the end of movements  Jaw tremor decreased compared to yesterday, question if exacerbated by anxiety as patient was anxious yesterday with unsure plan of treatment  Minimally anxious this AM   -Likely secondary to Depakote use  Dose decreased to 500mg TID  No seizure activity reported  -Repeat Valproic acid level in 1 week  -Continue Lamotrigine, level pending  -Continue to monitor for seizure activity or exam changes  -F/u with Dr Andry Smith as outpatient    2  Headache  -Likely secondary to shunt malfunction  -Will reassess after shunt revision    3   Shunt  -Plan for OR today per Neurosurgery to assess questionable distal shunt obstruction  Subjective:   Shannan Amezcua this is a 49-year-old male with past medical history of congenital hydrocephalus, cerebral palsy with mild cognitive impairment, status post  shunt with multiple revisions and replacements and localization related epilepsy  Neurology consult for increase tremors and headache  Today on exam, patient is resting comfortably in bed with mother at bedside  Did not go to OR yesterday for evaluation of distal shunt, plan to go today  Currently complaining of a headache that is 10/10, bilateral frontal with localization to left eye  Pounding and sharp in nature  Also complained of headache lasting, received 1 dose of Dilaudid, which resolved the headache completely  No seizure activity reported  Denies CP, SOB, dizziness, vision changes, N/V, abdominal pain, weakness or numbness  ROS:  12 point ROS performed, as stated above, all others negative      Scheduled Meds:  Current Facility-Administered Medications:  acetaminophen 975 mg Oral Q8H Félix Shetty MD    albuterol 2 puff Inhalation Q4H PRN Félix Shetty MD ALPRAZolam 0 25 mg Oral TID PRN Love Shaikh MD    cholecalciferol 2,000 Units Oral Daily Love Shaikh MD    divalproex sodium 500 mg Oral Q8H [address] Heather Christiansen PA-C    fluticasone-salmeterol 1 puff Inhalation Q12H [address] Love Shaikh MD    HYDROmorphone 0 5 mg Intravenous Q4H PRN Italo Guerra PA-C    lamoTRIgine 200 mg Oral BID Love Shaikh MD    oxyCODONE 10 mg Oral Q4H PRN Italo Guerra PA-C    oxyCODONE 5 mg Oral Q4H PRN Italo Guerra PA-C    sodium chloride 75 mL/hr Intravenous Continuous Italo Guerra PA-C Last Rate: 75 mL/hr (02/07/18 2244)     Continuous Infusions:  sodium chloride 75 mL/hr Last Rate: 75 mL/hr (02/07/18 2244)     PRN Meds: albuterol    ALPRAZolam    HYDROmorphone    oxyCODONE    oxyCODONE    Vitals: Blood pressure 130/73, pulse 78, temperature 98 5 °F (36 9 °C), temperature source Oral, resp  rate 18, height 5' 6" (1 676 m), weight 84 7 kg (186 lb 11 7 oz), SpO2 98 %  ,Body mass index is 30 14 kg/m²  Physical Exam:   Physical Exam   Constitutional: No distress  HENT:   Head: Normocephalic and atraumatic  Eyes: EOM are normal  Pupils are equal, round, and reactive to light  Neck: Normal range of motion  Neck supple  Cardiovascular: Normal rate and regular rhythm  Pulmonary/Chest: Effort normal and breath sounds normal    Abdominal: Soft  Bowel sounds are normal    Neurological:   Reflex Scores:       Tricep reflexes are 2+ on the right side and 2+ on the left side  Bicep reflexes are 2+ on the right side and 2+ on the left side  Brachioradialis reflexes are 2+ on the right side and 2+ on the left side  Patellar reflexes are 3+ on the right side and 3+ on the left side  Achilles reflexes are 2+ on the right side and 2+ on the left side  Skin: Skin is warm and dry  He is not diaphoretic  Psychiatric: His behavior is normal      Neurologic Exam     Mental Status   Patient alert and oriented to person, place, and time  Attention and concentration intact  Some trouble with repeating longer sentences, but speech fluent with no dysarthria or difficulty word finding  Patient's mentation is an 8year-old level  Cranial Nerves     CN II   Visual fields full to confrontation  CN III, IV, VI   Pupils are equal, round, and reactive to light  Extraocular motions are normal      CN V   Facial sensation intact  CN VII   Facial expression full, symmetric  CN VIII   CN VIII normal      CN IX, X   CN IX normal      CN XI   CN XI normal      CN XII   CN XII normal    Gaze disconjugate, chronic  No diplopia  Motor Exam   Left-sided strength full throughout  Right-sided strength 4+-5/5 throughout  Sensory Exam   Light touch intact bilaterally throughout  Slight decrease in temperature sensation right versus left in upper and lower extremities  Gait, Coordination, and Reflexes     Reflexes   Right brachioradialis: 2+  Left brachioradialis: 2+  Right biceps: 2+  Left biceps: 2+  Right triceps: 2+  Left triceps: 2+  Right patellar: 3+  Left patellar: 3+  Right achilles: 2+  Left achilles: 2+  Right plantar: equivocal  Left plantar: normal  Minimal bilateral upper extremity tremor at rest   Worsened with finger to nose at the end of movement  Slight jaw tremor noted while patient is speaking  Gait deferred  Lab Results: I have personally reviewed pertinent reports    Recent Results (from the past 24 hour(s))   Glucose, CSF    Collection Time: 02/07/18  1:55 PM   Result Value Ref Range    Glucose, CSF 57 50 - 80 mg/dL   Total Protein, CSF    Collection Time: 02/07/18  1:55 PM   Result Value Ref Range    Protein, CSF 42 15 - 45 mg/dL   CSF white cell count with differential    Collection Time: 02/07/18  1:56 PM   Result Value Ref Range    Appearance, CSF slightly cloudy     Tube Number, CSF      WBC, CSF 0 0 - 5 /uL    Xanthochromia No No   STAT Gram Stain    Collection Time: 02/07/18  1:56 PM   Result Value Ref Range    Gram Stain Result No Polys or Bacteria seen    UA w Reflex to Microscopic w Reflex to Culture    Collection Time: 02/07/18  3:49 PM   Result Value Ref Range    Color, UA Yellow     Clarity, UA Clear     Specific [place], UA 1 015 1 003 - 1 030    pH, UA 7 0 4 5 - 8 0    Leukocytes, UA Negative Negative    Nitrite, UA Negative Negative    Protein, UA Negative Negative mg/dl    Glucose, UA Negative Negative mg/dl    Ketones, UA Negative Negative mg/dl    Urobilinogen, UA 0 2 0 2, 1 0 E U /dl E U /dl    Bilirubin, UA Negative Negative    Blood, UA Negative Negative     Imaging Studies: No new neuro imaging  EKG, Pathology, and Other Studies: I have personally reviewed pertinent reports

## 2018-02-09 ENCOUNTER — APPOINTMENT (INPATIENT)
Dept: RADIOLOGY | Facility: HOSPITAL | Age: 43
DRG: 982 | End: 2018-02-09
Attending: NEUROLOGICAL SURGERY
Payer: MEDICARE

## 2018-02-09 PROBLEM — D72.829 LEUKOCYTOSIS: Status: ACTIVE | Noted: 2018-02-09

## 2018-02-09 LAB
ANION GAP SERPL CALCULATED.3IONS-SCNC: 9 MMOL/L (ref 4–13)
BUN SERPL-MCNC: 12 MG/DL (ref 5–25)
CALCIUM SERPL-MCNC: 8.6 MG/DL (ref 8.3–10.1)
CHLORIDE SERPL-SCNC: 106 MMOL/L (ref 100–108)
CO2 SERPL-SCNC: 27 MMOL/L (ref 21–32)
CREAT SERPL-MCNC: 0.7 MG/DL (ref 0.6–1.3)
ERYTHROCYTE [DISTWIDTH] IN BLOOD BY AUTOMATED COUNT: 12.1 % (ref 11.6–15.1)
GFR SERPL CREATININE-BSD FRML MDRD: 116 ML/MIN/1.73SQ M
GLUCOSE SERPL-MCNC: 96 MG/DL (ref 65–140)
HCT VFR BLD AUTO: 43.6 % (ref 36.5–49.3)
HGB BLD-MCNC: 14.9 G/DL (ref 12–17)
MCH RBC QN AUTO: 30 PG (ref 26.8–34.3)
MCHC RBC AUTO-ENTMCNC: 34.2 G/DL (ref 31.4–37.4)
MCV RBC AUTO: 88 FL (ref 82–98)
PLATELET # BLD AUTO: 249 THOUSANDS/UL (ref 149–390)
PMV BLD AUTO: 10 FL (ref 8.9–12.7)
POTASSIUM SERPL-SCNC: 3.8 MMOL/L (ref 3.5–5.3)
RBC # BLD AUTO: 4.96 MILLION/UL (ref 3.88–5.62)
SODIUM SERPL-SCNC: 142 MMOL/L (ref 136–145)
WBC # BLD AUTO: 12.21 THOUSAND/UL (ref 4.31–10.16)

## 2018-02-09 PROCEDURE — 99232 SBSQ HOSP IP/OBS MODERATE 35: CPT | Performed by: PHYSICIAN ASSISTANT

## 2018-02-09 PROCEDURE — 99233 SBSQ HOSP IP/OBS HIGH 50: CPT | Performed by: PHYSICIAN ASSISTANT

## 2018-02-09 PROCEDURE — 70250 X-RAY EXAM OF SKULL: CPT

## 2018-02-09 PROCEDURE — 85027 COMPLETE CBC AUTOMATED: CPT | Performed by: PHYSICIAN ASSISTANT

## 2018-02-09 PROCEDURE — 80048 BASIC METABOLIC PNL TOTAL CA: CPT | Performed by: PHYSICIAN ASSISTANT

## 2018-02-09 PROCEDURE — 71046 X-RAY EXAM CHEST 2 VIEWS: CPT

## 2018-02-09 PROCEDURE — 99024 POSTOP FOLLOW-UP VISIT: CPT | Performed by: NEUROLOGICAL SURGERY

## 2018-02-09 PROCEDURE — 74018 RADEX ABDOMEN 1 VIEW: CPT

## 2018-02-09 RX ADMIN — ACETAMINOPHEN 975 MG: 325 TABLET, FILM COATED ORAL at 11:46

## 2018-02-09 RX ADMIN — HYDROMORPHONE HYDROCHLORIDE 1 MG: 1 INJECTION, SOLUTION INTRAMUSCULAR; INTRAVENOUS; SUBCUTANEOUS at 10:59

## 2018-02-09 RX ADMIN — HYDROMORPHONE HYDROCHLORIDE 1 MG: 1 INJECTION, SOLUTION INTRAMUSCULAR; INTRAVENOUS; SUBCUTANEOUS at 16:01

## 2018-02-09 RX ADMIN — HYDROMORPHONE HYDROCHLORIDE 1 MG: 1 INJECTION, SOLUTION INTRAMUSCULAR; INTRAVENOUS; SUBCUTANEOUS at 23:50

## 2018-02-09 RX ADMIN — VITAMIN D, TAB 1000IU (100/BT) 2000 UNITS: 25 TAB at 08:39

## 2018-02-09 RX ADMIN — DIVALPROEX SODIUM 500 MG: 500 TABLET, DELAYED RELEASE ORAL at 00:41

## 2018-02-09 RX ADMIN — FLUTICASONE PROPIONATE AND SALMETEROL 1 PUFF: 50; 100 POWDER RESPIRATORY (INHALATION) at 08:39

## 2018-02-09 RX ADMIN — OXYCODONE HYDROCHLORIDE 10 MG: 10 TABLET ORAL at 09:10

## 2018-02-09 RX ADMIN — DIVALPROEX SODIUM 500 MG: 500 TABLET, DELAYED RELEASE ORAL at 21:20

## 2018-02-09 RX ADMIN — DIVALPROEX SODIUM 500 MG: 500 TABLET, DELAYED RELEASE ORAL at 05:13

## 2018-02-09 RX ADMIN — ACETAMINOPHEN 975 MG: 325 TABLET, FILM COATED ORAL at 03:00

## 2018-02-09 RX ADMIN — ALPRAZOLAM 0.25 MG: 0.25 TABLET ORAL at 06:20

## 2018-02-09 RX ADMIN — OXYCODONE HYDROCHLORIDE 5 MG: 5 TABLET ORAL at 00:41

## 2018-02-09 RX ADMIN — ACETAMINOPHEN 975 MG: 325 TABLET, FILM COATED ORAL at 19:52

## 2018-02-09 RX ADMIN — HYDROMORPHONE HYDROCHLORIDE 1 MG: 1 INJECTION, SOLUTION INTRAMUSCULAR; INTRAVENOUS; SUBCUTANEOUS at 19:44

## 2018-02-09 RX ADMIN — ALPRAZOLAM 0.25 MG: 0.25 TABLET ORAL at 12:48

## 2018-02-09 RX ADMIN — FLUTICASONE PROPIONATE AND SALMETEROL 1 PUFF: 50; 100 POWDER RESPIRATORY (INHALATION) at 22:57

## 2018-02-09 RX ADMIN — HYDROMORPHONE HYDROCHLORIDE 1 MG: 1 INJECTION, SOLUTION INTRAMUSCULAR; INTRAVENOUS; SUBCUTANEOUS at 06:20

## 2018-02-09 RX ADMIN — LAMOTRIGINE 200 MG: 100 TABLET ORAL at 19:52

## 2018-02-09 RX ADMIN — DIVALPROEX SODIUM 500 MG: 500 TABLET, DELAYED RELEASE ORAL at 12:48

## 2018-02-09 RX ADMIN — OXYCODONE HYDROCHLORIDE 10 MG: 10 TABLET ORAL at 05:13

## 2018-02-09 RX ADMIN — LAMOTRIGINE 200 MG: 100 TABLET ORAL at 08:39

## 2018-02-09 RX ADMIN — ENOXAPARIN SODIUM 40 MG: 40 INJECTION SUBCUTANEOUS at 16:01

## 2018-02-09 RX ADMIN — HYDROMORPHONE HYDROCHLORIDE 1 MG: 1 INJECTION, SOLUTION INTRAMUSCULAR; INTRAVENOUS; SUBCUTANEOUS at 12:48

## 2018-02-09 NOTE — PROGRESS NOTES
Patient seen and re-evaluated over concern for uncontrolled abdominal pain  Since his concerning exam, he was transported to Radiology for his abdominal x-ray and  shunt series  The x-ray images were reviewed by me personally, and I also reviewed the radiology interpretations  During transport, his pain was not uncontrolled, and he did not have any uncontrolled pain with bumps or moving on and off of the stretcher  He also notes that he was able to tolerate an oral diet without bloating, nausea, vomiting, or uncontrolled pain  Since he has begun taking his p r n  analgesic medications, his finger trial has been better  On exam, his abdomen was soft, nondistended, moderately tender over and around his 3 incisions, but otherwise nontender without evidence of peritonitis  - May continue diet as tolerated  - Monitor abdominal exam   - No further surgical intervention indicated at this time      Brendon Franks PA-C  2/9/2018 3:08 PM

## 2018-02-09 NOTE — SOCIAL WORK
[facility] Extended Stay  Optimal GLOS: 1  Hospital Day: 3 days   DC Readiness:   Discharge Readiness  Return to top of Shunt, Ventriculoperitoneal for Hydrocephalus RRG - ISC  · Discharge readiness is indicated by patient meeting Recovery Milestones, including ALL of the following:  ? Neurologic status at baseline or stable and acceptable for next level of care  ? No evidence of infection, or treatment at next level of care arranged  ? Hemodynamic stability  ? Pain absent or managed  ? Activity back to baseline  ? Oral hydration, medications, and diet  ? Discharge plans and education understood  ? Complications of procedure  § Complications may include:  § CNS complications (eg, subdural hygroma or hematoma)  § Shunt infection, malfunction, catheter misplacement, or CSF leakage  § Abdominal pain, bowel obstruction or perforation  § Anticipate possible reoperation, including shunt replacement  § Expect brief to moderate stay extension   Brief (1 to 3 days)     Identified barriers for extended stay: still with HA  S/p shunt revision    Discussion Date (Time): 02/09/18 with Rea Ramsey PA-C

## 2018-02-09 NOTE — PROGRESS NOTES
Rounded with Sofiya Cifuentes PA-C from [place]  See provider orders and progress notes for updates  Will continue to monitor

## 2018-02-09 NOTE — ASSESSMENT & PLAN NOTE
· Repeat valproic acid level on 2/15/18  · Valproic acid level was 92   Neurology decreased dose to 500mg Q8hr

## 2018-02-09 NOTE — ASSESSMENT & PLAN NOTE
· Currently resolved  Appears has been chronic in nature  Unclear etiology  · Consider related to one of his left-sided shunts? Patient underwent open laparotomy with lysis of adhesions and reposition of  shunt tubing yesterday  · CT chest with no pneumonia/effusion  No acute osseous abnormality  The patient denies any dysuria

## 2018-02-09 NOTE — PROGRESS NOTES
Progress Note - Neurology   Herbie Romo 43 y o  male MRN: [id number]  Unit/Bed#: [facility] 710-01 Encounter: [id number]    Assessment/ Plan:  1)  Tremor - improved  Likely secondary to depakote usage with component of excitability/ anxiousness causing worsening  Depakote decreased to 500 t i d  on 02/08/2018     -repeat valproic acid in 1 week   -continue Lamictal   Lamotrigine level within normal limits  2)  Headache - likely secondary to shunt malfunction  Resolved s/p revision  -patient status post shunt revision on 2/8/18   -CSF studies unremarkable to date  Fungal culture and CSF culture and Gram stain pending    -No further neurological recommendations  Please call with questions  3)  Localization-related epilepsy   -continue AEDs as detailed above    Subjective:   Patient is a 75-year-old male with a past medical history of congenital hydrocephalus, cerebral palsy with mild cognitive impairment, and status post  shunt with multiple revisions and replacements, and localization-related epilepsy  Neurology consulted for increase in tremors and headache  Patient to OR yesterday for shunt revision  CSF studies largely unremarkable, fungal culture and CSF culture pending  Today on exam, the patient reports total resolution of his headache  A 12 point review of systems was conducted and was negative  The patient did not demonstrate tremor at rest   Jaw tremor increased as patient spoke in became animated and conversation  Bilateral upper extremity tremor slightly noted antigravity, worsened with intentional movement and testing  Unchanged neurological exam with exception of improvements noted below      ROS:  See subjective     Medications:  Scheduled Meds:  Current Facility-Administered Medications:  acetaminophen 975 mg Oral Q8H Rk Chowdhury MD    albuterol 2 puff Inhalation Q4H PRN Rk Chowdhury MD    ALPRAZolam 0 25 mg Oral TID PRN Rk Chowdhury MD    cholecalciferol 2,000 Units Oral Daily Lucía Westbrook MD    divalproex sodium 500 mg Oral Q8H [facility] Rakan Moura PA-C    fluticasone-salmeterol 1 puff Inhalation Q12H [facility] Lucía Westbrook MD    HYDROmorphone 1 mg Intravenous Q2H PRN Zamzam Vickers MD    lamoTRIgine 200 mg Oral BID Lucía Westbrook MD    oxyCODONE 10 mg Oral Q4H PRN Marlyse Soda, PA-GODFREY    oxyCODONE 5 mg Oral Q4H PRN Marlyse Soda, PA-C    sodium chloride 75 mL/hr Intravenous Continuous Marlyse Soda, PA-C Last Rate: 75 mL/hr (02/07/18 2244)     Continuous Infusions:  sodium chloride 75 mL/hr Last Rate: 75 mL/hr (02/07/18 2244)     PRN Meds: albuterol    ALPRAZolam    HYDROmorphone    oxyCODONE    oxyCODONE      Vitals: Blood pressure 156/91, pulse 87, temperature 98 7 °F (37 1 °C), temperature source Oral, resp  rate 20, height 5' 6" (1 676 m), weight 84 7 kg (186 lb 11 7 oz), SpO2 97 %  ,Body mass index is 30 14 kg/m²  Physical Exam:  Physical Exam   Constitutional: He is oriented to person, place, and time  He appears well-developed and well-nourished  HENT:   Head: Normocephalic  Right Ear: External ear normal    Left Ear: External ear normal    Nose: Nose normal    Mouth/Throat: Oropharynx is clear and moist  No oropharyngeal exudate  Eyes: Conjunctivae are normal  Right eye exhibits no discharge  Left eye exhibits no discharge  No scleral icterus  Neck: Normal range of motion  Neck supple  Pulmonary/Chest: Effort normal  No respiratory distress  Musculoskeletal: Normal range of motion  He exhibits no edema, tenderness or deformity  Neurological: He is oriented to person, place, and time  Skin: Skin is warm and dry  No rash noted  He is not diaphoretic  No erythema  No pallor  Psychiatric: He has a normal mood and affect  His speech is normal and behavior is normal    Nursing note and vitals reviewed  Neurologic Exam     Mental Status   Oriented to person, place, and time     Attention: normal  Concentration: normal    Speech: speech is normal   Level of consciousness: alert  Normal comprehension  Cranial Nerves   Cranial nerves II through XII intact  With exception of baseline dysconjugate gaze     Motor Exam   Muscle bulk: normal  Overall muscle tone: normal  Right arm pronator drift: absent  Left arm pronator drift: absent  5/5 on the left upper extremity and lower extremity  5-/5 on right upper extremity lower extremity at baseline     Sensory Exam   Light touch normal      Gait, Coordination, and Reflexes     Tremor   Resting tremor: absentJaw tremor absent at rest, increased with frequency of talking/excitability    Bilateral upper extremity tremor not observed at rest, mild antigravity and worsened with movement  Lab, Imaging and other studies: I have personally reviewed pertinent reports       Recent Results (from the past 24 hour(s))   Glucose, CSF    Collection Time: 02/08/18  8:29 PM   Result Value Ref Range    Glucose, CSF 59 50 - 80 mg/dL   Total Protein, CSF    Collection Time: 02/08/18  8:29 PM   Result Value Ref Range    Protein, CSF 17 15 - 45 mg/dL   CSF white cell count with differential    Collection Time: 02/08/18  8:29 PM   Result Value Ref Range    Appearance, CSF clear     Tube Number, CSF      WBC, CSF 0 0 - 5 /uL    Xanthochromia No No   STAT Gram Stain    Collection Time: 02/08/18  8:29 PM   Result Value Ref Range    Gram Stain Result No Polys or Bacteria seen    RBC count,CSF    Collection Time: 02/08/18  8:29 PM   Result Value Ref Range    RBC, CSF 0 0 - 10 uL   Fingerstick Glucose (POCT)    Collection Time: 02/08/18 10:32 PM   Result Value Ref Range    POC Glucose 147 (H) 65 - 140 mg/dl   Basic metabolic panel    Collection Time: 02/09/18  5:45 AM   Result Value Ref Range    Sodium 142 136 - 145 mmol/L    Potassium 3 8 3 5 - 5 3 mmol/L    Chloride 106 100 - 108 mmol/L    CO2 27 21 - 32 mmol/L    Anion Gap 9 4 - 13 mmol/L    BUN 12 5 - 25 mg/dL    Creatinine 0 70 0 60 - 1 30 mg/dL    Glucose 96 65 - 140 mg/dL    Calcium 8 6 8 3 - 10 1 mg/dL    eGFR 116 ml/min/1 73sq m   CBC    Collection Time: 02/09/18  5:45 AM   Result Value Ref Range    WBC 12 21 (H) 4 31 - 10 16 Thousand/uL    RBC 4 96 3 88 - 5 62 Million/uL    Hemoglobin 14 9 12 0 - 17 0 g/dL    Hematocrit 43 6 36 5 - 49 3 %    MCV 88 82 - 98 fL    MCH 30 0 26 8 - 34 3 pg    MCHC 34 2 31 4 - 37 4 g/dL    RDW 12 1 11 6 - 15 1 %    Platelets 601 647 - 413 Thousands/uL    MPV 10 0 8 9 - 12 7 fL   ]      VTE Prophylaxis: Sequential compression device (Venodyne)  and Reason for no pharmacologic prophylaxis POD1    Counseling / Coordination of Care  Total time spent today 35 minutes  Greater than 50% of total time was spent with the patient and / or family counseling and / or coordination of care  A description of the counseling / coordination of care: The patient was seen examined by myself the attending physician  The chart was reviewed thoroughly, including laboratory values and specialty notes  The patient and his family were counseled in the room

## 2018-02-09 NOTE — PROGRESS NOTES
Progress Note - Herbie Romo 1975, 43 y o  male MRN: [id number]    Unit/Bed#: [facility] 710-01 Encounter: [id number]    Primary Care Provider: Manoj Coleman DO   Date and time admitted to hospital: 2/6/2018 11:58 AM        * Headache   Assessment & Plan    · Currently resolved  · Cause likely secondary to shunt malfunction  · Patient underwent open laparotomy with lysis of adhesions and repositioning of  shunt tubing yesterday with general surgery and neurosurgery          Leukocytosis   Assessment & Plan    · WBCs 12 21 up from 11 38  · Unclear etiology  · CT with no pneumonia  UA negative  CSF preliminarily with no growth  · He has been afebrile  · CBC in the morning        S/P  shunt   Assessment & Plan    · Suspect malfunctioning was causing HA  · History of congenital hydrocephalus, s/p  shunt with multiple revisions and replacements; neurosurgery performed shunt tap 2 /7/18- CSF with no bacteria/polys/growth  · Neurosurgery and neurology following  · S/p open laparotomy with lysis of adhesions and reposition of CP shunt tubing on 2/8/18  · Today's shunt series XR with intact  shunt catheter  · Non obstructive bowel gas pattern on today's abdominal x-ray        Left-sided back pain   Assessment & Plan    · Currently resolved  Appears has been chronic in nature  Unclear etiology  · Consider related to one of his left-sided shunts? Patient underwent open laparotomy with lysis of adhesions and reposition of  shunt tubing yesterday  · CT chest with no pneumonia/effusion  No acute osseous abnormality  The patient denies any dysuria  Seizure disorder [facility])   Assessment & Plan    · Repeat valproic acid level on 2/15/18  · Valproic acid level was 92   Neurology decreased dose to 500mg Q8hr        Tremor   Assessment & Plan    · Valproic level 92 - neurology decreased dose to 500mg Q8hr  · Lamictal level 16 0 - WNL  · UE tremor disappears with distraction today        Cerebral palsy [facility])   Assessment & Plan    · With a history of hydrocephalus, s/p  shunt with multiple revisions and replacements   · Continue supportive care          VTE Pharmacologic Prophylaxis:   Pharmacologic: Enoxaparin (Lovenox) - OK to start DV prophylaxis per both general surgery and neurosurgery teams  Patient not very ambulatory post-procedurally today  Mechanical VTE Prophylaxis in Place: Yes    Patient Centered Rounds: I have performed bedside rounds with nursing staff today  Petar    Discussions with Specialists or Other Care Team Provider: general surgery, nurse,     Education and Discussions with Family / Patient: Patient and his mother present at bedside    Time Spent for Care: 30 minutes  More than 50% of total time spent on counseling and coordination of care as described above  Current Length of Stay: 3 day(s)    Current Patient Status: Inpatient   Certification Statement: The patient will continue to require additional inpatient hospital stay due to plan as per above    Discharge Plan: pending clinical course    Code Status: Level 1 - Full Code      Subjective:   Mr Alessandro Howard reports pain at his midline abdominal incisions today  He reports burping  His headache is resolves  Left posterior lateral chest wall/back pain resolved  He denies CP or SOB    Objective:     Vitals:   Temp (24hrs), Av 2 °F (36 8 °C), Min:97 8 °F (36 6 °C), Max:98 7 °F (37 1 °C)    HR:  [] 87  Resp:  [16-27] 20  BP: (147-181)/(69-94) 156/91  SpO2:  [91 %-97 %] 97 %  Body mass index is 30 14 kg/m²  Input and Output Summary (last 24 hours): Intake/Output Summary (Last 24 hours) at 18 1416  Last data filed at 18 1141   Gross per 24 hour   Intake             1500 ml   Output                0 ml   Net             1500 ml       Physical Exam:     Physical Exam   Constitutional: He is oriented to person, place, and time  No distress     Patient seen lying on his back in bed with mother present at bedside  He is very pleasant and cooperative  Cardiovascular: Normal rate and regular rhythm  Pulmonary/Chest: Effort normal and breath sounds normal  No respiratory distress  He has no wheezes  He exhibits no tenderness  Abdominal: Bowel sounds are normal    Three closed abdominal incisions without evidence of bleeding   Musculoskeletal: He exhibits no edema  Neurological: He is alert and oriented to person, place, and time  Upper extremity tremor disappears with distraction   Skin: Skin is warm  Psychiatric:   Non-anxious appearing   Vitals reviewed  Additional Data:     Labs:      Results from last 7 days  Lab Units 02/09/18  0545 02/07/18  0542   WBC Thousand/uL 12 21* 11 38*   HEMOGLOBIN g/dL 14 9 15 5   HEMATOCRIT % 43 6 44 3   PLATELETS Thousands/uL 249 255   NEUTROS PCT %  --  54   LYMPHS PCT %  --  31   MONOS PCT %  --  13*   EOS PCT %  --  2       Results from last 7 days  Lab Units 02/09/18  0545  02/06/18  1328   SODIUM mmol/L 142  < > 139   POTASSIUM mmol/L 3 8  < > 3 5   CHLORIDE mmol/L 106  < > 102   CO2 mmol/L 27  < > 31   BUN mg/dL 12  < > 13   CREATININE mg/dL 0 70  < > 0 74   CALCIUM mg/dL 8 6  < > 9 2   TOTAL PROTEIN g/dL  --   --  6 8   BILIRUBIN TOTAL mg/dL  --   --  0 49   ALK PHOS U/L  --   --  53   ALT U/L  --   --  23   AST U/L  --   --  10   GLUCOSE RANDOM mg/dL 96  < > 88   < > = values in this interval not displayed  Results from last 7 days  Lab Units 02/07/18  0542   INR  1 09       * I Have Reviewed All Lab Data Listed Above  * Additional Pertinent Lab Tests Reviewed:  All Labs Within Last 24 Hours Reviewed    Imaging:    Imaging Reports Reviewed Today Include: Shunt series, abdominal XR  Imaging Personally Reviewed by Myself Includes:  None    Recent Cultures (last 7 days):       Results from last 7 days  Lab Units 02/08/18 2029 02/07/18  1356   GRAM STAIN RESULT  No Polys or Bacteria seen No Polys or Bacteria seen       Last 24 Hours Medication List: Current Facility-Administered Medications:  acetaminophen 975 mg Oral Q8H Claude Early, MD    albuterol 2 puff Inhalation Q4H PRN Claude Fajardo, MD    ALPRAZolam 0 25 mg Oral TID PRN Claude Early, MD    cholecalciferol 2,000 Units Oral Daily lCaude Early, MD    divalproex sodium 500 mg Oral Q8H [facility] Kim Combs PA-C    enoxaparin 40 mg Subcutaneous Q24H [facility] Analy Stoddard PA-C    fluticasone-salmeterol 1 puff Inhalation Q12H [facility] Claude Fajardo, MD    HYDROmorphone 1 mg Intravenous Q2H PRN Mia Jung MD    lamoTRIgine 200 mg Oral BID Claude Early, MD    oxyCODONE 10 mg Oral Q4H PRN Bo Kerns PA-C    oxyCODONE 5 mg Oral Q4H PRN Bo Kerns PA-C    sodium chloride 75 mL/hr Intravenous Continuous Bo Kerns PA-C Last Rate: 75 mL/hr (02/07/18 2734)        Today, Patient Was Seen By: Analy Stoddard PA-C    ** Please Note: Dictation voice to text software may have been used in the creation of this document   **

## 2018-02-09 NOTE — PROGRESS NOTES
Progress Note - General Surgery   Herbie Romo 43 y o  male MRN: [id number]  Unit/Bed#: [facility] 710-01 Encounter: [id number]    Assessment:  43 y o  M w/ h/o congenital hydrocephalus w/ malfunctioning  shunt s/p repositioning 2/9    Plan:  Diet as tolerated  PRN analgesia  Pulm toilet    Subjective/Objective     Subjective: Incisional tenderness, no n/v, no H/A    Objective:     Blood pressure 156/82, pulse 104, temperature 98 1 °F (36 7 °C), temperature source Temporal, resp  rate (!) 25, height 5' 6" (1 676 m), weight 84 7 kg (186 lb 11 7 oz), SpO2 97 %  ,Body mass index is 30 14 kg/m²        Intake/Output Summary (Last 24 hours) at 02/09/18 0631  Last data filed at 02/09/18 0539   Gross per 24 hour   Intake             1000 ml   Output                0 ml   Net             1000 ml       Invasive Devices     Peripheral Intravenous Line            Peripheral IV 02/06/18 Right Antecubital 2 days    Peripheral IV 02/08/18 Right Wrist less than 1 day                Physical Exam:   NAD  CV RRR  Pulm CTA  Abd soft, tender at incision, nondistended    Lab, Imaging and other studies:CBC: No results found for: WBC, HGB, HCT, MCV, PLT, ADJUSTEDWBC, MCH, MCHC, RDW, MPV, NRBC, CMP: No results found for: NA, K, CL, CO2, ANIONGAP, BUN, CREATININE, GLUCOSE, CALCIUM, AST, ALT, ALKPHOS, PROT, ALBUMIN, BILITOT, EGFR, Coagulation: No results found for: PT, INR, APTT  VTE Pharmacologic Prophylaxis: Heparin  VTE Mechanical Prophylaxis: sequential compression device

## 2018-02-09 NOTE — PROGRESS NOTES
Progress Note - Neurosurgery   Shannan Goldie 43 y o  male MRN: [id number]  Unit/Bed#: [facility] 710-01 Encounter: [id number]    Assessment:  1  POD1 s/p laparoscopy, lysis of adhesions, open laparotomy, repositioning of distal  shunt t ubing  2  Suspected distal shunt dysfunction s/p left sided  shunt insertion  3  Coiled upper epigastric intraperitoneal distal shunt tubing  4  History of 7 shunt revisions last revision 10/23/17  5  Stable ventricular size  6  Seizure disorder  7  Cerebral palsy  8  S/p  shunt tap left reservoir of codman programmable valve system with no proximal obstruction and clear CSF    Plan:  · Neuro exam:  Tremulous, oriented x3, distractible secondary to pain  No pronator drift  Stable left-to-right confusion  · Continue to monitor-call with exam change  · Imaging reviewed personally and with attending:  · CT head: no acute intracranial abnormality, 3 left sided ventriculostomy catheter  · Shunt series: left sided shunt catheter set at [id number]  Appearance of discontinuity of shunt catheter is attributed to radiolucent connector  · CT abdomen: There are 3 shunt catheters identified within the left neck, chest wall and abdominal wall, terminating within the midabdomen  The most laterally located of these within the abdomen appears to terminate within the left neck and does not clearly extend above the scope of this exam  No CSF adolfo identified  Incidental pulmonary nodule  · Post-op abdominal film: distal shunt tubing terminating in the RUQ  · Post-op shunt series including AP/lateral requested and pending  · Ongoing medical management by primary team, SLIM  · Diet as tolerated  · Pain control: headaches/abdominal pain managed with dilauid and oxycodone- continue monitor  · Patient with complaint of left low back pain, consider management with Lidoderm patch if above pain regimen is insufficient      · May initiate on heparin or lovenox for DVT ppx from nsx standpoint  · No nsx contraindication to patient being OOB as tolerated with PT/OT  · Neurology consulted for AED input  Appreciate recommendations  · Continue incentive spirometry  · Post-op management  · Await imaging as above   · CSF results: protein 17, RBC 0, WBC 0, glucose 59 ( all WNL ) pending culture  Low suspicion of infection  · Intra-operative findings of coiled and tangled ventriculoperitoneal shunt secondary to adhesions- repositioned over the liver in the RUQ, shortened intra-peritoneal catheter  · Continue to monitor for signs of hydrocephalus  · WBC 12 21 from 11 38 today- continue to monitor  No infectious source suspected  Afebrile  · 3 abdominal incisions closed with Monocryl in history call  Open to air  Continue to monitor for drainage, erythema, fluctuance  · Patient with significant abdominal pain, guarding  Diffusely tender to palpation  Will be evaluated by General surgery team to determine if normal postoperative course for laparoscopy with air infiltration versus concern of complication  Patient not yet passing flatus  Voiding without difficulty  · Continue to monitor post-operative course  No further nsx intervention planned at this time, however, due to extensive adhesions patient is at high risk for requirement of requiring future revision procedures  Subjective/Objective   Chief Complaint: "  My belly hurts     Subjective:  Patient reports severe 10/10 abdominal pain, diffuse, particularly tender at region of 3 abdominal incisions  Described the pain as cramping, comes and goes, worse with movement or palpation  Patient tolerated moderate amount of breakfast   Denies nausea  Difficulty sleeping/resting secondary to abdominal pain  Voiding without difficulty  Has not passed flatus  Denies headache, vision changes, seizures overnight  Objective:  Lying in bed, tremulous secondary to abdominal pain      I/O       02/07 1057 - 02/08 0700 02/08 1196 - 02/09 0700 02/09 8960 - 02/10 0700    P  O  240 0 0    I V  (mL/kg) 953 8 (11 3) 1000 (11 8)     Total Intake(mL/kg) 1193 8 (14 1) 1000 (11 8) 0 (0)    Urine (mL/kg/hr) 1000 (0 5)      Total Output 1000        Net +193 8 +1000 0           Unmeasured Urine Occurrence 2 x            Invasive Devices     Peripheral Intravenous Line            Peripheral IV 02/06/18 Right Antecubital 2 days    Peripheral IV 02/08/18 Right Wrist less than 1 day                Physical Exam:  Vitals: Blood pressure (!) 181/85, pulse 102, temperature 98 7 °F (37 1 °C), temperature source Oral, resp  rate 20, height 5' 6" (1 676 m), weight 84 7 kg (186 lb 11 7 oz), SpO2 91 %  ,Body mass index is 30 14 kg/m²  General appearance: alert, appears stated age, cooperative and no distress  Head:  Left-sided shunt reservoir compresses and refills briskly  Eyes: EOMI, PERRL, acuity intact in all quadrants  Neck:  Left-sided shunt catheter palpated- contiguous  Back:  Complaint of back pain, not able to fully examine secondary to significant abdominal pain  Abdomen:  Diffusely tender to palpation, guarding  Lungs: Tachycardic secondary to pain exacerbation  Heart:  Tachycardic secondary to pain exacerbation  Neurologic:   Mental status: Alert, oriented x3, thought content appropriate  Cranial nerves: grossly intact (Cranial nerves II-XII)  Sensory: normal to light touch in upper and lower extremities without sensory neglect  However, patient has baseline right left confusion  Motor:  Strength exam limited due to patient's pain, grossly 4+/5 bilateral upper extremities, provides 4+/5 dorsiflexion plantar flexion in bilateral feet    Coordination: finger to nose normal bilaterally, no drift bilaterally      Lab Results:    Results from last 7 days  Lab Units 02/09/18  0545 02/07/18  0542 02/06/18  1328   WBC Thousand/uL 12 21* 11 38* 9 43   HEMOGLOBIN g/dL 14 9 15 5 15 6   HEMATOCRIT % 43 6 44 3 44 6   PLATELETS Thousands/uL 249 255 254   NEUTROS PCT %  --  54 59 MONOS PCT %  --  13* 10       Results from last 7 days  Lab Units 02/09/18  0545 02/07/18  0542 02/06/18  1328   SODIUM mmol/L 142 143 139   POTASSIUM mmol/L 3 8 4 1 3 5   CHLORIDE mmol/L 106 106 102   CO2 mmol/L 27 29 31   BUN mg/dL 12 18 13   CREATININE mg/dL 0 70 0 73 0 74   CALCIUM mg/dL 8 6 8 8 9 2   TOTAL PROTEIN g/dL  --   --  6 8   BILIRUBIN TOTAL mg/dL  --   --  0 49   ALK PHOS U/L  --   --  53   ALT U/L  --   --  23   AST U/L  --   --  10   GLUCOSE RANDOM mg/dL 96 84 88               Results from last 7 days  Lab Units 02/07/18  0542   INR  1 09     Imaging Studies: I have personally reviewed pertinent reports  and I have personally reviewed pertinent films in PACS    EKG, Pathology, and Other Studies: I have personally reviewed pertinent reports        VTE Pharmacologic Prophylaxis: no nsx contraindication to DVT ppx    VTE Mechanical Prophylaxis: sequential compression device and foot pump applied

## 2018-02-09 NOTE — ASSESSMENT & PLAN NOTE
· Suspect malfunctioning was causing HA     · History of congenital hydrocephalus, s/p  shunt with multiple revisions and replacements; neurosurgery performed shunt tap 2 /7/18- CSF with no bacteria/polys/growth  · Neurosurgery and neurology following  · S/p open laparotomy with lysis of adhesions and reposition of CP shunt tubing on 2/8/18  · Today's shunt series XR with intact  shunt catheter  · Non obstructive bowel gas pattern on today's abdominal x-ray

## 2018-02-09 NOTE — PROGRESS NOTES
Pt being taken down to OR for his procedure  Nares swabbed with Betadine, Peridex given and CHG bath done prior to OR

## 2018-02-09 NOTE — OP NOTE
OPERATIVE REPORT  PATIENT NAME: Juaquin Lane    :  1975  MRN: [id number]  Pt Location: BE OR ROOM 04    SURGERY DATE: 2018    Surgeon(s) and Role:     * Deidre Rainey MD - Primary     * Boris Montana MD - co-surgeon    Preop Diagnosis:  Other complicated headache syndrome [G44 59]  Acute headache [R51]  Seizure-like activity ([address] ) [R56 9]  Shunt malfunction, initial encounter [T83 998A]    Post-Op Diagnosis Codes:     * Other complicated headache syndrome [G44 59]     * Acute headache [R51]     * Seizure-like activity ([address] ) [R56 9]     * Shunt malfunction, initial encounter [T85 038A]    Procedure(s) (LRB):  DIAGNOSTIC LAPAROSCOPY, LYSIS OF ADHESIONS, OPEN LAPAROTOMY, LYSIS OF ADHESIONS, REPOSIONING OF  SHUNT TUBING (N/A)    Specimen(s):  ID Type Source Tests Collected by Time Destination   A :  CSF PANEL Cerebrospinal Fluid Brain GLUCOSE, CSF, PROTEIN TOTAL, CSF, CSF WBC COUNT WITH DIFFERENTIAL, FUNGAL CULTURE, STAT GRAM STAIN, CSF CULTURE AND GRAM STAIN, RED CELL COUNT, CSF Boris Montana MD 2018        Estimated Blood Loss:   Minimal    Drains:       Anesthesia Type:   General    Operative Indications: Other complicated headache syndrome [G44 59]  Acute headache [R51]  Seizure-like activity ([address] ) [R56 9]  Shunt malfunction, initial encounter [T81 538A]      Operative Findings:  Independent, nonsmoker, ASA 3, wound class 1, BMI 30, weight 186, height 66 inches       Cardiovascular  Negative cardio ROS Exercise tolerance: good,   Pulmonary  Asthma: Asthma type of rescue: daily inhaler,       GI/Hepatic          Endo/Other    GYN      Hematology Musculoskeletal      Neurology  Seizures well controlled,  Headaches,   Comment: Oct 2017  Constant headaches    worsening Psychology             Complications:   None    Procedure and Technique:  Patient was identified visually and via armband  Placed in the supine position    After general anesthesia the abdomen is prepped and draped in a sterile fashion  In addition the mid chest and left cranial region was also prepped and draped in a sterile fashion  Jeanie Richards was utilized  Time-out was completed  Antibiotics provided  Incision was made below the umbilicus  This carried down to skin subcutaneous tissue and fascia was divided  Peritoneum was grasped between 2 hemostats  This was divided under direct vision  Hemostat was then used to probe the region it was felt that the freed  A 5 mm trocar was then gently inserted  However this was without clipped the clear indication would be intra-abdominal   Therefore the was withdrawn in the wide incision was widened  A 10 mm opening was 8 the fascia  The peritoneum was extended  Finger dissection was then utilized  A 10 mm trocar was inserted  It was found that indeed we are intra-abdominal however dense adhesions in all directions were present  No evidence of omentum  Small bowel was adherent to the anterior abdominal wall  Was not felt the laparoscopic procedure would be successful  The wound was wound was opened further so that the small bowel could be of firmly visualized  There is no evidence for enterotomy  A 2nd incision was then created in the midline upper abdomen inferior to where the  shunt tubing had been introduced into the abdomen  Incision was carried down to skin subcutaneous tissue looked linea alba was divided  The abdomen was entered also revealing dense adhesions in all directions  The coiled catheter was then found in the epigastric upper abdomen to the left of midline  This was dissected free  Adhesions to the catheter were then dissected free have  Was not felt that the catheter could be placed anywhere in the abdomen that would be successful in providing CSF drainage  This issue was made to create a 3rd incision in the right upper quadrant of the abdomen so the catheter could be placed above the liver were perhaps adhesions were improved      Incision was carried down through skin subcutaneous tissue and a right subcostal fashion  The rectus muscle was then retracted medially the oblique muscles were divided  Adhesions to the anterior abdominal wall were once again found however this included omentum against the anterior abdominal wall which was more readily able to dissect  Dissection continued with lysing adhesions above the gallbladder  Finally CT in the visualization above the liver was obtained  There was indeed an open space in this region  Previously placed PD catheter was also exposed  The catheter was placed above the liver using a tunneling for SSEPs  This was felt to be in good position  At this point each wound was irrigated  Was aspirated dry  Hemostasis was assured  Each wound was then closed with 0 Vicryl suture followed by 3 0 Vicryl subcutaneous and 4 Monocryl sutures Dermabond was then applied  Overall the patient tolerated the procedure quite well  Sponge and instrument count correct     I was present for the entire procedure    Patient Disposition:  PACU     SIGNATURE: Clarice Banks MD  DATE: February 8, 2018  TIME: 11:01 PM

## 2018-02-09 NOTE — ASSESSMENT & PLAN NOTE
· WBCs 12 21 up from 11 38  · Unclear etiology  · CT with no pneumonia  UA negative  CSF preliminarily with no growth    · He has been afebrile  · CBC in the morning

## 2018-02-09 NOTE — PERIOPERATIVE NURSING NOTE
2221 Arrived PACU supine in bed  HOB flat  Resps unlabored  VSS  No c/o pain at this time  Able to move all extremities and feel touch  3 abd incisions noted intact with hystocryl

## 2018-02-09 NOTE — ASSESSMENT & PLAN NOTE
· Valproic level 92 - neurology decreased dose to 500mg Q8hr  · Lamictal level 16 0 - WNL  · UE tremor disappears with distraction today

## 2018-02-09 NOTE — ASSESSMENT & PLAN NOTE
· Currently resolved  · Cause likely secondary to shunt malfunction  · Patient underwent open laparotomy with lysis of adhesions and repositioning of  shunt tubing yesterday with general surgery and neurosurgery

## 2018-02-09 NOTE — ANESTHESIA POSTPROCEDURE EVALUATION
Post-Op Assessment Note      CV Status:  Stable    Mental Status:  Alert and awake    Hydration Status:  Euvolemic    PONV Controlled:  Controlled    Airway Patency:  Patent    Post Op Vitals Reviewed: Yes          Staff: SHANI           BP (!) 181/74 (02/08/18 2224)    Temp 98 1 °F (36 7 °C) (02/08/18 2224)    Pulse (!) 115 (02/08/18 2224)   Resp 16 (02/08/18 2224)    SpO2 96 % (02/08/18 2224)

## 2018-02-10 ENCOUNTER — APPOINTMENT (INPATIENT)
Dept: RADIOLOGY | Facility: HOSPITAL | Age: 43
DRG: 982 | End: 2018-02-10
Payer: MEDICARE

## 2018-02-10 PROBLEM — K59.00 CONSTIPATION: Status: ACTIVE | Noted: 2018-02-10

## 2018-02-10 LAB
ANION GAP SERPL CALCULATED.3IONS-SCNC: 6 MMOL/L (ref 4–13)
BACTERIA CSF CULT: NO GROWTH
BUN SERPL-MCNC: 10 MG/DL (ref 5–25)
CALCIUM SERPL-MCNC: 9.1 MG/DL (ref 8.3–10.1)
CHLORIDE SERPL-SCNC: 101 MMOL/L (ref 100–108)
CO2 SERPL-SCNC: 30 MMOL/L (ref 21–32)
CREAT SERPL-MCNC: 0.74 MG/DL (ref 0.6–1.3)
ERYTHROCYTE [DISTWIDTH] IN BLOOD BY AUTOMATED COUNT: 12.4 % (ref 11.6–15.1)
GFR SERPL CREATININE-BSD FRML MDRD: 114 ML/MIN/1.73SQ M
GLUCOSE SERPL-MCNC: 101 MG/DL (ref 65–140)
HCT VFR BLD AUTO: 43.2 % (ref 36.5–49.3)
HGB BLD-MCNC: 15.4 G/DL (ref 12–17)
MCH RBC QN AUTO: 31.7 PG (ref 26.8–34.3)
MCHC RBC AUTO-ENTMCNC: 35.6 G/DL (ref 31.4–37.4)
MCV RBC AUTO: 89 FL (ref 82–98)
PLATELET # BLD AUTO: 252 THOUSANDS/UL (ref 149–390)
PMV BLD AUTO: 9.8 FL (ref 8.9–12.7)
POTASSIUM SERPL-SCNC: 4.1 MMOL/L (ref 3.5–5.3)
RBC # BLD AUTO: 4.86 MILLION/UL (ref 3.88–5.62)
SODIUM SERPL-SCNC: 137 MMOL/L (ref 136–145)
WBC # BLD AUTO: 14.2 THOUSAND/UL (ref 4.31–10.16)

## 2018-02-10 PROCEDURE — 99024 POSTOP FOLLOW-UP VISIT: CPT | Performed by: NEUROLOGICAL SURGERY

## 2018-02-10 PROCEDURE — 99024 POSTOP FOLLOW-UP VISIT: CPT | Performed by: PHYSICIAN ASSISTANT

## 2018-02-10 PROCEDURE — 70450 CT HEAD/BRAIN W/O DYE: CPT

## 2018-02-10 PROCEDURE — 80048 BASIC METABOLIC PNL TOTAL CA: CPT | Performed by: PHYSICIAN ASSISTANT

## 2018-02-10 PROCEDURE — 99232 SBSQ HOSP IP/OBS MODERATE 35: CPT | Performed by: INTERNAL MEDICINE

## 2018-02-10 PROCEDURE — 85027 COMPLETE CBC AUTOMATED: CPT | Performed by: PHYSICIAN ASSISTANT

## 2018-02-10 RX ORDER — DEXTROSE, SODIUM CHLORIDE, AND POTASSIUM CHLORIDE 5; .45; .15 G/100ML; G/100ML; G/100ML
50 INJECTION INTRAVENOUS CONTINUOUS
Status: DISCONTINUED | OUTPATIENT
Start: 2018-02-10 | End: 2018-02-11

## 2018-02-10 RX ORDER — POLYETHYLENE GLYCOL 3350 17 G/17G
17 POWDER, FOR SOLUTION ORAL DAILY
Status: DISCONTINUED | OUTPATIENT
Start: 2018-02-11 | End: 2018-02-11

## 2018-02-10 RX ORDER — BISACODYL 10 MG
10 SUPPOSITORY, RECTAL RECTAL ONCE
Status: COMPLETED | OUTPATIENT
Start: 2018-02-10 | End: 2018-02-10

## 2018-02-10 RX ADMIN — DIVALPROEX SODIUM 500 MG: 500 TABLET, DELAYED RELEASE ORAL at 13:41

## 2018-02-10 RX ADMIN — OXYCODONE HYDROCHLORIDE 10 MG: 10 TABLET ORAL at 20:14

## 2018-02-10 RX ADMIN — OXYCODONE HYDROCHLORIDE 10 MG: 10 TABLET ORAL at 05:40

## 2018-02-10 RX ADMIN — BISACODYL 10 MG: 10 SUPPOSITORY RECTAL at 13:28

## 2018-02-10 RX ADMIN — DIVALPROEX SODIUM 500 MG: 500 TABLET, DELAYED RELEASE ORAL at 21:19

## 2018-02-10 RX ADMIN — HYDROMORPHONE HYDROCHLORIDE 1 MG: 1 INJECTION, SOLUTION INTRAMUSCULAR; INTRAVENOUS; SUBCUTANEOUS at 09:52

## 2018-02-10 RX ADMIN — DEXTROSE, SODIUM CHLORIDE, AND POTASSIUM CHLORIDE 50 ML/HR: 5; .45; .15 INJECTION INTRAVENOUS at 09:53

## 2018-02-10 RX ADMIN — FLUTICASONE PROPIONATE AND SALMETEROL 1 PUFF: 50; 100 POWDER RESPIRATORY (INHALATION) at 09:29

## 2018-02-10 RX ADMIN — FLUTICASONE PROPIONATE AND SALMETEROL 1 PUFF: 50; 100 POWDER RESPIRATORY (INHALATION) at 22:02

## 2018-02-10 RX ADMIN — VITAMIN D, TAB 1000IU (100/BT) 2000 UNITS: 25 TAB at 09:25

## 2018-02-10 RX ADMIN — OXYCODONE HYDROCHLORIDE 10 MG: 10 TABLET ORAL at 13:28

## 2018-02-10 RX ADMIN — DIVALPROEX SODIUM 500 MG: 500 TABLET, DELAYED RELEASE ORAL at 05:38

## 2018-02-10 RX ADMIN — ALBUTEROL SULFATE 2 PUFF: 90 AEROSOL, METERED RESPIRATORY (INHALATION) at 17:12

## 2018-02-10 RX ADMIN — SODIUM CHLORIDE 75 ML/HR: 0.9 INJECTION, SOLUTION INTRAVENOUS at 05:40

## 2018-02-10 RX ADMIN — LAMOTRIGINE 200 MG: 100 TABLET ORAL at 09:24

## 2018-02-10 RX ADMIN — LAMOTRIGINE 200 MG: 100 TABLET ORAL at 20:14

## 2018-02-10 NOTE — PROGRESS NOTES
Progress Note - Paulo Abreu 1975, 43 y o  male MRN: [id number]    Unit/Bed#: [facility] 710-01 Encounter: [id number]    Primary Care Provider: Michelle Richards DO   Date and time admitted to hospital: 2/6/2018 11:58 AM        Constipation   Assessment & Plan    · According to my discussion with the patient and patient's family, no bowel movements yet for about 3 to 4 days now  · Neurosurgery ordered for a suppository laxative  · For further workup and management if no improve improvement  · General surgery on board  Status post open laparotomy with lysis of adhesions  Leukocytosis   Assessment & Plan    · Worsening today  · Unclear etiology  · May be reactive in nature with patient's surgery and  shunt malfunction  · CT with no pneumonia  UA negative  CSF preliminarily with no growth  · He has been afebrile  · CBC in the morning        Left-sided back pain   Assessment & Plan    · Currently resolved  Appears has been chronic in nature  Unclear etiology  · Consider related to one of his left-sided shunts? Patient underwent open laparotomy with lysis of adhesions and reposition of  shunt tubing yesterday  · CT chest with no pneumonia/effusion  No acute osseous abnormality  The patient denies any dysuria  Shunt malfunction   Assessment & Plan    · Please see management under  shunt        Acute headache   Assessment & Plan    · Resolved  · Please see management under  shunt  S/P  shunt   Assessment & Plan    · Suspect malfunctioning was causing HA     · History of congenital hydrocephalus, s/p  shunt with multiple revisions and replacements; neurosurgery performed shunt tap 2 /7/18- CSF with no bacteria/polys/growth  · Neurosurgery and neurology following  · S/p open laparotomy with lysis of adhesions and reposition of CP shunt tubing on 2/8/18  · Shunt series XR with intact  shunt catheter  · Non obstructive bowel gas pattern on yesterday's abdominal x-ray  · For CT scan of the head today as per neurosurgeon  · Neurosurgery on board        Seizure disorder [facility])   Assessment & Plan    · No seizure episodes  · Repeat valproic acid level on 2/15/18  · Valproic acid level was 92  Neurology decreased dose to 500mg Q8hr  · Neurology on board        Tremor   Assessment & Plan    · No tremors when I saw the patient today  · Valproic level 92 - neurology decreased dose to 500mg Q8hr  · Lamictal level 16 0 - WNL          Cerebral palsy (HCC)   Assessment & Plan    · With a history of hydrocephalus, s/p  shunt with multiple revisions and replacements   · Continue supportive care  · Neurosurgery on board  * Headache   Assessment & Plan    · Currently resolved  · Cause likely secondary to shunt malfunction  · Patient underwent open laparotomy with lysis of adhesions and repositioning of  shunt tubing yesterday with general surgery and neurosurgery              VTE Pharmacologic Prophylaxis:   Pharmacologic: Enoxaparin (Lovenox)  Mechanical VTE Prophylaxis in Place: Yes    Patient Centered Rounds: I have performed bedside rounds with nursing staff today  Discussions with Specialists or Other Care Team Provider:     Education and Discussions with Family / Patient:  Patient  Patient's parents at bedside  I gave updates for today  I answered all questions and concerns  Time Spent for Care: 30 minutes  More than 50% of total time spent on counseling and coordination of care as described above  Current Length of Stay: 4 day(s)    Current Patient Status: Inpatient   Certification Statement: The patient will continue to require additional inpatient hospital stay due to Above findings and plans  Discharge Plan:  None yet  Code Status: Level 1 - Full Code      Subjective:   Patient tells me that his doing fine  However, patient complains of abdominal pains  Patient told me that he did have any bowel movements yet    From a discussion of patient's parents, patient's last bowel movements were around 3 to 4 days ago  No nausea or vomiting  Patient denies any headaches  No other pains  No shortness of breath  Objective:     Vitals:   Temp (24hrs), Av 2 °F (37 3 °C), Min:98 6 °F (37 °C), Max:99 9 °F (37 7 °C)    HR:  [] 85  Resp:  [20] 20  BP: (158-186)/(81-96) 158/88  SpO2:  [92 %-94 %] 92 %  Body mass index is 30 14 kg/m²  Input and Output Summary (last 24 hours): Intake/Output Summary (Last 24 hours) at 02/10/18 1304  Last data filed at 02/10/18 1924   Gross per 24 hour   Intake           2922 5 ml   Output             1325 ml   Net           1597 5 ml       Physical Exam:     Physical Exam   Constitutional: He is oriented to person, place, and time  No distress  HENT:   Head: Normocephalic and atraumatic  Eyes: Right eye exhibits no discharge  Left eye exhibits no discharge  No scleral icterus  Neck: No JVD present  No tracheal deviation present  Cardiovascular: Normal rate, regular rhythm and normal heart sounds  Exam reveals no gallop and no friction rub  No murmur heard  Pulmonary/Chest: Effort normal and breath sounds normal  No stridor  No respiratory distress  He has no wheezes  He has no rales  Abdominal: Bowel sounds are normal  He exhibits distension  There is tenderness  There is no rebound and no guarding  Incisions are dry, and intact  Musculoskeletal: Normal range of motion  He exhibits no edema or tenderness  Neurological: He is alert and oriented to person, place, and time  No cranial nerve deficit  Skin: Skin is warm  No rash noted  He is not diaphoretic  No erythema  No pallor  Psychiatric: He has a normal mood and affect  His behavior is normal  Thought content normal    Vitals reviewed        Additional Data:     Labs:      Results from last 7 days  Lab Units 02/10/18  0542  18  0542   WBC Thousand/uL 14 20*  < > 11 38*   HEMOGLOBIN g/dL 15 4  < > 15 5   HEMATOCRIT % 43 2  < > 44 3   PLATELETS Thousands/uL 252  < > 255   NEUTROS PCT %  --   --  54   LYMPHS PCT %  --   --  31   MONOS PCT %  --   --  13*   EOS PCT %  --   --  2   < > = values in this interval not displayed  Results from last 7 days  Lab Units 02/10/18  0542  02/06/18  1328   SODIUM mmol/L 137  < > 139   POTASSIUM mmol/L 4 1  < > 3 5   CHLORIDE mmol/L 101  < > 102   CO2 mmol/L 30  < > 31   BUN mg/dL 10  < > 13   CREATININE mg/dL 0 74  < > 0 74   CALCIUM mg/dL 9 1  < > 9 2   TOTAL PROTEIN g/dL  --   --  6 8   BILIRUBIN TOTAL mg/dL  --   --  0 49   ALK PHOS U/L  --   --  53   ALT U/L  --   --  23   AST U/L  --   --  10   GLUCOSE RANDOM mg/dL 101  < > 88   < > = values in this interval not displayed  Results from last 7 days  Lab Units 02/07/18  0542   INR  1 09       * I Have Reviewed All Lab Data Listed Above  * Additional Pertinent Lab Tests Reviewed: Ivonne 66 Admission Reviewed    Imaging:    Imaging Reports Reviewed Today Include:  Diagnostic imaging studies that were done this admission  Imaging Personally Reviewed by Myself Includes:  None      Recent Cultures (last 7 days):       Results from last 7 days  Lab Units 02/08/18 2029 02/07/18  1356   GRAM STAIN RESULT  No Polys or Bacteria seen No Polys or Bacteria seen       Last 24 Hours Medication List:     Current Facility-Administered Medications:  acetaminophen 975 mg Oral Q8H Félix Shetty MD    albuterol 2 puff Inhalation Q4H PRN Félix Shetty MD    ALPRAZolam 0 25 mg Oral TID PRN Félix Shetty MD    bisacodyl 10 mg Rectal Once [place] MIK Pnea    cholecalciferol 2,000 Units Oral Daily Félix Shetty MD    dextrose 5 % and sodium chloride 0 45 % with KCl 20 mEq/L 50 mL/hr Intravenous Continuous Sirisha Vaughan MD Last Rate: 50 mL/hr (02/10/18 0953)   divalproex sodium 500 mg Oral Q8H [facility] Vladimir Larson PA-C    enoxaparin 40 mg Subcutaneous Q24H [facility] Ashlee Richters, PA-C    fluticasone-salmeterol 1 puff Inhalation Q12H [address] Art Moore MD    HYDROmorphone 1 mg Intravenous Q2H PRN Glenny Catherine MD    lamoTRIgine 200 mg Oral BID Anthony Boucher MD    oxyCODONE 10 mg Oral Q4H PRN Sandy Bruce PA-C    oxyCODONE 5 mg Oral Q4H PRN Sandy Bruce PA-C         Today, Patient Was Seen By: Chung Alcantara MD    ** Please Note: Dragon 360 Dictation voice to text software may have been used in the creation of this document   **

## 2018-02-10 NOTE — ASSESSMENT & PLAN NOTE
· No seizure episodes  · Repeat valproic acid level on 2/15/18  · Valproic acid level was 92   Neurology decreased dose to 500mg Q8hr  · Neurology on board

## 2018-02-10 NOTE — PROGRESS NOTES
Checked on patient, he was awake  He said he got sleep and his pain is "good" right now  Asked him if he needed anything at this time and he said "no " I will continue to monitor

## 2018-02-10 NOTE — PROGRESS NOTES
Progress Note - Neurosurgery   Shelley Maki 43 y o  male MRN: [id number]  Unit/Bed#: [facility] 710-01 Encounter: [id number]    Assessment:  1  POD#2  s/p laparoscopy, lysis of adhesions, open laparotomy, repositioning of distal  shunt t ubing  2  Suspected distal shunt dysfunction s/p left sided  shunt insertion  3  Coiled upper epigastric intraperitoneal distal shunt tubing  4  History of 7 shunt revisions last revision 10/23/17  5  Stable ventricular size  6  Seizure disorder  7  Cerebral palsy  8  S/p  shunt tap left reservoir of codman programmable valve system with no proximal obstruction and clear CSF       Plan:    -Moderate pain  - a bit distended  - dulcolax suppository   - imaging complete- shunt intact    Subjective/Objective     Pain and bloating       Intake/Output       02/10/18 0701 - 02/11/18 0700      [id number] 5861-2278 Total       Intake    I V   1198 8  -- 1198 8    Total Intake 1198 8 -- 1198 8       Output    Urine  375  -- 375    Urine 375 -- 375    Total Output 375 -- 375       Net I/O     823 8 -- 823 8          Invasive Devices     Peripheral Intravenous Line            Peripheral IV 02/06/18 Right Antecubital 3 days    Peripheral IV 02/08/18 Right Wrist 1 day                Physical Exam:     Vitals: Blood pressure 158/88, pulse 85, temperature 98 6 °F (37 °C), temperature source Oral, resp  rate 20, height 5' 6" (1 676 m), weight 84 7 kg (186 lb 11 7 oz), SpO2 92 %  ,Body mass index is 30 14 kg/m²  Awake and alert  Moves all extremities  Abdomen distended  Incisions intact      Lab Results: I have personally reviewed pertinent results  Imaging Studies: I have personally reviewed pertinent reports  SHUNT SERIES  IMPRESSION:     Intact  shunt catheter  Nonradiopaque  at point where catheter enters the skull       VTE Pharmacologic Prophylaxis: ok for heparin or lovenox    VTE Mechanical Prophylaxis: sequential compression device

## 2018-02-10 NOTE — PROGRESS NOTES
Progress Note - General Surgery   Gearlean Estimable 43 y o  male MRN: [id number]  Unit/Bed#: [facility] 710-01 Encounter: [id number]    Assessment:  43 y o  M w/ h/o congenital hydrocephalus w/ malfunctioning  shunt s/p repositioning 2/9    Still w/ significant incisional tenderness, however overall exam benign; shunt series normal    No flatus    Plan:  Diet as tolerated  PRN analgesia  Pulm toilet  Ambulation    Subjective/Objective     Subjective: Incisional tenderness, no n/v, H/A improved    Objective:     Blood pressure (!) 186/81, pulse 102, temperature 99 1 °F (37 3 °C), temperature source Oral, resp  rate 20, height 5' 6" (1 676 m), weight 84 7 kg (186 lb 11 7 oz), SpO2 93 %  ,Body mass index is 30 14 kg/m²        Intake/Output Summary (Last 24 hours) at 02/10/18 0729  Last data filed at 02/10/18 0545   Gross per 24 hour   Intake          2223 75 ml   Output              950 ml   Net          1273 75 ml       Invasive Devices     Peripheral Intravenous Line            Peripheral IV 02/06/18 Right Antecubital 3 days    Peripheral IV 02/08/18 Right Wrist 1 day                Physical Exam:   NAD  CV RRR  Pulm CTA  Abd soft, tender at incisions, nondistended    Lab, Imaging and other studies:CBC:   Lab Results   Component Value Date    WBC 14 20 (H) 02/10/2018    HGB 15 4 02/10/2018    HCT 43 2 02/10/2018    MCV 89 02/10/2018     02/10/2018    MCH 31 7 02/10/2018    MCHC 35 6 02/10/2018    RDW 12 4 02/10/2018    MPV 9 8 02/10/2018   , CMP:   Lab Results   Component Value Date     02/10/2018    K 4 1 02/10/2018     02/10/2018    CO2 30 02/10/2018    ANIONGAP 6 02/10/2018    BUN 10 02/10/2018    CREATININE 0 74 02/10/2018    GLUCOSE 101 02/10/2018    CALCIUM 9 1 02/10/2018    EGFR 114 02/10/2018   , Coagulation: No results found for: PT, INR, APTT  VTE Pharmacologic Prophylaxis: Heparin  VTE Mechanical Prophylaxis: sequential compression device

## 2018-02-10 NOTE — ASSESSMENT & PLAN NOTE
· With a history of hydrocephalus, s/p  shunt with multiple revisions and replacements   · Continue supportive care  · Neurosurgery on board

## 2018-02-10 NOTE — ASSESSMENT & PLAN NOTE
· According to my discussion with the patient and patient's family, no bowel movements yet for about 3 to 4 days now  · Neurosurgery ordered for a suppository laxative  · For further workup and management if no improve improvement  · General surgery on board  Status post open laparotomy with lysis of adhesions

## 2018-02-10 NOTE — PROGRESS NOTES
Rounded with Dr Zane Ortiz from [place]  See provider orders and progress notes for updates  Will continue to monitor

## 2018-02-10 NOTE — ASSESSMENT & PLAN NOTE
· Suspect malfunctioning was causing HA     · History of congenital hydrocephalus, s/p  shunt with multiple revisions and replacements; neurosurgery performed shunt tap 2 /7/18- CSF with no bacteria/polys/growth  · Neurosurgery and neurology following  · S/p open laparotomy with lysis of adhesions and reposition of CP shunt tubing on 2/8/18  · Shunt series XR with intact  shunt catheter  · Non obstructive bowel gas pattern on yesterday's abdominal x-ray  · For CT scan of the head today as per neurosurgeon  · Neurosurgery on board

## 2018-02-10 NOTE — ASSESSMENT & PLAN NOTE
· Worsening today  · Unclear etiology  · May be reactive in nature with patient's surgery and  shunt malfunction  · CT with no pneumonia  UA negative  CSF preliminarily with no growth    · He has been afebrile  · CBC in the morning

## 2018-02-11 ENCOUNTER — APPOINTMENT (INPATIENT)
Dept: RADIOLOGY | Facility: HOSPITAL | Age: 43
DRG: 982 | End: 2018-02-11
Payer: MEDICARE

## 2018-02-11 LAB
ANION GAP SERPL CALCULATED.3IONS-SCNC: 9 MMOL/L (ref 4–13)
BACTERIA CSF CULT: NO GROWTH
BASOPHILS # BLD AUTO: 0.03 THOUSANDS/ΜL (ref 0–0.1)
BASOPHILS NFR BLD AUTO: 0 % (ref 0–1)
BUN SERPL-MCNC: 11 MG/DL (ref 5–25)
CALCIUM SERPL-MCNC: 9.1 MG/DL (ref 8.3–10.1)
CHLORIDE SERPL-SCNC: 103 MMOL/L (ref 100–108)
CO2 SERPL-SCNC: 26 MMOL/L (ref 21–32)
CREAT SERPL-MCNC: 0.6 MG/DL (ref 0.6–1.3)
EOSINOPHIL # BLD AUTO: 0.36 THOUSAND/ΜL (ref 0–0.61)
EOSINOPHIL NFR BLD AUTO: 3 % (ref 0–6)
ERYTHROCYTE [DISTWIDTH] IN BLOOD BY AUTOMATED COUNT: 12.3 % (ref 11.6–15.1)
GFR SERPL CREATININE-BSD FRML MDRD: 124 ML/MIN/1.73SQ M
GLUCOSE SERPL-MCNC: 107 MG/DL (ref 65–140)
HCT VFR BLD AUTO: 41.5 % (ref 36.5–49.3)
HGB BLD-MCNC: 14.5 G/DL (ref 12–17)
LYMPHOCYTES # BLD AUTO: 2.09 THOUSANDS/ΜL (ref 0.6–4.47)
LYMPHOCYTES NFR BLD AUTO: 15 % (ref 14–44)
MCH RBC QN AUTO: 31 PG (ref 26.8–34.3)
MCHC RBC AUTO-ENTMCNC: 34.9 G/DL (ref 31.4–37.4)
MCV RBC AUTO: 89 FL (ref 82–98)
MONOCYTES # BLD AUTO: 2.21 THOUSAND/ΜL (ref 0.17–1.22)
MONOCYTES NFR BLD AUTO: 15 % (ref 4–12)
NEUTROPHILS # BLD AUTO: 9.64 THOUSANDS/ΜL (ref 1.85–7.62)
NEUTS SEG NFR BLD AUTO: 67 % (ref 43–75)
NRBC BLD AUTO-RTO: 0 /100 WBCS
PLATELET # BLD AUTO: 255 THOUSANDS/UL (ref 149–390)
PMV BLD AUTO: 9.9 FL (ref 8.9–12.7)
POTASSIUM SERPL-SCNC: 3.9 MMOL/L (ref 3.5–5.3)
RBC # BLD AUTO: 4.68 MILLION/UL (ref 3.88–5.62)
SODIUM SERPL-SCNC: 138 MMOL/L (ref 136–145)
WBC # BLD AUTO: 14.41 THOUSAND/UL (ref 4.31–10.16)

## 2018-02-11 PROCEDURE — 80048 BASIC METABOLIC PNL TOTAL CA: CPT | Performed by: INTERNAL MEDICINE

## 2018-02-11 PROCEDURE — 87493 C DIFF AMPLIFIED PROBE: CPT | Performed by: INTERNAL MEDICINE

## 2018-02-11 PROCEDURE — 71046 X-RAY EXAM CHEST 2 VIEWS: CPT

## 2018-02-11 PROCEDURE — 99232 SBSQ HOSP IP/OBS MODERATE 35: CPT | Performed by: INTERNAL MEDICINE

## 2018-02-11 PROCEDURE — 85025 COMPLETE CBC W/AUTO DIFF WBC: CPT | Performed by: STUDENT IN AN ORGANIZED HEALTH CARE EDUCATION/TRAINING PROGRAM

## 2018-02-11 RX ORDER — GUAIFENESIN 600 MG
600 TABLET, EXTENDED RELEASE 12 HR ORAL EVERY 12 HOURS SCHEDULED
Status: DISCONTINUED | OUTPATIENT
Start: 2018-02-11 | End: 2018-02-12 | Stop reason: HOSPADM

## 2018-02-11 RX ORDER — KETOROLAC TROMETHAMINE 30 MG/ML
15 INJECTION, SOLUTION INTRAMUSCULAR; INTRAVENOUS EVERY 6 HOURS PRN
Status: DISPENSED | OUTPATIENT
Start: 2018-02-11 | End: 2018-02-12

## 2018-02-11 RX ORDER — SODIUM CHLORIDE 9 MG/ML
100 INJECTION, SOLUTION INTRAVENOUS CONTINUOUS
Status: DISCONTINUED | OUTPATIENT
Start: 2018-02-11 | End: 2018-02-12 | Stop reason: HOSPADM

## 2018-02-11 RX ORDER — POLYETHYLENE GLYCOL 3350 17 G/17G
17 POWDER, FOR SOLUTION ORAL DAILY PRN
Status: DISCONTINUED | OUTPATIENT
Start: 2018-02-11 | End: 2018-02-12 | Stop reason: HOSPADM

## 2018-02-11 RX ORDER — PANTOPRAZOLE SODIUM 40 MG/1
40 TABLET, DELAYED RELEASE ORAL
Status: DISCONTINUED | OUTPATIENT
Start: 2018-02-11 | End: 2018-02-12 | Stop reason: HOSPADM

## 2018-02-11 RX ORDER — ACETAMINOPHEN 325 MG/1
650 TABLET ORAL EVERY 6 HOURS PRN
Status: DISCONTINUED | OUTPATIENT
Start: 2018-02-11 | End: 2018-02-12 | Stop reason: HOSPADM

## 2018-02-11 RX ORDER — KETOROLAC TROMETHAMINE 30 MG/ML
15 INJECTION, SOLUTION INTRAMUSCULAR; INTRAVENOUS ONCE
Status: DISCONTINUED | OUTPATIENT
Start: 2018-02-11 | End: 2018-02-11

## 2018-02-11 RX ORDER — IBUPROFEN 600 MG/1
600 TABLET ORAL EVERY 6 HOURS PRN
Status: DISCONTINUED | OUTPATIENT
Start: 2018-02-11 | End: 2018-02-11

## 2018-02-11 RX ADMIN — DIVALPROEX SODIUM 500 MG: 500 TABLET, DELAYED RELEASE ORAL at 22:12

## 2018-02-11 RX ADMIN — ACETAMINOPHEN 975 MG: 325 TABLET, FILM COATED ORAL at 02:00

## 2018-02-11 RX ADMIN — PANTOPRAZOLE SODIUM 40 MG: 40 TABLET, DELAYED RELEASE ORAL at 05:13

## 2018-02-11 RX ADMIN — VITAMIN D, TAB 1000IU (100/BT) 2000 UNITS: 25 TAB at 08:14

## 2018-02-11 RX ADMIN — ENOXAPARIN SODIUM 40 MG: 40 INJECTION SUBCUTANEOUS at 08:22

## 2018-02-11 RX ADMIN — LAMOTRIGINE 200 MG: 100 TABLET ORAL at 08:14

## 2018-02-11 RX ADMIN — FLUTICASONE PROPIONATE AND SALMETEROL 1 PUFF: 50; 100 POWDER RESPIRATORY (INHALATION) at 22:15

## 2018-02-11 RX ADMIN — GUAIFENESIN 600 MG: 600 TABLET, EXTENDED RELEASE ORAL at 12:38

## 2018-02-11 RX ADMIN — DIVALPROEX SODIUM 500 MG: 500 TABLET, DELAYED RELEASE ORAL at 12:38

## 2018-02-11 RX ADMIN — KETOROLAC TROMETHAMINE 15 MG: 30 INJECTION, SOLUTION INTRAMUSCULAR at 01:58

## 2018-02-11 RX ADMIN — SODIUM CHLORIDE 100 ML/HR: 0.9 INJECTION, SOLUTION INTRAVENOUS at 12:57

## 2018-02-11 RX ADMIN — LAMOTRIGINE 200 MG: 100 TABLET ORAL at 22:12

## 2018-02-11 RX ADMIN — GUAIFENESIN 600 MG: 600 TABLET, EXTENDED RELEASE ORAL at 22:12

## 2018-02-11 RX ADMIN — SODIUM CHLORIDE 100 ML/HR: 0.9 INJECTION, SOLUTION INTRAVENOUS at 02:05

## 2018-02-11 RX ADMIN — DIVALPROEX SODIUM 500 MG: 500 TABLET, DELAYED RELEASE ORAL at 05:13

## 2018-02-11 RX ADMIN — SODIUM CHLORIDE 100 ML/HR: 0.9 INJECTION, SOLUTION INTRAVENOUS at 22:13

## 2018-02-11 RX ADMIN — KETOROLAC TROMETHAMINE 15 MG: 30 INJECTION, SOLUTION INTRAMUSCULAR at 08:14

## 2018-02-11 RX ADMIN — FLUTICASONE PROPIONATE AND SALMETEROL 1 PUFF: 50; 100 POWDER RESPIRATORY (INHALATION) at 08:21

## 2018-02-11 NOTE — ASSESSMENT & PLAN NOTE
· Previous constipation has resolved; today, patient had watery stools  Patient was given Dulcolax yesterday afternoon, but only had the bowel movement today  · I will have the patient's the laxatives/stool softeners on as needed basis at this point  · Please see management under leukocytosis   · For further workup and management if this recurs  · General surgery on board  Status post open laparotomy with lysis of adhesions

## 2018-02-11 NOTE — PROGRESS NOTES
Patients pain has not been very well controlled and I have also noticed that no mater what pain meds he get he still has pain  Can't touch insision sites, patient is running a fever, tachy and WBC's this am were higher than the day before  Called [facility] spoke with Nuria Coates, she will order some blood work for the am      I will continue to monitor and give pain meds

## 2018-02-11 NOTE — ASSESSMENT & PLAN NOTE
· Suspect malfunctioning was causing HA  · History of congenital hydrocephalus, s/p  shunt with multiple revisions and replacements; neurosurgery performed shunt tap 2 /7/18- CSF with no bacteria/polys/growth  · Neurosurgery and neurology following  · S/p open laparotomy with lysis of adhesions and reposition of CP shunt tubing on 2/8/18  · Shunt series XR with intact  shunt catheter  · CT scan of the head, 2/10, revealed stable  shunt    · Neurosurgery on board

## 2018-02-11 NOTE — PROGRESS NOTES
Progress Note - Jessica Cedillo 1975, 43 y o  male MRN: [id number]    Unit/Bed#: [facility] 710-01 Encounter: [id number]    Primary Care Provider: Jessica Gillette DO   Date and time admitted to hospital: 2/6/2018 11:58 AM        Leukocytosis   Assessment & Plan    · Patient continues to have leukocytosis  Review of patient's temperature is revealed that patient has been having low-grade fevers this past few days  · Unclear etiology at this point  · Patient has some cough and a chest x-ray was already ordered  The in the meantime, I will treat patient's cough symptomatically  · Today, patient had huge watery diarrhea (I do not think this is related to the Dulcolax that was given yesterday afternoon as the diarrhea happened today)  Thus, I will send stools for C diff PCR  Contact isolation  · May be reactive in nature with patient's surgery and  shunt malfunction  · CT with no pneumonia  UA negative  CSF preliminarily with no growth  · He has been afebrile  · CBC in the morning        S/P  shunt   Assessment & Plan    · Suspect malfunctioning was causing HA  · History of congenital hydrocephalus, s/p  shunt with multiple revisions and replacements; neurosurgery performed shunt tap 2 /7/18- CSF with no bacteria/polys/growth  · Neurosurgery and neurology following  · S/p open laparotomy with lysis of adhesions and reposition of CP shunt tubing on 2/8/18  · Shunt series XR with intact  shunt catheter  · CT scan of the head, 2/10, revealed stable  shunt  · Neurosurgery on board        * Headache   Assessment & Plan    · Currently resolved  · Cause likely secondary to shunt malfunction  · Patient underwent open laparotomy with lysis of adhesions and repositioning of  shunt tubing yesterday with general surgery and neurosurgery          Seizure disorder ([facility] [address] )   Assessment & Plan    · No seizure episodes  · Repeat valproic acid level on 2/15/18  · Valproic acid level was 92   Neurology decreased dose to 500mg Q8hr  · Neurology on board        Cerebral palsy [facility])   Assessment & Plan    · With a history of hydrocephalus, s/p  shunt with multiple revisions and replacements   · Continue supportive care  · Neurosurgery on board  Constipation   Assessment & Plan    · Previous constipation has resolved; today, patient had watery stools  Patient was given Dulcolax yesterday afternoon, but only had the bowel movement today  · I will have the patient's the laxatives/stool softeners on as needed basis at this point  · Please see management under leukocytosis   · For further workup and management if this recurs  · General surgery on board  Status post open laparotomy with lysis of adhesions  Left-sided back pain   Assessment & Plan    · Currently resolved  Appears has been chronic in nature  Unclear etiology  · Consider related to one of his left-sided shunts? Patient underwent open laparotomy with lysis of adhesions and reposition of  shunt tubing yesterday  · CT chest with no pneumonia/effusion  No acute osseous abnormality  The patient denies any dysuria  Shunt malfunction   Assessment & Plan    · Please see management under  shunt        Tremor   Assessment & Plan    · Resolved  · Valproic level 92 - neurology decreased dose to 500mg Q8hr  · Lamictal level 16 0 - WNL              VTE Pharmacologic Prophylaxis:   Pharmacologic: Enoxaparin (Lovenox)  Mechanical VTE Prophylaxis in Place: Yes    Patient Centered Rounds: I have performed bedside rounds with nursing staff today  Discussions with Specialists or Other Care Team Provider:  Case management  Education and Discussions with Family / Patient:  Patient  I spoke to patient's parents at bedside and discussed with them our updates and I answered questions and concerns  Time Spent for Care: 30 minutes  More than 50% of total time spent on counseling and coordination of care as described above      Current Length of Stay: 5 day(s)    Current Patient Status: Inpatient   Certification Statement: The patient will continue to require additional inpatient hospital stay due to Above findings and plans  Discharge Plan:  None yet  Code Status: Level 1 - Full Code      Subjective: The patient told us that he had an episode of watery diarrhea awhile ago  According to him, it was a gush of watery stools  When asked, patient told me that his abdominal pains had been subsiding in fact it is just mild now  Patient denies any other pains  No headaches  Patient denies any shortness of breath  Patient has some cough, mostly trying to clear his throat of secretions  Has been having low-grade fever this past few days  Otherwise no other complaints      Objective:     Vitals:   Temp (24hrs), Av 3 °F (37 4 °C), Min:98 7 °F (37 1 °C), Max:99 8 °F (37 7 °C)    HR:  [] 97  Resp:  [16-20] 16  BP: (141-156)/(73-95) 141/73  SpO2:  [93 %-95 %] 94 %  Body mass index is 30 14 kg/m²  Input and Output Summary (last 24 hours): Intake/Output Summary (Last 24 hours) at 18 1117  Last data filed at 18 0618   Gross per 24 hour   Intake           551 67 ml   Output              200 ml   Net           351 67 ml       Physical Exam:     Physical Exam   Constitutional: No distress  HENT:   Head: Normocephalic and atraumatic  Eyes: Right eye exhibits no discharge  Left eye exhibits no discharge  No scleral icterus  Neck: No JVD present  No tracheal deviation present  Cardiovascular: Normal rate, regular rhythm and normal heart sounds  Exam reveals no gallop and no friction rub  No murmur heard  Pulmonary/Chest: Effort normal and breath sounds normal  No stridor  No respiratory distress  He has no wheezes  He has no rales  Abdominal: Soft  Bowel sounds are normal  He exhibits no distension  There is tenderness  There is no rebound and no guarding     Very mild tenderness at the right paraumbilical area (at the right side of the surgical incision wound)  Surgical incisional wounds are clean, dry and intact  Musculoskeletal: He exhibits no edema, tenderness or deformity  Neurological: He is alert  No cranial nerve deficit  Patient's coherent  Skin: Skin is warm  No rash noted  He is not diaphoretic  No erythema  No pallor  Psychiatric: He has a normal mood and affect  His behavior is normal  Thought content normal    Vitals reviewed  Additional Data:     Labs:      Results from last 7 days  Lab Units 02/11/18  0508   WBC Thousand/uL 14 41*   HEMOGLOBIN g/dL 14 5   HEMATOCRIT % 41 5   PLATELETS Thousands/uL 255   NEUTROS PCT % 67   LYMPHS PCT % 15   MONOS PCT % 15*   EOS PCT % 3       Results from last 7 days  Lab Units 02/11/18  0508  02/06/18  1328   SODIUM mmol/L 138  < > 139   POTASSIUM mmol/L 3 9  < > 3 5   CHLORIDE mmol/L 103  < > 102   CO2 mmol/L 26  < > 31   BUN mg/dL 11  < > 13   CREATININE mg/dL 0 60  < > 0 74   CALCIUM mg/dL 9 1  < > 9 2   TOTAL PROTEIN g/dL  --   --  6 8   BILIRUBIN TOTAL mg/dL  --   --  0 49   ALK PHOS U/L  --   --  53   ALT U/L  --   --  23   AST U/L  --   --  10   GLUCOSE RANDOM mg/dL 107  < > 88   < > = values in this interval not displayed  Results from last 7 days  Lab Units 02/07/18  0542   INR  1 09       * I Have Reviewed All Lab Data Listed Above  * Additional Pertinent Lab Tests Reviewed: Ivonne 66 Admission Reviewed    Imaging:    Imaging Reports Reviewed Today Include:  Diagnostic imaging studies that were done this admission  Imaging Personally Reviewed by Myself Includes:  None      Recent Cultures (last 7 days):       Results from last 7 days  Lab Units 02/08/18 2029 02/07/18  1356   GRAM STAIN RESULT  No Polys or Bacteria seen No Polys or Bacteria seen       Last 24 Hours Medication List:     Current Facility-Administered Medications:  acetaminophen 975 mg Oral Q8H Oswald Fontana MD    albuterol 2 puff Inhalation Q4H PRN Lucía Westbrook MD    ALPRAZolam 0 25 mg Oral TID PRN Lucía Westbrook MD    cholecalciferol 2,000 Units Oral Daily Lucía Westbrook MD    divalproex sodium 500 mg Oral Q8H [address] Rakan Pretty, PA-C    enoxaparin 40 mg Subcutaneous Q24H [address] Clifford Finley, PA-C    fluticasone-salmeterol 1 puff Inhalation Q12H [address] Lucía Westbrook MD    guaiFENesin 600 mg Oral Q12H [address] Zackery Floyd MD    HYDROmorphone 1 mg Intravenous Q2H PRN Zamzam Vickers MD    ibuprofen 600 mg Oral Q6H PRN Pheobe Yunier, PA-C    ketorolac 15 mg Intravenous Q6H PRN Lali Ann MD    lamoTRIgine 200 mg Oral BID Lucía Westbrook MD    oxyCODONE 10 mg Oral Q4H PRN Marlyse Soda, PA-C    oxyCODONE 5 mg Oral Q4H PRN Marlyse Soda, PA-C    pantoprazole 40 mg Oral Early Morning Pheobe Yunier, PA-C    polyethylene glycol 17 g Oral Daily PRN Zackery Floyd MD    sodium chloride 100 mL/hr Intravenous Continuous Pheobe Yunier, PA-C Last Rate: 100 mL/hr (02/11/18 0205)        Today, Patient Was Seen By: Laurel Francis MD    ** Please Note: Dragon 360 Dictation voice to text software may have been used in the creation of this document   **

## 2018-02-11 NOTE — PROGRESS NOTES
Call received from nurse earlier regarding low grade fever @ 99 3, increased leukocytosis and tachycardia @ 115  States pt's pain is poorly controlled and no BM x 4-5 days  After looking at chart pt has has low grade fever throughout hospitalization  WBC increased to 14 20 from 12 21 yesterday  Will reccheck CBC in am  BP remains stable with SBP @ 156  Call received from surgery resident saying that she was called to come evaluate pt  States pt appears ok and that KUB completed on 2/9 was clear and will recheck it in am    On exam pt is flushed and skin is warm to touch and diaphoretic  Will have nurse recheck vitals with rectal temp for accuracy  Currently on D51/2 NS with 20meq @ 50mL/hr  C/O "achy all over" which has unchanged since admission  States "I wish they would give me that strong stuff through my IV " Mild, congested cough noted  Decreased breath sounds to LLL otherwise clear  O2 sat 95% on RA  Plan:  - Currently receiving scheduled tylenol   Cr 0 74 with eGFR 114    - Will add Motrin 600mg PRN for fever x 4 doses  - Toradol 15mg x 1 for discomfort    - Recheck CBC in am  - Rectal temp  - Change IVF to NS @ 100mL/hr  - If temp > 101 will check flu swab  - CXR in am- Add protonix daily while on motrin

## 2018-02-11 NOTE — PROGRESS NOTES
Advised patient to lay more on his left side to try and help move the gas out  Patient turned on his side and stated " I am already letting some out"  Advised him to stay that way for a while and see if that helps him  Obtained rectal temp of 99 8 patient stated he is feeling better with the pain medicine in the arm and the tylenol

## 2018-02-11 NOTE — PROGRESS NOTES
Progress Note - Neurosurgery   Emelia Hernandez 43 y o  male MRN: [id number]  Unit/Bed#: [facility] 710-01 Encounter: [id number]    Assessment:  1  POD#3  s/p laparoscopy, lysis of adhesions, open laparotomy, repositioning of distal  shunt t ubing  2  Suspected distal shunt dysfunction s/p left sided  shunt insertion  3  Coiled upper epigastric intraperitoneal distal shunt tubing  4  History of 7 shunt revisions last revision 10/23/17  5  Stable ventricular size  6  Seizure disorder  7  Cerebral palsy  8  S/p  shunt tap left reservoir of codman programmable valve system with no proximal obstruction and clear CSF        Plan:  - stable from neurosurgical standpoint  - will sign off at this time and see prn for the remainder of the hospital stay  - please call with questions or problems  - outpatient neurosurgical follow up    Subjective/Objective     Feels much better   Had a BM yesterday      Intake/Output       02/11/18 0701 - 02/12/18 0700      [id number] Total       Intake    Total Intake -- -- --       Output    Urine  --  -- --    Unmeasured Urine Occurrence 1 x -- 1 x    Stool  --  -- --    Unmeasured Stool Occurrence 1 x -- 1 x    Total Output -- -- --       Net I/O     -- -- --          Invasive Devices     Peripheral Intravenous Line            Peripheral IV 02/10/18 Left Antecubital less than 1 day                Physical Exam:     Vitals: Blood pressure 141/73, pulse 97, temperature 98 7 °F (37 1 °C), temperature source Oral, resp  rate 16, height 5' 6" (1 676 m), weight 84 7 kg (186 lb 11 7 oz), SpO2 94 %  ,Body mass index is 30 14 kg/m²  Awake and alert  Moves all extremities  Abdomen soft  Incisions dry and intact  Lungs clear heart regular      Lab Results: I have personally reviewed pertinent results        Imaging Studies: I have personally reviewed pertinent films in PACS      VTE Pharmacologic Prophylaxis: Heparin    VTE Mechanical Prophylaxis: sequential compression device

## 2018-02-11 NOTE — PROGRESS NOTES
Progress Note - General Surgery   Gearlean Estimable 43 y o  male MRN: [id number]  Unit/Bed#: [facility] 710-01 Encounter: [id number]    Assessment:  43 y o  M w/ h/o congenital hydrocephalus w/ malfunctioning  shunt s/p repositioning 2/9  - flatus/BM smears    Plan:  - diet as tolerated  - prn pain control w/oxy and toradol  - IVF  - ambulation/OOB  - pulm toilet  - trend wbc  - SQH/SCDs  - rest of care per primary      Subjective/Objective     Subjective: Overnight called to evaluate 2/2 tachycardia and temp to 99 3  Did not appear to be well pain controlled, so started toradol x24hrs PRN  Patient says he is feeling much better since toradol  Passing flatus and small amounts of stool  No headaches  Objective:     Blood pressure 141/73, pulse 97, temperature 98 7 °F (37 1 °C), temperature source Oral, resp  rate 16, height 5' 6" (1 676 m), weight 84 7 kg (186 lb 11 7 oz), SpO2 94 %  ,Body mass index is 30 14 kg/m²        Intake/Output Summary (Last 24 hours) at 02/11/18 0753  Last data filed at 02/11/18 0618   Gross per 24 hour   Intake          1750 42 ml   Output              575 ml   Net          1175 42 ml       Invasive Devices     Peripheral Intravenous Line            Peripheral IV 02/10/18 Left Antecubital less than 1 day                Physical Exam:   NAD  CV RRR  Pulm CTA  Abd soft, tender at incisions, nondistended    Lab, Imaging and other studies:CBC:   Lab Results   Component Value Date    WBC 14 41 (H) 02/11/2018    HGB 14 5 02/11/2018    HCT 41 5 02/11/2018    MCV 89 02/11/2018     02/11/2018    MCH 31 0 02/11/2018    MCHC 34 9 02/11/2018    RDW 12 3 02/11/2018    MPV 9 9 02/11/2018    NRBC 0 02/11/2018   , CMP:   Lab Results   Component Value Date     02/11/2018    K 3 9 02/11/2018     02/11/2018    CO2 26 02/11/2018    ANIONGAP 9 02/11/2018    BUN 11 02/11/2018    CREATININE 0 60 02/11/2018    GLUCOSE 107 02/11/2018    CALCIUM 9 1 02/11/2018    EGFR 124 02/11/2018   , Coagulation: No results found for: PT, INR, APTT  VTE Pharmacologic Prophylaxis: Heparin  VTE Mechanical Prophylaxis: sequential compression device

## 2018-02-11 NOTE — PROGRESS NOTES
Called by nursing to evaluate patient for tachycardia and temp to 99 3, nursing states they were instructed by SLIM to call surgery with issues  Patient seen and examined  Uncomfortable  Abd moderately distended, incisions cdi with no erythema, non focal tenderness, no peritonitis  Tachy to 100s, maintaining BP and satting well on RA    Patient is tolerating diet with no vomiting, has been intermittently nauseated but passing flatus  Bowel regimen started today by primary team     Possible postoperative ileus but patient starting to have flatus  Administer IVF, tylenol prn for fevers, AM labs, check KUB in AM  Encourage ambulation and pulm toilet  Care per SLIM

## 2018-02-11 NOTE — PROGRESS NOTES
Spoke with Salvatore Gee [facility] ) again because she only put in an order for bowel med daily  She changed her mind on the blood work and advised I am to just keep and eye on the patient and contact general surgery if I feel there is maybe a post op infection going on  So will check regular CBC and BMP in the am that was previously ordered

## 2018-02-11 NOTE — ASSESSMENT & PLAN NOTE
· Patient continues to have leukocytosis  Review of patient's temperature is revealed that patient has been having low-grade fevers this past few days  · Unclear etiology at this point  · Patient has some cough and a chest x-ray was already ordered  The in the meantime, I will treat patient's cough symptomatically  · Today, patient had huge watery diarrhea (I do not think this is related to the Dulcolax that was given yesterday afternoon as the diarrhea happened today)  Thus, I will send stools for C diff PCR  Contact isolation  · May be reactive in nature with patient's surgery and  shunt malfunction  · CT with no pneumonia  UA negative  CSF preliminarily with no growth    · He has been afebrile  · CBC in the morning

## 2018-02-11 NOTE — PROGRESS NOTES
Spoke with Theresa Parker, they will be coming up to see patient  [facility] surgery was also paged and they came to take a quick look at the patient  They spoke with Theresa Parker on the phone and [facility] will now come up and see him

## 2018-02-11 NOTE — ASSESSMENT & PLAN NOTE
· Resolved    · Valproic level 92 - neurology decreased dose to 500mg Q8hr  · Lamictal level 16 0 - WNL

## 2018-02-12 VITALS
TEMPERATURE: 98.1 F | DIASTOLIC BLOOD PRESSURE: 95 MMHG | BODY MASS INDEX: 30.01 KG/M2 | WEIGHT: 186.73 LBS | HEART RATE: 92 BPM | SYSTOLIC BLOOD PRESSURE: 140 MMHG | OXYGEN SATURATION: 95 % | RESPIRATION RATE: 18 BRPM | HEIGHT: 66 IN

## 2018-02-12 LAB
ANION GAP SERPL CALCULATED.3IONS-SCNC: 8 MMOL/L (ref 4–13)
BASOPHILS # BLD AUTO: 0.04 THOUSANDS/ΜL (ref 0–0.1)
BASOPHILS NFR BLD AUTO: 0 % (ref 0–1)
BUN SERPL-MCNC: 16 MG/DL (ref 5–25)
C DIFF TOX GENS STL QL NAA+PROBE: NORMAL
CALCIUM SERPL-MCNC: 8.4 MG/DL (ref 8.3–10.1)
CHLORIDE SERPL-SCNC: 109 MMOL/L (ref 100–108)
CO2 SERPL-SCNC: 27 MMOL/L (ref 21–32)
CREAT SERPL-MCNC: 0.62 MG/DL (ref 0.6–1.3)
EOSINOPHIL # BLD AUTO: 0.94 THOUSAND/ΜL (ref 0–0.61)
EOSINOPHIL NFR BLD AUTO: 9 % (ref 0–6)
ERYTHROCYTE [DISTWIDTH] IN BLOOD BY AUTOMATED COUNT: 12.5 % (ref 11.6–15.1)
GFR SERPL CREATININE-BSD FRML MDRD: 122 ML/MIN/1.73SQ M
GLUCOSE SERPL-MCNC: 81 MG/DL (ref 65–140)
HCT VFR BLD AUTO: 39.6 % (ref 36.5–49.3)
HGB BLD-MCNC: 13.5 G/DL (ref 12–17)
LYMPHOCYTES # BLD AUTO: 2.86 THOUSANDS/ΜL (ref 0.6–4.47)
LYMPHOCYTES NFR BLD AUTO: 27 % (ref 14–44)
MCH RBC QN AUTO: 30.8 PG (ref 26.8–34.3)
MCHC RBC AUTO-ENTMCNC: 34.1 G/DL (ref 31.4–37.4)
MCV RBC AUTO: 90 FL (ref 82–98)
MONOCYTES # BLD AUTO: 1.4 THOUSAND/ΜL (ref 0.17–1.22)
MONOCYTES NFR BLD AUTO: 13 % (ref 4–12)
NEUTROPHILS # BLD AUTO: 5.1 THOUSANDS/ΜL (ref 1.85–7.62)
NEUTS SEG NFR BLD AUTO: 51 % (ref 43–75)
NRBC BLD AUTO-RTO: 0 /100 WBCS
PLATELET # BLD AUTO: 244 THOUSANDS/UL (ref 149–390)
PMV BLD AUTO: 10 FL (ref 8.9–12.7)
POTASSIUM SERPL-SCNC: 3.6 MMOL/L (ref 3.5–5.3)
RBC # BLD AUTO: 4.38 MILLION/UL (ref 3.88–5.62)
SODIUM SERPL-SCNC: 144 MMOL/L (ref 136–145)
WBC # BLD AUTO: 10.45 THOUSAND/UL (ref 4.31–10.16)

## 2018-02-12 PROCEDURE — 97163 PT EVAL HIGH COMPLEX 45 MIN: CPT

## 2018-02-12 PROCEDURE — 80048 BASIC METABOLIC PNL TOTAL CA: CPT | Performed by: INTERNAL MEDICINE

## 2018-02-12 PROCEDURE — G8979 MOBILITY GOAL STATUS: HCPCS

## 2018-02-12 PROCEDURE — G8978 MOBILITY CURRENT STATUS: HCPCS

## 2018-02-12 PROCEDURE — G8988 SELF CARE GOAL STATUS: HCPCS

## 2018-02-12 PROCEDURE — 97167 OT EVAL HIGH COMPLEX 60 MIN: CPT

## 2018-02-12 PROCEDURE — 99239 HOSP IP/OBS DSCHRG MGMT >30: CPT | Performed by: INTERNAL MEDICINE

## 2018-02-12 PROCEDURE — 85025 COMPLETE CBC W/AUTO DIFF WBC: CPT | Performed by: INTERNAL MEDICINE

## 2018-02-12 PROCEDURE — G8987 SELF CARE CURRENT STATUS: HCPCS

## 2018-02-12 RX ORDER — GUAIFENESIN 600 MG
600 TABLET, EXTENDED RELEASE 12 HR ORAL EVERY 12 HOURS SCHEDULED
Qty: 20 TABLET | Refills: 0 | Status: SHIPPED | OUTPATIENT
Start: 2018-02-12 | End: 2018-02-20

## 2018-02-12 RX ORDER — DIVALPROEX SODIUM 500 MG/1
500 TABLET, DELAYED RELEASE ORAL EVERY 8 HOURS SCHEDULED
Qty: 60 TABLET | Refills: 0 | Status: SHIPPED | OUTPATIENT
Start: 2018-02-12 | End: 2018-05-15 | Stop reason: SDUPTHER

## 2018-02-12 RX ORDER — OXYCODONE HYDROCHLORIDE 5 MG/1
5 TABLET ORAL EVERY 6 HOURS PRN
Qty: 15 TABLET | Refills: 0 | Status: SHIPPED | OUTPATIENT
Start: 2018-02-12 | End: 2018-03-29

## 2018-02-12 RX ORDER — ACETAMINOPHEN 325 MG/1
650 TABLET ORAL EVERY 6 HOURS PRN
Qty: 30 TABLET | Refills: 0
Start: 2018-02-12 | End: 2018-02-20

## 2018-02-12 RX ORDER — POLYETHYLENE GLYCOL 3350 17 G/17G
17 POWDER, FOR SOLUTION ORAL DAILY PRN
Qty: 14 EACH | Refills: 0 | Status: SHIPPED | OUTPATIENT
Start: 2018-02-12 | End: 2018-02-20

## 2018-02-12 RX ADMIN — FLUTICASONE PROPIONATE AND SALMETEROL 1 PUFF: 50; 100 POWDER RESPIRATORY (INHALATION) at 08:08

## 2018-02-12 RX ADMIN — PANTOPRAZOLE SODIUM 40 MG: 40 TABLET, DELAYED RELEASE ORAL at 05:14

## 2018-02-12 RX ADMIN — VITAMIN D, TAB 1000IU (100/BT) 2000 UNITS: 25 TAB at 08:07

## 2018-02-12 RX ADMIN — GUAIFENESIN 600 MG: 600 TABLET, EXTENDED RELEASE ORAL at 08:06

## 2018-02-12 RX ADMIN — ENOXAPARIN SODIUM 40 MG: 40 INJECTION SUBCUTANEOUS at 08:07

## 2018-02-12 RX ADMIN — SODIUM CHLORIDE 100 ML/HR: 0.9 INJECTION, SOLUTION INTRAVENOUS at 08:06

## 2018-02-12 RX ADMIN — LAMOTRIGINE 200 MG: 100 TABLET ORAL at 08:07

## 2018-02-12 RX ADMIN — DIVALPROEX SODIUM 500 MG: 500 TABLET, DELAYED RELEASE ORAL at 13:22

## 2018-02-12 RX ADMIN — DIVALPROEX SODIUM 500 MG: 500 TABLET, DELAYED RELEASE ORAL at 05:13

## 2018-02-12 NOTE — INCIDENTAL FINDINGS
The following findings require follow up:  Radiographic finding   Findin MILLIMETER LEFT LOWER LOBE PULMONARY NODULE  IF PATIENT IS CONSIDERED HIGH RISK FOR LUNG CANCER, 12 MONTH FOLLOW-UP WITH NONCONTRAST CHEST CT IS RECOMMENDED     Follow up required:  FOLLOW-UP WITH PRIMARY CARE PHYSICIAN FOR THE CHEST CT IF HIGH RISK FOR LUNG CANCER   Follow up should be done within 12 month(s)    Please notify the following clinician to assist with the follow up:   Dr Jessica Gillette

## 2018-02-12 NOTE — OCCUPATIONAL THERAPY NOTE
[address]  Evaluation     Patient Name: Rubén HIDALGOQUR'P Date: 2/12/2018  Problem List  Patient Active Problem List   Diagnosis    [facility]bral palsy [facility]    Tremor     (ventriculoperitoneal) shunt status    Seizure disorder ([facility] )    S/P  shunt    Headache    Shunt malfunction    Left-sided back pain    Leukocytosis    Constipation     Past Medical History  Past Medical History:   Diagnosis Date    Asthma     Cerebral palsy ([facility]     Congenital hydrocephalus ([facility] )     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[facility] )     Migraines     Seizures ([facility] )      Past Surgical History  Past Surgical History:   Procedure Laterality Date    BRAIN SURGERY      Multiple  shunt revisions    HERNIA REPAIR      SD CREATE SHUNT:VENTRIC-PERITONEAL Left 10/23/2017    Procedure: SHUNT VENTRICULAR-PERITONEAL revision;  Surgeon: Amisha De Souza MD;  [facility]: [facility];  Service: Neurosurgery    SD LAP,DIAGNOSTIC ABDOMEN N/A 2/8/2018    Procedure: DIAGNOSTIC LAPAROSCOPY, LYSIS OF ADHESIONS, OPEN LAPAROTOMY, LYSIS OF ADHESIONS, [id number] [facility] OF  SHUNT TUBING;  Surgeon: Andrew Conti MD;  Location: [facility];  Service: General                02/12/18 0920   Note Type   Note type Eval/Treat   Restrictions/Precautions   Weight Bearing Precautions Per Order No   Other Precautions Fall Risk;Pain; Chair Alarm; Bed Alarm;Cognitive   Pain Assessment   Pain Assessment 0-10   Pain Score 7   Pain Type Acute pain   Pain Location Abdomen   Pain Orientation Mid   Hospital Pain Intervention(s) Repositioned   Response to Interventions TOLERATED   Home Living   Type of [address] [place] [address] Two level;Bed/bath upstairs; Able to live on main level with bedroom/bathroom;Stairs to enter with rails  (1 VS 5 MATHEW)   Bathroom Shower/Tub Tub/shower unit   Bathroom Toilet Standard   Bathroom Accessibility Accessible   Additional Comments NO DME USE AT BASELINE      Prior Function   Level of [place] Independent with ADLs and functional mobility   Lives With [facility] Help From Family   ADL Assistance Independent   IADLs Needs assistance   Falls in the last 6 months 0   Vocational Part time employment   Lifestyle   [employer] PT REPORTS BASELINE INDEPENDENCE IN ADLS WHILE FAMILY ASSISTS W/ IADLS/DRIVING  NO DME USE AT BASELINE AND NO RECENT H/O FALLS  Reciprocal Relationships PT RESIDES WITH MOTHER/FATHER  FAMILY AVAILABLE TO ASSIST 24/7 AT TIME OF D/C  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 [employer]  Intrinsic Gratification PT ENJOYS WATCHING TV AND WORKING  Psychosocial   Psychosocial (WDL) WDL   Subjective   Subjective "MY BELLY HURTS"   ADL   Where Assessed Chair   Eating Assistance 5  Supervision/Setup   Grooming Assistance 4  Minimal Assistance   UB Bathing Assistance 4  Minimal Assistance   LB Bathing Assistance 3  Moderate Assistance   UB Dressing Assistance 4  Minimal Assistance   LB Dressing Assistance 4  Minimal [address]  4  Minimal Assistance   Bed Mobility   Supine to Sit 5  Supervision   Additional items HOB elevated   Sit to Supine Unable to assess   Additional Comments PT LEFT IN ROOM CHAIR POST EVAL W/ CHAIR ALAM ACTIVATED     Transfers   Sit to Stand 5  Supervision   Additional items Increased time required   Stand to Sit 5  Supervision   Additional items Increased time required   Functional Mobility   Functional Mobility 4  Minimal assistance   Additional Comments ASSIST X1 USING IV POLE FOR SUPPORT   Balance   Static Sitting Fair +   Dynamic Sitting Fair   Static Standing Fair -   Dynamic Standing Fair -   Ambulatory Poor +   Activity Tolerance   Activity Tolerance Patient limited by pain   Medical Staff Made Aware F/U W/ STEPHANIE JOHN/MICHELLE   Nurse Made Aware OKAY TO SEE PER RN    RUE Assessment   RUE Assessment WFL   LUE Assessment   LUE Assessment WFL   Hand Function   Gross Motor Coordination Functional   Fine Motor Coordination Impaired  (TREMORS PRESENT IN B/L UE'S) Sensation   Light Touch No apparent deficits   Sharp/Dull No apparent deficits   Stereognosis No apparent deficits   Proprioception   Proprioception No apparent deficits   Vision-Basic Assessment   Current Vision Wears glasses all the time   Cognition   Overall Cognitive Status Impaired   Arousal/Participation Alert   Attention Within functional limits   Orientation Level Oriented X4   Memory Within functional limits   Following Commands Follows multistep commands without difficulty   Comments PT ENGAGES IN APPROPRIATE CONVERSATION HOWEVER REQUIRES CUES FOR SAFETY T/O FUNCTIONAL ACTIVITY  Assessment   Limitation Decreased ADL status; Decreased Safe judgement during ADL;Decreased cognition;Decreased high-level ADLs; Decreased self-care trans;Decreased fine motor control  (BALANCE, MEDICAL STATUS)   Prognosis Good   Assessment PT IS A 44 YO M ADMIT W/ C/O TREMORS, ANXIETY, INSOMNIA, HA, BACK PAIN AND INABILITY TO EXPRESS THOUGHTS  PT CURRENTLY UNDERGOING W/U FOR OBSTRUCTED  SHUNT AND UE TREMORS  PT IS NOW S/P DIAGNOSTIC LAPAROSCOPY, ALEXANDER, OPEN LAPAROTOMY AND REPOSITIONING OF  SHUNT ON 2/8  PT W/ PMH SIGNIFICANT FOR CEREBRAL PALSY, CONGENITAL HYDROCEPHALUS, SEIZURE D/O, MIGRAINE, ASTHMA,  SHUNT  PT IS FROM HOME W/ FAMILY AND W/ BASELINE INDEPENDENCE IN ADLS WHILE FAMILY ASSISTS W/ IADLS  PT W/ NO DME USE AT BASELINE AND NO RECENT H/O FALLS  CURRENTLY PT REQUIRING MIN A UB ADLS, MIN/MOD A LB ADLS, SBA BED MOBILITY, SBA FUNCTIONAL TRANSFERS AND MIN A AMBULATION USING IV POLE FOR SUPPORT  PT APPEARS LIMITED AT THIST SONU 2* IMPAIRED BALANCE, ACTIVITY TOLERANCE, MEDICAL STATUS, ADL IMPAIRMENTS, GENERALIZED WEAKNESS/DECONDITIONING, PAIN, B/L UE TREMORS AS WELL AS IMPAIRED SAFETY AND INSIGHT  OT ANTICIPATES EVENTUAL D/C HOME W/ FAMILY SUPPORT PENDING FURTHER PROGRESS AND MEDICAL STABILITY  WILL CONTINUE TO FOLLOW PT 3-5X/WEEK IN ORDER TO MEET THE BELOW DESCRIBED GOALS IN 7-10 DAYS      Goals   Patient Goals PT WOULD LIKE TO RETURN HOME,  LT Time Frame 7-10   Long Term Goal #1 PLEASE SEE BELOW DESCRIBED GOALS  Plan   Treatment Interventions ADL retraining;Functional transfer training; Endurance training;Cognitive reorientation;Patient/family training;Equipment evaluation/education; Compensatory technique education;Continued evaluation; Energy conservation; Activityengagement   Goal Expiration Date 02/22/18   OT Frequency 3-5x/wk   Recommendation   OT Discharge Recommendation Home with family support   OT - OK to Discharge (PENDING PROGRESS/MEDICAL STABILITY  )   Barthel Index   Feeding 10   Bathing 0   Grooming Score 5   Dressing Score 5   Bladder Score 10   Bowels Score 10   Toilet Use Score 5   Transfers (Bed/Chair) Score 10   Mobility (Level Surface) Score 10   Stairs Score 0   Barthel Index Score 65   Modified Can Scale   Modified [place] Scale 4     GOALS TO BE MET IN 7-10 DAYS:    1) Pt will increase bed mobility to MOD I and transfer EOB to participate in functional activity with G tolerance and balance  2) Pt will improve functional transfers to MOD I on/off all surfaces using DME PRN w/ G balance/safety including toileting  3) Pt will increase independence in all ADLS to MOD I with G balance sitting upright in chair  4) Pt will complete toileting w/ MOD I w/ G hygiene/thoroughness using DME PRN  5) Pt will improve activity tolerance to G for min 30 min txment sessions  6) Pt will participate in light grooming task with MOD I using setup standing at sink ~3-5mins with G safety and balance  7) Pt will engage in ongoing cognitive assessment(S) w/ G participation to A w/ safe d/c planning/recommendations      8) Pt will follow 100% 2 step commands and be A&O x4 consistently with environmental cues to increase activity participation to [address], [place], OTR/L

## 2018-02-12 NOTE — PHYSICAL THERAPY NOTE
PT EVALUATION    02/12/18 0925   Note Type   Note type Eval only   Pain Assessment   Pain Assessment 0-10   Pain Score 7   Pain Location Abdomen   Pain Orientation Mid   Hospital Pain Intervention(s) Repositioned; Ambulation/increased activity   Response to Interventions pt did not have a change in pain during session    Home Living   Type of [address] Two [address];Bed/bath upstairs   Bathroom Shower/Tub Tub/shower unit   Additional Comments 1 MATHEW fro garage, 5 MATHEW in front of house   Prior Function   Level of [place] Needs assistance with IADLs; Independent with ADLs and functional mobility   Lives With [facility] Help From Family   ADL Assistance Independent   IADLs Needs assistance   Falls in the last 6 months 0   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[employer])   Restrictions/Precautions   AmVidBid Precautions Per Order No   Other Precautions Fall Risk;Pain; Chair Alarm; Bed Alarm   General   Family/Caregiver Present Yes   Cognition   Overall Cognitive Status Impaired   Arousal/Participation Cooperative   Orientation Level Oriented X4   Memory Within functional limits   Following Commands Follows all commands and directions without difficulty   RLE Assessment   RLE Assessment WFL   LLE Assessment   LLE Assessment WFL   Bed Mobility   Supine to Sit 5  Supervision   Sit to Supine 5  Supervision   Transfers   Sit to Stand 5  Supervision   Stand to Sit 5  Supervision   Stand pivot 5  Supervision   Ambulation/Elevation   Gait pattern Short stride; Inconsistent garcia;Decreased foot clearance   Gait Assistance 4  Minimal assist   Additional items Assist x 1   Assistive Device Other (Comment)  (IV pole )   Distance 300ft   Balance   Static Sitting Fair +   Dynamic Sitting Fair   Static Standing Fair -   Dynamic Standing Fair -   Ambulatory Poor +   Activity Tolerance   Activity Tolerance Patient tolerated treatment well   Nurse Made Aware yes   Assessment   Prognosis Good Problem List Decreased strength; Impaired balance;Decreased coordination;Pain   Assessment pt is seen for high complexity PT evaluation  pt is a 44 y/o male with a history of cerebral palsy, tremor, seizure disorder, and ventriculoperitoneal shunt status who is now admitted with a headache and shunt revision  PT consulted to assess transfers, ambulation, and overall mobility  pt presents with decrease standing and ambulation balance and decrease safe ambulation  pt's mother was present during session and reports that he looks more off balance when walking  pt will continue to benefit from skilled PT to work on these problems  Recommend d/c home with family support when medically stable  Goals   Patient Goals none stated   [facility] Expiration Date 02/26/18   Short Term Goal #1 1-2wks: ambulate 200ft with no device at independent level, perform full flight of steps with supervision, improve sitting and standing balance to good, improve bed mobiliy and transfers to independent  Treatment Day 0   Plan   Treatment/Interventions Functional transfer training;LE strengthening/ROM; Elevations; Therapeutic exercise; Bed mobility;Gait training   PT Frequency 5x/wk   Recommendation   Recommendation Home with family support   Modified Can Scale   Modified [place] Scale 3   Barthel Index   Feeding 10   Bathing 0   Grooming Score 5   Dressing Score 10   Bladder Score 10   Bowels Score 10   Toilet Use Score 5   Transfers (Bed/Chair) Score 15   Mobility (Level Surface) Score 10   Stairs Score 0   Barthel Index Score 75   Wen Rodriguez, SPT

## 2018-02-12 NOTE — PLAN OF CARE
Problem: PHYSICAL THERAPY ADULT  Goal: Performs mobility at highest level of function for planned discharge setting  See evaluation for individualized goals  Treatment/Interventions: Functional transfer training, LE strengthening/ROM, Elevations, Therapeutic exercise, Bed mobility, Gait training          See flowsheet documentation for full assessment, interventions and recommendations  Prognosis: Good  Problem List: Decreased strength, Impaired balance, Decreased coordination, Pain  Assessment: pt is seen for high complexity PT evaluation  pt is a 42 y/o male with a history of cerebral palsy, tremor, seizure disorder, and ventriculoperitoneal shunt status who is now admitted with a headache and shunt revision  PT consulted to assess transfers, ambulation, and overall mobility  pt presents with decrease standing and ambulation balance and decrease safe ambulation  pt's mother was present during session and reports that he looks more off balance when walking  pt will continue to benefit from skilled PT to work on these problems  Recommend d/c home with family support when medically stable  Recommendation: Home with family support          See flowsheet documentation for full assessment

## 2018-02-12 NOTE — DISCHARGE INSTRUCTIONS
Ventriculoperitoneal Shunt Discharge Instructions   Monitor incisions daily  Keep incision clean and dry   May shower and wash hair 72 hours after surgery   Avoid rubbing the incision, but gently massage hair   Do not use a hair dryer, and avoid hair products such as mousse, oils, and gels  Do not brush your hair away from the incision since this will put strain on the suture line   Do not dye or perm hair until cleared by MD Chio Hutchinson Please remove dressing within 1-2 days after surgery   No soaking in tub   May walk as tolerated: 3 short walks daily   Avoid heavy lifting, pushing or pulling over 10lbs for 2 weeks   Do not drive until cleared by MD/PA   Coumadin, ASA, Plavix may be restarted once cleared by MD Chio Hutchinson If you ever require a MRI a skull X-ray must be done before and after the MRI  There may be a need to reprogram your shunt, so contact the office   Follow-up for a 2 week incision check and staple removal as scheduled  Follow-up for a 6 week post-operative visit with a repeat CT head to be completed prior to your visit  **Please contact MD if incision becomes red, swelling, and tender or has increased drainage  Or if you experience increased headaches, difficulties walking, nausea/vomiting, changes in vision, and increased drowsiness or temp>101   **

## 2018-02-12 NOTE — DISCHARGE SUMMARY
Discharge Summary - [facility] Internal Medicine    Patient Information: Rubén Clay 43 y o  male MRN: [id number]  Unit/Bed#: [facility] 710-01 Encounter: [id number]    Discharging Physician / Practitioner: Marissa Trujillo MD  PCP: Mickey Pennington DO  Admission Date: 2/6/2018  Discharge Date: 02/12/18    Reason for Admission:  Headache and back pains, tremors, anxiety, insomnia    Discharge Diagnoses:   1  Suspected distal Shunt malfunction, status post left-sided  shunt, postoperative day 4  Coiled upper epigastric intraperitoneal distal shunt tubing, Status post laparoscopy, lysis of adhesions, open laparotomy, repositioning of distal  shunt tubing  History of 7 shunt revisions  Status post  shunt tap left reservoir of Codman programmable valve system with no proximal obstruction and clear CSF  2   Seizure disorder  3   Cerebral palsy  4   Leukocytosis, likely reactive due to the 1st problem above, improved  5   Headache likely due to the shunt malfunction, resolved  6   Constipation, resolved  7   Diarrhea, resolved  8   Left-sided back pain, resolved, chronic  9   Tremor, resolved  With abnormally high valproic acid level (patient's Depakote was decreased in dose)  10   Cough    Consultations During Hospital Stay:  · Neurosurgeon  · Neurology  · General surgeon  Procedures Performed:     · Please see hospital course below and diagnosis above  Significant Findings:     · Please see hospital course below and diagnosis above  Incidental Findings:   · 4 millimeter left lower lobe pulmonary nodule  If patient is considered to be high risk for lung cancer, 12 month follow-up with noncontrast chest CT is recommended by the radiologist   This should be evaluated by the primary care physician at that time  Test Results Pending at Discharge (will require follow up):    · Follow-up results of the fungal culture of the CSF which is pending at this point but preliminary reading revealed no growth at this point  Patient's primary care physician should follow up the results  Outpatient Tests Requested:  · None  However, I am strongly recommending a repeat CBC and BMP in the outpatient  Patient's primary care physician may facilitate patient having this tests on follow-up in 1 week  Complications:  None  Hospital Course:     Timi Dalton is a 43 y o  male patient who originally presented to the hospital on 2/6/2018 due to headache, back pain, anxiety, tremors and insomnia  Patient was found to have malfunction of the  shunt  Neurosurgery was on board  Please see above procedures that they did under the discharge summary diagnosis to fix the  shunt malfunction  Patient's headache had resolved and likely patient's headache was due to the malfunction of  shunt  Patient's back pain also had resolved due to this  Patient's valproic level was found to be high and this may have caused patient's anxiety, tremors and insomnia  Patient's Depakote was decreased in dose and patient's anxiety, tremors and insomnia had resolved  Patient may continue with his Xanax on as needed basis for his anxiety  Patient still has occasional pains in his belly and patient may continue with oxycodone p r n  for severe pains  Over the weekend, patient had constipation and then a diarrheal episode  At that time, with elevated white blood cell count and mild fever, C diff PCR was done and it was negative  Patient may continue with MiraLax p r n  Esmer Hastings Patient has some cough and chest x-ray was negative for pneumonia  Thus patient may continue with Mucinex p r n     From the standpoint of both the neurosurgeon as well as the general surgeon, patient may be discharge  Patient will follow up with them in the office  Condition at Discharge: stable     Discharge Day Visit / Exam:     Subjective:    Patient is presently doing fine and a lot better    Patient had good bowel movements yesterday  No more diarrhea  No constipation  Normal bowel movements  No headaches  Patient still has occasional pains in his belly from his surgery but not as bad as before and just mild at this point  However, patient's mother ask for pain medications as sometimes patient's pain is still significant and had interfered with patient's sleep  Patient has cough but it is more of clearing his throat  No shortness of breath  No more fever  No chills  No urinary symptoms  No other complaints  Patient and patient's mother at bedside are okay with the discharge plans  I spoke to the patient's mother at bedside and gave updates and I answered all her questions and concerns  Vitals: Blood Pressure: 140/95 (02/12/18 0748)  Pulse: 92 (02/12/18 0748)  Temperature: 98 1 °F (36 7 °C) (02/12/18 0748)  Temp Source: Oral (02/12/18 0748)  Respirations: 18 (02/12/18 0748)  Height: 5' 6" (167 6 cm) (02/06/18 1700)  Weight - Scale: 84 7 kg (186 lb 11 7 oz) (02/06/18 1700)  SpO2: 95 % (02/12/18 0748)  Exam:   Physical Exam   Constitutional: He is oriented to person, place, and time  No distress  HENT:   Head: Normocephalic and atraumatic  Eyes: Right eye exhibits no discharge  Left eye exhibits no discharge  No scleral icterus  Neck: No JVD present  No tracheal deviation present  Cardiovascular: Normal rate, regular rhythm and normal heart sounds  Exam reveals no gallop and no friction rub  No murmur heard  Pulmonary/Chest: Effort normal and breath sounds normal  No stridor  No respiratory distress  He has no wheezes  He has no rales  Abdominal: Soft  Bowel sounds are normal  He exhibits no distension  There is no tenderness  There is no rebound and no guarding  Musculoskeletal: He exhibits no edema, tenderness or deformity  Neurological: He is alert and oriented to person, place, and time  No cranial nerve deficit  Patient's coherent  Skin: Skin is warm  No rash noted   He is not diaphoretic  No erythema  No pallor  Psychiatric: He has a normal mood and affect  His behavior is normal  Thought content normal    Vitals reviewed  Discharge instructions/Information to patient and family:   See after visit summary for information provided to patient and family  Provisions for Follow-Up Care:  See after visit summary for information related to follow-up care and any pertinent home health orders  Disposition: Home    Planned Readmission:  None  Discharge Statement:  I spent 45 minutes discharging the patient  This time was spent on the day of discharge  I had direct contact with the patient on the day of discharge  Greater than 50% of the total time was spent examining patient, answering all patient questions, arranging and discussing plan of care with patient as well as directly providing post-discharge instructions  Additional time then spent on discharge activities  Discharge Medications:  See after visit summary for reconciled discharge medications provided to patient and family  ** Please Note: Dragon 360 Dictation voice to text software may have been used in the creation of this document   **

## 2018-02-12 NOTE — PLAN OF CARE
Problem: OCCUPATIONAL THERAPY ADULT  Goal: Performs self-care activities at highest level of function for planned discharge setting  See evaluation for individualized goals  Treatment Interventions: ADL retraining, Functional transfer training, Endurance training, Cognitive reorientation, Patient/family training, Equipment evaluation/education, Compensatory technique education, Continued evaluation, Energy conservation, Activityengagement          See flowsheet documentation for full assessment, interventions and recommendations  Limitation: Decreased ADL status, Decreased Safe judgement during ADL, Decreased cognition, Decreased high-level ADLs, Decreased self-care trans, Decreased fine motor control (BALANCE, MEDICAL STATUS)  Prognosis: Good  Assessment: PT IS A 44 YO M ADMIT W/ C/O TREMORS, ANXIETY, INSOMNIA, HA, BACK PAIN AND INABILITY TO EXPRESS THOUGHTS  PT CURRENTLY UNDERGOING W/U FOR OBSTRUCTED  SHUNT AND UE TREMORS  PT IS NOW S/P DIAGNOSTIC LAPAROSCOPY, ALEXANDER, OPEN LAPAROTOMY AND REPOSITIONING OF  SHUNT ON 2/8  PT W/ PMH SIGNIFICANT FOR CEREBRAL PALSY, CONGENITAL HYDROCEPHALUS, SEIZURE D/O, MIGRAINE, ASTHMA,  SHUNT  PT IS FROM HOME W/ FAMILY AND W/ BASELINE INDEPENDENCE IN ADLS WHILE FAMILY ASSISTS W/ IADLS  PT W/ NO DME USE AT BASELINE AND NO RECENT H/O FALLS  CURRENTLY PT REQUIRING MIN A UB ADLS, MIN/MOD A LB ADLS, SBA BED MOBILITY, SBA FUNCTIONAL TRANSFERS AND MIN A AMBULATION USING IV POLE FOR SUPPORT  PT APPEARS LIMITED AT THIST SONU 2* IMPAIRED BALANCE, ACTIVITY TOLERANCE, MEDICAL STATUS, ADL IMPAIRMENTS, GENERALIZED WEAKNESS/DECONDITIONING, PAIN, B/L UE TREMORS AS WELL AS IMPAIRED SAFETY AND INSIGHT  OT ANTICIPATES EVENTUAL D/C HOME W/ FAMILY SUPPORT PENDING FURTHER PROGRESS AND MEDICAL STABILITY  WILL CONTINUE TO FOLLOW PT 3-5X/WEEK IN ORDER TO MEET THE BELOW DESCRIBED GOALS IN 7-10 DAYS        OT Discharge Recommendation: Home with family support  OT - OK to Discharge:  (PENDING PROGRESS/MEDICAL STABILITY   )

## 2018-02-12 NOTE — RESTORATIVE TECHNICIAN NOTE
Restorative Specialist Mobility Note       Activity: Ambulate in elena, Ambulate in room, Bathroom privileges, Chair, Stand at bedside (Educated/encouraged pt to ambulate w/assistance 3-4 x's/day   Pt callbell, phone/tray within reach )     Assistive Device: None          Miguel A BLOUNT, Restorative Technician, [employer] [facility]

## 2018-02-12 NOTE — PROGRESS NOTES
Progress Note - General Surgery   Josefa Francisco 43 y o  male MRN: [id number]  Unit/Bed#: [facility] 710-01 Encounter: [id number]    Assessment:  43 y o  M w/ h/o congenital hydrocephalus w/ malfunctioning  shunt s/p repositioning 2/9    Tolerating diet  Passing flatus and BM  Abd soft, however  at incisions, this is improving  Neuro stable  Having URI symptoms this AM    Plan:  - diet as tolerated  - prn pain control w/oxy and toradol  - General surgery to see as needed  - Follow up in 2 weeks  - Call w/ questions      Subjective/Objective     Subjective: GE, tolerating diet, passing flatus, pain improving    Objective:     Blood pressure 121/62, pulse 96, temperature 98 3 °F (36 8 °C), temperature source Oral, resp  rate 18, height 5' 6" (1 676 m), weight 84 7 kg (186 lb 11 7 oz), SpO2 94 %  ,Body mass index is 30 14 kg/m²        Intake/Output Summary (Last 24 hours) at 02/12/18 0602  Last data filed at 02/12/18 0201   Gross per 24 hour   Intake             3310 ml   Output                0 ml   Net             3310 ml       Invasive Devices     Peripheral Intravenous Line            Peripheral IV 02/10/18 Left Antecubital 1 day                Physical Exam:   NAD  CV RRR  Pulm CTA  Abd soft, tender at incisions, nondistended    Lab, Imaging and other studies:CBC:   No results found for: WBC, HGB, HCT, MCV, PLT, ADJUSTEDWBC, MCH, MCHC, RDW, MPV, NRBC, CMP:   No results found for: NA, K, CL, CO2, ANIONGAP, BUN, CREATININE, GLUCOSE, CALCIUM, AST, ALT, ALKPHOS, PROT, ALBUMIN, BILITOT, EGFR, Coagulation: No results found for: PT, INR, APTT  VTE Pharmacologic Prophylaxis: Heparin  VTE Mechanical Prophylaxis: sequential compression device

## 2018-02-14 ENCOUNTER — TRANSITIONAL CARE MANAGEMENT (OUTPATIENT)
Dept: FAMILY MEDICINE CLINIC | Facility: CLINIC | Age: 43
End: 2018-02-14

## 2018-02-20 ENCOUNTER — OFFICE VISIT (OUTPATIENT)
Dept: NEUROSURGERY | Facility: CLINIC | Age: 43
End: 2018-02-20

## 2018-02-20 VITALS
DIASTOLIC BLOOD PRESSURE: 80 MMHG | SYSTOLIC BLOOD PRESSURE: 130 MMHG | HEIGHT: 66 IN | WEIGHT: 190.4 LBS | TEMPERATURE: 97.5 F | BODY MASS INDEX: 30.6 KG/M2

## 2018-02-20 DIAGNOSIS — Z48.89 AFTERCARE FOLLOWING SURGERY: Primary | ICD-10-CM

## 2018-02-20 PROCEDURE — 99024 POSTOP FOLLOW-UP VISIT: CPT | Performed by: PHYSICIAN ASSISTANT

## 2018-02-20 NOTE — LETTER
February 20, 2018     [facility], DO  [address] [address]    Patient: Timi Dalton   YOB: 1975   Date of Visit: 2/20/2018       Dear Dr Conner Whitewater:    Thank you for referring Timi Dalton to me for evaluation  Below are my notes for this consultation  If you have questions, please do not hesitate to call me  I look forward to following your patient along with you  Sincerely,        Natividad Atkinson PA-C        CC: MD Natividad Sanchez PA-C  2/20/2018  8:05 PM  Sign at close encounter  Assessment/Plan:    Aftercare following surgery  -     CT head without contrast; Future  -     XR shunt series; Future        Discussion / Summary: This is a 43 y o  male who  presents for routine 2 week post-op visit s/p laparoscopy, lysis of adhesions, open lapaortomy, repositioning of distal  shunt tubing on 2/8/18  (Dr Vijay Bangura / Dr Nay Patrick)  Patient is doing well post-op  His three surgical incisions on his abdomen are healing well and open to air  Patient may shower with gentle shampoo and soap / water  Patient is to pat incisions dry after showering  Patient is to refrain from using hair dryer  No submerging incisions until re-evaluated at post-op visit with neurosurgeon  Patient is to refrain from placing oils, ointments, lotions on incisions  Patient may gradually increase walking as tolerate and is encouraged to take several short walks in safe environment during course of day  Recommend patient take fall precautions and refrain from activity that increases chance of fall or injury to head  Patient's mother reports that patient does not drive at baseline  Patient would like to resume working  He works at [employer]  Patient reports he feels ready to resume working  From a neurosurgical standpoint patient may resume working as tolerated as of next week  Discussed this with Dr Rob Roberson    His mother will check with his employer to inquire if he needs a return to work note and patient's mother will notify our office if employer requesting a return to work note  Patient is to follow up as already scheduled for 3/22/18 with Dr Tracy Ghosh  for continued routine post-op follow up  Patient is to have CT head and shunt series x-ray completed a few days prior to post-op visit with the neurosurgeon -- studies ordered  Patient advised to call our office or present to Emergency room if experience redness, swelling, drainage, gapping/opening of incision or if develop fevers (101  F  or higher), chills, increased pain / headache, mental status change, seizure,  nausea, vomiting, speech / vision change, motor or sensory change, or other neurological changes  Patient and his mother expressed understanding and agreement     ____________________________________________________________________________________    Subjective:     Patient ID: Kathy Yoder is a 43 y o  male who  presents for routine 2 week post-op visit s/p laparoscopy, lysis of adhesions, open lapaortomy, repositioning of distal  shunt tubing on 2/8/18  (Dr Quin Davalos / Dr Juan Ibarra)  Patient is accompanied with his mother  HPI  In review, patient has past medical history including cerebral palsy, seizure disorder, and asthma  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  He had developmental delay and diagnosed with hydrocephalus requiring VPS placement with multiple subsequent revisions over the years  On 10/23/17 (Dr Tracy Ghosh) patient underwent left  shunt revision for disconnect of  shunt  Patient presented to ER 2/6/18 with headache, photophobia, slowed though process  He underwent work up and a distal shunt dysfunction was suspected  He underwent  laparoscopy, lysis of adhesions, open lapaortomy, repositioning of distal  shunt tubing on 2/8/18  (Dr Quin Davalos / Dr Juan Ibarra)    Patient's headache improved  He was discharged from hospital 2/12/18  Patient presents for routine post-op follow up  He reports some itching of incisions attributed to healing  He denies incision difficulty  He denies fevers or chills  He denies headaches or dizziness  He denies nausea or vomiting  He reports the previous abdominal bloating feeling has resolved  He denies abdominal pain  Patient / mother report he has been eating a regular diet although his mother reports he does not eat as much as he did in past     He denies seizure in interval since hospital discharge  He has routine follow up with Dr Shaggy Ramirez (Neuro) at the beginning of April 2018  He denies any vision change  Reports his left eye will sometimes deviate laterally although patient and mother reports this is long standing since childhood  He denies memory or cognitive difficulty or change  He denies sensory disturbances  He denies weakness  Patient reports ambulating independent and denies gait dysfunction  He denies bladder or bowel dysfunction   Patient would like to resume working ([employer])  The following portions of the patient's history were reviewed and updated as appropriate: allergies, current medications, past family history, past medical history, past social history and past surgical history  Review of Systems   Constitutional: Negative for chills and fever  HENT: Negative  Eyes:        See HPI   Respiratory: Negative  Cardiovascular: Negative  Gastrointestinal: Negative for abdominal pain, nausea and vomiting  Genitourinary: Negative for difficulty urinating  Musculoskeletal: Negative  Neurological: Negative for dizziness, weakness and headaches  Psychiatric/Behavioral: Negative for agitation           Objective:      /80 (BP Location: Left arm, Patient Position: Sitting, Cuff Size: Standard)   Temp 97 5 °F (36 4 °C) (Tympanic)   Ht 5' 6" (1 676 m)   Wt 86 4 kg (190 lb 6 4 oz)   BMI 30 73 kg/m²           Physical Exam   Constitutional: He appears well-developed and well-nourished  No distress  HENT:   Mature scar left posterior aspect of head   Eyes: EOM are normal  Pupils are equal, round, and reactive to light  Pulmonary/Chest: Effort normal    Abdominal: Soft  Bowel sounds are normal  He exhibits no distension  There is no tenderness  There is no guarding  Neurological: He has a normal Finger-Nose-Finger Test  Gait normal    Skin: Skin is warm and dry  Abdominal incisions x 3 are clean, dry, and intact and open to air  No erythema  No ecchymosis  No dehiscence  No drainage  No swelling   No induration  Psychiatric: He has a normal mood and affect  His speech is normal        Neurologic Exam     Mental Status   Attention: normal    Speech: speech is normal   Level of consciousness: alert      Alert, oriented 3  GCS15  Briskly follows commands  Id's pen, the ink , and function to write correct  Id's colors x 3 correct  Names [place] cities in [place] correct - mom prompted with one  Counts fingers correct  Shows correct number fingers both hands  Names [place] presidents x 3 in descending order correct      Cranial Nerves     CN III, IV, VI   Pupils are equal, round, and reactive to light  Extraocular motions are normal    Nystagmus: none   Upgaze: normal    CN V   Facial sensation intact  CN VII   Facial expression full, symmetric       CN VIII   Hearing: intact    CN IX, X   Palate: symmetric    CN XI   Right sternocleidomastoid strength: normal  Left sternocleidomastoid strength: normal  Right trapezius strength: normal  Left trapezius strength: normal    CN XII   Tongue: not atrophic  Fasciculations: absent  Tongue deviation: none    Motor Exam     Strength   Right deltoid: 5/5  Left deltoid: 5/5  Right biceps: 5/5  Left biceps: 5/5  Right triceps: 5/5  Left triceps: 5/5  Right wrist flexion: 5/5  Left wrist flexion: 5/5  Right wrist extension: 5/5  Left wrist extension: 5/5  Right interossei: 5/5  Left interossei: 5/5  Right iliopsoas: 5/5  Left iliopsoas: 5/5  Right quadriceps: 5/5  Left quadriceps: 5/5  Right hamstrin/5  Left hamstrin/5  Right anterior tibial: 5/5  Left anterior tibial: 5/5  Right gastroc: 5/5  Left gastroc: 5/5    Sensory Exam   Light touch normal      Gait, Coordination, and Reflexes     Gait  Gait: normal (Independent)    Coordination   Finger to nose coordination: normal    Tremor   Resting tremor: absent  No pronator drift  BYRON fingers intact bilaterally

## 2018-02-20 NOTE — LETTER
February 20, 2018     Jaylen Castillo, DO  [address] [place] [address]    Patient: Rose Davis   YOB: 1975   Date of Visit: 2/20/2018       Dear Dr Jose Pedor:    Thank you for referring Rose Davis to me for evaluation  Below are my notes for this consultation  If you have questions, please do not hesitate to call me  I look forward to following your patient along with you  Sincerely,        Becky Strong PA-C        CC: MD Becky Clement PA-C  2/20/2018  8:03 PM  Sign at close encounter  Assessment/Plan:    Aftercare following surgery  -     CT head without contrast; Future  -     XR shunt series; Future        Discussion / Summary: This is a 43 y o  male who  presents for routine 2 week post-op visit s/p laparoscopy, lysis of adhesions, open lapaortomy, repositioning of distal  shunt tubing on 2/8/18  (Dr Bharath Moya / Dr George Mills)  Patient is doing well post-op  His three surgical incisions on his abdomen are healing well and open to air  Patient may shower with gentle shampoo and soap / water  Patient is to pat incisions dry after showering  Patient is to refrain from using hair dryer  No submerging incisions until re-evaluated at post-op visit with neurosurgeon  Patient is to refrain from placing oils, ointments, lotions on incisions  Patient may gradually increase walking as tolerate and is encouraged to take several short walks in safe environment during course of day  Recommend patient take fall precautions and refrain from activity that increases chance of fall or injury to head  Patient's mother reports that patient does not drive at baseline  Patient would like to resume working  He works at [employer]  Patient reports he feels ready to resume working  From a neurosurgical standpoint patient may resume working as tolerated as of next week  Discussed this with Dr Jered Parikh    His mother will check with his employer to inquire if he needs a return to work note and patient's mother will notify our office if employer requesting a return to work note  Patient is to follow up on  3/22/18 with Dr Donald Tomas  for continued routine post-op follow up  Patient is to have CT head and shunt series x-ray completed a few days prior to post-op visit with the neurosurgeon -- studies ordered  Patient advised to call our office or present to Emergency room if experience redness, swelling, drainage, gapping/opening of incision or if develop fevers (101  F  or higher), chills, increased pain / headache, mental status change, seizure,  nausea, vomiting, speech / vision change, motor or sensory change, or other neurological changes  Patient and his mother expressed understanding and agreement     ____________________________________________________________________________________    Subjective:     Patient ID: Myles Humphreys is a 43 y o  male who  presents for routine 2 week post-op visit s/p laparoscopy, lysis of adhesions, open lapaortomy, repositioning of distal  shunt tubing on 2/8/18  (Dr iKm Montes / Dr Lenin Albarado)  Patient is accompanied with his mother  HPI  In review, patient has past medical history including cerebral palsy, seizure disorder, and asthma  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  He had developmental delay and diagnosed with hydrocephalus requiring VPS placement with multiple subsequent revisions over the years  On 10/23/17 (Dr Donald Tomas) patient underwent left  shunt revision for disconnect of  shunt  Patient presented to ER 2/6/18 with headache, photophobia, slowed though process  He underwent work up and a distal shunt dysfunction was suspected  He underwent  laparoscopy, lysis of adhesions, open lapaortomy, repositioning of distal  shunt tubing on 2/8/18  (Dr Kim Montes / Dr Lenin Albarado)  Patient's headache improved    He was discharged from hospital 2/12/18  Patient presents for routine post-op follow up  He reports some itching of incisions attributed to healing  He denies incision difficulty  He denies fevers or chills  He denies headaches or dizziness  He denies nausea or vomiting  He reports the previous abdominal bloating feeling has resolved  He denies abdominal pain  Patient / mother report he has been eating a regular diet although his mother reports he does not eat as much as he did in past     He denies seizure in interval since hospital discharge  He has routine follow up with Dr Debra Antonio (Neuro) at the beginning of April 2018  He denies any vision change  Reports his left eye will sometimes deviate laterally although patient and mother reports this is long standing since childhood  He denies memory or cognitive difficulty or change  He denies sensory disturbances  He denies weakness  Patient reports ambulating independent and denies gait dysfunction  He denies bladder or bowel dysfunction   Patient would like to resume working ([employer] [place])  The following portions of the patient's history were reviewed and updated as appropriate: allergies, current medications, past family history, past medical history, past social history and past surgical history  Review of Systems   Constitutional: Negative for chills and fever  HENT: Negative  Eyes:        See HPI   Respiratory: Negative  Cardiovascular: Negative  Gastrointestinal: Negative for abdominal pain, nausea and vomiting  Genitourinary: Negative for difficulty urinating  Musculoskeletal: Negative  Neurological: Negative for dizziness, weakness and headaches  Psychiatric/Behavioral: Negative for agitation           Objective:      /80 (BP Location: Left arm, Patient Position: Sitting, Cuff Size: Standard)   Temp 97 5 °F (36 4 °C) (Tympanic)   Ht 5' 6" (1 676 m)   Wt 86 4 kg (190 lb 6 4 oz)   BMI 30 73 kg/m² Physical Exam   Constitutional: He appears well-developed and well-nourished  No distress  HENT:   Mature scar left posterior aspect of head   Eyes: EOM are normal  Pupils are equal, round, and reactive to light  Pulmonary/Chest: Effort normal    Abdominal: Soft  Bowel sounds are normal  He exhibits no distension  There is no tenderness  There is no guarding  Neurological: He has a normal Finger-Nose-Finger Test  Gait normal    Skin: Skin is warm and dry  Abdominal incisions x 3 are clean, dry, and intact and open to air  No erythema  No ecchymosis  No dehiscence  No drainage  No swelling   No induration  Psychiatric: He has a normal mood and affect  His speech is normal        Neurologic Exam     Mental Status   Attention: normal    Speech: speech is normal   Level of consciousness: alert      Alert, oriented 3  GCS15  Briskly follows commands  Id's pen, the ink , and function to write correct  Id's colors x 3 correct  Names 3 cities in [place] correct - mom prompted with one  Counts fingers correct  Shows correct number fingers both hands  Names [place] presidents x 3 in descending order correct      Cranial Nerves     CN III, IV, VI   Pupils are equal, round, and reactive to light  Extraocular motions are normal    Nystagmus: none   Upgaze: normal    CN V   Facial sensation intact  CN VII   Facial expression full, symmetric       CN VIII   Hearing: intact    CN IX, X   Palate: symmetric    CN XI   Right sternocleidomastoid strength: normal  Left sternocleidomastoid strength: normal  Right trapezius strength: normal  Left trapezius strength: normal    CN XII   Tongue: not atrophic  Fasciculations: absent  Tongue deviation: none    Motor Exam     Strength   Right deltoid: 5/5  Left deltoid: 5/5  Right biceps: 5/5  Left biceps: 5/5  Right triceps: 5/5  Left triceps: 5/5  Right wrist flexion: 5/5  Left wrist flexion: 5/5  Right wrist extension: 5/5  Left wrist extension: 5/5  Right interossei: 5/5  Left interossei: 5/5  Right iliopsoas: 5/5  Left iliopsoas: 5/5  Right quadriceps: 5/5  Left quadriceps: 5/5  Right hamstrin/5  Left hamstrin/5  Right anterior tibial: 5/5  Left anterior tibial: 5/5  Right gastroc: 5/5  Left gastroc: 5/5    Sensory Exam   Light touch normal      Gait, Coordination, and Reflexes     Gait  Gait: normal (Independent)    Coordination   Finger to nose coordination: normal    Tremor   Resting tremor: absent  No pronator drift  BYRON fingers intact bilaterally

## 2018-02-20 NOTE — PATIENT INSTRUCTIONS
Patient may shower with gentle shampoo and soap / water  Patient is to pat incisions dry after showering  Patient is to refrain from using hair dryer  No submerging incision until re-evaluated at post-op visit with neurosurgeon  Patient is to refrain from placing oils, ointments, lotions on incision  Patient may gradually increase walking as tolerate and are encouraged to take several short walks in safe environment during course of day  Recommend patient take fall precautions and refrain from activity that increases chance of fall or injury to head  Patient may resume working next week at tolerated  Patient is to follow up on  3/22/18 with Dr Emre Salomon  for continued routine post-op follow up  Patient is to have CT head and shunt series x-ray completed a few days prior to post-op visit with the neurosurgeon -- study ordered  Patient is to call our office or present to Emergency room if experience redness, swelling, drainage, gapping/opening of incision or if develop fevers (101  F  or higher), chills, increased pain / headache, mental status change, seizure,  nausea, vomiting, speech / vision change, motor or sensory change, or other neurological changes

## 2018-02-20 NOTE — PROGRESS NOTES
Assessment/Plan:    Aftercare following surgery  -     CT head without contrast; Future  -     XR shunt series; Future        Discussion / Summary: This is a 43 y o  male who  presents for routine 2 week post-op visit s/p laparoscopy, lysis of adhesions, open lapaortomy, repositioning of distal  shunt tubing on 2/8/18  (Dr Rebecca Marinelli / Dr Adiel Cho)  Patient is doing well post-op  His three surgical incisions on his abdomen are healing well and open to air  Patient may shower with gentle shampoo and soap / water  Patient is to pat incisions dry after showering  Patient is to refrain from using hair dryer  No submerging incisions until re-evaluated at post-op visit with neurosurgeon  Patient is to refrain from placing oils, ointments, lotions on incisions  Patient may gradually increase walking as tolerate and is encouraged to take several short walks in safe environment during course of day  Recommend patient take fall precautions and refrain from activity that increases chance of fall or injury to head  Patient's mother reports that patient does not drive at baseline  Patient would like to resume working  He works at [employer]  Patient reports he feels ready to resume working  From a neurosurgical standpoint patient may resume working as tolerated as of next week  Discussed this with Dr Emre Salomon  His mother will check with his employer to inquire if he needs a return to work note and patient's mother will notify our office if employer requesting a return to work note  Patient is to follow up as already scheduled for 3/22/18 with Dr Emre Salomon  for continued routine post-op follow up  Patient is to have CT head and shunt series x-ray completed a few days prior to post-op visit with the neurosurgeon -- studies ordered       Patient advised to call our office or present to Emergency room if experience redness, swelling, drainage, gapping/opening of incision or if develop fevers (101  F  or higher), chills, increased pain / headache, mental status change, seizure,  nausea, vomiting, speech / vision change, motor or sensory change, or other neurological changes  Patient and his mother expressed understanding and agreement     ____________________________________________________________________________________    Subjective:     Patient ID: Portillo Zaragoza is a 43 y o  male who  presents for routine 2 week post-op visit s/p laparoscopy, lysis of adhesions, open lapaortomy, repositioning of distal  shunt tubing on 2/8/18  (Dr Yuridia Escobar / Dr Naty Galicia)  Patient is accompanied with his mother  HPI  In review, patient has past medical history including cerebral palsy, seizure disorder, and asthma  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  He had developmental delay and diagnosed with hydrocephalus requiring VPS placement with multiple subsequent revisions over the years  On 10/23/17 (Dr Zehra Dobson) patient underwent left  shunt revision for disconnect of  shunt  Patient presented to ER 2/6/18 with headache, photophobia, slowed though process  He underwent work up and a distal shunt dysfunction was suspected  He underwent  laparoscopy, lysis of adhesions, open lapaortomy, repositioning of distal  shunt tubing on 2/8/18  (Dr Yuridia Escobar / Dr Naty Galicia)  Patient's headache improved  He was discharged from hospital 2/12/18  Patient presents for routine post-op follow up  He reports some itching of incisions attributed to healing  He denies incision difficulty  He denies fevers or chills  He denies headaches or dizziness  He denies nausea or vomiting  He reports the previous abdominal bloating feeling has resolved  He denies abdominal pain    Patient / mother report he has been eating a regular diet although his mother reports he does not eat as much as he did in past     He denies seizure in interval since hospital discharge  He has routine follow up with Dr Joe Lopes (Neuro) at the beginning of April 2018  He denies any vision change  Reports his left eye will sometimes deviate laterally although patient and mother reports this is long standing since childhood  He denies memory or cognitive difficulty or change  He denies sensory disturbances  He denies weakness  Patient reports ambulating independent and denies gait dysfunction  He denies bladder or bowel dysfunction   Patient would like to resume working (russ[employer] at [employer])  The following portions of the patient's history were reviewed and updated as appropriate: allergies, current medications, past family history, past medical history, past social history and past surgical history  Review of Systems   Constitutional: Negative for chills and fever  HENT: Negative  Eyes:        See HPI   Respiratory: Negative  Cardiovascular: Negative  Gastrointestinal: Negative for abdominal pain, nausea and vomiting  Genitourinary: Negative for difficulty urinating  Musculoskeletal: Negative  Neurological: Negative for dizziness, weakness and headaches  Psychiatric/Behavioral: Negative for agitation  Objective:      /80 (BP Location: Left arm, Patient Position: Sitting, Cuff Size: Standard)   Temp 97 5 °F (36 4 °C) (Tympanic)   Ht 5' 6" (1 676 m)   Wt 86 4 kg (190 lb 6 4 oz)   BMI 30 73 kg/m²          Physical Exam   Constitutional: He appears well-developed and well-nourished  No distress  HENT:   Mature scar left posterior aspect of head   Eyes: EOM are normal  Pupils are equal, round, and reactive to light  Pulmonary/Chest: Effort normal    Abdominal: Soft  Bowel sounds are normal  He exhibits no distension  There is no tenderness  There is no guarding  Neurological: He has a normal Finger-Nose-Finger Test  Gait normal    Skin: Skin is warm and dry  Abdominal incisions x 3 are clean, dry, and intact and open to air      No erythema  No ecchymosis  No dehiscence  No drainage  No swelling   No induration  Psychiatric: He has a normal mood and affect  His speech is normal        Neurologic Exam     Mental Status   Attention: normal    Speech: speech is normal   Level of consciousness: alert      Alert, oriented 3  GCS15  Briskly follows commands  Id's pen, the ink , and function to write correct  Id's colors x 3 correct  Names 3 cities in [place] correct - mom prompted with one  Counts fingers correct  Shows correct number fingers both hands  Names [place] presidents x 3 in descending order correct      Cranial Nerves     CN III, IV, VI   Pupils are equal, round, and reactive to light  Extraocular motions are normal    Nystagmus: none   Upgaze: normal    CN V   Facial sensation intact  CN VII   Facial expression full, symmetric  CN VIII   Hearing: intact    CN IX, X   Palate: symmetric    CN XI   Right sternocleidomastoid strength: normal  Left sternocleidomastoid strength: normal  Right trapezius strength: normal  Left trapezius strength: normal    CN XII   Tongue: not atrophic  Fasciculations: absent  Tongue deviation: none    Motor Exam     Strength   Right deltoid: 5/5  Left deltoid: 5/5  Right biceps: 5/5  Left biceps: 5/5  Right triceps: 5/5  Left triceps: 5/5  Right wrist flexion: 5/5  Left wrist flexion: 5/5  Right wrist extension: 5/5  Left wrist extension: 5/5  Right interossei: 5/5  Left interossei: 5/5  Right iliopsoas: 5/5  Left iliopsoas: 5/5  Right quadriceps: 5/5  Left quadriceps: 5/5  Right hamstrin/5  Left hamstrin/5  Right anterior tibial: 5/5  Left anterior tibial: 5/5  Right gastroc: 5/5  Left gastroc: 5/5    Sensory Exam   Light touch normal      Gait, Coordination, and Reflexes     Gait  Gait: normal (Independent)    Coordination   Finger to nose coordination: normal    Tremor   Resting tremor: absent  No pronator drift  BYRON fingers intact bilaterally

## 2018-02-22 ENCOUNTER — OFFICE VISIT (OUTPATIENT)
Dept: FAMILY MEDICINE CLINIC | Facility: CLINIC | Age: 43
End: 2018-02-22
Payer: MEDICARE

## 2018-02-22 VITALS
DIASTOLIC BLOOD PRESSURE: 70 MMHG | HEART RATE: 80 BPM | SYSTOLIC BLOOD PRESSURE: 112 MMHG | WEIGHT: 183.6 LBS | BODY MASS INDEX: 29.51 KG/M2 | HEIGHT: 66 IN | RESPIRATION RATE: 16 BRPM | TEMPERATURE: 96.8 F

## 2018-02-22 DIAGNOSIS — Z98.2 S/P VP SHUNT: ICD-10-CM

## 2018-02-22 DIAGNOSIS — G80.1 SPASTIC DIPLEGIC CEREBRAL PALSY (HCC): Chronic | ICD-10-CM

## 2018-02-22 DIAGNOSIS — G40.909 SEIZURE DISORDER (HCC): ICD-10-CM

## 2018-02-22 DIAGNOSIS — IMO0001 TRANSITION OF CARE PERFORMED WITH SHARING OF CLINICAL SUMMARY: Primary | ICD-10-CM

## 2018-02-22 DIAGNOSIS — Z98.2 VP (VENTRICULOPERITONEAL) SHUNT STATUS: ICD-10-CM

## 2018-02-22 PROBLEM — Z78.9 TRANSITION OF CARE PERFORMED WITH SHARING OF CLINICAL SUMMARY: Status: ACTIVE | Noted: 2018-02-22

## 2018-02-22 PROCEDURE — 99496 TRANSJ CARE MGMT HIGH F2F 7D: CPT | Performed by: INTERNAL MEDICINE

## 2018-02-22 NOTE — PROGRESS NOTES
Assessment/Plan: Shannan Amezcua is a 43 y o  male with:   Problem List Items Addressed This Visit     None        Date and time hospital follow up call was made:  2/14/2018  8:46 AM  Hospital care reviewed:  Records reviewed  Patient was hopsitalized at:  [facility]  Date of admission:  2/6/18  Date of discharge:  2/12/18  Were the patients medicaitons reviewed and updated:  Yes  Current symptoms:  Middle abdominal pain, Fatigue  Middle abdominal pain severity:  Mild  Middle abdominal pain onset:  Unable to tell  Fatigue severity:  Mild  Post hospital issues:  None  Should patient be enrolled in anticoag monitoring?:  No  Scheduled for follow up?:  Yes  Patients specialists:  Neurologist, Other (comment)  Neurologist's Name:  [facility]  Other specialists Name:  Dr Casandra West  Did you obtain your prescribed medications:  Yes  Do you need help managing your perscriptions or medications:  No  Is transportation to your appointments needed:  No  I have advised the patient to call PCP with any new or worsening symptoms (please type in name along with any credentials):  Kvng Pan LPN  Living Arrangements:  Parents  Support System:  Parent  The type of support provided:  Emotional, Physical  Do you have social support:  Yes, as much as I need  Are you recieving outpatient services:  No  Are you recieving home care services:  No  Are you using any community resources:  No  Current waiver service:  No  Have you fallen in the last 12 months:  No  Interperter language line required?:  No  Counseling:  Family  Counseling topics:  instructions for management, Importance of RX compliance       Subjective:   Shannan Amezcua is a 43 y o  male who presents today with a chief complaint of Transition of Care Management    Patient here with his mother for tcm follow up after hospitalization; He was getting more headaches; He has hx of  shunt;   He had more tremors - went to get depakote level  In er- had shunt series- found to have blockage of  shunt- he was admitted due to headaches; underwent laporoscopy for replacement of shunt;   management was complicated by scarring; he had shunt removed and it was replaced and placed in RUQ;  His bp elevated and had some issues with pain; Had ascites; He was seen by neurosurgery as well as general surgery; he had minimal post op pain and took pain meds 1 x post op at home;  /        Review of Systems   Constitutional: Positive for appetite change (was not eating well during hospitalizztion) and unexpected weight change  Negative for activity change, chills, diaphoresis, fatigue and fever  HENT: Negative  Negative for congestion, dental problem (lost 12 lbs in hospital), ear discharge, ear pain, facial swelling, hearing loss, mouth sores, postnasal drip, rhinorrhea, sinus pain, sinus pressure, sneezing, sore throat, tinnitus, trouble swallowing and voice change  Eyes: Negative  Respiratory: Negative  Negative for apnea, cough, choking and chest tightness  Cardiovascular: Negative  Gastrointestinal: Negative  Negative for abdominal distention, abdominal pain, anal bleeding, blood in stool, constipation and diarrhea  Endocrine: Negative  Genitourinary: Negative  Negative for difficulty urinating, dysuria, enuresis, flank pain and frequency  Musculoskeletal: Negative  Skin: Negative  Allergic/Immunologic: Negative  Negative for environmental allergies  Neurological: Negative  Negative for dizziness, facial asymmetry, light-headedness and headaches  Hematological: Negative  Psychiatric/Behavioral: Negative        Allergies   Allergen Reactions    Latex Hives    Other      Cats, seasonal    Pollen Extract      Active Ambulatory Problems     Diagnosis Date Noted    Cerebral palsy ([facility] ) 10/04/2017    Tremor 10/20/2017     (ventriculoperitoneal) shunt status 10/20/2017    Seizure disorder ([facility] ) 10/20/2017    S/P  shunt 10/23/2017    Headache 02/06/2018    Shunt malfunction 02/06/2018    Left-sided back pain 02/08/2018    Leukocytosis 02/09/2018    Constipation 02/10/2018     Resolved Ambulatory Problems     Diagnosis Date Noted    Intractable headache 10/04/2017    Migraine headache 10/04/2017     Past Medical History:   Diagnosis Date    Asthma     [facility]     Congenital hydrocephalus ([facility]     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[facility] )     Migraines     Seizures ([facility] )      I have reviewed the patient's PMH, Social History, Medication List and Allergies  Objective:  /70 (BP Location: Right arm, Patient Position: Sitting)   Pulse 80   Temp (!) 96 8 °F (36 °C) (Tympanic)   Resp 16   Ht 5' 6" (1 676 m)   Wt 83 3 kg (183 lb 9 6 oz)   BMI 29 63 kg/m²   Physical Exam   Constitutional: He appears well-developed and well-nourished  Developmental delay- no changes  Left eye deviated outward;    HENT:   Head: Normocephalic and atraumatic  Right Ear: External ear normal    Left Ear: External ear normal    Nose: Nose normal    Mouth/Throat: No oropharyngeal exudate  Eyes: EOM are normal  Pupils are equal, round, and reactive to light  Right eye exhibits no discharge  No scleral icterus  Neck: Normal range of motion  Neck supple  No JVD present  No tracheal deviation present  No thyromegaly present  Cardiovascular: Normal rate, regular rhythm and normal heart sounds  Pulmonary/Chest: Effort normal and breath sounds normal  No respiratory distress  He has no wheezes  He has no rales  He exhibits no tenderness  Abdominal: Soft  Bowel sounds are normal  He exhibits no distension  There is no tenderness  There is no rebound  Healing cicatrix- no inflammation at site of wound  Bruising centrally; Healing wounds    Musculoskeletal: Normal range of motion  He exhibits no edema or tenderness  Shunt palpated left side of neck   Lymphadenopathy:     He has no cervical adenopathy     Neurological: He is alert  He has normal reflexes  No cranial nerve deficit  He exhibits normal muscle tone  Skin: Skin is warm and dry  Psychiatric: He has a normal mood and affect  His behavior is normal  Judgment and thought content normal    Nursing note and vitals reviewed      Past Surgical History:   Procedure Laterality Date    BRAIN SURGERY      Multiple  shunt revisions    EYE SURGERY  12/23/2015    HERNIA REPAIR  12/23/2015    ME CREATE SHUNT:VENTRIC-PERITONEAL Left 10/23/2017    Procedure: SHUNT VENTRICULAR-PERITONEAL revision;  Surgeon: Kirill Bauman MD;  [facility];  Service: Neurosurgery    ME LAP,DIAGNOSTIC ABDOMEN N/A 2/8/2018    Procedure: DIAGNOSTIC LAPAROSCOPY, LYSIS OF ADHESIONS, OPEN LAPAROTOMY, LYSIS OF ADHESIONS, [id number] [facility] OF  SHUNT TUBING;  Surgeon: Sai Houser MD;  [facility];  Service: General           Future Appointments  Date Time Provider [place]   3/20/2018 11:30 AM BE CT 2 BE CT [facility]   3/22/2018 9:30 AM Kirill Bauman MD NEURO [facility]   4/6/2018 8:00 AM Kelsey Wahl MD NEURO [facility]   1/17/2019 11:30 AM Kirill Bauman MD NEURO [facility]

## 2018-02-22 NOTE — PATIENT INSTRUCTIONS
Follow up with neurosurgery  Follow up with neurology;  Call if any further issues  Call if any worsening of pain  Follow up for labs and ov in 6 months  Call if any seizures; follow up labs and ct scan   /

## 2018-03-12 LAB — FUNGUS SPEC CULT: NORMAL

## 2018-03-20 ENCOUNTER — HOSPITAL ENCOUNTER (OUTPATIENT)
Dept: RADIOLOGY | Facility: HOSPITAL | Age: 43
Discharge: HOME | End: 2018-03-20
Payer: MEDICARE

## 2018-03-20 ENCOUNTER — TRANSCRIBE ORDERS (OUTPATIENT)
Dept: RADIOLOGY | Facility: HOSPITAL | Age: 43
End: 2018-03-20

## 2018-03-20 DIAGNOSIS — Z48.89 AFTERCARE FOLLOWING SURGERY: ICD-10-CM

## 2018-03-20 PROCEDURE — 71046 X-RAY EXAM CHEST 2 VIEWS: CPT

## 2018-03-20 PROCEDURE — 70450 CT HEAD/BRAIN W/O DYE: CPT

## 2018-03-20 PROCEDURE — 70250 X-RAY EXAM OF SKULL: CPT

## 2018-03-20 PROCEDURE — 74018 RADEX ABDOMEN 1 VIEW: CPT

## 2018-03-29 ENCOUNTER — OFFICE VISIT (OUTPATIENT)
Dept: NEUROSURGERY | Facility: CLINIC | Age: 43
End: 2018-03-29

## 2018-03-29 VITALS
TEMPERATURE: 96.9 F | RESPIRATION RATE: 16 BRPM | SYSTOLIC BLOOD PRESSURE: 116 MMHG | BODY MASS INDEX: 30.82 KG/M2 | DIASTOLIC BLOOD PRESSURE: 72 MMHG | HEART RATE: 80 BPM | WEIGHT: 191.8 LBS | HEIGHT: 66 IN

## 2018-03-29 DIAGNOSIS — Z98.2 S/P VP SHUNT: ICD-10-CM

## 2018-03-29 DIAGNOSIS — Z09 POSTOPERATIVE EXAMINATION: Primary | ICD-10-CM

## 2018-03-29 PROCEDURE — 99024 POSTOP FOLLOW-UP VISIT: CPT | Performed by: NEUROLOGICAL SURGERY

## 2018-03-29 NOTE — LETTER
March 29, 2018     Xochilt Marshall DO  [address] [place] [address]    Patient: Shanda Diez   YOB: 1975   Date of Visit: 3/29/2018       Dear Dr Corin Posadas:    Thank you for referring Shanda Diez to me for evaluation  Below are my notes for this consultation  If you have questions, please do not hesitate to call me  I look forward to following your patient along with you  Sincerely,        Donell Garcia MD        CC: MD Donell Caballero Res, MD  3/29/2018 10:26 AM  Sign at close encounter  Neurosurgery Office Note  Shanda Diez is a 43 y o  male    Type of Visit: Post-op      ASSESSMENT / PLAN  Diagnoses and all orders for this visit:    Postoperative examination  -     CT head wo contrast; Future    S/P  shunt          DISCUSSION SUMMARY  51-year-old male with cerebral palsy and hydrocephalus  He is status post shunt revision and is doing very well  CT of the brain demonstrates the ventricles to be small  The patient is asymptomatic  I have recommended continuing his follow-up on a yearly basis or sooner should he have any new symptoms  CHIEF COMPLAINT  Patient presents for 6 week post op visit with CT Head s/p laparoscopy, lysis of adhesions, open laparotomy, repositioning of distal  shunt tubing by Dr Emir Gorman and Dr Naty Jama on 2/8/18    HISTORY OF PRESENT ILLNESS  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 of mild to moderate cerebral palsy has a complicated history of past shunt revisions and also a seizure disorder  His last shunt revision was over 30 years ago  Patient was found to have shunt disconnection on the left side of the posterior ventricular catheter   He had 3 ventricular catheters in place 2 of which appear to be nonfunctional  He has 2 peritoneal catheters; 1 is in close proximity to the posterior left ventricular system which is likely the disconnected system the other looks like it is an old remnant system  The plan was to replace the valve and peritoneal catheter utilizing the ventricular catheter from the old system if it all possible  Patient underwent a shunt revision on 10/23/17  When I saw him in office on 1/16/18, he was doing well and I recommended continued yearly evaluations given the complexity of his shunt system  Patient presented to the hospital on 2/6/2018 due to headache, back pain, anxiety, tremors and insomnia  Patient was found to have malfunction of the  shunt  Neurosurgery was on board  Patient underwent laparoscopy, lysis of adhesions, open laparotomy, repositioning of distal  shunt tubing on 2/8/18  (Dr Mayela Cosme / Dr Manda Christine)  Patient denies headaches, nausea, or vomiting  He has no changes in his sleep-wake patterns although he does go to sleep late at night over the objection of his mother  He is back to work in functioning well at work  REVIEW OF SYSTEMS  Review of Systems   Constitutional: Negative  HENT: Negative  Eyes: Negative  Respiratory: Negative  Cardiovascular: Negative  Gastrointestinal: Negative  Endocrine: Negative  Genitourinary: Negative  Musculoskeletal: Negative  Skin: Negative  Allergic/Immunologic: Negative  Neurological: Negative  Hematological: Negative  Psychiatric/Behavioral: Negative  All other systems reviewed and are negative          Active Ambulatory Problems     Diagnosis Date Noted    [facility]bral [facility]y [facility] ) 10/04/2017    Tremor 10/20/2017     (ventriculoperitoneal) shunt status 10/20/2017    Seizure disorder [facility] ) 10/20/2017    S/P  shunt 10/23/2017    Headache 02/06/2018    Shunt malfunction 02/06/2018    Left-sided back pain 02/08/2018    Leukocytosis 02/09/2018    Constipation 02/10/2018    Transition of care performed with sharing of clinical summary 02/22/2018     Resolved Ambulatory Problems     Diagnosis Date Noted    Intractable headache 10/04/2017    Migraine headache 10/04/2017     Past Medical History:   Diagnosis Date    Asthma     [facility]     [facility]en[facility]us [facility]     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[facility] )     Migraines     Seizures ([facility] )        Past Surgical History:   Procedure Laterality Date    BRAIN SURGERY      Multiple  shunt revisions    EYE SURGERY  12/23/2015    HERNIA REPAIR  12/23/2015    MI CREATE SHUNT:VENTRIC-PERITONEAL Left 10/23/2017    Procedure: SHUNT VENTRICULAR-PERITONEAL revision;  Surgeon: Pelon Davidson MD;  [facility];  Service: Neurosurgery    MI LAP,DIAGNOSTIC ABDOMEN N/A 2/8/2018    Procedure: DIAGNOSTIC LAPAROSCOPY, LYSIS OF ADHESIONS, OPEN LAPAROTOMY, LYSIS OF ADHESIONS, REPOSIONING OF  SHUNT TUBING;  Surgeon: Riley Albarado MD;  [facility];  Service: General       History   Smoking Status    Never Smoker   Smokeless Tobacco    Never Used     Comment: N/A       History   Alcohol Use No     Comment: N/A       History   Drug Use No     Comment: N/A       Vitals:    03/29/18 0948   BP: 116/72   BP Location: Left arm   Patient Position: Sitting   Cuff Size: Adult   Pulse: 80   Resp: 16   Temp: (!) 96 9 °F (36 1 °C)   TempSrc: Tympanic   Weight: 87 kg (191 lb 12 8 oz)   Height: 5' 6" (1 676 m)         Current Outpatient Prescriptions:     albuterol (PROAIR HFA) 90 mcg/act inhaler, ProAir  (90 Base) MCG/ACT Inhalation Aerosol Solution INHALE 1 TO 2 PUFFS EVERY 4 TO 6 HOURS AS NEEDED  Quantity: 1;  Refills: 5    Myah Wilkinson DO;  Started 23-Dec-2015 Dionna Murphy  5 GM Inhaler, Disp: , Rfl:     ALPRAZolam (XANAX) 0 25 mg tablet, Take 0 25 mg by mouth 3 (three) times a day as needed  , Disp: , Rfl:     Cholecalciferol (VITAMIN D3) 2000 units capsule, Take 2,000 Units by mouth daily Pt takes 2,000 units per day  , Disp: , Rfl:     divalproex sodium (DEPAKOTE) 500 mg EC tablet, Take 1 tablet (500 mg total) by mouth every 8 (eight) hours, Disp: 60 tablet, Rfl: 0   fluticasone-salmeterol (ADVAIR DISKUS) 100-50 mcg/dose, Advair Diskus 100-50 MCG/DOSE Inhalation Aerosol Powder Breath Activated TAKE 1 PUFF TWICE A DAY  Quantity: 1;  Refills: 4    Lian Trujillo DO;  Started 23-Dec-2015 [id number] Aerosol Powder Breath Activated Disp Pack, Disp: , Rfl:     lamoTRIgine (LaMICtal) 200 MG tablet, Take 1 tablet by mouth 2 (two) times a day, Disp: 60 tablet, Rfl: 0    The following portions of the patient's history were reviewed and updated as appropriate: allergies, current medications, past family history, past medical history, past social history, past surgical history and problem list       Physical Exam  Awake alert and oriented  Extraocular movements are intact  Power is 5/5 bilaterally  Station and gait are normal  Incisions are clean and dry and without signs of infection  RESULTS/DATA  CT scan of the brain demonstrates the ventricles to be of normal size    The  shunts are all unchanged in position

## 2018-03-29 NOTE — PROGRESS NOTES
Neurosurgery Office Note  Emelia Hernandez is a 43 y o  male    Type of Visit: Post-op      ASSESSMENT / PLAN  Diagnoses and all orders for this visit:    Postoperative examination  -     CT head wo contrast; Future    S/P  shunt          DISCUSSION SUMMARY  55-year-old male with cerebral palsy and hydrocephalus  He is status post shunt revision and is doing very well  CT of the brain demonstrates the ventricles to be small  The patient is asymptomatic  I have recommended continuing his follow-up on a yearly basis or sooner should he have any new symptoms  CHIEF COMPLAINT  Patient presents for 6 week post op visit with CT Head s/p laparoscopy, lysis of adhesions, open laparotomy, repositioning of distal  shunt tubing by Dr Willie Rogel and Dr Purvi Lindquist on 2/8/18    HISTORY OF PRESENT ILLNESS  Patient is a 55-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 with a diagnosis of mild to moderate cerebral palsy has a complicated history of past shunt revisions and also a seizure disorder  His last shunt revision was over 30 years ago  Patient was found to have shunt disconnection on the left side of the posterior ventricular catheter  He had 3 ventricular catheters in place 2 of which appear to be nonfunctional  He has 2 peritoneal catheters; 1 is in close proximity to the posterior left ventricular system which is likely the disconnected system the other looks like it is an old remnant system  The plan was to replace the valve and peritoneal catheter utilizing the ventricular catheter from the old system if it all possible  Patient underwent a shunt revision on 10/23/17  When I saw him in office on 1/16/18, he was doing well and I recommended continued yearly evaluations given the complexity of his shunt system  Patient presented to the hospital on 2/6/2018 due to headache, back pain, anxiety, tremors and insomnia  Patient was found to have malfunction of the  shunt  Neurosurgery was on board   Patient underwent laparoscopy, lysis of adhesions, open laparotomy, repositioning of distal  shunt tubing on 2/8/18  (Dr Thea Fleischer / Dr Javed Daniels)  Patient denies headaches, nausea, or vomiting  He has no changes in his sleep-wake patterns although he does go to sleep late at night over the objection of his mother  He is back to work in functioning well at work  REVIEW OF SYSTEMS  Review of Systems   Constitutional: Negative  HENT: Negative  Eyes: Negative  Respiratory: Negative  Cardiovascular: Negative  Gastrointestinal: Negative  Endocrine: Negative  Genitourinary: Negative  Musculoskeletal: Negative  Skin: Negative  Allergic/Immunologic: Negative  Neurological: Negative  Hematological: Negative  Psychiatric/Behavioral: Negative  All other systems reviewed and are negative          Active Ambulatory Problems     Diagnosis Date Noted    Cerebral palsy ([address] ) 10/04/2017    Tremor 10/20/2017     (ventriculoperitoneal) shunt status 10/20/2017    Seizure disorder ([address] ) 10/20/2017    S/P  shunt 10/23/2017    Headache 02/06/2018    Shunt malfunction 02/06/2018    Left-sided back pain 02/08/2018    Leukocytosis 02/09/2018    Constipation 02/10/2018    Transition of care performed with sharing of clinical summary 02/22/2018     Resolved Ambulatory Problems     Diagnosis Date Noted    Intractable headache 10/04/2017    Migraine headache 10/04/2017     Past Medical History:   Diagnosis Date    Asthma     Cerebral palsy ([address] )     Congenital hydrocephalus ([address] )     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[address] )     Migraines     Seizures ([address] )        Past Surgical History:   Procedure Laterality Date    BRAIN SURGERY      Multiple  shunt revisions    EYE SURGERY  12/23/2015    HERNIA REPAIR  12/23/2015    NM CREATE SHUNT:VENTRIC-PERITONEAL Left 10/23/2017    Procedure: SHUNT VENTRICULAR-PERITONEAL revision;  Surgeon: Yee Mahajan MD;  [facility]; Service: Neurosurgery    CA LAP,DIAGNOSTIC ABDOMEN N/A 2/8/2018    Procedure: DIAGNOSTIC LAPAROSCOPY, LYSIS OF ADHESIONS, OPEN LAPAROTOMY, LYSIS OF ADHESIONS, REPOSIONING OF  SHUNT TUBING;  Surgeon: Marta Culver MD;  Location[facility];  Service: General       History   Smoking Status    Never Smoker   Smokeless Tobacco    Never Used     Comment: N/A       History   Alcohol Use No     Comment: N/A       History   Drug Use No     Comment: N/A       Vitals:    03/29/18 0948   BP: 116/72   BP Location: Left arm   Patient Position: Sitting   Cuff Size: Adult   Pulse: 80   Resp: 16   Temp: (!) 96 9 °F (36 1 °C)   TempSrc: Tympanic   Weight: 87 kg (191 lb 12 8 oz)   Height: 5' 6" (1 676 m)         Current Outpatient Prescriptions:     albuterol (PROAIR HFA) 90 mcg/act inhaler, ProAir  (90 Base) MCG/ACT Inhalation Aerosol Solution INHALE 1 TO 2 PUFFS EVERY 4 TO 6 HOURS AS NEEDED  Quantity: 1;  Refills: 5    Myah Wilkinson DO;  Started 23-Dec-2015 [place]  5 GM Inhaler, Disp: , Rfl:     ALPRAZolam (XANAX) 0 25 mg tablet, Take 0 25 mg by mouth 3 (three) times a day as needed  , Disp: , Rfl:     Cholecalciferol (VITAMIN D3) 2000 units capsule, Take 2,000 Units by mouth daily Pt takes 2,000 units per day  , Disp: , Rfl:     divalproex sodium (DEPAKOTE) 500 mg EC tablet, Take 1 tablet (500 mg total) by mouth every 8 (eight) hours, Disp: 60 tablet, Rfl: 0    fluticasone-salmeterol (ADVAIR DISKUS) 100-50 mcg/dose, Advair Diskus 100-50 MCG/DOSE Inhalation Aerosol Powder Breath Activated TAKE 1 PUFF TWICE A DAY  Quantity: 1;  Refills: 4    Sondra Barney DO;  Started 23-Dec-2015 [id number] Aerosol Powder Breath Activated Disp Pack, Disp: , Rfl:     lamoTRIgine (LaMICtal) 200 MG tablet, Take 1 tablet by mouth 2 (two) times a day, Disp: 60 tablet, Rfl: 0    The following portions of the patient's history were reviewed and updated as appropriate: allergies, current medications, past family history, past medical history, past social history, past surgical history and problem list       Physical Exam  Awake alert and oriented  Extraocular movements are intact  Power is 5/5 bilaterally  Station and gait are normal  Incisions are clean and dry and without signs of infection  RESULTS/DATA  CT scan of the brain demonstrates the ventricles to be of normal size    The  shunts are all unchanged in position

## 2018-03-31 DIAGNOSIS — J45.909 UNCOMPLICATED ASTHMA, UNSPECIFIED ASTHMA SEVERITY, UNSPECIFIED WHETHER PERSISTENT: Primary | ICD-10-CM

## 2018-04-01 DIAGNOSIS — G40.909 EPILEPSY (HCC): Primary | ICD-10-CM

## 2018-04-02 RX ORDER — LAMOTRIGINE 200 MG/1
200 TABLET ORAL 2 TIMES DAILY
Qty: 180 TABLET | Refills: 3 | Status: SHIPPED | OUTPATIENT
Start: 2018-04-02 | End: 2019-05-05 | Stop reason: SDUPTHER

## 2018-04-02 RX ORDER — LAMOTRIGINE 100 MG/1
TABLET ORAL
Qty: 315 TABLET | Refills: 0 | OUTPATIENT
Start: 2018-04-02

## 2018-04-02 NOTE — TELEPHONE ENCOUNTER
Please call patient and confirm dosing  Refill request for Lamotrigine appears to be different from prior reported and prescribed dose, which was lamotrigine 200 mg twice a day

## 2018-04-02 NOTE — TELEPHONE ENCOUNTER
Thank you! I sent an updated Rx to his pharmacy for the appropriate dose (Lamotrigine 200 mg twice a day), with instructions to discontinue the other and erroneous prescriptions  As for the labs, he just had levels checked in February, which were stable, so no need to get repeat levels checked  Thanks again

## 2018-04-02 NOTE — TELEPHONE ENCOUNTER
Pt's mother Viviana Chavez confirmed that he is taking lamotrigine 200mg BID  States that pharmacy accidentally filled the 100mg tablets, she has been giving him these to use up, but at the 200mg BID dosing  Questioning if you want lamotrigine and Valproic acid levels prior to appt on 4/6? They have these done in network

## 2018-04-26 DIAGNOSIS — Z79.899 OTHER LONG TERM (CURRENT) DRUG THERAPY: ICD-10-CM

## 2018-04-26 DIAGNOSIS — E55.9 VITAMIN D DEFICIENCY: ICD-10-CM

## 2018-04-26 DIAGNOSIS — J45.20 MILD INTERMITTENT ASTHMA, UNCOMPLICATED: ICD-10-CM

## 2018-04-26 DIAGNOSIS — G40.109 LOCALZ-RLTD SYMPTOMATIC EPILEPSY W SMPL PART SZ, NONINTRACT, WO STATUS (HCC): ICD-10-CM

## 2018-04-26 DIAGNOSIS — G43.109 MIGRAINE WITH AURA AND WITHOUT STATUS MIGRAINOSUS, NOT INTRACTABLE: ICD-10-CM

## 2018-05-15 ENCOUNTER — OFFICE VISIT (OUTPATIENT)
Dept: NEUROLOGY | Facility: CLINIC | Age: 43
End: 2018-05-15
Payer: MEDICARE

## 2018-05-15 VITALS
SYSTOLIC BLOOD PRESSURE: 118 MMHG | BODY MASS INDEX: 29.73 KG/M2 | DIASTOLIC BLOOD PRESSURE: 60 MMHG | WEIGHT: 189.4 LBS | HEIGHT: 67 IN | HEART RATE: 79 BPM

## 2018-05-15 DIAGNOSIS — R25.1 TREMOR: ICD-10-CM

## 2018-05-15 DIAGNOSIS — Z98.2 S/P VP SHUNT: ICD-10-CM

## 2018-05-15 DIAGNOSIS — G40.109 LOCALIZATION-RELATED EPILEPSY (HCC): Primary | ICD-10-CM

## 2018-05-15 DIAGNOSIS — E55.9 VITAMIN D DEFICIENCY: ICD-10-CM

## 2018-05-15 DIAGNOSIS — G43.109 MIGRAINE WITH AURA AND WITHOUT STATUS MIGRAINOSUS, NOT INTRACTABLE: Chronic | ICD-10-CM

## 2018-05-15 PROCEDURE — 99214 OFFICE O/P EST MOD 30 MIN: CPT | Performed by: PSYCHIATRY & NEUROLOGY

## 2018-05-15 RX ORDER — DIVALPROEX SODIUM 500 MG/1
500 TABLET, DELAYED RELEASE ORAL EVERY 8 HOURS SCHEDULED
Qty: 270 TABLET | Refills: 3 | Status: SHIPPED | OUTPATIENT
Start: 2018-05-15 | End: 2019-05-05 | Stop reason: SDUPTHER

## 2018-05-15 NOTE — ASSESSMENT & PLAN NOTE
His headaches are now much better as well, likely eliza worse previously due to his shunt malfunction

## 2018-05-15 NOTE — PROGRESS NOTES
Patient ID: Brandee Cage is a 43 y o  male with ongenital hydrocephalus, s/p  shunt with multiple revisions and replacements and localization related epilepsy who is returning to the Neurology office for follow up of her seizures  Assessment/Plan:    Localization-related epilepsy [facility])  He has not had any further seizures since his shunt revision  He is now doing better on his current seizure medications with improvement of his tremor  His seizure medications has been a bit of a moving target with several changes in the setting of hospitalizations for his shunt revision  It will be important to keep him on a stable regimen in order to see how he does over time  -- he will continue Depakote 500 mg three times a day and Lamotrigine 200 mg twice a day  --I will have him get some bloodwork about 1 week before his next appointment  Vitamin D deficiency  He is taking a vitamin D supplement, but I would like to recheck his level to assure it has been repleted  Tremor  This has improved with lowering his Depakote dose  He will continue the lower dose  Migraine with aura and without status migrainosus, not intractable  His headaches are now much better as well, likely eliza worse previously due to his shunt malfunction  He will return to the office in about 3 months  Subjective:    HPI    Current seizure medications:  1  Depakote 500 mg three times a day  2  Lamotrigine 200 mg twice a day  Other medications as per Epic  I last saw him in the office on 11/6/2017  At that time, he was doing okay, but had had several breakthrough seizures in the setting of a shunt malfunction  No changes were made to his medications  Since his last appointment, he was readmitted to the hospital because of severe headaches comma worsen seizures, and was found to have Claude distal tubing of his  shunt, which required lysis of adhesions    Following the surgery, his shunt has been working much better he has been doing better  While in the hospital, they did decrease his Depakote dose to 500 milligrams 3 times a day because of significant tremor which had started when his dose had been increased previously  Since leaving the hospital, his tremor has dramatically improved  He has not had any further seizures since his shunt revision  He has been tolerating his medications well  Overall his mother is very happy with how he has been doing  He has returned back to work  His tremor is no longer problematic  Prior Medications: none other than current medicatiosn    His history was also obtained from his mother, who was present at today's visit  I reviewed including notes from his recent hospitalizations, as documented in Epic/2Checkout and summarized above  The following portions of the patient's history were reviewed and updated as appropriate: allergies, current medications and problem list          Objective:    Blood pressure 118/60, pulse 79, height 5' 7" (1 702 m), weight 85 9 kg (189 lb 6 4 oz)  Physical Exam    Neurological Exam      ROS:    Review of Systems   Constitutional: Negative  Negative for appetite change and fever  HENT: Negative  Negative for hearing loss, tinnitus, trouble swallowing and voice change  Eyes: Negative  Negative for photophobia and pain  Respiratory: Negative  Negative for shortness of breath  Cardiovascular: Negative  Negative for palpitations  Gastrointestinal: Negative  Negative for nausea and vomiting  Endocrine: Negative  Negative for cold intolerance and heat intolerance  Genitourinary: Negative  Negative for dysuria, frequency and urgency  Musculoskeletal: Negative  Negative for myalgias and neck pain  Skin: Negative  Negative for rash  Neurological: Negative  Negative for dizziness, tremors, seizures, syncope, facial asymmetry, speech difficulty, weakness, light-headedness, numbness and headaches  Hematological: Negative  Does not bruise/bleed easily  Psychiatric/Behavioral: Positive for sleep disturbance (trouble going to sleep)  Negative for confusion and hallucinations

## 2018-05-15 NOTE — ASSESSMENT & PLAN NOTE
He is taking a vitamin D supplement, but I would like to recheck his level to assure it has been repleted

## 2018-05-15 NOTE — PATIENT INSTRUCTIONS
-- continue to take Depakote 500 mg three times a day and Lamotrigine 200 mg twice a day  -- Please get some bloodwork checked about a week before his next appointment

## 2018-05-15 NOTE — ASSESSMENT & PLAN NOTE
He has not had any further seizures since his shunt revision  He is now doing better on his current seizure medications with improvement of his tremor  His seizure medications has been a bit of a moving target with several changes in the setting of hospitalizations for his shunt revision  It will be important to keep him on a stable regimen in order to see how he does over time  -- he will continue Depakote 500 mg three times a day and Lamotrigine 200 mg twice a day  --I will have him get some bloodwork about 1 week before his next appointment

## 2018-08-15 DIAGNOSIS — F41.1 GAD (GENERALIZED ANXIETY DISORDER): Primary | ICD-10-CM

## 2018-08-15 RX ORDER — ALPRAZOLAM 0.25 MG/1
0.25 TABLET ORAL 3 TIMES DAILY PRN
Qty: 30 TABLET | Refills: 0 | Status: SHIPPED | OUTPATIENT
Start: 2018-08-15 | End: 2019-06-11 | Stop reason: SDUPTHER

## 2018-09-04 ENCOUNTER — OFFICE VISIT (OUTPATIENT)
Dept: NEUROLOGY | Facility: CLINIC | Age: 43
End: 2018-09-04
Payer: MEDICARE

## 2018-09-04 VITALS
SYSTOLIC BLOOD PRESSURE: 144 MMHG | HEIGHT: 67 IN | DIASTOLIC BLOOD PRESSURE: 73 MMHG | BODY MASS INDEX: 31.04 KG/M2 | HEART RATE: 92 BPM | WEIGHT: 197.8 LBS

## 2018-09-04 DIAGNOSIS — G43.109 MIGRAINE WITH AURA AND WITHOUT STATUS MIGRAINOSUS, NOT INTRACTABLE: Chronic | ICD-10-CM

## 2018-09-04 DIAGNOSIS — Z98.2 VP (VENTRICULOPERITONEAL) SHUNT STATUS: ICD-10-CM

## 2018-09-04 DIAGNOSIS — G40.109 LOCALIZATION-RELATED EPILEPSY (HCC): Primary | ICD-10-CM

## 2018-09-04 DIAGNOSIS — R25.1 TREMOR: ICD-10-CM

## 2018-09-04 PROCEDURE — 99214 OFFICE O/P EST MOD 30 MIN: CPT | Performed by: PSYCHIATRY & NEUROLOGY

## 2018-09-04 NOTE — PROGRESS NOTES
Patient ID: Silke Jefferson is a 43 y o  male with congenital hydrocephalus, s/p  shunt with multiple revisions, and localization related epilepsy, who is returning to Neurology office for follow up of his seizures  Assessment/Plan:    Localization-related epilepsy [facility])  He has not had any additional seizures since the last appointment  He is tolerating Depakote and lamotrigine well  He denies any clear side effects to his medications  Because he is overall doing well, I will not make any changes to his medications today  It appears that his most recent seizures have occurred because of malfunctioning  shunts  Since these have been revised, his seizures have been under control     --he will continue Depakote 500 mg 3 times a day and lamotrigine 200 mg twice a day  --I will have him get the blood work that was recommended at his last appointment    Migraine with aura and without status migrainosus, not intractable  He has not had any additional severe headaches the last several months  He will continue his medications unchanged    Tremor  His tremor has significantly improved with decreased amount of Depakote  This tremor was likely drug-induced  He currently only notices trouble with his tremor is when riding her so, this does not interfere with his daily living  As such, he will continue his medications unchanged     (ventriculoperitoneal) shunt status  He will continue to follow up with Neurosurgery    He will return to the office in about 6 months  Subjective:    HPI  Current seizure medications:  1  Depakote 500 mg three times a day  2  Lamotrigine 200 mg twice a day  Other medications as per Epic  I last saw him in the office on 5/15/2018  At that time, he was doing well without any recurrent seizures since his most recent  shunt revision  Because he was doing well without any major side effects were seizures, no changes were made to his medications      Since his last visit, he continues to be seizure-free  He has not had any issues with headaches, imbalance, or other concerns  He has been tolerating his medications well  He previously was having some tremor on higher amounts of Depakote, this has improved since his dose was decreased  Although he was has to get some blood work and was last appointment, they did not get this done, but are willing to get later this week  Prior Medications:   None other than current medications    His history was also obtained from his mother, who was present at today's visit  The following portions of the patient's history were reviewed and updated as appropriate: allergies, current medications and problem list      Objective:    Blood pressure 144/73, pulse 92, height 5' 7" (1 702 m), weight 89 7 kg (197 lb 12 8 oz)  Physical Exam    Neurological Exam      ROS:    Review of Systems   Constitutional: Negative  HENT: Negative  Eyes: Negative  Respiratory: Negative  Cardiovascular: Negative  Gastrointestinal: Negative  Endocrine: Negative  Musculoskeletal: Negative  Allergic/Immunologic: Negative  Neurological: Negative  Hematological: Negative  Psychiatric/Behavioral: Negative          I personally reviewed the ROS that was entered by the medical assistant

## 2018-09-04 NOTE — ASSESSMENT & PLAN NOTE
His tremor has significantly improved with decreased amount of Depakote  This tremor was likely drug-induced  He currently only notices trouble with his tremor is when riding her so, this does not interfere with his daily living    As such, he will continue his medications unchanged

## 2018-09-04 NOTE — PATIENT INSTRUCTIONS
-- Continue to take Depakote 500 mg three times a day and Lamotrigine 200 mg twice a day  -- Please get bloodwork drawn this week  -- Call our office if any problems arise

## 2018-09-04 NOTE — ASSESSMENT & PLAN NOTE
He has not had any additional severe headaches the last several months    He will continue his medications unchanged

## 2018-09-04 NOTE — ASSESSMENT & PLAN NOTE
He has not had any additional seizures since the last appointment  He is tolerating Depakote and lamotrigine well  He denies any clear side effects to his medications  Because he is overall doing well, I will not make any changes to his medications today  It appears that his most recent seizures have occurred because of malfunctioning  shunts  Since these have been revised, his seizures have been under control     --he will continue Depakote 500 mg 3 times a day and lamotrigine 200 mg twice a day      --I will have him get the blood work that was recommended at his last appointment

## 2018-10-25 ENCOUNTER — APPOINTMENT (OUTPATIENT)
Dept: LAB | Age: 43
End: 2018-10-25
Payer: MEDICARE

## 2018-10-25 ENCOUNTER — TRANSCRIBE ORDERS (OUTPATIENT)
Dept: ADMINISTRATIVE | Age: 43
End: 2018-10-25

## 2018-10-25 DIAGNOSIS — E55.9 VITAMIN D DEFICIENCY: ICD-10-CM

## 2018-10-25 DIAGNOSIS — G40.109 LOCALIZATION-RELATED EPILEPSY (HCC): ICD-10-CM

## 2018-10-25 LAB
25(OH)D3 SERPL-MCNC: 38.6 NG/ML (ref 30–100)
ALBUMIN SERPL BCP-MCNC: 3.7 G/DL (ref 3.5–5)
ALP SERPL-CCNC: 58 U/L (ref 46–116)
ALT SERPL W P-5'-P-CCNC: 16 U/L (ref 12–78)
ANION GAP SERPL CALCULATED.3IONS-SCNC: 5 MMOL/L (ref 4–13)
AST SERPL W P-5'-P-CCNC: 5 U/L (ref 5–45)
BILIRUB SERPL-MCNC: 0.45 MG/DL (ref 0.2–1)
BUN SERPL-MCNC: 18 MG/DL (ref 5–25)
CALCIUM SERPL-MCNC: 8.6 MG/DL (ref 8.3–10.1)
CHLORIDE SERPL-SCNC: 105 MMOL/L (ref 100–108)
CO2 SERPL-SCNC: 31 MMOL/L (ref 21–32)
CREAT SERPL-MCNC: 0.74 MG/DL (ref 0.6–1.3)
ERYTHROCYTE [DISTWIDTH] IN BLOOD BY AUTOMATED COUNT: 12 % (ref 11.6–15.1)
GFR SERPL CREATININE-BSD FRML MDRD: 113 ML/MIN/1.73SQ M
GLUCOSE P FAST SERPL-MCNC: 88 MG/DL (ref 65–99)
HCT VFR BLD AUTO: 46.5 % (ref 36.5–49.3)
HGB BLD-MCNC: 15.5 G/DL (ref 12–17)
MCH RBC QN AUTO: 29.9 PG (ref 26.8–34.3)
MCHC RBC AUTO-ENTMCNC: 33.3 G/DL (ref 31.4–37.4)
MCV RBC AUTO: 90 FL (ref 82–98)
PLATELET # BLD AUTO: 297 THOUSANDS/UL (ref 149–390)
PMV BLD AUTO: 9.9 FL (ref 8.9–12.7)
POTASSIUM SERPL-SCNC: 3.8 MMOL/L (ref 3.5–5.3)
PROT SERPL-MCNC: 6.8 G/DL (ref 6.4–8.2)
RBC # BLD AUTO: 5.19 MILLION/UL (ref 3.88–5.62)
SODIUM SERPL-SCNC: 141 MMOL/L (ref 136–145)
VALPROATE SERPL-MCNC: 71 UG/ML (ref 50–100)
WBC # BLD AUTO: 9.11 THOUSAND/UL (ref 4.31–10.16)

## 2018-10-25 PROCEDURE — 80175 DRUG SCREEN QUAN LAMOTRIGINE: CPT

## 2018-10-25 PROCEDURE — 82306 VITAMIN D 25 HYDROXY: CPT

## 2018-10-25 PROCEDURE — 36415 COLL VENOUS BLD VENIPUNCTURE: CPT

## 2018-10-25 PROCEDURE — 85027 COMPLETE CBC AUTOMATED: CPT

## 2018-10-25 PROCEDURE — 80053 COMPREHEN METABOLIC PANEL: CPT

## 2018-10-25 PROCEDURE — 80164 ASSAY DIPROPYLACETIC ACD TOT: CPT

## 2018-10-27 LAB — LAMOTRIGINE SERPL-MCNC: 10.7 UG/ML (ref 2–20)

## 2018-11-07 DIAGNOSIS — J45.909 UNCOMPLICATED ASTHMA, UNSPECIFIED ASTHMA SEVERITY, UNSPECIFIED WHETHER PERSISTENT: ICD-10-CM

## 2018-11-21 ENCOUNTER — TELEPHONE (OUTPATIENT)
Dept: NEUROLOGY | Facility: CLINIC | Age: 43
End: 2018-11-21

## 2019-01-14 ENCOUNTER — TELEPHONE (OUTPATIENT)
Dept: NEUROSURGERY | Facility: CLINIC | Age: 44
End: 2019-01-14

## 2019-01-14 NOTE — TELEPHONE ENCOUNTER
Called patient to clarify reason for appointment since it says he is returning for 1 year follow up with CT head but his CT head is scheduled for 3/25/19 at 11:00  Spoke to mother and she agreed to cancel this appointment and keep appointment on 3/28/19 after completion of CT head

## 2019-03-25 ENCOUNTER — TELEPHONE (OUTPATIENT)
Dept: NEUROSURGERY | Facility: CLINIC | Age: 44
End: 2019-03-25

## 2019-03-25 NOTE — TELEPHONE ENCOUNTER
CT head wo contrast; Sched  3/25/2019 11:00 AM     Patient NO SHOW for the above appointment  I called and spoke to mother since he was at work  She stated that she was unsure of the appointment even though she had it written down but did not note what it was for  I explained that he needs the CT for his appointment on 3/28/19  I provided central scheduling number and she will call to reschedule the CT  She was advised that if she could not get in for CT before the appointment she is to call to reschedule appointment with [facility]  She verbalized understanding

## 2019-03-27 ENCOUNTER — HOSPITAL ENCOUNTER (OUTPATIENT)
Dept: RADIOLOGY | Facility: HOSPITAL | Age: 44
Discharge: HOME | End: 2019-03-27
Attending: NEUROLOGICAL SURGERY
Payer: MEDICARE

## 2019-03-27 ENCOUNTER — TRANSCRIBE ORDERS (OUTPATIENT)
Dept: RADIOLOGY | Facility: HOSPITAL | Age: 44
End: 2019-03-27

## 2019-03-27 DIAGNOSIS — Z09 POSTOPERATIVE EXAMINATION: ICD-10-CM

## 2019-03-27 PROCEDURE — 70450 CT HEAD/BRAIN W/O DYE: CPT

## 2019-03-28 ENCOUNTER — OFFICE VISIT (OUTPATIENT)
Dept: NEUROSURGERY | Facility: CLINIC | Age: 44
End: 2019-03-28
Payer: MEDICARE

## 2019-03-28 VITALS
HEIGHT: 67 IN | RESPIRATION RATE: 16 BRPM | BODY MASS INDEX: 31.67 KG/M2 | HEART RATE: 86 BPM | DIASTOLIC BLOOD PRESSURE: 66 MMHG | WEIGHT: 201.8 LBS | SYSTOLIC BLOOD PRESSURE: 134 MMHG | TEMPERATURE: 96.9 F

## 2019-03-28 DIAGNOSIS — G91.9 HYDROCEPHALUS WITH OPERATING SHUNT (HCC): Primary | ICD-10-CM

## 2019-03-28 PROCEDURE — 99213 OFFICE O/P EST LOW 20 MIN: CPT | Performed by: NEUROLOGICAL SURGERY

## 2019-03-28 NOTE — PROGRESS NOTES
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 and has had no change in his performance on these games nor at work  I have recommended follow-up on a p r n  Basis  The patient and his family understand that there power to report any symptoms and have an imaging study of the brain  CHIEF COMPLAINT  Patient presents for 1 year follow up    NEUROSURGERY PROCEDURES  10/23/17 (DKO) SHUNT VENTRICULAR-PERITONEAL revision (Left Head) - UNABLE TO RETRIEVE PRE-GO LIVE IMPLANT INFORMATION  EXPLANTED 3 VENTRICULAR VALVES; LEFT CRANIUM    2/8/18 (Dr Joel Mathur) DIAGNOSTIC LAPAROSCOPY, LYSIS OF ADHESIONS, OPEN LAPAROTOMY, LYSIS OF ADHESIONS, [id number] [facility] OF  SHUNT TUBING    HISTORY OF PRESENT ILLNESS  This patient originally presented to the hospital on 10/20/2017 due to  Seizure-like activity  Patient has a history of cerebral palsy, seizure disorder and is status post  shunt and multiple shunt revisions at another institution  We performed the most recent revision on 10/23/17  Patient was doing well at his office visit on 1/6/18 and we recommended continued yearly evaluations given the complexity of his shunt system  Patient presented to the hospital on 2/6/2018 due to headache, back pain, anxiety, tremors and insomnia  Patient was found to have malfunction of the  shunt  Neurosurgery was on board   Patient underwent laparoscopy, lysis of adhesions, open laparotomy, repositioning of distal  shunt tubing on 2/8/18 (Dr Lizeth Tran / Dr Lalitha Noland)  Patient was then seen in the office on 3/29/18 and was asymptomatic  He was recommended continued follow up in a yearly basis  Patient feels great  Rare headaches  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  He plays video games on a regular basis  He says the scores have not worsened in our slowly improving    He has had no seizures or seizure-like activity he had his mother are going to ask for reduction in the anti seizure medicines which he feels causes tremors  In general he and his mother feel that he is improving on a regular basis  REVIEW OF SYSTEMS  Review of Systems   Constitutional: Negative  HENT: Negative  Eyes: Negative  Respiratory: Negative  Cardiovascular: Negative  Gastrointestinal: Negative  Endocrine: Negative  Genitourinary: Negative  Musculoskeletal: Negative  Skin: Negative  Allergic/Immunologic: Negative  Neurological: Negative  Hematological: Negative  Psychiatric/Behavioral: Negative  All other systems reviewed and are negative  I reviewed the ROS      MEDICAL HISTORY  Active Ambulatory Problems     Diagnosis Date Noted    Cerebral palsy ([address] ) 10/04/2017    Migraine with aura and without status migrainosus, not intractable 10/04/2017    Tremor 10/20/2017     (ventriculoperitoneal) shunt status 10/20/2017    Seizure disorder ([address] ) 10/20/2017    S/P  shunt 10/23/2017    Headache 02/06/2018    Shunt malfunction 02/06/2018    Left-sided back pain 02/08/2018    Leukocytosis 02/09/2018    Constipation 02/10/2018    Transition of care performed with sharing of clinical summary 02/22/2018    Vitamin D deficiency 05/15/2018    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[address] ) 02/08/2016    Hydrocephalus with operating shunt 03/28/2019     Resolved Ambulatory Problems Diagnosis Date Noted    Intractable headache 10/04/2017     Past Medical History:   Diagnosis Date    Asthma     Cerebral palsy ([address] )     Congenital hydrocephalus ([address] )     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[address] )     Migraines     Seizures ([address] )        Past Surgical History:   Procedure Laterality Date    BRAIN SURGERY      Multiple  shunt revisions    EYE SURGERY  12/23/2015    HERNIA REPAIR  12/23/2015    CA CREATE SHUNT:VENTRIC-PERITONEAL Left 10/23/2017    Procedure: SHUNT VENTRICULAR-PERITONEAL revision;  Surgeon: Bailey Connell MD;  [facility];  Service: Neurosurgery    CA LAP,DIAGNOSTIC ABDOMEN N/A 2/8/2018    Procedure: DIAGNOSTIC LAPAROSCOPY, LYSIS OF ADHESIONS, OPEN LAPAROTOMY, LYSIS OF ADHESIONS, REPOSIONING OF  SHUNT TUBING;  Surgeon: Paulette Mccauley MD;  [facility];  Service: General       Social History     Tobacco Use   Smoking Status Never Smoker   Smokeless Tobacco Never Used   Tobacco Comment    N/A       Social History     Substance and Sexual Activity   Alcohol Use No    Comment: N/A       Social History     Substance and Sexual Activity   Drug Use No    Comment: N/A       Vitals:    03/28/19 1005   BP: 134/66   BP Location: Right arm   Patient Position: Sitting   Cuff Size: Adult   Pulse: 86   Resp: 16   Temp: (!) 96 9 °F (36 1 °C)   TempSrc: Tympanic   Weight: 91 5 kg (201 lb 12 8 oz)   Height: 5' 7" (1 702 m)         Current Outpatient Medications:     ADVAIR DISKUS 100-50 MCG/DOSE inhaler, INHALE 1 PUFF BY MOUTH TWICE DAILY, Disp: 1 Inhaler, Rfl: 1    albuterol (PROAIR HFA) 90 mcg/act inhaler, ProAir  (90 Base) MCG/ACT Inhalation Aerosol Solution INHALE 1 TO 2 PUFFS EVERY 4 TO 6 HOURS AS NEEDED  Quantity: 1;  Refills: 5    Brotzman, Myah DO;  Started 23-Dec-2015 Bee Pert  5 GM Inhaler, Disp: , Rfl:     ALPRAZolam (XANAX) 0 25 mg tablet, Take 1 tablet (0 25 mg total) by mouth 3 (three) times a day as needed (tid prn), Disp: 30 tablet, Rfl: 0   Cholecalciferol (VITAMIN D3) 2000 units capsule, Take 2,000 Units by mouth daily Pt takes 2,000 units per day  , Disp: , Rfl:     divalproex sodium (DEPAKOTE) 500 mg EC tablet, Take 1 tablet (500 mg total) by mouth every 8 (eight) hours, Disp: 270 tablet, Rfl: 3    lamoTRIgine (LaMICtal) 200 MG tablet, Take 1 tablet (200 mg total) by mouth 2 (two) times a day, Disp: 180 tablet, Rfl: 3     Allergies   Allergen Reactions    Latex Hives    Other      Cats, seasonal    Pollen Extract         The following portions of the patient's history were updated by MA and reviewed by MD: allergies, current medications, past family history, past medical history, past social history, past surgical history and problem list       Physical Exam  Awake alert and oriented  Extraocular movements are intact   His face is symmetric in grimace and at rest  his power is 5/5 bilaterally  Pupils are equal round reactive to light and accommodate  He ambulates with an awkward gait but has no difficulties with balance  He cannot perform tandem gait without sidestepping  There is slight degree of increased specificity on the right in comparison to the left  His Romberg is negative  The shunt pumps and refills well  There is no erythema or edema along the shunt tract    RESULTS/DATA  CT scan of the brain is carefully reviewed and compared with previous studies  This demonstrates the ventricular size to be approximately the same  There is no change in position of the ventricular catheter tips

## 2019-05-05 DIAGNOSIS — Z98.2 S/P VP SHUNT: ICD-10-CM

## 2019-05-05 DIAGNOSIS — G40.909 EPILEPSY (HCC): ICD-10-CM

## 2019-05-08 RX ORDER — DIVALPROEX SODIUM 500 MG/1
TABLET, DELAYED RELEASE ORAL
Qty: 270 TABLET | Refills: 3 | Status: SHIPPED | OUTPATIENT
Start: 2019-05-08 | End: 2019-06-07 | Stop reason: SDUPTHER

## 2019-05-08 RX ORDER — LAMOTRIGINE 200 MG/1
TABLET ORAL
Qty: 180 TABLET | Refills: 3 | Status: SHIPPED | OUTPATIENT
Start: 2019-05-08 | End: 2020-04-24

## 2019-06-07 ENCOUNTER — OFFICE VISIT (OUTPATIENT)
Dept: NEUROLOGY | Facility: CLINIC | Age: 44
End: 2019-06-07
Payer: MEDICARE

## 2019-06-07 VITALS
DIASTOLIC BLOOD PRESSURE: 78 MMHG | WEIGHT: 198.6 LBS | BODY MASS INDEX: 31.17 KG/M2 | SYSTOLIC BLOOD PRESSURE: 132 MMHG | HEIGHT: 67 IN | HEART RATE: 79 BPM

## 2019-06-07 DIAGNOSIS — Z98.2 S/P VP SHUNT: ICD-10-CM

## 2019-06-07 DIAGNOSIS — G40.109 LOCALIZATION-RELATED EPILEPSY (HCC): Primary | ICD-10-CM

## 2019-06-07 PROCEDURE — 99213 OFFICE O/P EST LOW 20 MIN: CPT | Performed by: PSYCHIATRY & NEUROLOGY

## 2019-06-07 RX ORDER — DIVALPROEX SODIUM 500 MG/1
TABLET, DELAYED RELEASE ORAL
Qty: 270 TABLET | Refills: 3 | Status: SHIPPED | OUTPATIENT
Start: 2019-06-07 | End: 2020-07-29

## 2019-06-11 ENCOUNTER — OFFICE VISIT (OUTPATIENT)
Dept: FAMILY MEDICINE CLINIC | Facility: CLINIC | Age: 44
End: 2019-06-11
Payer: MEDICARE

## 2019-06-11 VITALS
RESPIRATION RATE: 16 BRPM | BODY MASS INDEX: 31.4 KG/M2 | WEIGHT: 200.5 LBS | TEMPERATURE: 96.5 F | HEART RATE: 61 BPM | SYSTOLIC BLOOD PRESSURE: 118 MMHG | DIASTOLIC BLOOD PRESSURE: 66 MMHG | OXYGEN SATURATION: 95 %

## 2019-06-11 DIAGNOSIS — J45.20 MILD INTERMITTENT ASTHMA WITHOUT COMPLICATION: Primary | ICD-10-CM

## 2019-06-11 DIAGNOSIS — F41.1 GAD (GENERALIZED ANXIETY DISORDER): ICD-10-CM

## 2019-06-11 DIAGNOSIS — G80.1 SPASTIC DIPLEGIC CEREBRAL PALSY (HCC): Chronic | ICD-10-CM

## 2019-06-11 PROCEDURE — 99214 OFFICE O/P EST MOD 30 MIN: CPT | Performed by: PHYSICIAN ASSISTANT

## 2019-06-11 RX ORDER — ALPRAZOLAM 0.25 MG/1
0.25 TABLET ORAL 3 TIMES DAILY PRN
Qty: 30 TABLET | Refills: 0 | Status: SHIPPED | OUTPATIENT
Start: 2019-06-11 | End: 2020-01-08 | Stop reason: SDUPTHER

## 2019-06-11 RX ORDER — ALBUTEROL SULFATE 90 UG/1
1 AEROSOL, METERED RESPIRATORY (INHALATION) EVERY 6 HOURS PRN
Qty: 1 INHALER | Refills: 2 | Status: SHIPPED | OUTPATIENT
Start: 2019-06-11 | End: 2019-11-23 | Stop reason: SDUPTHER

## 2019-06-11 RX ORDER — ALBUTEROL SULFATE 90 UG/1
1 AEROSOL, METERED RESPIRATORY (INHALATION) EVERY 4 HOURS PRN
Qty: 1 INHALER | Refills: 1 | Status: CANCELLED | OUTPATIENT
Start: 2019-06-11

## 2019-07-09 ENCOUNTER — OFFICE VISIT (OUTPATIENT)
Dept: FAMILY MEDICINE CLINIC | Facility: CLINIC | Age: 44
End: 2019-07-09
Payer: MEDICARE

## 2019-07-09 VITALS
WEIGHT: 201 LBS | RESPIRATION RATE: 16 BRPM | TEMPERATURE: 96.5 F | SYSTOLIC BLOOD PRESSURE: 114 MMHG | BODY MASS INDEX: 28.14 KG/M2 | HEIGHT: 71 IN | HEART RATE: 76 BPM | DIASTOLIC BLOOD PRESSURE: 62 MMHG

## 2019-07-09 DIAGNOSIS — Q03.9 CONGENITAL HYDROCEPHALUS (HCC): ICD-10-CM

## 2019-07-09 DIAGNOSIS — Z00.00 MEDICARE ANNUAL WELLNESS VISIT, SUBSEQUENT: Primary | ICD-10-CM

## 2019-07-09 DIAGNOSIS — Z13.6 SCREENING FOR CARDIOVASCULAR CONDITION: ICD-10-CM

## 2019-07-09 DIAGNOSIS — G40.109 LOCALIZATION-RELATED EPILEPSY (HCC): ICD-10-CM

## 2019-07-09 DIAGNOSIS — G80.1 SPASTIC DIPLEGIC CEREBRAL PALSY (HCC): Chronic | ICD-10-CM

## 2019-07-09 DIAGNOSIS — J45.20 MILD INTERMITTENT ASTHMA WITHOUT COMPLICATION: ICD-10-CM

## 2019-07-09 DIAGNOSIS — Z23 NEED FOR TDAP VACCINATION: ICD-10-CM

## 2019-07-09 DIAGNOSIS — Z23 NEED FOR TD VACCINE: ICD-10-CM

## 2019-07-09 PROCEDURE — 90714 TD VACC NO PRESV 7 YRS+ IM: CPT

## 2019-07-09 PROCEDURE — 90471 IMMUNIZATION ADMIN: CPT

## 2019-07-09 PROCEDURE — G0439 PPPS, SUBSEQ VISIT: HCPCS | Performed by: PHYSICIAN ASSISTANT

## 2019-07-09 RX ORDER — ASCORBIC ACID 500 MG
500 TABLET ORAL DAILY
COMMUNITY

## 2019-07-09 NOTE — PROGRESS NOTES
Assessment and Plan:     Problem List Items Addressed This Visit        Respiratory    Mild intermittent asthma without complication    Relevant Orders    Lipid Panel with Direct LDL reflex    CBC and differential       Nervous and Auditory    Cerebral palsy (HCC) (Chronic)    Relevant Orders    Lipid Panel with Direct LDL reflex    CBC and differential    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[facility] )    Relevant Orders    Lipid Panel with Direct LDL reflex    CBC and differential      Other Visit Diagnoses     Medicare annual wellness visit, subsequent    -  Primary    Relevant Orders    Lipid Panel with Direct LDL reflex    CBC and differential    Need for Tdap vaccination        Congenital hydrocephalus ([facility] )        Screening for cardiovascular condition        Relevant Orders    Lipid Panel with Direct LDL reflex    CBC and differential         History of Present Illness:     Patient presents for Medicare Annual Wellness visit    Patient Care Team:  Jennifer Lopez MD as PCP - General (Family Medicine)  MIK Lopez MD     Problem List:     Patient Active Problem List   Diagnosis    Cerebral palsy ([facility] )    Migraine with aura and without status migrainosus, not intractable    Tremor     (ventriculoperitoneal) shunt status    Seizure disorder (HCC)    S/P  shunt    Headache    Mild intermittent asthma without complication    Shunt malfunction    Left-sided back pain    Leukocytosis    Constipation    Transition of care performed with sharing of clinical summary    Vitamin D deficiency    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[facility] )    Hydrocephalus with operating shunt    QASIM (generalized anxiety disorder)      Past Medical and Surgical History:     Past Medical History:   Diagnosis Date    Asthma     C[facility]y ([facility] )     Congenital hydrocephalus ([facility] )     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[facility] )     Migraines     Seizures ([facility] )      Past Surgical History:   Procedure Laterality Date    BRAIN SURGERY      Multiple  shunt revisions    EYE SURGERY  12/23/2015    HERNIA REPAIR  12/23/2015    OK CREATE SHUNT:VENTRIC-PERITONEAL Left 10/23/2017    Procedure: SHUNT VENTRICULAR-PERITONEAL revision;  Surgeon: Fabricio Hernandez MD;  [facility];  Service: Neurosurgery    OK LAP,DIAGNOSTIC ABDOMEN N/A 2/8/2018    Procedure: DIAGNOSTIC LAPAROSCOPY, LYSIS OF ADHESIONS, OPEN LAPAROTOMY, LYSIS OF ADHESIONS, [id number] [address] OF  SHUNT TUBING;  Surgeon: Beth Boas, MD;  [facility];  Service: General      Family History:     Family History   Problem Relation Age of Onset    Hyperlipidemia Mother     Hypertension Mother    [facility] Breast cancer Mother     Hypertension Father       Social History:     Social History     Tobacco Use   Smoking Status Never Smoker   Smokeless Tobacco Never Used   Tobacco Comment    N/A     Social History     Substance and Sexual Activity   Alcohol Use No    Comment: N/A     Social History     Substance and Sexual Activity   Drug Use No    Comment: N/A      Medications and Allergies:     Current Outpatient Medications   Medication Sig Dispense Refill    albuterol (VENTOLIN HFA) 90 mcg/act inhaler Inhale 1 puff every 6 (six) hours as needed for wheezing 1 Inhaler 2    ALPRAZolam (XANAX) 0 25 mg tablet Take 1 tablet (0 25 mg total) by mouth 3 (three) times a day as needed (tid prn) 30 tablet 0    ascorbic acid (VITAMIN C) 500 mg tablet Take 500 mg by mouth daily      Cholecalciferol (VITAMIN D3) 2000 units capsule Take 2,000 Units by mouth daily Pt takes 2,000 units per day        divalproex sodium (DEPAKOTE) 500 mg EC tablet Take 500 mg (1 tab) in the morning and 1000 mg (2 tabs) at night  270 tablet 3    lamoTRIgine (LaMICtal) 200 MG tablet TAKE 1 TABLET(200 MG TOTAL) BY MOUTH 2(TWO) TIMES A  tablet 3     No current facility-administered medications for this visit        Allergies   Allergen Reactions    Latex Hives    Other      Cats, seasonal    Pollen Extract       Immunizations:     Immunization History   Administered Date(s) Administered    INFLUENZA 01/01/2015, 11/09/2015, 11/03/2016, 09/26/2017, 11/06/2018    Influenza Quadrivalent, 6-35 Months IM 09/26/2017    Influenza TIV (IM) 01/01/2015, 11/03/2016    Influenza, injectable, quadrivalent, preservative free 0 5 mL 11/06/2018      Medicare Screening Tests and Risk Assessments:     Danuta Oliva is here for his Subsequent Wellness visit  Last Medicare Wellness visit information reviewed, patient interviewed and updates made to the record today  Health Risk Assessment:  Patient rates overall health as very good  Patient feels that their physical health rating is Much better  Eyesight was rated as Same  Hearing was rated as Same  Patient feels that their emotional and mental health rating is Slightly better  Pain experienced by patient in the last 7 days has been None  Patient states that he has experienced no weight loss or gain in last 6 months  Emotional/Mental Health:  Patient has been feeling nervous/anxious  PHQ-9 Depression Screening:    Frequency of the following problems over the past two weeks:      1  Little interest or pleasure in doing things: 0 - not at all      2  Feeling down, depressed, or hopeless: 0 - not at all  PHQ-2 Score: 0          Broken Bones/Falls: Fall Risk Assessment:    In the past year, patient has experienced: No history of falling in past year          Bladder/Bowel:  Patient has not leaked urine accidently in the last six months  Patient reports no loss of bowel control  Immunizations:  Patient has had a flu vaccination within the last year  Patient has not received a pneumonia shot  Patient has not received a shingles shot  Patient has not received tetanus/diphtheria shot  Home Safety:  Patient does not have trouble with stairs inside or outside of their home     Patient currently reports that there are no safety hazards present in home, working smoke alarms, working carbon monoxide detectors  Preventative Screenings:   no prostate cancer screen performed, no colon cancer screen completed, no cholesterol screen completed, no glaucoma eye exam completed    Nutrition:  Current diet: Regular with servings of the following:    Medications:  Patient is currently taking over-the-counter supplements  List of OTC medications includes: VITAMIN D3 AND C  Patient is not able to manage medications  Lifestyle Choices:  Patient reports no tobacco use  Patient has not smoked or used tobacco in the past   Patient reports no alcohol use  Patient does not drive a vehicle  Patient wears seat belt  Current level of exercise of physical activity described by patient as: [place] [address]  Activities of Daily Living:  Can get out of bed by his or her self, able to dress self, unable to make own meals, unable to do own shopping, able to bathe self, unable to do laundry/housekeeping, unable to manage own money and other related tasks    Previous Hospitalizations:  Hospitalization or ED visit in past 12 months  Number of hospitalizations within the last year: 1-2        Advanced Directives:  Patient has decided on a power of   Patient has spoken to designated power of   Patient has not completed advanced directive          Preventative Screening/Counseling:      Cardiovascular:      General: Screening Current     Due for Labs/Analytes/Optional EKG: Lipid Panel          Diabetes:      General: Screening Current          Colorectal Cancer:      General: Screening Not Indicated          Prostate Cancer:      General: Screening Not Indicated          Osteoporosis:      General: Screening Not Indicated          AAA:      General: Screening Not Indicated          Glaucoma:      General: Screening Not Indicated      Comments: He was screened last year        HIV:      General: Screening Not Indicated          Hepatitis C: General: Screening Not Indicated        Advanced Directives:   Patient has no living will for healthcare, does not have durable POA for healthcare, patient does not have an advanced directive  Information on ACP and/or AD provided  5 wishes given  End of life assessment reviewed with patient       Immunizations:  Patient reviewed and up to date

## 2019-07-09 NOTE — PATIENT INSTRUCTIONS
Obesity   AMBULATORY CARE:   Obesity  is when your body mass index (BMI) is greater than 30  Your healthcare provider will use your height and weight to measure your BMI  The risks of obesity include  many health problems, such as injuries or physical disability  You may need tests to check for the following:  · Diabetes     · High blood pressure or high cholesterol     · Heart disease     · Gallbladder or liver disease     · Cancer of the colon, breast, prostate, liver, or kidney     · Sleep apnea     · Arthritis or gout  Seek care immediately if:   · You have a severe headache, confusion, or difficulty speaking  · You have weakness on one side of your body  · You have chest pain, sweating, or shortness of breath  Contact your healthcare provider if:   · You have symptoms of gallbladder or liver disease, such as pain in your upper abdomen  · You have knee or hip pain and discomfort while walking  · You have symptoms of diabetes, such as intense hunger and thirst, and frequent urination  · You have symptoms of sleep apnea, such as snoring or daytime sleepiness  · You have questions or concerns about your condition or care  Treatment for obesity  focuses on helping you lose weight to improve your health  Even a small decrease in BMI can reduce the risk for many health problems  Your healthcare provider will help you set a weight-loss goal   · Lifestyle changes  are the first step in treating obesity  These include making healthy food choices and getting regular physical activity  Your healthcare provider may suggest a weight-loss program that involves coaching, education, and therapy  · Medicine  may help you lose weight when it is used with a healthy diet and physical activity  · Surgery  can help you lose weight if you are very obese and have other health problems  There are several types of weight-loss surgery  Ask your healthcare provider for more information    Be successful losing weight:   · Set small, realistic goals  An example of a small goal is to walk for 20 minutes 5 days a week  Anther goal is to lose 5% of your body weight  · Tell friends, family members, and coworkers about your goals  and ask for their support  Ask a friend to lose weight with you, or join a weight-loss support group  · Identify foods or triggers that may cause you to overeat , and find ways to avoid them  Remove tempting high-calorie foods from your home and workplace  Place a bowl of fresh fruit on your kitchen counter  If stress causes you to eat, then find other ways to cope with stress  · Keep a diary to track what you eat and drink  Also write down how many minutes of physical activity you do each day  Weigh yourself once a week and record it in your diary  Eating changes: You will need to eat 500 to 1,000 fewer calories each day than you currently eat to lose 1 to 2 pounds a week  The following changes will help you cut calories:  · Eat smaller portions  Use small plates, no larger than 9 inches in diameter  Fill your plate half full of fruits and vegetables  Measure your food using measuring cups until you know what a serving size looks like  · Eat 3 meals and 1 or 2 snacks each day  Plan your meals in advance  Stuart Ugarte and eat at home most of the time  Eat slowly  · Eat fruits and vegetables at every meal   They are low in calories and high in fiber, which makes you feel full  Do not add butter, margarine, or cream sauce to vegetables  Use herbs to season steamed vegetables  · Eat less fat and fewer fried foods  Eat more baked or grilled chicken and fish  These protein sources are lower in calories and fat than red meat  Limit fast food  Dress your salads with olive oil and vinegar instead of bottled dressing  · Limit the amount of sugar you eat  Do not drink sugary beverages  Limit alcohol  Activity changes:  Physical activity is good for your body in many ways   It helps you burn calories and build strong muscles  It decreases stress and depression, and improves your mood  It can also help you sleep better  Talk to your healthcare provider before you begin an exercise program   · Exercise for at least 30 minutes 5 days a week  Start slowly  Set aside time each day for physical activity that you enjoy and that is convenient for you  It is best to do both weight training and an activity that increases your heart rate, such as walking, bicycling, or swimming  · Find ways to be more active  Do yard work and housecleaning  Walk up the stairs instead of using elevators  Spend your leisure time going to events that require walking, such as outdoor festivals or fairs  This extra physical activity can help you lose weight and keep it off  Follow up with your healthcare provider as directed: You may need to meet with a dietitian  Write down your questions so you remember to ask them during your visits  © 2017 [address] [employer] Information is for End User's use only and may not be sold, redistributed or otherwise used for commercial purposes  All illustrations and images included in CareNotes® are the copyrighted property of [employer]  or Yovani Simmons  The above information is an  only  It is not intended as medical advice for individual conditions or treatments  Talk to your doctor, nurse or pharmacist before following any medical regimen to see if it is safe and effective for you  Urinary Incontinence   WHAT YOU NEED TO KNOW:   What is urinary incontinence? Urinary incontinence (UI) is when you lose control of your bladder  What causes UI? UI occurs because your bladder cannot store or empty urine properly  The following are the most common types of UI:  · Stress incontinence  is when you leak urine due to increased bladder pressure  This may happen when you cough, sneeze, or exercise       · Urge incontinence  is when you feel the need to urinate right away and leak urine accidentally  · Mixed incontinence  is when you have both stress and urge UI  What are the signs and symptoms of UI?   · You feel like your bladder does not empty completely when you urinate  · You urinate often and need to urinate immediately  · You leak urine when you sleep, or you wake up with the urge to urinate  · You leak urine when you cough, sneeze, exercise, or laugh  How is UI diagnosed? Your healthcare provider will ask how often you leak urine and whether you have stress or urge symptoms  Tell him which medicines you take, how often you urinate, and how much liquid you drink each day  You may need any of the following tests:  · Urine tests  may show infection or kidney function  · A pelvic exam  may be done to check for blockages  A pelvic exam will also show if your bladder, uterus, or other organs have moved out of place  · An x-ray, ultrasound, or CT  may show problems with parts of your urinary system  You may be given contrast liquid to help your organs show up better in the pictures  Tell the healthcare provider if you have ever had an allergic reaction to contrast liquid  Do not enter the MRI room with anything metal  Metal can cause serious injury  Tell the healthcare provider if you have any metal in or on your body  · A bladder scan  will show how much urine is left in your bladder after you urinate  You will be asked to urinate and then healthcare providers will use a small ultrasound machine to check the urine left in your bladder  · Cystometry  is used to check the function of your urinary system  Your healthcare provider checks the pressure in your bladder while filling it with fluid  Your bladder pressure may also be tested when your bladder is full and while you urinate  How is UI treated? · Medicines  can help strengthen your bladder control      · Electrical stimulation  is used to send a small amount of electrical energy to your pelvic floor muscles  This helps control your bladder function  Electrodes may be placed outside your body or in your rectum  For women, the electrodes may be placed in the vagina  · A bulking agent  may be injected into the wall of your urethra to make it thicker  This helps keep your urethra closed and decreases urine leakage  · Devices  such as a clamp, pessary, or tampon may help stop urine leaks  Ask your healthcare provider for more information about these and other devices  · Surgery  may be needed if other treatments do not work  Several types of surgery can help improve your bladder control  Ask your healthcare provider for more information about the surgery you may need  How can I manage my symptoms? · Do pelvic muscle exercises often  Your pelvic muscles help you stop urinating  Squeeze these muscles tight for 5 seconds, then relax for 5 seconds  Gradually work up to squeezing for 10 seconds  Do 3 sets of 15 repetitions a day, or as directed  This will help strengthen your pelvic muscles and improve bladder control  · A catheter  may be used to help empty your bladder  A catheter is a tiny, plastic tube that is put into your bladder to drain your urine  Your healthcare provider may tell you to use a catheter to prevent your bladder from getting too full and leaking urine  · Keep a UI record  Write down how often you leak urine and how much you leak  Make a note of what you were doing when you leaked urine  · Train your bladder  Go to the bathroom at set times, such as every 2 hours, even if you do not feel the urge to go  You can also try to hold your urine when you feel the urge to go  For example, hold your urine for 5 minutes when you feel the urge to go  As that becomes easier, hold your urine for 10 minutes  · Drink liquids as directed  Ask your healthcare provider how much liquid to drink each day and which liquids are best for you   You may need to limit the amount of liquid you drink to help control your urine leakage  Limit or do not have drinks that contain caffeine or alcohol  Do not drink any liquid right before you go to bed  · Prevent constipation  Eat a variety of high-fiber foods  Good examples are high-fiber cereals, beans, vegetables, and whole-grain breads  Prune juice may help make your bowel movement softer  Walking is the best way to trigger your intestines to have a bowel movement  · Exercise regularly and maintain a healthy weight  Ask your healthcare provider how much you should weigh and about the best exercise plan for you  Weight loss and exercise will decrease pressure on your bladder and help you control your leakage  Ask him to help you create a weight loss plan if you are overweight  When should I seek immediate care? · You have severe pain  · You are confused or cannot think clearly  When should I contact my healthcare provider? · You have a fever  · You see blood in your urine  · You have pain when you urinate  · You have new or worse pain, even after treatment  · Your mouth feels dry or you have vision changes  · Your urine is cloudy or smells bad  · You have questions or concerns about your condition or care  CARE AGREEMENT:   You have the right to help plan your care  Learn about your health condition and how it may be treated  Discuss treatment options with your caregivers to decide what care you want to receive  You always have the right to refuse treatment  The above information is an  only  It is not intended as medical advice for individual conditions or treatments  Talk to your doctor, nurse or pharmacist before following any medical regimen to see if it is safe and effective for you  © 2017 [address] [employer] Information is for End User's use only and may not be sold, redistributed or otherwise used for commercial purposes   All illustrations and images included in CareNotes® are the copyrighted property of A [employer] A M , Inc  or Yovani Simmons  Cigarette Smoking and Your Health   AMBULATORY CARE:   Risks to your health if you smoke:  Nicotine and other chemicals found in tobacco damage every cell in your body  Even if you are a light smoker, you have an increased risk for cancer, heart disease, and lung disease  If you are pregnant or have diabetes, smoking increases your risk for complications  Benefits to your health if you stop smoking:   · You decrease respiratory symptoms such as coughing, wheezing, and shortness of breath  · You reduce your risk for cancers of the lung, mouth, throat, kidney, bladder, pancreas, stomach, and cervix  If you already have cancer, you increase the benefits of chemotherapy  You also reduce your risk for cancer returning or a second cancer from developing  · You reduce your risk for heart disease, blood clots, heart attack, and stroke  · You reduce your risk for lung infections, and diseases such as pneumonia, asthma, chronic bronchitis, and emphysema  · Your circulation improves  More oxygen can be delivered to your body  If you have diabetes, you lower your risk for complications, such as kidney, artery, and eye diseases  You also lower your risk for nerve damage  Nerve damage can lead to amputations, poor vision, and blindness  · You improve your body's ability to heal and to fight infections  Benefits to the health of others if you stop smoking:  Tobacco is harmful to nonsmokers who breathe in your secondhand smoke  The following are ways the health of others around you may improve when you stop smoking:  · You lower the risks for lung cancer and heart disease in nonsmoking adults  · If you are pregnant, you lower the risk for miscarriage, early delivery, low birth weight, and stillbirth  You also lower your baby's risk for SIDS, obesity, developmental delay, and neurobehavioral problems, such as ADHD  · If you have children, you lower their risk for ear infections, colds, pneumonia, bronchitis, and asthma  For more information and support to stop smoking:   · Smokefree  gov  Phone: 1- 042 - 804-7805  Web Address: www smokefree  [employer]  Follow up with your healthcare provider as directed:  Write down your questions so you remember to ask them during your visits  © 2017 [address] [employer] Information is for End User's use only and may not be sold, redistributed or otherwise used for commercial purposes  All illustrations and images included in CareNotes® are the copyrighted property of A [employer] M [employer]  or Yovani Simmons  The above information is an  only  It is not intended as medical advice for individual conditions or treatments  Talk to your doctor, nurse or pharmacist before following any medical regimen to see if it is safe and effective for you  Fall Prevention   WHAT YOU NEED TO KNOW:   What is fall prevention? Fall prevention includes ways to make your home and other areas safer  It also includes ways you can move more carefully to prevent a fall  What increases my risk for falls? · Lack of vitamin D    · Not getting enough sleep each night    · Trouble walking or keeping your balance, or foot problems    · Health conditions that cause changes in your blood pressure, vision, or muscle strength and coordination    · Medicines that make you dizzy, weak, or sleepy    · Problems seeing clearly    · Shoes that have high heels or are not supportive    · Tripping hazards, such as items left on the floor, no handrails on the stairs, or broken steps  How can I help protect myself from falls? · Stand or sit up slowly  This may help you keep your balance and prevent falls  If you need to get up during the night, sit up first  Be sure you are fully awake before you stand  Turn on the light before you start walking  Go slowly in case you are still sleepy   Make sure you will not trip over any pets sleeping in the bedroom  · Use assistive devices as directed  Your healthcare provider may suggest that you use a cane or walker to help you keep your balance  You may need to have grab bars put in your bathroom near the toilet or in the shower  · Wear shoes that fit well and have soles that   Wear shoes both inside and outside  Use slippers with good   Do not wear shoes with high heels  · Wear a personal alarm  This is a device that allows you to call 911 if you fall and need help  Ask your healthcare provider for more information  · Stay active  Exercise can help strengthen your muscles and improve your balance  Your healthcare provider may recommend water aerobics or walking  He or she may also recommend physical therapy to improve your coordination  Never start an exercise program without talking to your healthcare provider first      · Manage medical conditions  Keep all appointments with your healthcare providers  Visit your eye doctor as directed  How can I make my home safer? · Add items to prevent falls in the bathroom  Put nonslip strips on your bath or shower floor to prevent you from slipping  Use a bath mat if you do not have carpet in the bathroom  This will prevent you from falling when you step out of the bath or shower  Use a shower seat so you do not need to stand while you shower  Sit on the toilet or a chair in your bathroom to dry yourself and put on clothing  This will prevent you from losing your balance from drying or dressing yourself while you are standing  · Keep paths clear  Remove books, shoes, and other objects from walkways and stairs  Place cords for telephones and lamps out of the way so that you do not need to walk over them  Tape them down if you cannot move them  Remove small rugs  If you cannot remove a rug, secure it with double-sided tape  This will prevent you from tripping  · Install bright lights in your home  Use night lights to help light paths to the bathroom or kitchen  Always turn on the light before you start walking  · Keep items you use often on shelves within reach  Do not use a step stool to help you reach an item  · Paint or place reflective tape on the edges of your stairs  This will help you see the stairs better  Call 911 or have someone else call if:   · You have fallen and are unconscious  · You have fallen and cannot move part of your body  Contact your healthcare provider if:   · You have fallen and have pain or a headache  · You have questions or concerns about your condition or care  CARE AGREEMENT:   You have the right to help plan your care  Learn about your health condition and how it may be treated  Discuss treatment options with your caregivers to decide what care you want to receive  You always have the right to refuse treatment  The above information is an  only  It is not intended as medical advice for individual conditions or treatments  Talk to your doctor, nurse or pharmacist before following any medical regimen to see if it is safe and effective for you  © 2017 [address] [facility] Information is for End User's use only and may not be sold, redistributed or otherwise used for commercial purposes  All illustrations and images included in CareNotes® are the copyrighted property of [employer]  or Yovani Simmons  Advance Directives   WHAT YOU NEED TO KNOW:   What are advance directives? Advance directives are legal documents that state your wishes and plans for medical care  These plans are made ahead of time in case you lose your ability to make decisions for yourself  Advance directives can apply to any medical decision, such as the treatments you want, and if you want to donate organs  What are the types of advance directives? There are many types of advance directives, and each state has rules about how to use them   You may choose a combination of any of the following:  · Living will: This is a written record of the treatment you want  You can also choose which treatments you do not want, which to limit, and which to stop at a certain time  This includes surgery, medicine, IV fluid, and tube feedings  · Durable power of  for healthcare [facility]): This is a written record that states who you want to make healthcare choices for you when you are unable to make them for yourself  This person, called a proxy, is usually a family member or a friend  You may choose more than 1 proxy  · Do not resuscitate (DNR) order:  A DNR order is used in case your heart stops beating or you stop breathing  It is a request not to have certain forms of treatment, such as CPR  A DNR order may be included in other types of advance directives  · Medical directive: This covers the care that you want if you are in a coma, near death, or unable to make decisions for yourself  You can list the treatments you want for each condition  Treatment may include pain medicine, surgery, blood transfusions, dialysis, IV or tube feedings, and a ventilator (breathing machine)  · Values history: This document has questions about your views, beliefs, and how you feel and think about life  This information can help others choose the care that you would choose  Why are advance directives important? An advance directive helps you control your care  Although spoken wishes may be used, it is better to have your wishes written down  Spoken wishes can be misunderstood, or not followed  Treatments may be given even if you do not want them  An advance directive may make it easier for your family to make difficult choices about your care  How do I decide what to put in my advance directives? · Make informed decisions:  Make sure you fully understand treatments or care you may receive   Think about the benefits and problems your decisions could cause for you or your family  Talk to healthcare providers if you have concerns or questions before you write down your wishes  You may also want to talk with your Taoism or , or a   Check your state laws to make sure that what you put in your advance directive is legal      · Sign all forms:  Sign and date your advance directive when you have finished  You may also need 2 witnesses to sign the forms  Witnesses cannot be your doctor or his staff, your spouse, heirs or beneficiaries, people you owe money to, or your chosen proxy  Talk to your family, proxy, and healthcare providers about your advance directive  Give each person a copy, and keep one for yourself in a place you can get to easily  Do not keep it hidden or locked away  · Review and revise your plans: You can revise your advance directive at any time, as long as you are able to make decisions  Review your plan every year, and when there are changes in your life, or your health  When you make changes, let your family, proxy, and healthcare providers know  Give each a new copy  Where can I find more information? · [employer]  [address] , [address]   Phone: 5- 863 - [phone]  Phone: 1- 443 - 787-7087  Web Address: http://www  aafp org  · [address] [facility])  [address] [facility], [address]  Phone: 5- 970 - [phone]  Phone: [phone]  Web Address: Lexis matthews  [facility] AGREEMENT:   You have the right to help plan your care  To help with this plan, you must learn about your health condition and treatment options  You must also learn about advance directives and how they are used  Work with your healthcare providers to decide what care will be used to treat you  You always have the right to refuse treatment  The above information is an  only   It is not intended as medical advice for individual conditions or treatments  Talk to your doctor, nurse or pharmacist before following any medical regimen to see if it is safe and effective for you  © 2017 [address] Information is for End User's use only and may not be sold, redistributed or otherwise used for commercial purposes  All illustrations and images included in CareNotes® are the copyrighted property of A D A M , Inc  or Yovani Simmons

## 2019-11-23 DIAGNOSIS — J45.20 MILD INTERMITTENT ASTHMA WITHOUT COMPLICATION: ICD-10-CM

## 2019-12-09 ENCOUNTER — APPOINTMENT (OUTPATIENT)
Dept: LAB | Facility: CLINIC | Age: 44
End: 2019-12-09
Payer: MEDICARE

## 2019-12-09 DIAGNOSIS — G80.1 SPASTIC DIPLEGIC CEREBRAL PALSY (HCC): Chronic | ICD-10-CM

## 2019-12-09 DIAGNOSIS — Z13.6 SCREENING FOR CARDIOVASCULAR CONDITION: ICD-10-CM

## 2019-12-09 DIAGNOSIS — Z00.00 MEDICARE ANNUAL WELLNESS VISIT, SUBSEQUENT: ICD-10-CM

## 2019-12-09 DIAGNOSIS — G40.109 LOCALIZATION-RELATED EPILEPSY (HCC): ICD-10-CM

## 2019-12-09 DIAGNOSIS — J45.20 MILD INTERMITTENT ASTHMA WITHOUT COMPLICATION: ICD-10-CM

## 2019-12-09 LAB
BASOPHILS # BLD AUTO: 0.09 THOUSANDS/ΜL (ref 0–0.1)
BASOPHILS NFR BLD AUTO: 1 % (ref 0–1)
CHOLEST SERPL-MCNC: 168 MG/DL (ref 50–200)
EOSINOPHIL # BLD AUTO: 0.52 THOUSAND/ΜL (ref 0–0.61)
EOSINOPHIL NFR BLD AUTO: 5 % (ref 0–6)
ERYTHROCYTE [DISTWIDTH] IN BLOOD BY AUTOMATED COUNT: 12.3 % (ref 11.6–15.1)
HCT VFR BLD AUTO: 47.1 % (ref 36.5–49.3)
HDLC SERPL-MCNC: 31 MG/DL
HGB BLD-MCNC: 15.4 G/DL (ref 12–17)
IMM GRANULOCYTES # BLD AUTO: 0.12 THOUSAND/UL (ref 0–0.2)
IMM GRANULOCYTES NFR BLD AUTO: 1 % (ref 0–2)
LDLC SERPL CALC-MCNC: 89 MG/DL (ref 0–100)
LYMPHOCYTES # BLD AUTO: 3.37 THOUSANDS/ΜL (ref 0.6–4.47)
LYMPHOCYTES NFR BLD AUTO: 30 % (ref 14–44)
MCH RBC QN AUTO: 29.8 PG (ref 26.8–34.3)
MCHC RBC AUTO-ENTMCNC: 32.7 G/DL (ref 31.4–37.4)
MCV RBC AUTO: 91 FL (ref 82–98)
MONOCYTES # BLD AUTO: 1.24 THOUSAND/ΜL (ref 0.17–1.22)
MONOCYTES NFR BLD AUTO: 11 % (ref 4–12)
NEUTROPHILS # BLD AUTO: 5.93 THOUSANDS/ΜL (ref 1.85–7.62)
NEUTS SEG NFR BLD AUTO: 52 % (ref 43–75)
NRBC BLD AUTO-RTO: 0 /100 WBCS
PLATELET # BLD AUTO: 296 THOUSANDS/UL (ref 149–390)
PMV BLD AUTO: 10.2 FL (ref 8.9–12.7)
RBC # BLD AUTO: 5.16 MILLION/UL (ref 3.88–5.62)
TRIGL SERPL-MCNC: 238 MG/DL
WBC # BLD AUTO: 11.27 THOUSAND/UL (ref 4.31–10.16)

## 2019-12-09 PROCEDURE — 80061 LIPID PANEL: CPT

## 2019-12-09 PROCEDURE — 85025 COMPLETE CBC W/AUTO DIFF WBC: CPT

## 2019-12-09 PROCEDURE — 36415 COLL VENOUS BLD VENIPUNCTURE: CPT

## 2020-01-08 ENCOUNTER — IMMUNIZATIONS (OUTPATIENT)
Dept: FAMILY MEDICINE CLINIC | Facility: CLINIC | Age: 45
End: 2020-01-08
Payer: MEDICARE

## 2020-01-08 DIAGNOSIS — Z23 NEED FOR INFLUENZA VACCINATION: Primary | ICD-10-CM

## 2020-01-08 DIAGNOSIS — F41.1 GAD (GENERALIZED ANXIETY DISORDER): ICD-10-CM

## 2020-01-08 PROCEDURE — G0008 ADMIN INFLUENZA VIRUS VAC: HCPCS

## 2020-01-08 PROCEDURE — 90686 IIV4 VACC NO PRSV 0.5 ML IM: CPT

## 2020-01-08 NOTE — TELEPHONE ENCOUNTER
06/14/2019 1 06/11/2019 ALPRAZOLAM 0 25 MG TABLET 30 0 10 NA LIT [id number] [facility] ([id number]) 0 [employer] [place]

## 2020-01-08 NOTE — TELEPHONE ENCOUNTER
Patient is asking for a refill for Alprazolam 0 25 mg 30 day supply  [employer]  Patient was in today  Controlled substance letter was signed with mothers help and placed in Dr Rosina Cleaning folder for his signature

## 2020-01-09 RX ORDER — ALPRAZOLAM 0.25 MG/1
0.25 TABLET ORAL 3 TIMES DAILY PRN
Qty: 90 TABLET | Refills: 0 | Status: SHIPPED | OUTPATIENT
Start: 2020-01-09 | End: 2022-02-03 | Stop reason: SDUPTHER

## 2020-01-15 DIAGNOSIS — J45.20 MILD INTERMITTENT ASTHMA WITHOUT COMPLICATION: ICD-10-CM

## 2020-04-24 DIAGNOSIS — G40.909 EPILEPSY (HCC): ICD-10-CM

## 2020-04-24 RX ORDER — LAMOTRIGINE 200 MG/1
TABLET ORAL
Qty: 180 TABLET | Refills: 3 | Status: SHIPPED | OUTPATIENT
Start: 2020-04-24 | End: 2020-07-29 | Stop reason: SDUPTHER

## 2020-06-13 DIAGNOSIS — J45.20 MILD INTERMITTENT ASTHMA WITHOUT COMPLICATION: ICD-10-CM

## 2020-07-21 DIAGNOSIS — G40.909 EPILEPSY (HCC): ICD-10-CM

## 2020-07-21 DIAGNOSIS — Z98.2 S/P VP SHUNT: ICD-10-CM

## 2020-07-21 NOTE — TELEPHONE ENCOUNTER
Received a request for medication refill  Patient has not been seen in over a year and does not have a follow up scheduled  Please call patient to schedule a follow up appointment so we can provide refills

## 2020-07-21 NOTE — TELEPHONE ENCOUNTER
Spoke to Gerry Rivas mother of the pt she will speak to him about scheduling appointment with Yadira Garvin

## 2020-07-29 RX ORDER — LAMOTRIGINE 200 MG/1
200 TABLET ORAL 2 TIMES DAILY
Qty: 180 TABLET | Refills: 3 | Status: SHIPPED | OUTPATIENT
Start: 2020-07-29 | End: 2021-10-12 | Stop reason: SDUPTHER

## 2020-07-29 RX ORDER — DIVALPROEX SODIUM 500 MG/1
TABLET, DELAYED RELEASE ORAL
Qty: 270 TABLET | Refills: 3 | Status: SHIPPED | OUTPATIENT
Start: 2020-07-29 | End: 2021-10-12 | Stop reason: SDUPTHER

## 2020-07-29 NOTE — TELEPHONE ENCOUNTER
Patient's mother requesting lamotrigine refill also  200 mg BID       Patient has a f/u with Leeann 10/6/2020

## 2020-07-29 NOTE — TELEPHONE ENCOUNTER
Called Erwin Zacarias and left message to return my call and schedule a follow up in order to receive refills  Received a request again today

## 2020-09-17 ENCOUNTER — OFFICE VISIT (OUTPATIENT)
Dept: FAMILY MEDICINE CLINIC | Facility: CLINIC | Age: 45
End: 2020-09-17
Payer: MEDICARE

## 2020-09-17 VITALS
WEIGHT: 206 LBS | SYSTOLIC BLOOD PRESSURE: 122 MMHG | RESPIRATION RATE: 16 BRPM | HEART RATE: 72 BPM | BODY MASS INDEX: 29.14 KG/M2 | DIASTOLIC BLOOD PRESSURE: 80 MMHG | TEMPERATURE: 97.4 F

## 2020-09-17 DIAGNOSIS — Z23 NEED FOR PNEUMOCOCCAL VACCINATION: ICD-10-CM

## 2020-09-17 DIAGNOSIS — G80.1 SPASTIC DIPLEGIC CEREBRAL PALSY (HCC): ICD-10-CM

## 2020-09-17 DIAGNOSIS — Z23 NEED FOR INFLUENZA VACCINATION: ICD-10-CM

## 2020-09-17 DIAGNOSIS — E78.1 HYPERTRIGLYCERIDEMIA: ICD-10-CM

## 2020-09-17 DIAGNOSIS — G40.109 LOCALIZATION-RELATED EPILEPSY (HCC): ICD-10-CM

## 2020-09-17 DIAGNOSIS — Q03.9 CONGENITAL HYDROCEPHALUS (HCC): ICD-10-CM

## 2020-09-17 DIAGNOSIS — J45.20 MILD INTERMITTENT ASTHMA WITHOUT COMPLICATION: Primary | ICD-10-CM

## 2020-09-17 DIAGNOSIS — Z00.00 MEDICARE ANNUAL WELLNESS VISIT, SUBSEQUENT: ICD-10-CM

## 2020-09-17 PROBLEM — L21.9 SEBORRHEIC DERMATITIS: Status: ACTIVE | Noted: 2017-11-01

## 2020-09-17 PROBLEM — G93.0 CEREBRAL CYST: Status: ACTIVE | Noted: 2017-11-08

## 2020-09-17 PROCEDURE — 90732 PPSV23 VACC 2 YRS+ SUBQ/IM: CPT

## 2020-09-17 PROCEDURE — G0008 ADMIN INFLUENZA VIRUS VAC: HCPCS

## 2020-09-17 PROCEDURE — 90686 IIV4 VACC NO PRSV 0.5 ML IM: CPT

## 2020-09-17 PROCEDURE — G0009 ADMIN PNEUMOCOCCAL VACCINE: HCPCS

## 2020-09-17 PROCEDURE — 99214 OFFICE O/P EST MOD 30 MIN: CPT

## 2020-09-17 PROCEDURE — G0439 PPPS, SUBSEQ VISIT: HCPCS

## 2020-09-17 NOTE — PATIENT INSTRUCTIONS
Medicare Preventive Visit Patient Instructions  Thank you for completing your Welcome to Medicare Visit or Medicare Annual Wellness Visit today  Your next wellness visit will be due in one year (9/17/2021)  The screening/preventive services that you may require over the next 5-10 years are detailed below  Some tests may not apply to you based off risk factors and/or age  Screening tests ordered at today's visit but not completed yet may show as past due  Also, please note that scanned in results may not display below  Preventive Screenings:  Service Recommendations Previous Testing/Comments   Colorectal Cancer Screening  · Colonoscopy    · Fecal Occult Blood Test (FOBT)/Fecal Immunochemical Test (FIT)  · Fecal DNA/Cologuard Test  · Flexible Sigmoidoscopy Age: 54-65 years old   Colonoscopy: every 10 years (May be performed more frequently if at higher risk)  OR  FOBT/FIT: every 1 year  OR  Cologuard: every 3 years  OR  Sigmoidoscopy: every 5 years  Screening may be recommended earlier than age 48 if at higher risk for colorectal cancer  Also, an individualized decision between you and your healthcare provider will decide whether screening between the ages of 74-80 would be appropriate   Colonoscopy: Not on file  FOBT/FIT: Not on file  Cologuard: Not on file  Sigmoidoscopy: Not on file         Prostate Cancer Screening Individualized decision between patient and health care provider in men between ages of 53-78   Medicare will cover every 12 months beginning on the day after your 50th birthday PSA: No results in last 5 years     Screening Not Indicated     Hepatitis C Screening Once for adults born between [facility]  More frequently in patients at high risk for Hepatitis C Hep C Antibody: Not on file       Diabetes Screening 1-2 times per year if you're at risk for diabetes or have pre-diabetes Fasting glucose: 88 mg/dL   A1C: No results in last 5 years       Cholesterol Screening Once every 5 years if you don't have a lipid disorder  May order more often based on risk factors  Lipid panel: 12/09/2019    Screening Current      Other Preventive Screenings Covered by Medicare:  1  Abdominal Aortic Aneurysm (AAA) Screening: covered once if your at risk  You're considered to be at risk if you have a family history of AAA or a male between the age of 73-68 who smoking at least 100 cigarettes in your lifetime  2  Lung Cancer Screening: covers low dose CT scan once per year if you meet all of the following conditions: (1) Age 50-69; (2) No signs or symptoms of lung cancer; (3) Current smoker or have quit smoking within the last 15 years; (4) You have a tobacco smoking history of at least 30 pack years (packs per day x number of years you smoked); (5) You get a written order from a healthcare provider  3  Glaucoma Screening: covered annually if you're considered high risk: (1) You have diabetes OR (2) Family history of glaucoma OR (3)  aged 48 and older OR (3)  American aged 72 and older  3  Osteoporosis Screening: covered every 2 years if you meet one of the following conditions: (1) Have a vertebral abnormality; (2) On glucocorticoid therapy for more than 3 months; (3) Have primary hyperparathyroidism; (4) On osteoporosis medications and need to assess response to drug therapy  5  HIV Screening: covered annually if you're between the age of 12-76  Also covered annually if you are younger than 13 and older than 72 with risk factors for HIV infection  For pregnant patients, it is covered up to 3 times per pregnancy      Immunizations:  Immunization Recommendations   Influenza Vaccine Annual influenza vaccination during flu season is recommended for all persons aged >= 6 months who do not have contraindications   Pneumococcal Vaccine (Prevnar and Pneumovax)  * Prevnar = PCV13  * Pneumovax = PPSV23 Adults 25-60 years old: 1-3 doses may be recommended based on certain risk factors  Adults 72 years old: Prevnar (PCV13) vaccine recommended followed by Pneumovax (PPSV23) vaccine  If already received PPSV23 since turning 65, then PCV13 recommended at least one year after PPSV23 dose  Hepatitis B Vaccine 3 dose series if at intermediate or high risk (ex: diabetes, end stage renal disease, liver disease)   Tetanus (Td) Vaccine - COST NOT COVERED BY MEDICARE PART B Following completion of primary series, a booster dose should be given every 10 years to maintain immunity against tetanus  Td may also be given as tetanus wound prophylaxis  Tdap Vaccine - COST NOT COVERED BY MEDICARE PART B Recommended at least once for all adults  For pregnant patients, recommended with each pregnancy  Shingles Vaccine (Shingrix) - COST NOT COVERED BY MEDICARE PART B  2 shot series recommended in those aged 48 and above     Health Maintenance Due:  There are no preventive care reminders to display for this patient  Immunizations Due:      Topic Date Due    Pneumococcal Vaccine: Pediatrics (0 to 5 Years) and At-Risk Patients (6 to 59 Years) (1 of 1 - PPSV23) [birth year]1981    Influenza Vaccine  07/01/2020     Advance Directives   What are advance directives? Advance directives are legal documents that state your wishes and plans for medical care  These plans are made ahead of time in case you lose your ability to make decisions for yourself  Advance directives can apply to any medical decision, such as the treatments you want, and if you want to donate organs  What are the types of advance directives? There are many types of advance directives, and each state has rules about how to use them  You may choose a combination of any of the following:  · Living will: This is a written record of the treatment you want  You can also choose which treatments you do not want, which to limit, and which to stop at a certain time  This includes surgery, medicine, IV fluid, and tube feedings     · Durable power of  for healthcare [facility]): This is a written record that states who you want to make healthcare choices for you when you are unable to make them for yourself  This person, called a proxy, is usually a family member or a friend  You may choose more than 1 proxy  · Do not resuscitate (DNR) order:  A DNR order is used in case your heart stops beating or you stop breathing  It is a request not to have certain forms of treatment, such as CPR  A DNR order may be included in other types of advance directives  · Medical directive: This covers the care that you want if you are in a coma, near death, or unable to make decisions for yourself  You can list the treatments you want for each condition  Treatment may include pain medicine, surgery, blood transfusions, dialysis, IV or tube feedings, and a ventilator (breathing machine)  · Values history: This document has questions about your views, beliefs, and how you feel and think about life  This information can help others choose the care that you would choose  Why are advance directives important? An advance directive helps you control your care  Although spoken wishes may be used, it is better to have your wishes written down  Spoken wishes can be misunderstood, or not followed  Treatments may be given even if you do not want them  An advance directive may make it easier for your family to make difficult choices about your care  © Copyright [employer] 2018 Information is for End User's use only and may not be sold, redistributed or otherwise used for commercial purposes   All illustrations and images included in CareNotes® are the copyrighted property of [employer]  or [address] [place] [address] "[employer]

## 2020-09-17 NOTE — PROGRESS NOTES
Assessment/Plan:     Diagnoses and all orders for this visit:    Mild intermittent asthma without complication  -     fluticasone-salmeterol (ADVAIR, WIXELA) 100-50 mcg/dose inhaler; Inhale 1 puff 2 (two) times a day Rinse mouth after use  -     PNEUMOCOCCAL POLYSACCHARIDE VACCINE 23-VALENT =>1YO SQ IM    Hypertriglyceridemia  -     Lipid panel    Congenital hydrocephalus (HCC)    Spastic diplegic cerebral palsy (HCC)    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[facility] [address] )    Medicare annual wellness visit, subsequent    Need for pneumococcal vaccination  -     PNEUMOCOCCAL POLYSACCHARIDE VACCINE 23-VALENT =>1YO SQ IM    Need for influenza vaccination  -     influenza vaccine, quadrivalent, 0 5 mL, preservative-free, for adult and pediatric patients 6 mos+ (AFLURIA, FLUARIX, FLULAVAL, FLUZONE)          Continue with current medications  Restart Advair 100/5 one puff BID  Flu vaccine and Pneumovax 23 today  Follow-up with neurology  Repeat lipid profile with next labs     Patient ID: Shannan Amezcua is a 40 y o  male  Follow-up visit  Medications reviewed  Here with her mother  Cerebral palsy with  shunt  S/p shunt revision 2018  Seizure disorder-patient has been seizure free since his shunt revision on current regimen  He is followed by Neurology  He has resumed working at [employer]  Lipid profile 12/2019 cholesterol 168  Triglycerides 238  HDL 31   LDL 89  10/2018 FBS 88       Lab Results   Component Value Date    WBC 11 27 (H) 12/09/2019    HGB 15 4 12/09/2019    HCT 47 1 12/09/2019    MCV 91 12/09/2019     12/09/2019       Lab Results   Component Value Date    CHOLESTEROL 168 12/09/2019    CHOLESTEROL 162 09/12/2017     Lab Results   Component Value Date    HDL 31 (L) 12/09/2019    HDL 40 09/12/2017     Lab Results   Component Value Date    TRIG 238 (H) 12/09/2019    TRIG 128 09/12/2017     Lab Results   Component Value Date    LDLCALC 89 12/09/2019         The following portions of the patient's history were reviewed and updated as appropriate: allergies, current medications, past family history, past medical history, past social history, past surgical history and problem list     Review of Systems   Constitutional: Positive for unexpected weight change (6 lb weight gain from 06/2019)  Negative for appetite change, chills and fever  HENT: Negative for congestion, ear pain, rhinorrhea, sore throat and trouble swallowing  Eyes: Negative for visual disturbance  Respiratory: Positive for cough and wheezing  Negative for shortness of breath  Asthma he has been using prn Albuterol MDI  His mother reports he was less symptomatic when was on Advair daily  Cardiovascular: Negative for chest pain, palpitations and leg swelling  Gastrointestinal: Negative for abdominal pain, blood in stool, constipation, diarrhea, nausea and vomiting  Endocrine: Negative for polydipsia and polyuria  Genitourinary: Negative for difficulty urinating  Musculoskeletal: Negative for arthralgias and myalgias  Skin: Negative for rash  Allergic/Immunologic: Negative for environmental allergies  Neurological: Positive for seizures and weakness  Negative for dizziness and headaches  See HPI    Hematological: Negative for adenopathy  Does not bruise/bleed easily  Psychiatric/Behavioral: Negative for dysphoric mood and sleep disturbance  Objective:    /80   Pulse 72   Temp (!) 97 4 °F (36 3 °C)   Resp 16   Wt 93 4 kg (206 lb)   BMI 29 14 kg/m²     Wt Readings from Last 3 Encounters:   09/17/20 93 4 kg (206 lb)   07/09/19 91 2 kg (201 lb)   06/11/19 90 9 kg (200 lb 8 oz)          Physical Exam  Vitals signs and nursing note reviewed  Constitutional:       General: He is not in acute distress  Appearance: He is well-developed  HENT:      Right Ear: Tympanic membrane normal       Left Ear: Tympanic membrane normal    Eyes:      General: No scleral icterus       Conjunctiva/sclera: Conjunctivae normal    Neck:      Thyroid: No thyroid mass or thyromegaly  Vascular: No carotid bruit or JVD  Trachea: No tracheal deviation  Cardiovascular:      Rate and Rhythm: Normal rate and regular rhythm  Pulses:           Carotid pulses are 2+ on the right side and 2+ on the left side  Heart sounds: Normal heart sounds  No murmur  No gallop  Pulmonary:      Effort: Pulmonary effort is normal  No respiratory distress  Breath sounds: Normal breath sounds  No wheezing or rales  Abdominal:      General: Bowel sounds are normal  There is no distension or abdominal bruit  Palpations: Abdomen is soft  There is no mass  Tenderness: There is no abdominal tenderness  There is no guarding or rebound  Musculoskeletal:      Right lower leg: No edema  Left lower leg: No edema  Lymphadenopathy:      Cervical: No cervical adenopathy  Skin:     Findings: No rash  Nails: There is no clubbing  Neurological:      Mental Status: He is alert and oriented to person, place, and time  Motor: Atrophy present  Deep Tendon Reflexes: Reflexes abnormal (hyperreflexia  )  Comments: Atrophy right calf      Psychiatric:         Mood and Affect: Mood normal          Behavior: Behavior normal

## 2020-09-17 NOTE — PROGRESS NOTES
Assessment and Plan:     Problem List Items Addressed This Visit        Respiratory    Mild intermittent asthma without complication - Primary    Relevant Medications    fluticasone-salmeterol (ADVAIR, WIXELA) 100-50 mcg/dose inhaler    Other Relevant Orders    PNEUMOCOCCAL POLYSACCHARIDE VACCINE 23-VALENT =>1YO SQ IM (Completed)       Nervous and Auditory    Cerebral palsy (HCC) (Chronic)    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[facility] [address] )    Congenital hydrocephalus ([facility] )      Other Visit Diagnoses     Hypertriglyceridemia        Relevant Orders    Lipid panel    Medicare annual wellness visit, subsequent        Need for pneumococcal vaccination        Relevant Orders    PNEUMOCOCCAL POLYSACCHARIDE VACCINE 23-VALENT =>1YO SQ IM (Completed)    Need for influenza vaccination        Relevant Orders    influenza vaccine, quadrivalent, 0 5 mL, preservative-free, for adult and pediatric patients 6 mos+ (AFLURIA, FLUARIX, FLULAVAL, FLUZONE) (Completed)           Preventive health issues were discussed with patient, and age appropriate screening tests were ordered as noted in patient's After Visit Summary  Personalized health advice and appropriate referrals for health education or preventive services given if needed, as noted in patient's After Visit Summary  BMI Counseling: Body mass index is 29 14 kg/m²  The BMI is above normal  Nutrition recommendations include reducing portion sizes, decreasing overall calorie intake, consuming healthier snacks, moderation in carbohydrate intake, reducing intake of saturated fat and trans fat and reducing intake of cholesterol  Exercise recommendations include exercising 3-5 times per week         History of Present Illness:     Patient presents for Medicare Annual Wellness visit    Patient Care Team:  Brandy Lemons MD as PCP - General (Family Medicine)  MIK Hummel MD Georg Rei, MD (Neurology)     Problem List:     Patient Active Problem List   Diagnosis  Cerebral palsy (HCC)    Migraine with aura and without status migrainosus, not intractable    Tremor     (ventriculoperitoneal) shunt status    Seizure disorder (HCC)    S/P  shunt    Headache    Mild intermittent asthma without complication    Shunt malfunction    Left-sided back pain    Leukocytosis    Constipation    Transition of care performed with sharing of clinical summary    Vitamin D deficiency    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[facility] [address] )    Hydrocephalus with operating shunt ([facility] [address] )    QASIM (generalized anxiety disorder)    Seborrheic dermatitis    Cerebral cyst    Congenital hydrocephalus ([facility] [address] )      Past Medical and Surgical History:     Past Medical History:   Diagnosis Date    Asthma     Cerebral palsy ([facility] [address] )     Congenital hydrocephalus ([facility] [address] )     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[facility] [address] )     Migraines     Seizures ([facility] [address] )      Past Surgical History:   Procedure Laterality Date    BRAIN SURGERY      Multiple  shunt revisions    EYE SURGERY  12/23/2015    HERNIA REPAIR  12/23/2015    IL CREATE SHUNT:VENTRIC-PERITONEAL Left 10/23/2017    Procedure: SHUNT VENTRICULAR-PERITONEAL revision;  Surgeon: Aiyana Erwin MD;  [facility]: [facility];  Service: Neurosurgery    IL LAP,DIAGNOSTIC ABDOMEN N/A 2/8/2018    Procedure: DIAGNOSTIC LAPAROSCOPY, LYSIS OF ADHESIONS, OPEN LAPAROTOMY, LYSIS OF ADHESIONS, [address] [facility] OF  SHUNT TUBING;  Surgeon: Angelina Farias MD;  Location: [facility];  Service: General      Family History:     Family History   Problem Relation Age of Onset    Hyperlipidemia Mother     Hypertension Mother     Breast cancer Mother     Hypertension Father       Social History:        Social History     Socioeconomic History    Marital status: Single     Spouse name: Not on file    Number of children: Not on file    Years of education: Not on file    Highest education level: Not on file   Occupational History    Not on file   Social Needs    Financial resource strain: Not on file    Food insecurity     Worry: Not on file     Inability: Not on file    Transportation needs     Medical: Not on file     Non-medical: Not on file   Tobacco Use    Smoking status: Never Smoker    Smokeless tobacco: Never Used    Tobacco comment: N/A   Substance and Sexual Activity    Alcohol use: No     Comment: N/A    Drug use: No     Comment: N/A    Sexual activity: Never     Comment: N/A   Lifestyle    Physical activity     Days per week: Not on file     Minutes per session: Not on file    Stress: Not on file   Relationships    Social connections     Talks on phone: Not on file     Gets together: Not on file     Attends Jain service: Not on file     Active member of club or organization: Not on file     Attends meetings of clubs or organizations: Not on file     Relationship status: Not on file    Intimate partner violence     Fear of current or ex partner: Not on file     Emotionally abused: Not on file     Physically abused: Not on file     Forced sexual activity: Not on file   Other Topics Concern    Not on file   Social History Narrative    Not on file      Medications and Allergies:     Current Outpatient Medications   Medication Sig Dispense Refill    ALPRAZolam (XANAX) 0 25 mg tablet Take 1 tablet (0 25 mg total) by mouth 3 (three) times a day as needed (PRN) 90 tablet 0    ascorbic acid (VITAMIN C) 500 mg tablet Take 500 mg by mouth daily      Cholecalciferol (VITAMIN D3) 2000 units capsule Take 2,000 Units by mouth daily Pt takes 2,000 units per day        divalproex sodium (DEPAKOTE) 500 mg EC tablet TAKE 1 TABLET(500 MG TOTAL) IN THE MORNING AND 2 TABLETS(1000 MG) AT NIGHT 270 tablet 3    lamoTRIgine (LaMICtal) 200 MG tablet Take 1 tablet (200 mg total) by mouth 2 (two) times a day 180 tablet 3    VENTOLIN  (90 Base) MCG/ACT inhaler INHALE 1 PUFF BY MOUTH EVERY 6 HOURS AS NEEDED FOR WHEEZING 18 g 1    fluticasone-salmeterol (ADVAIR, WIXELA) 100-50 mcg/dose inhaler Inhale 1 puff 2 (two) times a day Rinse mouth after use  60 each 5     No current facility-administered medications for this visit  Allergies   Allergen Reactions    Latex Hives    Other      Cats, seasonal    Pollen Extract       Immunizations:     Immunization History   Administered Date(s) Administered    INFLUENZA 01/01/2015, 11/09/2015, 11/03/2016, 09/26/2017, 11/06/2018    Influenza Quadrivalent, 6-35 Months IM 09/26/2017    Influenza TIV (IM) 01/01/2015, 11/03/2016    Influenza, injectable, quadrivalent, preservative free 0 5 mL 11/06/2018, 01/08/2020, 09/17/2020    Pneumococcal Polysaccharide PPV23 09/17/2020    TD (adult) Preservative Free 07/09/2019      Health Maintenance: There are no preventive care reminders to display for this patient  There are no preventive care reminders to display for this patient  Medicare Health Risk Assessment:     /80   Pulse 72   Temp (!) 97 4 °F (36 3 °C)   Resp 16   Wt 93 4 kg (206 lb)   BMI 29 14 kg/m²      Meagan Marcial is here for his Subsequent Wellness visit  Last Medicare Wellness visit information reviewed, patient interviewed and updates made to the record today  Health Risk Assessment:   Patient rates overall health as good  Patient feels that their physical health rating is same  Eyesight was rated as same  Hearing was rated as same  Patient feels that their emotional and mental health rating is same  Pain experienced in the last 7 days has been none  Patient states that he has experienced no weight loss or gain in last 6 months  Depression Screening:   PHQ-2 Score: 0      Fall Risk Screening: In the past year, patient has experienced: history of falling in past year    Number of falls: 1  Injured during fall?: No    Feels unsteady when standing or walking?: Yes    Worried about falling?: No      Home Safety:  Patient does not have trouble with stairs inside or outside of their home   Patient has working smoke alarms and has working carbon monoxide detector  Home safety hazards include: none  Nutrition:   Current diet is Regular  Medications:   Patient is currently taking over-the-counter supplements  OTC medications include: see medication list  Patient is able to manage medications  Activities of Daily Living (ADLs)/Instrumental Activities of Daily Living (IADLs):   Walk and transfer into and out of bed and chair?: Yes  Dress and groom yourself?: Yes    Bathe or shower yourself?: Yes    Feed yourself? Yes  Do your laundry/housekeeping?: No  Manage your money, pay your bills and track your expenses?: No  Make your own meals?: No    Do your own shopping?: No    Previous Hospitalizations:   Any hospitalizations or ED visits within the last 12 months?: No      Advance Care Planning:   Living will: No    Advanced directive: No      Cognitive Screening:   Provider or family/friend/caregiver concerned regarding cognition?: No    PREVENTIVE SCREENINGS      Cardiovascular Screening:    General: Screening Current      Colorectal Cancer Screening:     General: Screening Not Indicated      Prostate Cancer Screening:    General: Screening Not Indicated      Osteoporosis Screening:    General: Screening Not Indicated      Abdominal Aortic Aneurysm (AAA) Screening:        General: Screening Not Indicated      Lung Cancer Screening:     General: Screening Not Indicated      Hepatitis C Screening:    General: Screening Not Indicated    Other Counseling Topics:   Regular weightbearing exercise         Marquita Dudley MD

## 2020-09-28 ENCOUNTER — TELEPHONE (OUTPATIENT)
Dept: NEUROLOGY | Facility: CLINIC | Age: 45
End: 2020-09-28

## 2020-09-28 NOTE — TELEPHONE ENCOUNTER
LMOM with Da Dupree in regards to confirming upcoming appointment with Leeann    Appointment is scheduled for     Date:  10/6/2020  Time:  3:00  Location: [facility]

## 2020-10-05 DIAGNOSIS — J45.20 MILD INTERMITTENT ASTHMA WITHOUT COMPLICATION: ICD-10-CM

## 2020-10-06 ENCOUNTER — OFFICE VISIT (OUTPATIENT)
Dept: NEUROLOGY | Facility: CLINIC | Age: 45
End: 2020-10-06
Payer: MEDICARE

## 2020-10-06 VITALS
TEMPERATURE: 97.6 F | HEART RATE: 84 BPM | SYSTOLIC BLOOD PRESSURE: 120 MMHG | WEIGHT: 203 LBS | DIASTOLIC BLOOD PRESSURE: 70 MMHG | BODY MASS INDEX: 28.72 KG/M2

## 2020-10-06 DIAGNOSIS — G91.9 HYDROCEPHALUS WITH OPERATING SHUNT (HCC): ICD-10-CM

## 2020-10-06 DIAGNOSIS — E55.9 VITAMIN D DEFICIENCY: ICD-10-CM

## 2020-10-06 DIAGNOSIS — G44.52 NEW DAILY PERSISTENT HEADACHE: ICD-10-CM

## 2020-10-06 DIAGNOSIS — G40.109 LOCALIZATION-RELATED EPILEPSY (HCC): Primary | ICD-10-CM

## 2020-10-06 PROCEDURE — 99214 OFFICE O/P EST MOD 30 MIN: CPT | Performed by: NURSE PRACTITIONER

## 2020-11-13 ENCOUNTER — TELEPHONE (OUTPATIENT)
Dept: NEUROLOGY | Facility: CLINIC | Age: 45
End: 2020-11-13

## 2021-03-23 DIAGNOSIS — J45.20 MILD INTERMITTENT ASTHMA WITHOUT COMPLICATION: ICD-10-CM

## 2021-03-24 DIAGNOSIS — J45.20 MILD INTERMITTENT ASTHMA WITHOUT COMPLICATION: ICD-10-CM

## 2021-03-24 RX ORDER — ALBUTEROL SULFATE 90 UG/1
AEROSOL, METERED RESPIRATORY (INHALATION)
Qty: 54 G | Refills: 0 | Status: SHIPPED | OUTPATIENT
Start: 2021-03-24 | End: 2022-01-24 | Stop reason: SDUPTHER

## 2021-03-24 RX ORDER — ALBUTEROL SULFATE 90 UG/1
1 AEROSOL, METERED RESPIRATORY (INHALATION) EVERY 6 HOURS PRN
Qty: 18 G | Refills: 1 | Status: SHIPPED | OUTPATIENT
Start: 2021-03-24 | End: 2021-03-24

## 2021-03-27 ENCOUNTER — IMMUNIZATIONS (OUTPATIENT)
Dept: FAMILY MEDICINE CLINIC | Facility: HOSPITAL | Age: 46
End: 2021-03-27

## 2021-03-27 DIAGNOSIS — Z23 ENCOUNTER FOR IMMUNIZATION: Primary | ICD-10-CM

## 2021-03-27 PROCEDURE — 91300 SARS-COV-2 / COVID-19 MRNA VACCINE (PFIZER-BIONTECH) 30 MCG: CPT

## 2021-03-27 PROCEDURE — 0001A SARS-COV-2 / COVID-19 MRNA VACCINE (PFIZER-BIONTECH) 30 MCG: CPT

## 2021-04-17 ENCOUNTER — IMMUNIZATIONS (OUTPATIENT)
Dept: FAMILY MEDICINE CLINIC | Facility: HOSPITAL | Age: 46
End: 2021-04-17

## 2021-04-17 DIAGNOSIS — Z23 ENCOUNTER FOR IMMUNIZATION: Primary | ICD-10-CM

## 2021-04-17 PROCEDURE — 91300 SARS-COV-2 / COVID-19 MRNA VACCINE (PFIZER-BIONTECH) 30 MCG: CPT

## 2021-04-17 PROCEDURE — 0002A SARS-COV-2 / COVID-19 MRNA VACCINE (PFIZER-BIONTECH) 30 MCG: CPT

## 2021-09-28 ENCOUNTER — TELEPHONE (OUTPATIENT)
Dept: NEUROLOGY | Facility: CLINIC | Age: 46
End: 2021-09-28

## 2021-09-28 NOTE — TELEPHONE ENCOUNTER
Called and spoke to patient's mom - confirmed upcoming appointment with Raymundo Floyd on 10/12/21 11:00 am at the [place] office  Provided patient with apt date, time and location  Informed patient that check in is at least 15 minutes prior to apt time  She stated she will have bloodwork done before the come to the appointment

## 2021-09-29 ENCOUNTER — APPOINTMENT (OUTPATIENT)
Dept: LAB | Facility: CLINIC | Age: 46
End: 2021-09-29
Payer: MEDICARE

## 2021-09-29 DIAGNOSIS — G40.109 LOCALIZATION-RELATED EPILEPSY (HCC): ICD-10-CM

## 2021-09-29 DIAGNOSIS — E55.9 VITAMIN D DEFICIENCY: ICD-10-CM

## 2021-09-29 LAB
25(OH)D3 SERPL-MCNC: 59 NG/ML (ref 30–100)
ALBUMIN SERPL BCP-MCNC: 3.5 G/DL (ref 3.5–5)
ALP SERPL-CCNC: 55 U/L (ref 46–116)
ALT SERPL W P-5'-P-CCNC: 19 U/L (ref 12–78)
ANION GAP SERPL CALCULATED.3IONS-SCNC: 2 MMOL/L (ref 4–13)
AST SERPL W P-5'-P-CCNC: 6 U/L (ref 5–45)
BASOPHILS # BLD AUTO: 0.09 THOUSANDS/ΜL (ref 0–0.1)
BASOPHILS NFR BLD AUTO: 1 % (ref 0–1)
BILIRUB SERPL-MCNC: 0.51 MG/DL (ref 0.2–1)
BUN SERPL-MCNC: 17 MG/DL (ref 5–25)
CALCIUM SERPL-MCNC: 8.9 MG/DL (ref 8.3–10.1)
CHLORIDE SERPL-SCNC: 106 MMOL/L (ref 100–108)
CHOLEST SERPL-MCNC: 148 MG/DL (ref 50–200)
CO2 SERPL-SCNC: 32 MMOL/L (ref 21–32)
CREAT SERPL-MCNC: 0.74 MG/DL (ref 0.6–1.3)
EOSINOPHIL # BLD AUTO: 0.38 THOUSAND/ΜL (ref 0–0.61)
EOSINOPHIL NFR BLD AUTO: 4 % (ref 0–6)
ERYTHROCYTE [DISTWIDTH] IN BLOOD BY AUTOMATED COUNT: 12.2 % (ref 11.6–15.1)
GFR SERPL CREATININE-BSD FRML MDRD: 111 ML/MIN/1.73SQ M
GLUCOSE P FAST SERPL-MCNC: 85 MG/DL (ref 65–99)
HCT VFR BLD AUTO: 46.8 % (ref 36.5–49.3)
HDLC SERPL-MCNC: 38 MG/DL
HGB BLD-MCNC: 15.7 G/DL (ref 12–17)
IMM GRANULOCYTES # BLD AUTO: 0.15 THOUSAND/UL (ref 0–0.2)
IMM GRANULOCYTES NFR BLD AUTO: 2 % (ref 0–2)
LDLC SERPL CALC-MCNC: 86 MG/DL (ref 0–100)
LYMPHOCYTES # BLD AUTO: 3.02 THOUSANDS/ΜL (ref 0.6–4.47)
LYMPHOCYTES NFR BLD AUTO: 30 % (ref 14–44)
MCH RBC QN AUTO: 30.5 PG (ref 26.8–34.3)
MCHC RBC AUTO-ENTMCNC: 33.5 G/DL (ref 31.4–37.4)
MCV RBC AUTO: 91 FL (ref 82–98)
MONOCYTES # BLD AUTO: 0.91 THOUSAND/ΜL (ref 0.17–1.22)
MONOCYTES NFR BLD AUTO: 9 % (ref 4–12)
NEUTROPHILS # BLD AUTO: 5.4 THOUSANDS/ΜL (ref 1.85–7.62)
NEUTS SEG NFR BLD AUTO: 54 % (ref 43–75)
NONHDLC SERPL-MCNC: 110 MG/DL
NRBC BLD AUTO-RTO: 0 /100 WBCS
PLATELET # BLD AUTO: 272 THOUSANDS/UL (ref 149–390)
PMV BLD AUTO: 9.6 FL (ref 8.9–12.7)
POTASSIUM SERPL-SCNC: 3.9 MMOL/L (ref 3.5–5.3)
PROT SERPL-MCNC: 6.7 G/DL (ref 6.4–8.2)
RBC # BLD AUTO: 5.14 MILLION/UL (ref 3.88–5.62)
SODIUM SERPL-SCNC: 140 MMOL/L (ref 136–145)
TRIGL SERPL-MCNC: 120 MG/DL
VALPROATE SERPL-MCNC: 66 UG/ML (ref 50–100)
WBC # BLD AUTO: 9.95 THOUSAND/UL (ref 4.31–10.16)

## 2021-09-29 PROCEDURE — 36415 COLL VENOUS BLD VENIPUNCTURE: CPT

## 2021-09-29 PROCEDURE — 80164 ASSAY DIPROPYLACETIC ACD TOT: CPT

## 2021-09-29 PROCEDURE — 80175 DRUG SCREEN QUAN LAMOTRIGINE: CPT

## 2021-09-29 PROCEDURE — 85025 COMPLETE CBC W/AUTO DIFF WBC: CPT

## 2021-09-29 PROCEDURE — 80061 LIPID PANEL: CPT | Performed by: FAMILY MEDICINE

## 2021-09-29 PROCEDURE — 80053 COMPREHEN METABOLIC PANEL: CPT

## 2021-09-29 PROCEDURE — 82306 VITAMIN D 25 HYDROXY: CPT

## 2021-10-01 ENCOUNTER — TELEPHONE (OUTPATIENT)
Dept: FAMILY MEDICINE CLINIC | Facility: CLINIC | Age: 46
End: 2021-10-01

## 2021-10-01 LAB — LAMOTRIGINE SERPL-MCNC: 15.4 UG/ML (ref 2–20)

## 2021-10-12 ENCOUNTER — OFFICE VISIT (OUTPATIENT)
Dept: NEUROLOGY | Facility: CLINIC | Age: 46
End: 2021-10-12
Payer: MEDICARE

## 2021-10-12 VITALS
HEIGHT: 70 IN | SYSTOLIC BLOOD PRESSURE: 129 MMHG | TEMPERATURE: 97.3 F | DIASTOLIC BLOOD PRESSURE: 74 MMHG | BODY MASS INDEX: 29.78 KG/M2 | WEIGHT: 208 LBS | HEART RATE: 94 BPM

## 2021-10-12 DIAGNOSIS — Z98.2 S/P VP SHUNT: ICD-10-CM

## 2021-10-12 DIAGNOSIS — G91.9 HYDROCEPHALUS WITH OPERATING SHUNT (HCC): ICD-10-CM

## 2021-10-12 DIAGNOSIS — G40.909 EPILEPSY (HCC): ICD-10-CM

## 2021-10-12 PROCEDURE — 99213 OFFICE O/P EST LOW 20 MIN: CPT | Performed by: NURSE PRACTITIONER

## 2021-10-12 RX ORDER — LAMOTRIGINE 200 MG/1
200 TABLET ORAL 2 TIMES DAILY
Qty: 180 TABLET | Refills: 3 | Status: SHIPPED | OUTPATIENT
Start: 2021-10-12

## 2021-10-12 RX ORDER — DIVALPROEX SODIUM 500 MG/1
TABLET, DELAYED RELEASE ORAL
Qty: 270 TABLET | Refills: 3 | Status: SHIPPED | OUTPATIENT
Start: 2021-10-12 | End: 2021-10-14

## 2021-10-14 DIAGNOSIS — Z98.2 S/P VP SHUNT: ICD-10-CM

## 2021-10-14 RX ORDER — DIVALPROEX SODIUM 500 MG/1
TABLET, DELAYED RELEASE ORAL
Qty: 270 TABLET | Refills: 3 | Status: SHIPPED | OUTPATIENT
Start: 2021-10-14

## 2021-11-17 ENCOUNTER — IMMUNIZATIONS (OUTPATIENT)
Dept: FAMILY MEDICINE CLINIC | Facility: CLINIC | Age: 46
End: 2021-11-17
Payer: MEDICARE

## 2021-11-17 DIAGNOSIS — Z23 ENCOUNTER FOR IMMUNIZATION: Primary | ICD-10-CM

## 2021-11-17 PROCEDURE — 90686 IIV4 VACC NO PRSV 0.5 ML IM: CPT

## 2021-11-17 PROCEDURE — G0008 ADMIN INFLUENZA VIRUS VAC: HCPCS

## 2021-12-18 ENCOUNTER — IMMUNIZATIONS (OUTPATIENT)
Dept: FAMILY MEDICINE CLINIC | Facility: HOSPITAL | Age: 46
End: 2021-12-18

## 2021-12-18 DIAGNOSIS — Z23 ENCOUNTER FOR IMMUNIZATION: Primary | ICD-10-CM

## 2021-12-18 PROCEDURE — 91300 COVID-19 PFIZER VACC 0.3 ML: CPT

## 2021-12-18 PROCEDURE — 0001A COVID-19 PFIZER VACC 0.3 ML: CPT

## 2022-01-24 ENCOUNTER — OFFICE VISIT (OUTPATIENT)
Dept: FAMILY MEDICINE CLINIC | Facility: CLINIC | Age: 47
End: 2022-01-24
Payer: MEDICARE

## 2022-01-24 VITALS
TEMPERATURE: 96.3 F | WEIGHT: 210.5 LBS | HEART RATE: 94 BPM | BODY MASS INDEX: 30.14 KG/M2 | SYSTOLIC BLOOD PRESSURE: 130 MMHG | OXYGEN SATURATION: 96 % | DIASTOLIC BLOOD PRESSURE: 90 MMHG | HEIGHT: 70 IN

## 2022-01-24 DIAGNOSIS — G91.9 HYDROCEPHALUS WITH OPERATING SHUNT (HCC): ICD-10-CM

## 2022-01-24 DIAGNOSIS — F41.1 GAD (GENERALIZED ANXIETY DISORDER): Primary | ICD-10-CM

## 2022-01-24 DIAGNOSIS — Q03.9 CONGENITAL HYDROCEPHALUS (HCC): ICD-10-CM

## 2022-01-24 DIAGNOSIS — G40.909 NONINTRACTABLE EPILEPSY WITHOUT STATUS EPILEPTICUS, UNSPECIFIED EPILEPSY TYPE (HCC): ICD-10-CM

## 2022-01-24 DIAGNOSIS — J45.20 MILD INTERMITTENT ASTHMA WITHOUT COMPLICATION: ICD-10-CM

## 2022-01-24 DIAGNOSIS — G80.1 SPASTIC DIPLEGIC CEREBRAL PALSY (HCC): ICD-10-CM

## 2022-01-24 DIAGNOSIS — G40.109 LOCALIZATION-RELATED EPILEPSY (HCC): ICD-10-CM

## 2022-01-24 PROCEDURE — 99214 OFFICE O/P EST MOD 30 MIN: CPT | Performed by: PHYSICIAN ASSISTANT

## 2022-01-24 RX ORDER — ALBUTEROL SULFATE 90 UG/1
2 AEROSOL, METERED RESPIRATORY (INHALATION) EVERY 4 HOURS PRN
Qty: 54 G | Refills: 0 | Status: SHIPPED | OUTPATIENT
Start: 2022-01-24

## 2022-01-24 NOTE — PROGRESS NOTES
Assessment/Plan:    Cerebral palsy ([address] )  Followed by Neurology     Mild intermittent asthma without complication  Stable with infrequent exacerbations, no night time symptoms  Discussed importance of adherence  - Refilled Advair and albuterol  QASIM (generalized anxiety disorder)  Uses Xanax 0 25 mg 1-2 times a week, more frequent issues during travel    Controlled Substance Review    [place] or [place]  reviewed: No red flags were identified; safe to proceed with prescription  Pt requested Xanax refilled from PCP    Localization-related epilepsy [facility])  Managed by Neurology  Seizure free for over 1 year  Currently managed on Depakote 500 mg in AM, 1000 mg in PM and lamotrigine 200 mg BID    Directed to FU for AWV in 3 months  Subjective:    Patient ID: Sharyle Fare is a 55 y o  male  Pt is presenting today with mother for Annual visit and refill of medications  His mother is concerned about him frequently clearing his throat and producing mucous  Takes Zyxal and night and Benadryl during the day  He uses Advair intermittently with poor adherence  Uses albuterol 3-4 times a month, uncertain of trigger  Allergies to cats, dobson, leaves, cut grass, worse in spring and fall  Previously diagnosed with DARRIUS but does not use a CPAP device  He denies apneic episodes  Requesting refill of Xanax 0 25 mg  Per mother he uses typically 1 tablet a week, occasional 2 with anxiety around traveling  (Used 81 tablets over the past year, averaging 6 75 tablets a month )    Itchy flaky slightly red rash on forehead, no improvement with OTC hydrocortisone or prescription steroid  Asthma  There is no cough, shortness of breath or wheezing  Pertinent negatives include no chest pain or fever  His past medical history is significant for asthma       The following portions of the patient's history were reviewed and updated as appropriate: allergies, current medications, past family history, past medical history, past social history, past surgical history and problem list     Review of Systems   Constitutional: Negative for activity change, fatigue, fever and unexpected weight change  Respiratory: Negative for cough, shortness of breath and wheezing  Cardiovascular: Negative for chest pain, palpitations and leg swelling  Gastrointestinal: Negative for constipation, diarrhea, nausea and vomiting  Musculoskeletal: Negative for back pain, neck pain and neck stiffness  Skin: Negative for rash  Neurological: Positive for seizures  Objective:  /90 (BP Location: Left arm, Patient Position: Sitting, Cuff Size: Standard)   Pulse 94   Temp (!) 96 3 °F (35 7 °C)   Ht 5' 10" (1 778 m)   Wt 95 5 kg (210 lb 8 oz)   SpO2 96%   BMI 30 20 kg/m²      Physical Exam  Constitutional:       Appearance: He is well-developed  HENT:      Head: Normocephalic and atraumatic  Right Ear: Tympanic membrane and external ear normal       Left Ear: Tympanic membrane and external ear normal       Nose: Nose normal  No rhinorrhea  Mouth/Throat:      Pharynx: No oropharyngeal exudate  Neck:      Thyroid: No thyromegaly  Cardiovascular:      Rate and Rhythm: Normal rate and regular rhythm  Heart sounds: Normal heart sounds  No murmur heard  No friction rub  No gallop  Pulmonary:      Effort: Pulmonary effort is normal       Breath sounds: Normal breath sounds  No wheezing or rales  Abdominal:      General: Bowel sounds are normal  There is no distension  Palpations: Abdomen is soft  Tenderness: There is no abdominal tenderness  There is no guarding or rebound  Musculoskeletal:         General: Normal range of motion  Cervical back: Normal range of motion  Lymphadenopathy:      Head:      Right side of head: No submental, submandibular, tonsillar, preauricular or posterior auricular adenopathy        Left side of head: No submental, submandibular, tonsillar, preauricular or posterior auricular adenopathy  Cervical: No cervical adenopathy  Psychiatric:         Behavior: Behavior normal          Thought Content: Thought content normal          Judgment: Judgment normal            BMI Counseling: Body mass index is 30 2 kg/m²  The BMI is above normal  Nutrition recommendations include decreasing portion sizes, encouraging healthy choices of fruits and vegetables, limiting drinks that contain sugar and reducing intake of cholesterol  Exercise recommendations include moderate physical activity 150 minutes/week  No pharmacotherapy was ordered  Rationale for BMI follow-up plan is due to patient being overweight or obese  Depression Screening and Follow-up Plan: Patient was screened for depression during today's encounter  They screened negative with a PHQ-2 score of 0

## 2022-01-24 NOTE — ASSESSMENT & PLAN NOTE
Uses Xanax 0 25 mg 1-2 times a week, more frequent issues during travel    Cont[place] Sub[place]    [place]  reviewed: No red flags were identified; safe to proceed with prescription     Pt requested Xanax refilled from PCP

## 2022-01-24 NOTE — ASSESSMENT & PLAN NOTE
Managed by Neurology  Seizure free for over 1 year  Currently managed on Depakote 500 mg in AM, 1000 mg in PM and lamotrigine 200 mg BID

## 2022-02-03 DIAGNOSIS — F41.1 GAD (GENERALIZED ANXIETY DISORDER): ICD-10-CM

## 2022-02-04 RX ORDER — ALPRAZOLAM 0.25 MG/1
0.25 TABLET ORAL 3 TIMES DAILY PRN
Qty: 90 TABLET | Refills: 0 | Status: SHIPPED | OUTPATIENT
Start: 2022-02-04

## 2022-02-28 DIAGNOSIS — J45.20 MILD INTERMITTENT ASTHMA WITHOUT COMPLICATION: ICD-10-CM

## 2022-02-28 RX ORDER — BUDESONIDE AND FORMOTEROL FUMARATE DIHYDRATE 80; 4.5 UG/1; UG/1
2 AEROSOL RESPIRATORY (INHALATION) 2 TIMES DAILY
Qty: 10.2 G | Refills: 0 | Status: SHIPPED | OUTPATIENT
Start: 2022-02-28 | End: 2022-04-11 | Stop reason: SDUPTHER

## 2022-04-11 DIAGNOSIS — J45.20 MILD INTERMITTENT ASTHMA WITHOUT COMPLICATION: ICD-10-CM

## 2022-04-11 RX ORDER — BUDESONIDE AND FORMOTEROL FUMARATE DIHYDRATE 80; 4.5 UG/1; UG/1
2 AEROSOL RESPIRATORY (INHALATION) 2 TIMES DAILY
Qty: 10.2 G | Refills: 2 | Status: SHIPPED | OUTPATIENT
Start: 2022-04-11

## 2022-04-19 NOTE — TELEPHONE ENCOUNTER
Date:April 20, 2022      Provider requested that no letter be sent. Do not send.       [facility]       Mother made aware

## 2022-10-20 ENCOUNTER — TELEPHONE (OUTPATIENT)
Dept: FAMILY MEDICINE CLINIC | Facility: CLINIC | Age: 47
End: 2022-10-20

## 2022-10-20 DIAGNOSIS — Q03.9 CONGENITAL HYDROCEPHALUS (HCC): ICD-10-CM

## 2022-10-20 DIAGNOSIS — G91.9 HYDROCEPHALUS WITH OPERATING SHUNT (HCC): ICD-10-CM

## 2022-10-20 DIAGNOSIS — G40.909 NONINTRACTABLE EPILEPSY WITHOUT STATUS EPILEPTICUS, UNSPECIFIED EPILEPSY TYPE (HCC): Primary | ICD-10-CM

## 2022-10-20 DIAGNOSIS — Z98.2 VP (VENTRICULOPERITONEAL) SHUNT STATUS: ICD-10-CM

## 2022-10-20 NOTE — TELEPHONE ENCOUNTER
Patient is scheduled for a visit tomorrow 10/21/22 at [facility]  They are requesting a referral for Dr Eze Valverde        Diagnosis codes G40 [id number],Z98 2, G91 9

## 2022-11-15 ENCOUNTER — OFFICE VISIT (OUTPATIENT)
Dept: NEUROLOGY | Facility: CLINIC | Age: 47
End: 2022-11-15

## 2022-11-15 VITALS
BODY MASS INDEX: 29.29 KG/M2 | SYSTOLIC BLOOD PRESSURE: 130 MMHG | DIASTOLIC BLOOD PRESSURE: 80 MMHG | HEART RATE: 82 BPM | WEIGHT: 204.1 LBS

## 2022-11-15 DIAGNOSIS — E55.9 VITAMIN D DEFICIENCY: ICD-10-CM

## 2022-11-15 DIAGNOSIS — G40.109 LOCALIZATION-RELATED EPILEPSY (HCC): ICD-10-CM

## 2022-11-15 DIAGNOSIS — G40.909 NONINTRACTABLE EPILEPSY WITHOUT STATUS EPILEPTICUS, UNSPECIFIED EPILEPSY TYPE (HCC): Primary | ICD-10-CM

## 2022-11-15 DIAGNOSIS — G43.109 MIGRAINE WITH AURA AND WITHOUT STATUS MIGRAINOSUS, NOT INTRACTABLE: Chronic | ICD-10-CM

## 2022-11-15 RX ORDER — DIVALPROEX SODIUM 500 MG/1
TABLET, DELAYED RELEASE ORAL
Qty: 270 TABLET | Refills: 3 | Status: SHIPPED | OUTPATIENT
Start: 2022-11-15

## 2022-11-15 RX ORDER — LAMOTRIGINE 200 MG/1
200 TABLET ORAL 2 TIMES DAILY
Qty: 180 TABLET | Refills: 3 | Status: SHIPPED | OUTPATIENT
Start: 2022-11-15

## 2022-11-15 NOTE — PATIENT INSTRUCTIONS
- Continue lamotrigine 200 mg twice per day  - Continue divalproex 500 mg in the morning and 1000 mg at night  - Continue with vitamin D 2000 units daily  - Call the office with seizures or concerns  - Follow up in 1 year or sooner if needed

## 2022-11-15 NOTE — PROGRESS NOTES
Patient ID: Jessica Cedillo is a 52 y o  male with congenital hydrocephalus, s/p  shunt with multiple revisions, and localization related epilepsy, who is returning to Neurology office for follow up of his seizures  Assessment/Plan:    Localization-related epilepsy [facility])  Patient continues to be seizure free on lamotrigine 200 mg BID and divalproex 500 mg in the morning and 1000 mg at night  He denies any side effects other than tremor from Depakote  He does feel that the tremor is better than his last visit and has been using special utensils for eating which has been helpful  Patient will continue medications as above  With breakthrough seizures they will call the office  Recommend that if there are breakthrough seizures in the future to check shunt placement and function  Follow up in 1 year or sooner if needed  Check CBC, CMP, lamotrigine level, and valproic acid level  Migraine with aura and without status migrainosus, not intractable  No complaints at this time  S/P  shunt  No symptoms to suggest shunt malfunction  Overall doing well  Vitamin D deficiency  Continues on vitamin D 2000 units daily  Check vitamin D level  He will Return in about 1 year (around 11/15/2023) for Follow up with Dr Fahad Lord  Subjective:  Jessica Cedillo is a 52 y o  male with congenital hydrocephalus, s/p  shunt with multiple revisions, and localization related epilepsy, who is returning to Neurology office for follow up of his seizures  He was last seen in the office on 11/4/21 by Kd Ocampo  At that time, he continued to be seizure free on divalproex  He noted a tremor that had not changed and was likely related to Depakote use  No changes were made to AED therapy at that time  Headaches were controlled  Recommended 1 year follow up  Since his last visit, he continues to be seizure free   Currently on lamotrigine 200 mg BID and divalproex 500 mg in the morning and 1000 mg at night  No side effects or concerns  He did have Covid about 2 months ago  Feeling better  No headaches  Continues to work at [employer] his tremor is not as bad as it was  He has been using special silver wear which has been helpful  Walking is good  No falls  No issues with balance  Current seizure medications:  - lamotrigine 200 mg BID  - divalproex 500-1000  - vitamin D 2000 units daily  Other medications as per Epic  Latest Reference Range & Units 09/29/21 09:43   LAMOTRIGINE LEVEL 2 0 - 20 0 ug/mL 15 4   VALPROIC ACID LEVEL, TOTAL  Rpt   VALPROIC ACID TOTAL 50 - 100 ug/mL 66      Latest Reference Range & Units 09/29/21 09:43   Vit D, 25-Hydroxy 30 0 - 100 0 ng/mL 59 0     Prior Seizure Medications: none     I reviewed prior neurology notes, most recent labs, as documented in Epic/[facility], and summarized above  Objective:    Blood pressure 130/80, pulse 82, weight 92 6 kg (204 lb 1 6 oz)  Physical Exam  No apparent distress  Appears well nourished  Mood appropriate for situation     Neurologic Exam  Mental status- alert and oriented to person, place, and time  Speech appropriate for conversation  Good attention and knowledge  Cranial Nerves- PERRL, VFFTC, bilaterally to finger rubs, tongue midline, palate rise symmetrical, shoulder shrug symmetrical     Motor- No pronator drift  Appropriate strength  Moves all extremities freely  No tremor  Sensory-  Intact distally in all extremities to light touch  DTRs- 2+ and symmetric in all extremities  Gait- normal casual gait  Coordination- FNF intact  ROS:  Review of Systems   Constitutional: Negative  Negative for appetite change and fever  HENT: Negative  Negative for hearing loss, tinnitus, trouble swallowing and voice change  Eyes: Negative  Negative for photophobia, pain and visual disturbance  Respiratory: Negative  Negative for shortness of breath  Cardiovascular: Negative    Negative for palpitations  Gastrointestinal: Negative  Negative for nausea and vomiting  Endocrine: Negative  Negative for cold intolerance  Genitourinary: Negative  Negative for dysuria, frequency and urgency  Musculoskeletal: Negative  Negative for gait problem, myalgias and neck pain  Skin: Negative  Negative for rash  Allergic/Immunologic: Negative  Neurological: Negative  Negative for dizziness, tremors, seizures, syncope, facial asymmetry, speech difficulty, weakness, light-headedness, numbness and headaches  Hematological: Negative  Does not bruise/bleed easily  Psychiatric/Behavioral: Negative  Negative for confusion, hallucinations and sleep disturbance  All other systems reviewed and are negative  ROS obtained by MA and reviewed by myself  This note may have been created using voice recognition software  There may be unintentional errors such as grammatical errors, spelling errors, or pronoun errors

## 2022-11-15 NOTE — PROGRESS NOTES
Review of Systems   Constitutional: Negative  Negative for appetite change and fever  HENT: Negative  Negative for hearing loss, tinnitus, trouble swallowing and voice change  Eyes: Negative  Negative for photophobia, pain and visual disturbance  Respiratory: Negative  Negative for shortness of breath  Cardiovascular: Negative  Negative for palpitations  Gastrointestinal: Negative  Negative for nausea and vomiting  Endocrine: Negative  Negative for cold intolerance  Genitourinary: Negative  Negative for dysuria, frequency and urgency  Musculoskeletal: Negative  Negative for gait problem, myalgias and neck pain  Skin: Negative  Negative for rash  Allergic/Immunologic: Negative  Neurological: Negative  Negative for dizziness, tremors, seizures, syncope, facial asymmetry, speech difficulty, weakness, light-headedness, numbness and headaches  Hematological: Negative  Does not bruise/bleed easily  Psychiatric/Behavioral: Negative  Negative for confusion, hallucinations and sleep disturbance  All other systems reviewed and are negative

## 2022-11-15 NOTE — ASSESSMENT & PLAN NOTE
Patient continues to be seizure free on lamotrigine 200 mg BID and divalproex 500 mg in the morning and 1000 mg at night  He denies any side effects other than tremor from Depakote  He does feel that the tremor is better than his last visit and has been using special utensils for eating which has been helpful  Patient will continue medications as above  With breakthrough seizures they will call the office  Recommend that if there are breakthrough seizures in the future to check shunt placement and function  Follow up in 1 year or sooner if needed  Check CBC, CMP, lamotrigine level, and valproic acid level

## 2023-04-24 NOTE — ASSESSMENT & PLAN NOTE
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 CT head 10/19: stable from above  ? Xray shunt series 10/4: The left frontal ventricular catheter  appears attached to a [place] reservoir and the valve connection before coursing down the left side of the hemicranium down the neck to the left upper quadrant  This is likely the functioning shunt   2 separate shunt catheters noted coursing through left neck over left chest terminating in left upper quadrant  One catheter terminating in the soft tissues of the left neck at the C4-5 region   The catheter terminates over the left occiput     ? XR shunt series 10/20: 2  shunt catheters unchanged, including discontinuity of catheter in left lateral neck  .

## 2023-05-10 DIAGNOSIS — F41.1 GAD (GENERALIZED ANXIETY DISORDER): ICD-10-CM

## 2023-05-12 RX ORDER — ALPRAZOLAM 0.25 MG/1
0.25 TABLET ORAL 3 TIMES DAILY PRN
Qty: 90 TABLET | Refills: 0 | Status: SHIPPED | OUTPATIENT
Start: 2023-05-12

## 2023-06-07 ENCOUNTER — OFFICE VISIT (OUTPATIENT)
Dept: FAMILY MEDICINE CLINIC | Facility: CLINIC | Age: 48
End: 2023-06-07
Payer: MEDICARE

## 2023-06-07 VITALS
HEART RATE: 89 BPM | HEIGHT: 70 IN | SYSTOLIC BLOOD PRESSURE: 124 MMHG | TEMPERATURE: 99.1 F | WEIGHT: 208 LBS | DIASTOLIC BLOOD PRESSURE: 82 MMHG | RESPIRATION RATE: 17 BRPM | OXYGEN SATURATION: 96 % | BODY MASS INDEX: 29.78 KG/M2

## 2023-06-07 DIAGNOSIS — G91.9 HYDROCEPHALUS WITH OPERATING SHUNT (HCC): ICD-10-CM

## 2023-06-07 DIAGNOSIS — J45.20 MILD INTERMITTENT ASTHMA WITHOUT COMPLICATION: ICD-10-CM

## 2023-06-07 DIAGNOSIS — G40.109 LOCALIZATION-RELATED EPILEPSY (HCC): ICD-10-CM

## 2023-06-07 DIAGNOSIS — G80.1 SPASTIC DIPLEGIC CEREBRAL PALSY (HCC): ICD-10-CM

## 2023-06-07 DIAGNOSIS — Q03.9 CONGENITAL HYDROCEPHALUS (HCC): ICD-10-CM

## 2023-06-07 PROBLEM — IMO0001 TRANSITION OF CARE PERFORMED WITH SHARING OF CLINICAL SUMMARY: Status: RESOLVED | Noted: 2018-02-22 | Resolved: 2023-06-07

## 2023-06-07 PROBLEM — Z78.9 TRANSITION OF CARE PERFORMED WITH SHARING OF CLINICAL SUMMARY: Status: RESOLVED | Noted: 2018-02-22 | Resolved: 2023-06-07

## 2023-06-07 PROCEDURE — 99214 OFFICE O/P EST MOD 30 MIN: CPT | Performed by: FAMILY MEDICINE

## 2023-06-07 RX ORDER — BUDESONIDE AND FORMOTEROL FUMARATE DIHYDRATE 80; 4.5 UG/1; UG/1
2 AEROSOL RESPIRATORY (INHALATION) 2 TIMES DAILY
Qty: 10.2 G | Refills: 2 | Status: SHIPPED | OUTPATIENT
Start: 2023-06-07

## 2023-06-07 NOTE — PROGRESS NOTES
Name: Sarah Ramsey      : 1975      MRN: [id number]  Encounter Provider: Corinne Yoder MD  Encounter Date: 2023   Encounter department: [address] [place] [address]     1  Mild intermittent asthma without complication  -     budesonide-formoterol (Symbicort) 80-4 5 MCG/ACT inhaler; Inhale 2 puffs 2 (two) times a day Rinse mouth after use  2  Congenital hydrocephalus ([address] )  Assessment & Plan:  Stable      3  Hydrocephalus with operating shunt [facility])  Assessment & Plan:  Stable  Most recent revision 2018  Follows with neurosurgery as needed      4  Spastic diplegic cerebral palsy [facility])  Assessment & Plan:  Follows with neurology      5  Localization-related epilepsy [facility])  Assessment & Plan:  Currently stable  Continue current medications  I recommend patient restart Symbicort 2 puffs twice daily and rinse mouth following use  Has taken Mucinex very sparingly  He can start taking Mucinex twice daily  His lungs are clear on examination  Albuterol should only be used as needed  He is in no acute distress at this time  Vital signs within normal limits  Subjective      Patient presents with:  Wheezing: Patient takes benadryl daily   Nasal Congestion  Cough: Patient is coughing up grayish phlegm     Patient presents today with his mother  She is concerned over his nasal congestion and cough  Has a past medical history of congenital hydrocephalus  Has been using his albuterol at home  Has not been taking Symbicort  Patient's mother states he is not very compliant with medications  Patient states he is in no acute distress at this time  He feels fine  Review of Systems   Constitutional: Negative for fatigue and fever  HENT: Positive for congestion  Negative for sore throat  Eyes: Negative for visual disturbance  Respiratory: Positive for cough  Negative for chest tightness and shortness of breath      Cardiovascular: Negative for chest "pain, palpitations and leg swelling  Gastrointestinal: Negative for abdominal pain, constipation, diarrhea and nausea  Endocrine: Negative for cold intolerance and heat intolerance  Genitourinary: Negative for flank pain  Musculoskeletal: Negative for back pain and neck pain  Skin: Negative for rash  Neurological: Negative for headaches  Psychiatric/Behavioral: Negative for behavioral problems and confusion  Current Outpatient Medications on File Prior to Visit   Medication Sig   • albuterol (PROVENTIL HFA,VENTOLIN HFA) 90 mcg/act inhaler Inhale 2 puffs every 4 (four) hours as needed for wheezing or shortness of breath   • ALPRAZolam (XANAX) 0 25 mg tablet Take 1 tablet (0 25 mg total) by mouth 3 (three) times a day as needed (PRN)   • ascorbic acid (VITAMIN C) 500 mg tablet Take 500 mg by mouth daily   • Cholecalciferol (VITAMIN D3) 2000 units capsule Take 2,000 Units by mouth daily Pt takes 2,000 units per day     • divalproex sodium (DEPAKOTE) 500 mg EC tablet TAKE 1 TABLET BY MOUTH EVERY MORNING AND 2 AT NIGHT   • lamoTRIgine (LaMICtal) 200 MG tablet Take 1 tablet (200 mg total) by mouth 2 (two) times a day       Objective     /82 (BP Location: Left arm, Patient Position: Sitting, Cuff Size: Standard)   Pulse 89   Temp 99 1 °F (37 3 °C) (Tympanic)   Resp 17   Ht 5' 10\" (1 778 m)   Wt 94 3 kg (208 lb)   SpO2 96%   BMI 29 84 kg/m²     Physical Exam  Vitals and nursing note reviewed  Constitutional:       Appearance: He is well-developed  HENT:      Head: Normocephalic and atraumatic  Cardiovascular:      Rate and Rhythm: Normal rate and regular rhythm  Pulmonary:      Effort: Pulmonary effort is normal       Breath sounds: Normal breath sounds  Neurological:      General: No focal deficit present  Mental Status: He is alert     Psychiatric:         Mood and Affect: Mood normal          Behavior: Behavior normal        Leeanne Glasgow MD  "

## 2023-09-15 DIAGNOSIS — J45.20 MILD INTERMITTENT ASTHMA WITHOUT COMPLICATION: ICD-10-CM

## 2023-09-15 RX ORDER — DILTIAZEM HYDROCHLORIDE 60 MG/1
2 TABLET, FILM COATED ORAL 2 TIMES DAILY
Qty: 10.2 G | Refills: 2 | Status: SHIPPED | OUTPATIENT
Start: 2023-09-15

## 2023-10-19 ENCOUNTER — OFFICE VISIT (OUTPATIENT)
Dept: FAMILY MEDICINE CLINIC | Facility: CLINIC | Age: 48
End: 2023-10-19

## 2023-10-19 VITALS
HEART RATE: 80 BPM | WEIGHT: 211 LBS | TEMPERATURE: 98.1 F | BODY MASS INDEX: 30.21 KG/M2 | OXYGEN SATURATION: 97 % | RESPIRATION RATE: 16 BRPM | HEIGHT: 70 IN | SYSTOLIC BLOOD PRESSURE: 126 MMHG | DIASTOLIC BLOOD PRESSURE: 80 MMHG

## 2023-10-19 DIAGNOSIS — Z00.00 MEDICARE ANNUAL WELLNESS VISIT, SUBSEQUENT: ICD-10-CM

## 2023-10-19 DIAGNOSIS — J45.20 MILD INTERMITTENT ASTHMA WITHOUT COMPLICATION: ICD-10-CM

## 2023-10-19 DIAGNOSIS — G43.109 MIGRAINE WITH AURA AND WITHOUT STATUS MIGRAINOSUS, NOT INTRACTABLE: Chronic | ICD-10-CM

## 2023-10-19 DIAGNOSIS — Z12.11 SCREENING FOR COLORECTAL CANCER: ICD-10-CM

## 2023-10-19 DIAGNOSIS — Z23 ENCOUNTER FOR IMMUNIZATION: ICD-10-CM

## 2023-10-19 DIAGNOSIS — Q03.9 CONGENITAL HYDROCEPHALUS (HCC): ICD-10-CM

## 2023-10-19 DIAGNOSIS — F41.1 GAD (GENERALIZED ANXIETY DISORDER): Primary | ICD-10-CM

## 2023-10-19 DIAGNOSIS — Z12.12 SCREENING FOR COLORECTAL CANCER: ICD-10-CM

## 2023-10-19 DIAGNOSIS — G80.1 SPASTIC DIPLEGIC CEREBRAL PALSY (HCC): Chronic | ICD-10-CM

## 2023-10-19 NOTE — PROGRESS NOTES
Assessment and Plan:     Problem List Items Addressed This Visit        Respiratory    Mild intermittent asthma without complication       Cardiovascular and Mediastinum    Migraine with aura and without status migrainosus, not intractable (Chronic)       Nervous and Auditory    Cerebral palsy (HCC) (Chronic)    Congenital hydrocephalus (HCC)       Other    QASIM (generalized anxiety disorder) - Primary   Other Visit Diagnoses     Screening for colorectal cancer        Relevant Orders    Cologuard    Medicare annual wellness visit, subsequent        Encounter for immunization        Relevant Orders    influenza vaccine, quadrivalent, 0.5 mL, preservative-free, for adult and pediatric patients 6 mos+ (AFLURIA, FLUARIX, FLULAVAL, FLUZONE) (Completed)        AWV and follow-up completed today. Reviewed previous labs. Chronic conditions stable. Continue current medications. Continue following with neurology. BMI Counseling: Body mass index is 30.28 kg/m². The BMI is above normal. Nutrition recommendations include decreasing portion sizes, consuming healthier snacks, reducing intake of saturated and trans fat and reducing intake of cholesterol. Exercise recommendations include moderate physical activity 150 minutes/week. No pharmacotherapy was ordered. Patient referred to PCP. Rationale for BMI follow-up plan is due to patient being overweight or obese. Depression Screening and Follow-up Plan: Patient was screened for depression during today's encounter. They screened negative with a PHQ-2 score of 0. Preventive health issues were discussed with patient, and age appropriate screening tests were ordered as noted in patient's After Visit Summary. Personalized health advice and appropriate referrals for health education or preventive services given if needed, as noted in patient's After Visit Summary.      History of Present Illness:     Patient presents for a Medicare Wellness Visit    Patient presents with:  Medicare Wellness Visit  Injections: Flu shot    Follows with neurology. They manages seizure medications. Continues to have some mucus. Tolerating current medications well. Patient Care Team:  Vianca Gu MD as PCP - General (Family Medicine)  Ivory Caraway, PA-C Loralee Sample, MD Stephany Sever, MD (Neurology)     Review of Systems:     Review of Systems   Constitutional:  Negative for fatigue and fever. HENT:  Negative for sore throat. Eyes:  Negative for visual disturbance. Respiratory:  Negative for cough, chest tightness and shortness of breath. Cardiovascular:  Negative for chest pain, palpitations and leg swelling. Gastrointestinal:  Negative for abdominal pain, constipation, diarrhea and nausea. Endocrine: Negative for cold intolerance and heat intolerance. Genitourinary:  Negative for flank pain. Musculoskeletal:  Negative for back pain and neck pain. Skin:  Negative for rash. Neurological:  Negative for headaches. Psychiatric/Behavioral:  Negative for behavioral problems and confusion.          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[address])   • Hydrocephalus with operating shunt ([address])   • QASIM (generalized anxiety disorder)   • Seborrheic dermatitis   • Cerebral cyst   • Congenital hydrocephalus [facility])      Past Medical and Surgical History:     Past Medical History:   Diagnosis Date   • Asthma    • Cerebral palsy ([address])    • Congenital hydrocephalus ([address])    • 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[address]) • Migraines    • Seizures [facility])      Past Surgical History:   Procedure Laterality Date   • BRAIN SURGERY      Multiple  shunt revisions   • EYE SURGERY  12/23/2015   • HERNIA REPAIR  12/23/2015   • ID CRTJ SHUNT [id number] TERMINUS Left 10/23/2017    Procedure: SHUNT VENTRICULAR-PERITONEAL revision;  Surgeon: Kris Nevarez MD;  Location: [facility];  Service: Neurosurgery   • ID LAPS ABD PRTM&OMENTUM DX W/WO SPEC BR/WA SPX N/A 2/8/2018    Procedure: DIAGNOSTIC LAPAROSCOPY, LYSIS OF ADHESIONS, OPEN LAPAROTOMY, LYSIS OF ADHESIONS, REPOSIONING OF  SHUNT TUBING;  Surgeon: Clyde Tilley MD;  Location: [facility];  Service: General      Family History:     Family History   Problem Relation Age of Onset   • Hyperlipidemia Mother    • Hypertension Mother    • Breast cancer Mother    • Hypertension Father    • Hypertension Brother    • Hypertension Brother       Social History:     Social History     Socioeconomic History   • Marital status: Single     Spouse name: None   • Number of children: None   • Years of education: None   • Highest education level: None   Occupational History   • None   Tobacco Use   • Smoking status: Never   • Smokeless tobacco: Never   • Tobacco comments:     N/A   Vaping Use   • Vaping Use: Never used   Substance and Sexual Activity   • Alcohol use: No     Comment: N/A   • Drug use: No     Comment: N/A   • Sexual activity: Never     Comment: N/A   Other Topics Concern   • None   Social History Narrative   • None     Social Determinants of Health     Financial Resource Strain: Not on file   Food Insecurity: Not on file   Transportation Needs: Not on file   Physical Activity: Not on file   Stress: Not on file   Social Connections: Not on file   Intimate Partner Violence: Not on file   Housing Stability: Not on file      Medications and Allergies:     Current Outpatient Medications   Medication Sig Dispense Refill   • albuterol (PROVENTIL HFA,VENTOLIN HFA) 90 mcg/act inhaler Inhale 2 puffs every 4 (four) hours as needed for wheezing or shortness of breath 54 g 0   • ALPRAZolam (XANAX) 0.25 mg tablet Take 1 tablet (0.25 mg total) by mouth 3 (three) times a day as needed (PRN) 90 tablet 0   • ascorbic acid (VITAMIN C) 500 mg tablet Take 500 mg by mouth daily     • Cholecalciferol (VITAMIN D3) 2000 units capsule Take 2,000 Units by mouth daily Pt takes 2,000 units per day       • divalproex sodium (DEPAKOTE) 500 mg EC tablet TAKE 1 TABLET BY MOUTH EVERY MORNING AND 2 AT NIGHT 270 tablet 3   • lamoTRIgine (LaMICtal) 200 MG tablet Take 1 tablet (200 mg total) by mouth 2 (two) times a day 180 tablet 3   • Symbicort 80-4.5 MCG/ACT inhaler INHALE 2 PUFFS BY MOUTH TWICE DAILY. RINSE MOUTH AFTER USE 10.2 g 2     No current facility-administered medications for this visit.      Allergies   Allergen Reactions   • Latex Hives   • Other      Cats, seasonal   • Pollen Extract       Immunizations:     Immunization History   Administered Date(s) Administered   • COVID-19 PFIZER VACCINE 0.3 ML IM 03/27/2021, 04/17/2021, 12/18/2021   • INFLUENZA 01/01/2015, 11/09/2015, 11/03/2016, 09/26/2017, 11/06/2018   • Influenza Quadrivalent, 6-35 Months IM 09/26/2017   • Influenza, injectable, quadrivalent, preservative free 0.5 mL 11/06/2018, 01/08/2020, 09/17/2020, 11/17/2021, 10/19/2023   • Influenza, seasonal, injectable 01/01/2015, 11/03/2016   • Pneumococcal Polysaccharide PPV23 09/17/2020   • TD (adult) Preservative Free 07/09/2019      Health Maintenance:         Topic Date Due   • Colorectal Cancer Screening  Never done   • HIV Screening  10/19/2025 (Originally 9/24/1990)   • Hepatitis C Screening  11/19/2025 (Originally 1975)         Topic Date Due   • Pneumococcal Vaccine: Pediatrics (0 to 5 Years) and At-Risk Patients (6 to 59 Years) (2 - PCV) 09/17/2021   • COVID-19 Vaccine (4 - Pfizer series) 02/12/2022      Medicare Screening Tests and Risk Assessments:     Eliot Mann is here for his Subsequent Wellness visit. Last Medicare Wellness visit information reviewed, patient interviewed, no change since last AWV. Health Risk Assessment:   Patient rates overall health as good. Patient feels that their physical health rating is same. Patient is very satisfied with their life. Eyesight was rated as same. Hearing was rated as same. Patient feels that their emotional and mental health rating is same. Patients states they are sometimes angry. Patient states they are never, rarely unusually tired/fatigued. Pain experienced in the last 7 days has been some. Patient's pain rating has been 4/10. Patient states that he has experienced no weight loss or gain in last 6 months. Depression Screening:   PHQ-2 Score: 0      Fall Risk Screening: In the past year, patient has experienced: no history of falling in past year      Home Safety:  Patient has trouble with stairs inside or outside of their home. Patient has working smoke alarms and has working carbon monoxide detector. Home safety hazards include: none. Nutrition:   Current diet is Regular and Limited junk food. Medications:   Patient is currently taking over-the-counter supplements. OTC medications include: see medication list. Patient is not able to manage medications. Activities of Daily Living (ADLs)/Instrumental Activities of Daily Living (IADLs):   Walk and transfer into and out of bed and chair?: Yes  Dress and groom yourself?: Yes    Bathe or shower yourself?: Yes    Feed yourself?  Yes  Do your laundry/housekeeping?: Yes  Manage your money, pay your bills and track your expenses?: No  Make your own meals?: No    Do your own shopping?: No    Previous Hospitalizations:   Any hospitalizations or ED visits within the last 12 months?: No      Advance Care Planning:   Living will: No      PREVENTIVE SCREENINGS      Cardiovascular Screening:    General: Screening Current      Diabetes Screening:     General: Risks and Benefits Discussed    Due for: Blood Glucose      Prostate Cancer Screening:    General: Screening Not Indicated      Lung Cancer Screening:     General: Screening Not Indicated      Hepatitis C Screening:    General: Screening Current    Screening, Brief Intervention, and Referral to Treatment (SBIRT)    Screening  Typical number of drinks in a day: 0  Typical number of drinks in a week: 0  Interpretation: Low risk drinking behavior. Single Item Drug Screening:  How often have you used an illegal drug (including marijuana) or a prescription medication for non-medical reasons in the past year? never    Single Item Drug Screen Score: 0  Interpretation: Negative screen for possible drug use disorder    No results found. Physical Exam:     /80 (BP Location: Left arm, Patient Position: Sitting, Cuff Size: Large)   Pulse 80   Temp 98.1 °F (36.7 °C)   Resp 16   Ht 5' 10" (1.778 m)   Wt 95.7 kg (211 lb)   SpO2 97%   BMI 30.28 kg/m²     Physical Exam  Vitals and nursing note reviewed. Constitutional:       Appearance: He is well-developed. He is obese. HENT:      Head: Normocephalic and atraumatic. Eyes:      Extraocular Movements: Extraocular movements intact. Pupils: Pupils are equal, round, and reactive to light. Cardiovascular:      Rate and Rhythm: Normal rate and regular rhythm. Pulmonary:      Effort: Pulmonary effort is normal.      Breath sounds: Normal breath sounds. Abdominal:      General: Bowel sounds are normal.      Palpations: Abdomen is soft. Musculoskeletal:      Cervical back: Normal range of motion. Skin:     General: Skin is warm and dry. Neurological:      Mental Status: He is alert. Mental status is at baseline.    Psychiatric:         Mood and Affect: Mood normal.         Speech: Speech normal.         Behavior: Behavior normal.          Augustine Pruitt MD

## 2023-10-19 NOTE — PATIENT INSTRUCTIONS
Medicare Preventive Visit Patient Instructions  Thank you for completing your Welcome to Medicare Visit or Medicare Annual Wellness Visit today. Your next wellness visit will be due in one year (10/19/2024). The screening/preventive services that you may require over the next 5-10 years are detailed below. Some tests may not apply to you based off risk factors and/or age. Screening tests ordered at today's visit but not completed yet may show as past due. Also, please note that scanned in results may not display below. Preventive Screenings:  Service Recommendations Previous Testing/Comments   Colorectal Cancer Screening  Colonoscopy    Fecal Occult Blood Test (FOBT)/Fecal Immunochemical Test (FIT)  Fecal DNA/Cologuard Test  Flexible Sigmoidoscopy Age: 43-73 years old   Colonoscopy: every 10 years (May be performed more frequently if at higher risk)  OR  FOBT/FIT: every 1 year  OR  Cologuard: every 3 years  OR  Sigmoidoscopy: every 5 years  Screening may be recommended earlier than age 39 if at higher risk for colorectal cancer. Also, an individualized decision between you and your healthcare provider will decide whether screening between the ages of 77-80 would be appropriate.  Colonoscopy: Not on file  FOBT/FIT: Not on file  Cologuard: Not on file  Sigmoidoscopy: Not on file          Prostate Cancer Screening Individualized decision between patient and health care provider in men between ages of 53-66   Medicare will cover every 12 months beginning on the day after your 50th birthday PSA: No results in last 5 years     Screening Not Indicated     Hepatitis C Screening Once for adults born between [address] [place] [address]  More frequently in patients at high risk for Hepatitis C Hep C Antibody: Not on file    Screening Current   Diabetes Screening 1-2 times per year if you're at risk for diabetes or have pre-diabetes Fasting glucose: 85 mg/dL (9/29/2021)  A1C: No results in last 5 years (No results in last 5 years) Cholesterol Screening Once every 5 years if you don't have a lipid disorder. May order more often based on risk factors. Lipid panel: 09/29/2021  Screening Current      Other Preventive Screenings Covered by Medicare:  Abdominal Aortic Aneurysm (AAA) Screening: covered once if your at risk. You're considered to be at risk if you have a family history of AAA or a male between the age of 70-76 who smoking at least 100 cigarettes in your lifetime. Lung Cancer Screening: covers low dose CT scan once per year if you meet all of the following conditions: (1) Age 48-67; (2) No signs or symptoms of lung cancer; (3) Current smoker or have quit smoking within the last 15 years; (4) You have a tobacco smoking history of at least 20 pack years (packs per day x number of years you smoked); (5) You get a written order from a healthcare provider. Glaucoma Screening: covered annually if you're considered high risk: (1) You have diabetes OR (2) Family history of glaucoma OR (3)  aged 48 and older OR (3)  American aged 72 and older  Osteoporosis Screening: covered every 2 years if you meet one of the following conditions: (1) Have a vertebral abnormality; (2) On glucocorticoid therapy for more than 3 months; (3) Have primary hyperparathyroidism; (4) On osteoporosis medications and need to assess response to drug therapy. HIV Screening: covered annually if you're between the age of 14-79. Also covered annually if you are younger than 13 and older than 72 with risk factors for HIV infection. For pregnant patients, it is covered up to 3 times per pregnancy.     Immunizations:  Immunization Recommendations   Influenza Vaccine Annual influenza vaccination during flu season is recommended for all persons aged >= 6 months who do not have contraindications   Pneumococcal Vaccine   * Pneumococcal conjugate vaccine = PCV13 (Prevnar 13), PCV15 (Vaxneuvance), PCV20 (Prevnar 20)  * Pneumococcal polysaccharide vaccine = PPSV23 (Pneumovax) Adults 23-93 yo with certain risk factors or if 69+ yo  If never received any pneumonia vaccine: recommend Prevnar 20 (PCV20)  Give PCV20 if previously received 1 dose of PCV13 or PPSV23   Hepatitis B Vaccine 3 dose series if at intermediate or high risk (ex: diabetes, end stage renal disease, liver disease)   Respiratory syncytial virus (RSV) Vaccine - COVERED BY MEDICARE PART D  * RSVPreF3 (Arexvy) CDC recommends that adults 61years of age and older may receive a single dose of RSV vaccine using shared clinical decision-making (SCDM)   Tetanus (Td) Vaccine - COST NOT COVERED BY MEDICARE PART B Following completion of primary series, a booster dose should be given every 10 years to maintain immunity against tetanus. Td may also be given as tetanus wound prophylaxis. Tdap Vaccine - COST NOT COVERED BY MEDICARE PART B Recommended at least once for all adults. For pregnant patients, recommended with each pregnancy. Shingles Vaccine (Shingrix) - COST NOT COVERED BY MEDICARE PART B  2 shot series recommended in those 19 years and older who have or will have weakened immune systems or those 50 years and older     Health Maintenance Due:      Topic Date Due   • Colorectal Cancer Screening  Never done   • HIV Screening  10/19/2025 (Originally 9/24/1990)   • Hepatitis C Screening  11/19/2025 (Originally 1975)     Immunizations Due:      Topic Date Due   • Pneumococcal Vaccine: Pediatrics (0 to 5 Years) and At-Risk Patients (6 to 59 Years) (2 - PCV) 09/17/2021   • COVID-19 Vaccine (4 - Pfizer series) 02/12/2022   • Influenza Vaccine (1) 09/01/2023     Advance Directives   What are advance directives? Advance directives are legal documents that state your wishes and plans for medical care. These plans are made ahead of time in case you lose your ability to make decisions for yourself.  Advance directives can apply to any medical decision, such as the treatments you want, and if you want to donate organs. What are the types of advance directives? There are many types of advance directives, and each state has rules about how to use them. You may choose a combination of any of the following:  Living will: This is a written record of the treatment you want. You can also choose which treatments you do not want, which to limit, and which to stop at a certain time. This includes surgery, medicine, IV fluid, and tube feedings. Durable power of  for [facility]): This is a written record that states who you want to make healthcare choices for you when you are unable to make them for yourself. This person, called a proxy, is usually a family member or a friend. You may choose more than 1 proxy. Do not resuscitate (DNR) order:  A DNR order is used in case your heart stops beating or you stop breathing. It is a request not to have certain forms of treatment, such as CPR. A DNR order may be included in other types of advance directives. Medical directive: This covers the care that you want if you are in a coma, near death, or unable to make decisions for yourself. You can list the treatments you want for each condition. Treatment may include pain medicine, surgery, blood transfusions, dialysis, IV or tube feedings, and a ventilator (breathing machine). Values history: This document has questions about your views, beliefs, and how you feel and think about life. This information can help others choose the care that you would choose. Why are advance directives important? An advance directive helps you control your care. Although spoken wishes may be used, it is better to have your wishes written down. Spoken wishes can be misunderstood, or not followed. Treatments may be given even if you do not want them. An advance directive may make it easier for your family to make difficult choices about your care.    Weight Management   Why it is important to manage your weight:  Being overweight increases your risk of health conditions such as heart disease, high blood pressure, type 2 diabetes, and certain types of cancer. It can also increase your risk for osteoarthritis, sleep apnea, and other respiratory problems. Aim for a slow, steady weight loss. Even a small amount of weight loss can lower your risk of health problems. How to lose weight safely:  A safe and healthy way to lose weight is to eat fewer calories and get regular exercise. You can lose up about 1 pound a week by decreasing the number of calories you eat by 500 calories each day. Healthy meal plan for weight management:  A healthy meal plan includes a variety of foods, contains fewer calories, and helps you stay healthy. A healthy meal plan includes the following:  Eat whole-grain foods more often. A healthy meal plan should contain fiber. Fiber is the part of grains, fruits, and vegetables that is not broken down by your body. Whole-grain foods are healthy and provide extra fiber in your diet. Some examples of whole-grain foods are whole-wheat breads and pastas, oatmeal, brown rice, and bulgur. Eat a variety of vegetables every day. Include dark, leafy greens such as spinach, kale, minna greens, and mustard greens. Eat yellow and orange vegetables such as carrots, sweet potatoes, and winter squash. Eat a variety of fruits every day. Choose fresh or canned fruit (canned in its own juice or light syrup) instead of juice. Fruit juice has very little or no fiber. Eat low-fat dairy foods. Drink fat-free (skim) milk or 1% milk. Eat fat-free yogurt and low-fat cottage cheese. Try low-fat cheeses such as mozzarella and other reduced-fat cheeses. Choose meat and other protein foods that are low in fat. Choose beans or other legumes such as split peas or lentils. Choose fish, skinless poultry (chicken or turkey), or lean cuts of red meat (beef or pork). Before you cook meat or poultry, cut off any visible fat. Use less fat and oil.   Try baking foods instead of frying them. Add less fat, such as margarine, sour cream, regular salad dressing and mayonnaise to foods. Eat fewer high-fat foods. Some examples of high-fat foods include french fries, doughnuts, ice cream, and cakes. Eat fewer sweets. Limit foods and drinks that are high in sugar. This includes candy, cookies, regular soda, and sweetened drinks. Exercise:  Exercise at least 30 minutes per day on most days of the week. Some examples of exercise include walking, biking, dancing, and swimming. You can also fit in more physical activity by taking the stairs instead of the elevator or parking farther away from stores. Ask your healthcare provider about the best exercise plan for you. © Copyright [employer] 2018 Information is for End User's use only and may not be sold, redistributed or otherwise used for commercial purposes.  All illustrations and images included in CareNotes® are the copyrighted property of [employer] Inc[employer] or [address] [place] [address]

## 2023-10-20 ENCOUNTER — APPOINTMENT (OUTPATIENT)
Dept: LAB | Facility: CLINIC | Age: 48
End: 2023-10-20
Payer: MEDICARE

## 2023-10-20 DIAGNOSIS — G40.909 NONINTRACTABLE EPILEPSY WITHOUT STATUS EPILEPTICUS, UNSPECIFIED EPILEPSY TYPE (HCC): ICD-10-CM

## 2023-10-20 DIAGNOSIS — E55.9 VITAMIN D DEFICIENCY: ICD-10-CM

## 2023-10-20 LAB
25(OH)D3 SERPL-MCNC: 29.7 NG/ML (ref 30–100)
ALBUMIN SERPL BCP-MCNC: 4.1 G/DL (ref 3.5–5)
ALP SERPL-CCNC: 51 U/L (ref 34–104)
ALT SERPL W P-5'-P-CCNC: 12 U/L (ref 7–52)
ANION GAP SERPL CALCULATED.3IONS-SCNC: 6 MMOL/L
AST SERPL W P-5'-P-CCNC: 10 U/L (ref 13–39)
BASOPHILS # BLD AUTO: 0.07 THOUSANDS/ÂΜL (ref 0–0.1)
BASOPHILS NFR BLD AUTO: 1 % (ref 0–1)
BILIRUB SERPL-MCNC: 0.47 MG/DL (ref 0.2–1)
BUN SERPL-MCNC: 10 MG/DL (ref 5–25)
CALCIUM SERPL-MCNC: 9.4 MG/DL (ref 8.4–10.2)
CHLORIDE SERPL-SCNC: 102 MMOL/L (ref 96–108)
CO2 SERPL-SCNC: 34 MMOL/L (ref 21–32)
CREAT SERPL-MCNC: 0.82 MG/DL (ref 0.6–1.3)
EOSINOPHIL # BLD AUTO: 0.29 THOUSAND/ÂΜL (ref 0–0.61)
EOSINOPHIL NFR BLD AUTO: 3 % (ref 0–6)
ERYTHROCYTE [DISTWIDTH] IN BLOOD BY AUTOMATED COUNT: 12.3 % (ref 11.6–15.1)
GFR SERPL CREATININE-BSD FRML MDRD: 104 ML/MIN/1.73SQ M
GLUCOSE P FAST SERPL-MCNC: 82 MG/DL (ref 65–99)
HCT VFR BLD AUTO: 46.1 % (ref 36.5–49.3)
HGB BLD-MCNC: 15.7 G/DL (ref 12–17)
IMM GRANULOCYTES # BLD AUTO: 0.2 THOUSAND/UL (ref 0–0.2)
IMM GRANULOCYTES NFR BLD AUTO: 2 % (ref 0–2)
LYMPHOCYTES # BLD AUTO: 2.71 THOUSANDS/ÂΜL (ref 0.6–4.47)
LYMPHOCYTES NFR BLD AUTO: 28 % (ref 14–44)
MCH RBC QN AUTO: 30.5 PG (ref 26.8–34.3)
MCHC RBC AUTO-ENTMCNC: 34.1 G/DL (ref 31.4–37.4)
MCV RBC AUTO: 90 FL (ref 82–98)
MONOCYTES # BLD AUTO: 0.94 THOUSAND/ÂΜL (ref 0.17–1.22)
MONOCYTES NFR BLD AUTO: 10 % (ref 4–12)
NEUTROPHILS # BLD AUTO: 5.4 THOUSANDS/ÂΜL (ref 1.85–7.62)
NEUTS SEG NFR BLD AUTO: 56 % (ref 43–75)
NRBC BLD AUTO-RTO: 0 /100 WBCS
PLATELET # BLD AUTO: 296 THOUSANDS/UL (ref 149–390)
PMV BLD AUTO: 9.9 FL (ref 8.9–12.7)
POTASSIUM SERPL-SCNC: 3.9 MMOL/L (ref 3.5–5.3)
PROT SERPL-MCNC: 6.5 G/DL (ref 6.4–8.4)
RBC # BLD AUTO: 5.15 MILLION/UL (ref 3.88–5.62)
SODIUM SERPL-SCNC: 142 MMOL/L (ref 135–147)
VALPROATE SERPL-MCNC: 59 UG/ML (ref 50–100)
WBC # BLD AUTO: 9.61 THOUSAND/UL (ref 4.31–10.16)

## 2023-10-20 PROCEDURE — 82306 VITAMIN D 25 HYDROXY: CPT

## 2023-10-20 PROCEDURE — 80164 ASSAY DIPROPYLACETIC ACD TOT: CPT

## 2023-10-20 PROCEDURE — 36415 COLL VENOUS BLD VENIPUNCTURE: CPT

## 2023-10-20 PROCEDURE — 80175 DRUG SCREEN QUAN LAMOTRIGINE: CPT

## 2023-10-20 PROCEDURE — 85025 COMPLETE CBC W/AUTO DIFF WBC: CPT

## 2023-10-20 PROCEDURE — 80053 COMPREHEN METABOLIC PANEL: CPT

## 2023-10-23 LAB — LAMOTRIGINE SERPL-MCNC: 20.2 UG/ML (ref 2–20)

## 2023-10-25 ENCOUNTER — TELEPHONE (OUTPATIENT)
Dept: NEUROLOGY | Facility: CLINIC | Age: 48
End: 2023-10-25

## 2023-10-25 DIAGNOSIS — G40.909 NONINTRACTABLE EPILEPSY WITHOUT STATUS EPILEPTICUS, UNSPECIFIED EPILEPSY TYPE (HCC): Primary | ICD-10-CM

## 2023-10-25 NOTE — TELEPHONE ENCOUNTER
----- Message from DEEDEE Verde sent at 10/23/2023  6:08 PM EDT -----  Lamotrigine level is slightly elevated but this may be a peak level based on the timing of the blood draw. If he is not having any side effects, we can just repeat it about 1 week prior (make sure it is a trough level) to his next visit with Dr Hodge which is next month. His vitamin D is also slightly low, please see if he is on supplementation. If not, he can start taking vitamin D3 - 2000 units daily OTC.   Thanks  ----- Message -----  From: Lab, Background User  Sent: 10/20/2023   7:56 PM EDT  To: DEEDEE Verde

## 2023-10-25 NOTE — TELEPHONE ENCOUNTER
Can increase vitamin D to 4000 units daily.    I will place another lamotrigine level for him.

## 2023-10-25 NOTE — TELEPHONE ENCOUNTER
Called back re: below and left a detailed VM with call back # if any further assistance is required.

## 2023-10-25 NOTE — TELEPHONE ENCOUNTER
Called re: below and spoke with pt's mother, who verbalized an understanding.     She reports that pt is already taking Vit D3 2,000 units daily, so she is asking if she should increase it?    Also she states that pt is agreeable to getting blood work repeated somewhere on or about the 10th of November. Please place lab order and please advise on D3. Thank you.

## 2023-10-31 ENCOUNTER — TELEPHONE (OUTPATIENT)
Dept: FAMILY MEDICINE CLINIC | Facility: CLINIC | Age: 48
End: 2023-10-31

## 2023-10-31 DIAGNOSIS — J45.20 MILD INTERMITTENT ASTHMA WITHOUT COMPLICATION: ICD-10-CM

## 2023-10-31 RX ORDER — ALBUTEROL SULFATE 90 UG/1
2 AEROSOL, METERED RESPIRATORY (INHALATION) EVERY 4 HOURS PRN
Qty: 54 G | Refills: 2 | Status: SHIPPED | OUTPATIENT
Start: 2023-10-31

## 2023-11-13 ENCOUNTER — APPOINTMENT (OUTPATIENT)
Dept: LAB | Facility: CLINIC | Age: 48
End: 2023-11-13
Payer: MEDICARE

## 2023-11-13 DIAGNOSIS — G40.909 NONINTRACTABLE EPILEPSY WITHOUT STATUS EPILEPTICUS, UNSPECIFIED EPILEPSY TYPE (HCC): ICD-10-CM

## 2023-11-13 PROCEDURE — 80175 DRUG SCREEN QUAN LAMOTRIGINE: CPT

## 2023-11-13 PROCEDURE — 36415 COLL VENOUS BLD VENIPUNCTURE: CPT

## 2023-11-15 LAB — LAMOTRIGINE SERPL-MCNC: 16.6 UG/ML (ref 2–20)

## 2023-11-16 ENCOUNTER — TELEPHONE (OUTPATIENT)
Dept: NEUROLOGY | Facility: CLINIC | Age: 48
End: 2023-11-16

## 2023-11-16 NOTE — TELEPHONE ENCOUNTER
----- Message from DEEDEE Cody sent at 11/16/2023 12:15 PM EST -----  Level looks good. Continue current dose.   ----- Message -----  From: Lab, Background User  Sent: 11/15/2023   1:06 PM EST  To: DEEDEE Cody

## 2023-11-17 ENCOUNTER — OFFICE VISIT (OUTPATIENT)
Dept: NEUROLOGY | Facility: CLINIC | Age: 48
End: 2023-11-17
Payer: MEDICARE

## 2023-11-17 VITALS
HEART RATE: 100 BPM | BODY MASS INDEX: 30.42 KG/M2 | DIASTOLIC BLOOD PRESSURE: 60 MMHG | HEIGHT: 70 IN | WEIGHT: 212.5 LBS | TEMPERATURE: 97.1 F | OXYGEN SATURATION: 97 % | SYSTOLIC BLOOD PRESSURE: 110 MMHG

## 2023-11-17 DIAGNOSIS — E55.9 VITAMIN D DEFICIENCY: Primary | ICD-10-CM

## 2023-11-17 DIAGNOSIS — G40.109 LOCALIZATION-RELATED EPILEPSY (HCC): ICD-10-CM

## 2023-11-17 DIAGNOSIS — G40.909 NONINTRACTABLE EPILEPSY WITHOUT STATUS EPILEPTICUS, UNSPECIFIED EPILEPSY TYPE (HCC): ICD-10-CM

## 2023-11-17 PROCEDURE — 99214 OFFICE O/P EST MOD 30 MIN: CPT | Performed by: PSYCHIATRY & NEUROLOGY

## 2023-11-17 RX ORDER — DIVALPROEX SODIUM 500 MG/1
TABLET, DELAYED RELEASE ORAL
Qty: 180 TABLET | Refills: 3 | Status: SHIPPED | OUTPATIENT
Start: 2023-11-17

## 2023-11-17 RX ORDER — LAMOTRIGINE 200 MG/1
200 TABLET ORAL 2 TIMES DAILY
Qty: 180 TABLET | Refills: 3 | Status: SHIPPED | OUTPATIENT
Start: 2023-11-17

## 2023-11-17 RX ORDER — DIVALPROEX SODIUM 250 MG/1
TABLET, DELAYED RELEASE ORAL
Qty: 90 TABLET | Refills: 3 | Status: SHIPPED | OUTPATIENT
Start: 2023-11-17

## 2023-11-17 NOTE — PROGRESS NOTES
Patient ID: Sil Kraus is a 50 y.o. male with  congenital hydrocephalus, s/p  shunt with multiple revisions, and localization related epilepsy , who is returning to Neurology office for follow up of his seizures. Assessment/Plan:    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[address])  He does continue to be seizure-free on combination of lamotrigine and divalproex. His most recent seizure occurred in the setting of a shunt malfunction and he has otherwise been doing well. Although he is seizure-free, he is having side effects with significant tremor which does limit his ability to do things at times. This is quite bothersome and persisted even with using weighted spoons and other adaptive devices. --To try to limit his tremor, I will slightly decrease his dose of divalproex. He will take 500 mg in the morning and 750 mg at night. If he tolerates this decrease, we may be able to decrease it very slightly in the future. --He will continue lamotrigine unchanged. If he would have additional seizures, his divalproex should be increased back up to his prior dose    Vitamin D deficiency  He does have vitamin D deficiency and had low vitamin D on recent blood work. It would be important for him to increase his dose of vitamin D from 2000 units daily to 4000 units daily. --I will also recheck blood work including CBC, CMP, medication levels, and vitamin D        He will Return in about 4 months (around 3/17/2024). Subjective:    HPI  Current seizure medications:  1. Lamotrigine 200 mg twice a day  2. Divalproex  mg in the am and 1000 mg at night   Other medications as per Epic. Since his last visit, he has continued to be without any additional seizures. He continues to have significant issues with tremor which does limit his ability to do things. This is present mainly when trying to do things with his hands.     He has not had any additional headaches    Prior Seizure Medications: none    His history was also obtained from his mother, who was present at today's visit. Objective:    Blood pressure 110/60, pulse 100, temperature (!) 97.1 °F (36.2 °C), temperature source Temporal, height 5' 10" (1.778 m), weight 96.4 kg (212 lb 8 oz), SpO2 97 %. Physical Exam    Neurological Exam      ROS:    Review of Systems   Constitutional:  Negative for appetite change, fatigue and fever. HENT: Negative. Negative for hearing loss, tinnitus, trouble swallowing and voice change. Eyes: Negative. Negative for photophobia, pain and visual disturbance. Respiratory: Negative. Negative for shortness of breath. Cardiovascular: Negative. Negative for palpitations. Gastrointestinal: Negative. Negative for nausea and vomiting. Endocrine: Negative. Negative for cold intolerance. Genitourinary: Negative. Negative for dysuria, frequency and urgency. Musculoskeletal:  Negative for back pain, gait problem, myalgias and neck pain. Skin: Negative. Negative for rash. Allergic/Immunologic: Negative. Neurological: Negative. Negative for dizziness, tremors, seizures, syncope, facial asymmetry, speech difficulty, weakness, light-headedness, numbness and headaches. Hematological: Negative. Does not bruise/bleed easily. Psychiatric/Behavioral: Negative. Negative for confusion, hallucinations and sleep disturbance. All other systems reviewed and are negative. I personally reviewed the ROS that was entered by the medical assistant    Voice recognition software was used in the generation of this note. There may be unintentional errors including grammatical errors, spelling errors, or pronoun errors.

## 2023-11-17 NOTE — PATIENT INSTRUCTIONS
-- continue Lamotrigine unchanged. -- Please change Divalproex to be 500 mg in the morning and 750 mg at night. -- Please also increase your vitamin D to be 4000 units daily.

## 2023-11-19 PROBLEM — G40.909 NONINTRACTABLE EPILEPSY WITHOUT STATUS EPILEPTICUS (HCC): Status: RESOLVED | Noted: 2017-10-20 | Resolved: 2023-11-19

## 2023-11-20 NOTE — ASSESSMENT & PLAN NOTE
He does have vitamin D deficiency and had low vitamin D on recent blood work. It would be important for him to increase his dose of vitamin D from 2000 units daily to 4000 units daily.     --I will also recheck blood work including CBC, CMP, medication levels, and vitamin D

## 2023-11-20 NOTE — ASSESSMENT & PLAN NOTE
He does continue to be seizure-free on combination of lamotrigine and divalproex. His most recent seizure occurred in the setting of a shunt malfunction and he has otherwise been doing well. Although he is seizure-free, he is having side effects with significant tremor which does limit his ability to do things at times. This is quite bothersome and persisted even with using weighted spoons and other adaptive devices. --To try to limit his tremor, I will slightly decrease his dose of divalproex. He will take 500 mg in the morning and 750 mg at night. If he tolerates this decrease, we may be able to decrease it very slightly in the future. --He will continue lamotrigine unchanged.   If he would have additional seizures, his divalproex should be increased back up to his prior dose

## 2023-11-22 DIAGNOSIS — J45.20 MILD INTERMITTENT ASTHMA WITHOUT COMPLICATION: ICD-10-CM

## 2023-11-22 RX ORDER — BUDESONIDE AND FORMOTEROL FUMARATE DIHYDRATE 80; 4.5 UG/1; UG/1
2 AEROSOL RESPIRATORY (INHALATION) 2 TIMES DAILY
Qty: 10.2 G | Refills: 5 | Status: SHIPPED | OUTPATIENT
Start: 2023-11-22

## 2024-02-01 DIAGNOSIS — G40.909 NONINTRACTABLE EPILEPSY WITHOUT STATUS EPILEPTICUS, UNSPECIFIED EPILEPSY TYPE (HCC): ICD-10-CM

## 2024-02-01 RX ORDER — DIVALPROEX SODIUM 500 MG/1
TABLET, DELAYED RELEASE ORAL
Qty: 180 TABLET | Refills: 3 | Status: SHIPPED | OUTPATIENT
Start: 2024-02-01

## 2024-02-26 ENCOUNTER — TELEPHONE (OUTPATIENT)
Age: 49
End: 2024-02-26

## 2024-02-26 NOTE — TELEPHONE ENCOUNTER
Reason for call:     Patients mother called, states budesonide-formoterol (SYMBICORT) is no longer covered by patients insurance. Mother is asking for a replacement medication.     [] Refill   [] Prior Auth  [x] Other:     Office:   [x] PCP/Provider - Dr Morton   [] Specialty/Provider -     Pharmacy: [facility], [place]

## 2024-02-27 NOTE — TELEPHONE ENCOUNTER
Called patient let message to call the office back   Was calling to get patient 's message     Patient needs to call insurance and see which medications are covered

## 2024-02-29 NOTE — TELEPHONE ENCOUNTER
Called patient spoke with mom gave her message, mom stated she went to pharmacy and they had symbicort and was able to get medication, pharmacy told her insurance is still covering it for now. Medication has 3 refills and did not coast patient anything.

## 2024-03-01 ENCOUNTER — TELEPHONE (OUTPATIENT)
Dept: NEUROLOGY | Facility: CLINIC | Age: 49
End: 2024-03-01

## 2024-03-01 NOTE — TELEPHONE ENCOUNTER
Left VM to confirm appt with Dr Hodge and that labs still need completed for this visit if possible.

## 2024-03-04 NOTE — TELEPHONE ENCOUNTER
Pts mom called in to r/s she does not have her GPS and needed it to get to appt. Today at 1:30 with Dr. Hodge. Offered to give her directions but want to r/s appt.   Moved appt to 3/8 @ 2:15 with Leeann and pt will get labs done before appt.     Thank you.

## 2024-03-08 ENCOUNTER — OFFICE VISIT (OUTPATIENT)
Dept: NEUROLOGY | Facility: CLINIC | Age: 49
End: 2024-03-08
Payer: COMMERCIAL

## 2024-03-08 VITALS
BODY MASS INDEX: 30.48 KG/M2 | SYSTOLIC BLOOD PRESSURE: 131 MMHG | HEART RATE: 88 BPM | TEMPERATURE: 98.8 F | WEIGHT: 212.9 LBS | HEIGHT: 70 IN | DIASTOLIC BLOOD PRESSURE: 87 MMHG

## 2024-03-08 DIAGNOSIS — G40.109 LOCALIZATION-RELATED EPILEPSY (HCC): Primary | ICD-10-CM

## 2024-03-08 DIAGNOSIS — E55.9 VITAMIN D DEFICIENCY: ICD-10-CM

## 2024-03-08 PROCEDURE — 99214 OFFICE O/P EST MOD 30 MIN: CPT | Performed by: NURSE PRACTITIONER

## 2024-03-08 RX ORDER — GUAIFENESIN 600 MG/1
600 TABLET, EXTENDED RELEASE ORAL
COMMUNITY

## 2024-03-08 NOTE — PROGRESS NOTES
Patient ID: Jose D Glover is a 48 y.o. male with congenital hydrocephalus, s/p  shunt with multiple revisions, and localization related epilepsy , who is returning to Neurology office for follow up of his seizures.    Assessment/Plan:    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HCC)  Patient continues to be seizure free on lamotrigine 200 mg BID and divalproex 500 mg in the morning and 750 mg at night. He denies any side effects other than tremor from Depakote. With recent decrease in divalproex dose he does not notice any change to his tremor. We discussed the possibility of further changes but he is not interested due to possibility of seizures. Recommended that he have his blood work to check AED levels, CBC, and CMP.     Patient will continue medications as above. With breakthrough seizures they will call the office. Recommend that if there are breakthrough seizures in the future to check shunt placement and function as well as AED levels. Follow up in 4 months or sooner if needed.        He will Return for 4 months with Dr Hodge.      Subjective:  Jose D Glover is a 48 y.o. male with congenital hydrocephalus, s/p  shunt with multiple revisions, and localization related epilepsy , who is returning to Neurology office for follow up of his seizures. He was last seen in the office on 11/17/2023. At that time, he was seizure free on combination of lamotrigine and divalproex. Most recent seizure was in the setting of shunt malfunction, otherwise doing well. He did have a significant tremor which was interfering with his ability to do things at times and was quite bothersome even when using weighted spoons or other adaptive devices. Decreased diavlproex but noted if there were seizures, his dose should be increase back up.     Since his last visit, he does not feel that his tremor Is better with the decrease in dose of divalproex. He has been seizure free. Other than the tremor he does feel good. Tremor seems  "to be most bothersome with eating and writing. Does not seem to be better or worse at any certain time of day.  He is not interested in changing Depakote dose further given concern that seizures could recur.     He has been having some issues with his asthma. Sees PCP for asthma. On symbicort, mucinex, benadryl PRN.     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     Current seizure medications:  - lamotrigine 200 mg BID  - divalproex 500-750  Other medications as per Epic.    Prior Seizure Medications: none    His history was also obtained from his mother, who was present at today's visit.     p I reviewed prior neurology notes, most recent labs, as documented in Epic/[facility], and summarized above.        Objective:    Blood pressure 131/87, pulse 88, temperature 98.8 °F (37.1 °C), temperature source Temporal, height 5' 10\" (1.778 m), weight 96.6 kg (212 lb 14.4 oz).    Physical Exam  No apparent distress.   Appears well nourished.   Mood appropriate for situation     Neurologic Exam  Mental status- alert and oriented to person, place, and time. Speech appropriate for conversation. Good attention and knowledge.     Cranial Nerves- PERRL, EOMS normal, facial sensation and muscles symmetric, hearing intact bilaterally to finger rubs, tongue midline, palate rise symmetrical, shoulder shrug symmetrical.    Motor- No pronator drift. Appropriate strength. Moves all extremities freely. Moderate postural tremor of bilateral hands.     Sensory-  Intact distally in all extremities to light touch.     DTRs- 2+ and symmetric in all extremities.     Gait- normal casual gait.     Coordination- FNF intact.         ROS:  Review of Systems   Constitutional:  Negative for appetite change, fatigue and fever.   HENT: Negative.  Negative for hearing loss, tinnitus, trouble swallowing and voice change.    Eyes: Negative.  Negative for photophobia, pain and visual disturbance.   Respiratory: Negative.  Negative for shortness of " breath.    Cardiovascular: Negative.  Negative for palpitations.   Gastrointestinal: Negative.  Negative for nausea and vomiting.   Endocrine: Negative.  Negative for cold intolerance.   Genitourinary: Negative.  Negative for dysuria, frequency and urgency.   Musculoskeletal:  Negative for back pain, gait problem, myalgias, neck pain and neck stiffness.   Skin: Negative.  Negative for rash.   Allergic/Immunologic: Negative.    Neurological:  Positive for tremors (Severe.). Negative for dizziness, seizures, syncope, facial asymmetry, speech difficulty, weakness, light-headedness, numbness and headaches.   Hematological: Negative.  Does not bruise/bleed easily.   Psychiatric/Behavioral: Negative.  Negative for confusion, hallucinations and sleep disturbance.    All other systems reviewed and are negative.          ROS obtained by MA and reviewed by myself.       This note may have been created using voice recognition software. There may be unintentional errors such as grammatical errors, spelling errors, or pronoun errors.

## 2024-03-08 NOTE — PATIENT INSTRUCTIONS
Continue current seizure medications  Continue following with PCP regarding your asthma  Call the office with seizures, issues or concerns  Follow up with Dr Hodge in 4 months with Dr Hodge

## 2024-03-08 NOTE — ASSESSMENT & PLAN NOTE
Patient continues to be seizure free on lamotrigine 200 mg BID and divalproex 500 mg in the morning and 750 mg at night. He denies any side effects other than tremor from Depakote. With recent decrease in divalproex dose he does not notice any change to his tremor. We discussed the possibility of further changes but he is not interested due to possibility of seizures. Recommended that he have his blood work to check AED levels, CBC, and CMP.     Patient will continue medications as above. With breakthrough seizures they will call the office. Recommend that if there are breakthrough seizures in the future to check shunt placement and function as well as AED levels. Follow up in 4 months or sooner if needed.

## 2024-03-08 NOTE — PROGRESS NOTES
Review of Systems   Constitutional:  Negative for appetite change, fatigue and fever.   HENT: Negative.  Negative for hearing loss, tinnitus, trouble swallowing and voice change.    Eyes: Negative.  Negative for photophobia, pain and visual disturbance.   Respiratory: Negative.  Negative for shortness of breath.    Cardiovascular: Negative.  Negative for palpitations.   Gastrointestinal: Negative.  Negative for nausea and vomiting.   Endocrine: Negative.  Negative for cold intolerance.   Genitourinary: Negative.  Negative for dysuria, frequency and urgency.   Musculoskeletal:  Negative for back pain, gait problem, myalgias, neck pain and neck stiffness.   Skin: Negative.  Negative for rash.   Allergic/Immunologic: Negative.    Neurological:  Positive for tremors (Severe.). Negative for dizziness, seizures, syncope, facial asymmetry, speech difficulty, weakness, light-headedness, numbness and headaches.   Hematological: Negative.  Does not bruise/bleed easily.   Psychiatric/Behavioral: Negative.  Negative for confusion, hallucinations and sleep disturbance.    All other systems reviewed and are negative.

## 2024-03-11 ENCOUNTER — APPOINTMENT (OUTPATIENT)
Dept: LAB | Facility: CLINIC | Age: 49
End: 2024-03-11
Payer: COMMERCIAL

## 2024-03-11 DIAGNOSIS — G40.109 LOCALIZATION-RELATED EPILEPSY (HCC): ICD-10-CM

## 2024-03-11 DIAGNOSIS — E55.9 VITAMIN D DEFICIENCY: ICD-10-CM

## 2024-03-11 LAB
25(OH)D3 SERPL-MCNC: 46.4 NG/ML (ref 30–100)
ALBUMIN SERPL BCP-MCNC: 4.2 G/DL (ref 3.5–5)
ALP SERPL-CCNC: 50 U/L (ref 34–104)
ALT SERPL W P-5'-P-CCNC: 12 U/L (ref 7–52)
ANION GAP SERPL CALCULATED.3IONS-SCNC: 8 MMOL/L
AST SERPL W P-5'-P-CCNC: 10 U/L (ref 13–39)
BASOPHILS # BLD AUTO: 0.07 THOUSANDS/ÂΜL (ref 0–0.1)
BASOPHILS NFR BLD AUTO: 1 % (ref 0–1)
BILIRUB SERPL-MCNC: 0.7 MG/DL (ref 0.2–1)
BUN SERPL-MCNC: 15 MG/DL (ref 5–25)
CALCIUM SERPL-MCNC: 9.1 MG/DL (ref 8.4–10.2)
CHLORIDE SERPL-SCNC: 104 MMOL/L (ref 96–108)
CO2 SERPL-SCNC: 31 MMOL/L (ref 21–32)
CREAT SERPL-MCNC: 0.91 MG/DL (ref 0.6–1.3)
EOSINOPHIL # BLD AUTO: 0.38 THOUSAND/ÂΜL (ref 0–0.61)
EOSINOPHIL NFR BLD AUTO: 4 % (ref 0–6)
ERYTHROCYTE [DISTWIDTH] IN BLOOD BY AUTOMATED COUNT: 12.7 % (ref 11.6–15.1)
GFR SERPL CREATININE-BSD FRML MDRD: 99 ML/MIN/1.73SQ M
GLUCOSE P FAST SERPL-MCNC: 93 MG/DL (ref 65–99)
HCT VFR BLD AUTO: 47.7 % (ref 36.5–49.3)
HGB BLD-MCNC: 15.6 G/DL (ref 12–17)
IMM GRANULOCYTES # BLD AUTO: 0.16 THOUSAND/UL (ref 0–0.2)
IMM GRANULOCYTES NFR BLD AUTO: 2 % (ref 0–2)
LYMPHOCYTES # BLD AUTO: 2.97 THOUSANDS/ÂΜL (ref 0.6–4.47)
LYMPHOCYTES NFR BLD AUTO: 28 % (ref 14–44)
MCH RBC QN AUTO: 29.8 PG (ref 26.8–34.3)
MCHC RBC AUTO-ENTMCNC: 32.7 G/DL (ref 31.4–37.4)
MCV RBC AUTO: 91 FL (ref 82–98)
MONOCYTES # BLD AUTO: 1.04 THOUSAND/ÂΜL (ref 0.17–1.22)
MONOCYTES NFR BLD AUTO: 10 % (ref 4–12)
NEUTROPHILS # BLD AUTO: 6.07 THOUSANDS/ÂΜL (ref 1.85–7.62)
NEUTS SEG NFR BLD AUTO: 55 % (ref 43–75)
NRBC BLD AUTO-RTO: 0 /100 WBCS
PLATELET # BLD AUTO: 299 THOUSANDS/UL (ref 149–390)
PMV BLD AUTO: 9.8 FL (ref 8.9–12.7)
POTASSIUM SERPL-SCNC: 4.1 MMOL/L (ref 3.5–5.3)
PROT SERPL-MCNC: 6.3 G/DL (ref 6.4–8.4)
RBC # BLD AUTO: 5.23 MILLION/UL (ref 3.88–5.62)
SODIUM SERPL-SCNC: 143 MMOL/L (ref 135–147)
VALPROATE SERPL-MCNC: 55 UG/ML (ref 50–100)
WBC # BLD AUTO: 10.69 THOUSAND/UL (ref 4.31–10.16)

## 2024-03-11 PROCEDURE — 80175 DRUG SCREEN QUAN LAMOTRIGINE: CPT

## 2024-03-11 PROCEDURE — 80164 ASSAY DIPROPYLACETIC ACD TOT: CPT

## 2024-03-11 PROCEDURE — 85025 COMPLETE CBC W/AUTO DIFF WBC: CPT

## 2024-03-11 PROCEDURE — 80053 COMPREHEN METABOLIC PANEL: CPT

## 2024-03-11 PROCEDURE — 82306 VITAMIN D 25 HYDROXY: CPT

## 2024-03-11 PROCEDURE — 36415 COLL VENOUS BLD VENIPUNCTURE: CPT

## 2024-03-12 ENCOUNTER — TELEPHONE (OUTPATIENT)
Dept: NEUROLOGY | Facility: CLINIC | Age: 49
End: 2024-03-12

## 2024-03-12 DIAGNOSIS — G40.109 LOCALIZATION-RELATED EPILEPSY (HCC): Primary | ICD-10-CM

## 2024-03-12 NOTE — TELEPHONE ENCOUNTER
received vm 3/11 at 9:16am-Yes, this is about cole quezada. It's about a mix up of his medication that I made, my mistake. I want to talk to somebody that had adjusted, getting it back on track. And I am going to take him for blood work today. So if someone could call me back, I'd appreciate it. Thank you  [phone]  ----------------------------------------  Called and spoke to pt's mom.    States that she was adding 250mg of depakote instead of taking away 250mg     He was taking depakote 500mg 2 tabs in am and 500mg 1 tabs hs and 250mg 1 tab hs  States that it should have only been 500mg 1 tab in am and 500mg hs and 250mg hs  She is not sure how many days he was taking the higher dose in the morning    The only thing that may have happened is that his tremors may have gotten worse    States that he back on track taking 500mg in am and 750 at hs as of yesterday.     He had labs done yesterday    Please advise  [phone] to leave detailed message

## 2024-03-12 NOTE — TELEPHONE ENCOUNTER
Called re: Leeann's recommendation below and left a detailed VM with call back # if any further assistance is required.

## 2024-03-13 DIAGNOSIS — G40.109 LOCALIZATION-RELATED EPILEPSY (HCC): Primary | ICD-10-CM

## 2024-03-13 LAB — LAMOTRIGINE SERPL-MCNC: 19.6 UG/ML (ref 2–20)

## 2024-07-02 ENCOUNTER — LAB (OUTPATIENT)
Dept: LAB | Facility: CLINIC | Age: 49
End: 2024-07-02
Payer: COMMERCIAL

## 2024-07-02 DIAGNOSIS — G40.109 LOCALIZATION-RELATED EPILEPSY (HCC): ICD-10-CM

## 2024-07-02 LAB — VALPROATE SERPL-MCNC: 86 UG/ML (ref 50–100)

## 2024-07-02 PROCEDURE — 36415 COLL VENOUS BLD VENIPUNCTURE: CPT

## 2024-07-02 PROCEDURE — 80164 ASSAY DIPROPYLACETIC ACD TOT: CPT

## 2024-07-02 PROCEDURE — 80175 DRUG SCREEN QUAN LAMOTRIGINE: CPT

## 2024-07-05 LAB — LAMOTRIGINE SERPL-MCNC: 21.1 UG/ML (ref 2–20)

## 2024-07-26 DIAGNOSIS — J45.20 MILD INTERMITTENT ASTHMA WITHOUT COMPLICATION: ICD-10-CM

## 2024-07-26 RX ORDER — BUDESONIDE AND FORMOTEROL FUMARATE DIHYDRATE 80; 4.5 UG/1; UG/1
2 AEROSOL RESPIRATORY (INHALATION) 2 TIMES DAILY
Qty: 10.2 G | Refills: 5 | Status: SHIPPED | OUTPATIENT
Start: 2024-07-26

## 2024-08-05 ENCOUNTER — OFFICE VISIT (OUTPATIENT)
Dept: NEUROLOGY | Facility: CLINIC | Age: 49
End: 2024-08-05

## 2024-08-05 VITALS
HEIGHT: 70 IN | BODY MASS INDEX: 31.21 KG/M2 | HEART RATE: 94 BPM | WEIGHT: 218 LBS | OXYGEN SATURATION: 97 % | TEMPERATURE: 98.5 F | DIASTOLIC BLOOD PRESSURE: 76 MMHG | SYSTOLIC BLOOD PRESSURE: 130 MMHG

## 2024-08-05 DIAGNOSIS — G80.1 SPASTIC DIPLEGIC CEREBRAL PALSY (HCC): Chronic | ICD-10-CM

## 2024-08-05 DIAGNOSIS — G40.909 NONINTRACTABLE EPILEPSY WITHOUT STATUS EPILEPTICUS, UNSPECIFIED EPILEPSY TYPE (HCC): ICD-10-CM

## 2024-08-05 DIAGNOSIS — G91.9 HYDROCEPHALUS WITH OPERATING SHUNT (HCC): ICD-10-CM

## 2024-08-05 DIAGNOSIS — Z79.899 ENCOUNTER FOR LONG-TERM (CURRENT) DRUG USE: ICD-10-CM

## 2024-08-05 DIAGNOSIS — G40.109 LOCALIZATION-RELATED EPILEPSY (HCC): Primary | ICD-10-CM

## 2024-08-05 DIAGNOSIS — R25.1 TREMOR: ICD-10-CM

## 2024-08-05 RX ORDER — LAMOTRIGINE 200 MG/1
200 TABLET ORAL 2 TIMES DAILY
Qty: 180 TABLET | Refills: 3 | Status: SHIPPED | OUTPATIENT
Start: 2024-08-05

## 2024-08-05 RX ORDER — DIVALPROEX SODIUM 250 MG/1
TABLET, DELAYED RELEASE ORAL
Qty: 90 TABLET | Refills: 3 | Status: SHIPPED | OUTPATIENT
Start: 2024-08-05

## 2024-08-05 RX ORDER — DIVALPROEX SODIUM 500 MG/1
TABLET, DELAYED RELEASE ORAL
Qty: 180 TABLET | Refills: 3 | Status: SHIPPED | OUTPATIENT
Start: 2024-08-05

## 2024-08-05 NOTE — PROGRESS NOTES
Neurology Ambulatory Visit  Name: Jose D Glover       : 1975       MRN: [id number]   Encounter Provider: DEEDEE Flores   Encounter Date: 2024  Encounter department: [facility] [place]    Assessment and Plan    Jose D is a 48 year-old man with history of congenital hydrocephalus, s/p  shunt with multiple revisions, cerebral palsy, localization related epilepsy, and asthma.    1. 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HCC   He has remained seizure free with a combination of lamotrigine and divalproex. He continues to experience tremors due to the lamotrigine and divalproex. However, the tremors are manageable with adaptive utensils. Recent levels showed lamotrigine level slightly elevated at 21.1, valproic acid level was 86. The patient and his mother did not want to make any changes to his antiepileptic medications at this time. Lamotrigine and valproic acid will be continued at the current doses. A DEXA scan was also ordered to evaluate his bone health as long term use of divalproex can lead to thinning of his bones.     2. Tremor  Tremor is likely side effect of lamotrigine and divalproex. Tremor is manageable with adaptive utensils.     3. Congenital Hydrocephalus with shunt   Patient denies sleepiness, change in mental status, and headaches. Shunt appears to be functioning appropriately. Last imaging was in 2019.     He should follow-up with his PCP regarding treatment of his eczema and asthma .    He will Return in about 1 year (around 2025) for Follow-up with Dr. Hodge.    History of Present Illness   Jose D Glover is a 48 y.o. male, who is presenting to [facility] for follow-up of his seizures.   He has a history of congenital hydrocephalus, s/p  shunt with multiple revisions, cerebral palsy, localization related epilepsy,    and asthma. He is accompanied by his mother.     Since the last visit, with Leeann Soto 3/8/24 patient  reports no further seizures. He denies any metallic taste or staring spells. Of note, the patient's last seizure occurred in the setting of shunt malfunction. He is maintained on lamotrigine and valproic acid and continues to experience tremors from that. His tremors are stable with the use of adaptive utensils. His shunt has also been stable. Patient denies any headaches. He is experiencing some vision changes and will follow-up with ophthalmology. He denies experiencing any falls.    He is now experiencing eczema and his mother feels like his asthma is not well controlled.     Current seizure medications:  1. lamotrigine 200mg bid  2. divalproex sodium  /750mg  Other medications as per Epic      Event/Seizure Semiology:  1.  GTC    Previous Evaluation:  - HCT 3/27/19: No acute intracranial abnormality.Stable ventricular shunt catheters.    - Routine EE16 A breach rhythm in the left parietal area is consistent with a skull defect in this region. Sharp activity and higher amplitude slow activities are isolated to the region of breach and are due to a skull defect, but superimposed focal neuronal dysfunction in this region cannot be completely excluded.   -- Labs: 24 LTG 21.1, VPA 86, vitamin D 46.4      Prior AEDs:  none       Medical History: see above  Eczema  Asthma      Social History:  Driving:no  Workin hours a week bagging groceries at [employer]      I reviewed Allergies, Family History, Medical History, Surgical History and problem list as documented in Epic/Care Everywhere      Review of Systems:  Constitutional:  Negative for appetite change, fatigue and fever.   HENT: Negative for hearing loss, tinnitus, trouble swallowing and voice change.    Eyes: Negative for photophobia, pain and visual disturbance.   Respiratory: Negative for shortness of breath.    Cardiovascular: Negative for palpitations.   Gastrointestinal: Negative for nausea and vomiting.   Endocrine: Negative for cold  "intolerance.   Genitourinary: Negative for dysuria, frequency and urgency.   Musculoskeletal:  Negative for back pain, gait problem, myalgias, neck pain and neck stiffness.   Skin: Negative for rash.   Allergic/Immunologic: Negative for hives.  Neurological: Negative for dizziness, syncope, speech difficulty, weakness, light-headedness, numbness and headaches.   Hematological: Negative for easy bruising and bleeding  Psychiatric/Behavioral: Negative for confusion, hallucinations and sleep disturbance.         Objective     /76 (BP Location: Right arm, Patient Position: Sitting, Cuff Size: Adult)   Pulse 94   Temp 98.5 °F (36.9 °C) (Temporal)   Ht 5' 10\" (1.778 m)   Wt 98.9 kg (218 lb)   SpO2 97%   BMI 31.28 kg/m²      GENERAL EXAMINATION:   In general patient is well appearing and in no distress.    NEUROLOGIC EXAMINATION:     Alert and oriented to person, date, location.     Mood and affect are appropriate. Attention is intact.    Language function including fluency, naming, and comprehension intact.    Cranial nerves: Pupils are equal round reactive to light and accommodation.  Extraocular movements are intact without nystagmus. No facial droop, face activates symmetrically. There is no dysarthria.     Motor Exam:  No pronator drift. + fine hand tremors, worse with intention, increased tone on right    Coordination: Finger nose finger intact    Gait: spastic gait on right      "

## 2024-08-16 ENCOUNTER — TELEPHONE (OUTPATIENT)
Age: 49
End: 2024-08-16

## 2024-08-27 ENCOUNTER — OFFICE VISIT (OUTPATIENT)
Dept: FAMILY MEDICINE CLINIC | Facility: CLINIC | Age: 49
End: 2024-08-27
Payer: COMMERCIAL

## 2024-08-27 VITALS
SYSTOLIC BLOOD PRESSURE: 122 MMHG | RESPIRATION RATE: 17 BRPM | WEIGHT: 215.5 LBS | HEART RATE: 90 BPM | DIASTOLIC BLOOD PRESSURE: 78 MMHG | OXYGEN SATURATION: 96 % | HEIGHT: 70 IN | TEMPERATURE: 98.5 F | BODY MASS INDEX: 30.85 KG/M2

## 2024-08-27 DIAGNOSIS — J45.20 MILD INTERMITTENT ASTHMA WITHOUT COMPLICATION: ICD-10-CM

## 2024-08-27 DIAGNOSIS — J45.21 MILD INTERMITTENT ASTHMA WITH EXACERBATION: Primary | ICD-10-CM

## 2024-08-27 DIAGNOSIS — R05.9 COUGH, UNSPECIFIED TYPE: ICD-10-CM

## 2024-08-27 LAB
SARS-COV-2 AG UPPER RESP QL IA: NEGATIVE
VALID CONTROL: NORMAL

## 2024-08-27 PROCEDURE — 99214 OFFICE O/P EST MOD 30 MIN: CPT | Performed by: FAMILY MEDICINE

## 2024-08-27 PROCEDURE — 87811 SARS-COV-2 COVID19 W/OPTIC: CPT | Performed by: FAMILY MEDICINE

## 2024-08-27 RX ORDER — PREDNISONE 20 MG/1
40 TABLET ORAL DAILY
Qty: 10 TABLET | Refills: 0 | Status: SHIPPED | OUTPATIENT
Start: 2024-08-27 | End: 2024-09-01

## 2024-08-27 RX ORDER — IPRATROPIUM BROMIDE AND ALBUTEROL SULFATE 2.5; .5 MG/3ML; MG/3ML
3 SOLUTION RESPIRATORY (INHALATION)
Status: DISCONTINUED | OUTPATIENT
Start: 2024-08-27 | End: 2024-08-27

## 2024-08-27 RX ORDER — ALBUTEROL SULFATE 0.83 MG/ML
2.5 SOLUTION RESPIRATORY (INHALATION) ONCE
Status: COMPLETED | OUTPATIENT
Start: 2024-08-27 | End: 2024-08-27

## 2024-08-27 RX ORDER — ALBUTEROL SULFATE 0.83 MG/ML
2.5 SOLUTION RESPIRATORY (INHALATION) EVERY 6 HOURS PRN
Status: SHIPPED | OUTPATIENT
Start: 2024-08-27

## 2024-08-27 RX ORDER — ALBUTEROL SULFATE 0.83 MG/ML
2.5 SOLUTION RESPIRATORY (INHALATION) EVERY 6 HOURS PRN
Qty: 210 ML | Refills: 0 | Status: SHIPPED | OUTPATIENT
Start: 2024-08-27

## 2024-08-27 RX ADMIN — ALBUTEROL SULFATE 2.5 MG: 0.83 SOLUTION RESPIRATORY (INHALATION) at 11:35

## 2024-08-27 NOTE — PROGRESS NOTES
Ambulatory Visit  Name: Jose D Glover      : 1975      MRN: [id number]  Encounter Provider: Rachelle Morton MD  Encounter Date: 2024   Encounter department: [facility]    Assessment & Plan   1. Mild intermittent asthma with exacerbation  -     Nebulizer  -     albuterol (2.5 mg/3 mL) 0.083 % nebulizer solution; Take 3 mL (2.5 mg total) by nebulization every 6 (six) hours as needed for wheezing or shortness of breath  -     Nebulizer Supplies  -     predniSONE 20 mg tablet; Take 2 tablets (40 mg total) by mouth daily for 5 days  2. Cough, unspecified type  -     POCT Rapid Covid Ag  -     Nebulizer  -     albuterol (2.5 mg/3 mL) 0.083 % nebulizer solution; Take 3 mL (2.5 mg total) by nebulization every 6 (six) hours as needed for wheezing or shortness of breath  -     Nebulizer Supplies  3. Mild intermittent asthma without complication  Nebulizer treatment given in office.  Responded well.  Nebulizer supplies and medication ordered.  Will also start patient on prednisone 40 mg daily for the next 5 days.  Follow-up in office if no improvement.  Will within consider chest x-ray.       History of Present Illness     Patient presents with:  Asthma: Patient has had asthma since a child.  Cough: Heavy cough, mucus build up          Review of Systems   Constitutional:  Negative for activity change, fatigue and fever.   Eyes:  Negative for visual disturbance.   Respiratory:  Positive for cough and wheezing. Negative for shortness of breath.    Cardiovascular:  Negative for chest pain.   Gastrointestinal:  Negative for abdominal pain, constipation, diarrhea and nausea.   Endocrine: Negative for cold intolerance and heat intolerance.   Musculoskeletal:  Negative for back pain.   Skin:  Negative for rash.   Neurological:  Negative for headaches.   Psychiatric/Behavioral:  Negative for confusion.      Past Medical History:   Diagnosis Date    Asthma     Cerebral palsy (HCC)      Congenital hydrocephalus (HCC)     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HCC)     Migraines     Seizures (HCC)      Past Surgical History:   Procedure Laterality Date    BRAIN SURGERY      Multiple  shunt revisions    EYE SURGERY  12/23/2015    HERNIA REPAIR  12/23/2015    WA CRTJ SHUNT PIGXLYFZUS-[id number] TERMINUS Left 10/23/2017    Procedure: SHUNT VENTRICULAR-PERITONEAL revision;  Surgeon: Solitario Clifton MD;  Location: [facility];  Service: Neurosurgery    WA LAPS ABD PRTM&OMENTUM DX W/WO SPEC BR/WA SPX N/A 2/8/2018    Procedure: DIAGNOSTIC LAPAROSCOPY, LYSIS OF ADHESIONS, OPEN LAPAROTOMY, LYSIS OF ADHESIONS, REPOSIONING OF  SHUNT TUBING;  Surgeon: Ilya Roberts MD;  Location: [facility];  Service: General     Family History   Problem Relation Age of Onset    Hyperlipidemia Mother     Hypertension Mother     Breast cancer Mother     Hypertension Father     Hypertension Brother     Hypertension Brother      Social History     Tobacco Use    Smoking status: Never    Smokeless tobacco: Never    Tobacco comments:     N/A   Vaping Use    Vaping status: Never Used   Substance and Sexual Activity    Alcohol use: No     Comment: N/A    Drug use: No     Comment: N/A    Sexual activity: Never     Comment: N/A     Current Outpatient Medications on File Prior to Visit   Medication Sig    albuterol (PROVENTIL HFA,VENTOLIN HFA) 90 mcg/act inhaler Inhale 2 puffs every 4 (four) hours as needed for wheezing or shortness of breath    ALPRAZolam (XANAX) 0.25 mg tablet Take 1 tablet (0.25 mg total) by mouth 3 (three) times a day as needed (PRN)    budesonide-formoterol (SYMBICORT) 80-4.5 MCG/ACT inhaler INHALE 2 PUFFS BY MOUTH TWICE DAILY. RINSE MOUTH AFTER USE    Cholecalciferol (VITAMIN D3) 2000 units capsule Take 2,000 Units by mouth daily Pt takes 2,000 units per day      divalproex sodium (Depakote) 250 mg DR tablet Take 1 tab at night with one 500 mg tab for total night time dose of 750 mg at night.    divalproex sodium  "(DEPAKOTE) 500 mg DR tablet TAKE 1 TABLET BY MOUTH EVERY MORNING AND 1 TABLET AT NIGHT    lamoTRIgine (LaMICtal) 200 MG tablet Take 1 tablet (200 mg total) by mouth 2 (two) times a day     Allergies   Allergen Reactions    Latex Hives    Other      Cats, seasonal    Pollen Extract      Immunization History   Administered Date(s) Administered    COVID-19 PFIZER VACCINE 0.3 ML IM 03/27/2021, 04/17/2021, 12/18/2021    INFLUENZA 01/01/2015, 11/09/2015, 11/03/2016, 09/26/2017, 11/06/2018    Influenza Quadrivalent, 6-35 Months IM 09/26/2017    Influenza, injectable, quadrivalent, preservative free 0.5 mL 11/06/2018, 01/08/2020, 09/17/2020, 11/17/2021, 10/19/2023    Influenza, seasonal, injectable 01/01/2015, 11/03/2016    Pneumococcal Polysaccharide PPV23 09/17/2020    TD (adult) Preservative Free 07/09/2019     Objective     /78 (BP Location: Left arm, Patient Position: Sitting, Cuff Size: Standard)   Pulse 90   Temp 98.5 °F (36.9 °C) (Temporal)   Resp 17   Ht 5' 10\" (1.778 m)   Wt 97.8 kg (215 lb 8 oz)   SpO2 96%   BMI 30.92 kg/m²     Physical Exam  Vitals and nursing note reviewed.   Constitutional:       Appearance: Normal appearance. He is well-developed.   HENT:      Head: Normocephalic and atraumatic.   Cardiovascular:      Rate and Rhythm: Normal rate and regular rhythm.   Pulmonary:      Effort: Pulmonary effort is normal.      Breath sounds: Wheezing and rhonchi present.   Abdominal:      General: Bowel sounds are normal.      Palpations: Abdomen is soft.   Musculoskeletal:      Cervical back: Normal range of motion.   Skin:     General: Skin is warm.   Neurological:      General: No focal deficit present.      Mental Status: He is alert.   Psychiatric:         Mood and Affect: Mood normal.         Speech: Speech normal.         "

## 2024-09-04 ENCOUNTER — TELEPHONE (OUTPATIENT)
Age: 49
End: 2024-09-04

## 2024-09-04 DIAGNOSIS — J45.21 MILD INTERMITTENT ASTHMA WITH EXACERBATION: Primary | ICD-10-CM

## 2024-09-04 NOTE — TELEPHONE ENCOUNTER
Mom called.  Provider ordered liquid Albuterol, however the patient does not have a nebulizer, so could we please order a nebulizer for the patient.  Thank you.

## 2024-09-12 ENCOUNTER — TELEPHONE (OUTPATIENT)
Dept: FAMILY MEDICINE CLINIC | Facility: CLINIC | Age: 49
End: 2024-09-12

## 2024-09-12 NOTE — TELEPHONE ENCOUNTER
Ceci - Patients mom        Patients mom is calling, patient has the nebulizer solution but no machine to use it with.  She is asking for someone from the office to call her back today.    CB: [phone]

## 2024-09-18 ENCOUNTER — APPOINTMENT (EMERGENCY)
Dept: CT IMAGING | Facility: HOSPITAL | Age: 49
End: 2024-09-18
Payer: COMMERCIAL

## 2024-09-18 ENCOUNTER — HOSPITAL ENCOUNTER (EMERGENCY)
Facility: HOSPITAL | Age: 49
Discharge: HOME | End: 2024-09-18
Attending: EMERGENCY MEDICINE
Payer: COMMERCIAL

## 2024-09-18 VITALS
HEART RATE: 85 BPM | SYSTOLIC BLOOD PRESSURE: 170 MMHG | RESPIRATION RATE: 18 BRPM | OXYGEN SATURATION: 97 % | TEMPERATURE: 98.1 F | DIASTOLIC BLOOD PRESSURE: 82 MMHG

## 2024-09-18 DIAGNOSIS — K52.9 GASTROENTERITIS: Primary | ICD-10-CM

## 2024-09-18 DIAGNOSIS — R10.9 ABDOMINAL PAIN: ICD-10-CM

## 2024-09-18 DIAGNOSIS — R11.0 NAUSEA: ICD-10-CM

## 2024-09-18 LAB
ALBUMIN SERPL BCG-MCNC: 4.5 G/DL (ref 3.5–5)
ALP SERPL-CCNC: 54 U/L (ref 34–104)
ALT SERPL W P-5'-P-CCNC: 14 U/L (ref 7–52)
ANION GAP SERPL CALCULATED.3IONS-SCNC: 9 MMOL/L (ref 4–13)
AST SERPL W P-5'-P-CCNC: 11 U/L (ref 13–39)
BASOPHILS # BLD AUTO: 0.09 THOUSANDS/ΜL (ref 0–0.1)
BASOPHILS NFR BLD AUTO: 1 % (ref 0–1)
BILIRUB SERPL-MCNC: 0.4 MG/DL (ref 0.2–1)
BUN SERPL-MCNC: 15 MG/DL (ref 5–25)
CALCIUM SERPL-MCNC: 9.2 MG/DL (ref 8.4–10.2)
CHLORIDE SERPL-SCNC: 102 MMOL/L (ref 96–108)
CO2 SERPL-SCNC: 29 MMOL/L (ref 21–32)
CREAT SERPL-MCNC: 0.81 MG/DL (ref 0.6–1.3)
EOSINOPHIL # BLD AUTO: 0.16 THOUSAND/ΜL (ref 0–0.61)
EOSINOPHIL NFR BLD AUTO: 1 % (ref 0–6)
ERYTHROCYTE [DISTWIDTH] IN BLOOD BY AUTOMATED COUNT: 12.7 % (ref 11.6–15.1)
GFR SERPL CREATININE-BSD FRML MDRD: 105 ML/MIN/1.73SQ M
GLUCOSE SERPL-MCNC: 110 MG/DL (ref 65–140)
HCT VFR BLD AUTO: 47.2 % (ref 36.5–49.3)
HGB BLD-MCNC: 16.2 G/DL (ref 12–17)
IMM GRANULOCYTES # BLD AUTO: 0.25 THOUSAND/UL (ref 0–0.2)
IMM GRANULOCYTES NFR BLD AUTO: 2 % (ref 0–2)
LIPASE SERPL-CCNC: 24 U/L (ref 11–82)
LYMPHOCYTES # BLD AUTO: 2.33 THOUSANDS/ΜL (ref 0.6–4.47)
LYMPHOCYTES NFR BLD AUTO: 15 % (ref 14–44)
MCH RBC QN AUTO: 30.2 PG (ref 26.8–34.3)
MCHC RBC AUTO-ENTMCNC: 34.3 G/DL (ref 31.4–37.4)
MCV RBC AUTO: 88 FL (ref 82–98)
MONOCYTES # BLD AUTO: 1.28 THOUSAND/ΜL (ref 0.17–1.22)
MONOCYTES NFR BLD AUTO: 8 % (ref 4–12)
NEUTROPHILS # BLD AUTO: 11.86 THOUSANDS/ΜL (ref 1.85–7.62)
NEUTS SEG NFR BLD AUTO: 73 % (ref 43–75)
NRBC BLD AUTO-RTO: 0 /100 WBCS
PLATELET # BLD AUTO: 300 THOUSANDS/UL (ref 149–390)
PMV BLD AUTO: 9.3 FL (ref 8.9–12.7)
POTASSIUM SERPL-SCNC: 4.1 MMOL/L (ref 3.5–5.3)
PROT SERPL-MCNC: 7.1 G/DL (ref 6.4–8.4)
RBC # BLD AUTO: 5.36 MILLION/UL (ref 3.88–5.62)
SODIUM SERPL-SCNC: 140 MMOL/L (ref 135–147)
WBC # BLD AUTO: 15.97 THOUSAND/UL (ref 4.31–10.16)

## 2024-09-18 PROCEDURE — 80053 COMPREHEN METABOLIC PANEL: CPT | Performed by: EMERGENCY MEDICINE

## 2024-09-18 PROCEDURE — 83690 ASSAY OF LIPASE: CPT | Performed by: EMERGENCY MEDICINE

## 2024-09-18 PROCEDURE — 74177 CT ABD & PELVIS W/CONTRAST: CPT

## 2024-09-18 PROCEDURE — 96361 HYDRATE IV INFUSION ADD-ON: CPT

## 2024-09-18 PROCEDURE — 85025 COMPLETE CBC W/AUTO DIFF WBC: CPT | Performed by: EMERGENCY MEDICINE

## 2024-09-18 PROCEDURE — 99285 EMERGENCY DEPT VISIT HI MDM: CPT

## 2024-09-18 PROCEDURE — 36415 COLL VENOUS BLD VENIPUNCTURE: CPT

## 2024-09-18 PROCEDURE — 96374 THER/PROPH/DIAG INJ IV PUSH: CPT

## 2024-09-18 PROCEDURE — 96375 TX/PRO/DX INJ NEW DRUG ADDON: CPT

## 2024-09-18 PROCEDURE — 99284 EMERGENCY DEPT VISIT MOD MDM: CPT

## 2024-09-18 RX ORDER — ONDANSETRON 2 MG/ML
4 INJECTION INTRAMUSCULAR; INTRAVENOUS ONCE
Status: COMPLETED | OUTPATIENT
Start: 2024-09-18 | End: 2024-09-18

## 2024-09-18 RX ORDER — KETOROLAC TROMETHAMINE 30 MG/ML
15 INJECTION, SOLUTION INTRAMUSCULAR; INTRAVENOUS ONCE
Status: COMPLETED | OUTPATIENT
Start: 2024-09-18 | End: 2024-09-18

## 2024-09-18 RX ORDER — ACETAMINOPHEN 500 MG
1000 TABLET ORAL EVERY 8 HOURS
Qty: 40 TABLET | Refills: 0 | Status: SHIPPED | OUTPATIENT
Start: 2024-09-18

## 2024-09-18 RX ORDER — ONDANSETRON 4 MG/1
4 TABLET, FILM COATED ORAL EVERY 6 HOURS
Qty: 15 TABLET | Refills: 0 | Status: SHIPPED | OUTPATIENT
Start: 2024-09-18

## 2024-09-18 RX ADMIN — KETOROLAC TROMETHAMINE 15 MG: 30 INJECTION, SOLUTION INTRAMUSCULAR; INTRAVENOUS at 02:06

## 2024-09-18 RX ADMIN — ONDANSETRON 4 MG: 2 INJECTION INTRAMUSCULAR; INTRAVENOUS at 02:04

## 2024-09-18 RX ADMIN — IOHEXOL 100 ML: 350 INJECTION, SOLUTION INTRAVENOUS at 02:16

## 2024-09-18 RX ADMIN — SODIUM CHLORIDE 1000 ML: 0.9 INJECTION, SOLUTION INTRAVENOUS at 02:04

## 2024-09-18 NOTE — ED PROVIDER NOTES
1. Gastroenteritis    2. Abdominal pain    3. Nausea      ED Disposition       ED Disposition   Discharge    Condition   Stable    Date/Time   Wed Sep 18, 2024  3:27 AM    Comment   Jose D Glover discharge to home/self care.                   Assessment & Plan       Medical Decision Making  48-year-old male presents to ED for evaluation of left lower quadrant abdominal pain, nausea, chills as seen in HPI.  On physical examination patient vital signs stable.  Normotensive.  Afebrile.  Nontachycardic.  Nonhypoxic.  Appears uncomfortable secondary to left lower quadrant abdominal pain.  No murmur.  Normal breath sounds.  Tender to palpation of left lower quadrant of abdomen.  No overlying skin changes of abdomen.  Plan to obtain workup consisting of CBC, CMP, lipase, CT abdomen pelvis with IV contrast.  Zofran for nausea.  IV fluids.  Toradol for pain.  Differential diagnosis includes but not limited to diverticulitis, viral GI infection, bowel obstruction, appendicitis, constipation     CBC returned with leukocytosis of 15.97.  CMP without concerning values.  No elevation of hepatic enzymes.  Lipase WNL.  CT abdomen pelvis with IV contrast returned consistent with gastroenteritis.  Radiology interpretation does mention a possibility of early small bowel obstruction however clinically patient's presentation is less suggestive of a small bowel obstruction.  Has been passing stool, gas the past couple days.  No previous abdominal surgeries per patient and his mom to cause adhesion formation.  No history of bowel obstructions.    Reevaluated patient once workup returned.  Patient appears much improved clinically.  No longer nauseous.  No longer having any abdominal pain.  No reproducible abdominal pain with palpation.  Given results of workup and patient's clinical improvement with supportive care, plan to discharge with monitoring of symptoms.  Prescription for Zofran, Tylenol sent to patient's pharmacy.  Advised  "to advance diet as tolerated.  Provided strict return precautions in the event that symptoms are worsening or not improving.  Advised to follow-up with his primary care provider in the next couple days.  Patient and his mom agreeable to plan.  Patient discharged.     Prior to discharge, discharge instructions were discussed with patient at bedside. Patient was provided both verbal and written instructions. Patient is understanding of the discharge instructions and is agreeable to plan of care. Return precautions were discussed with patient bedside, patient verbalized understanding of signs and symptoms that would necessitate return to the ED. All questions were answered. Patient was comfortable with the plan of care and discharged to home.    Portions of this chart may have been written with voice recognition software.  Occasional grammatical errors, wrong word or \"sound a like\" substitutions may have occurred due to software limitations.  Please read carefully and use context to recognize where substitutions have occurred.     Amount and/or Complexity of Data Reviewed  Labs: ordered.  Radiology: ordered.    Risk  OTC drugs.  Prescription drug management.                     Medications   sodium chloride 0.9 % bolus 1,000 mL (1,000 mL Intravenous New Bag 9/18/24 0204)   ketorolac (TORADOL) injection 15 mg (15 mg Intravenous Given 9/18/24 0206)   ondansetron (ZOFRAN) injection 4 mg (4 mg Intravenous Given 9/18/24 0204)   iohexol (OMNIPAQUE) 350 MG/ML injection (MULTI-DOSE) 100 mL (100 mL Intravenous Given 9/18/24 0216)       History of Present Illness       48-year-old male with history of cerebral palsy, previous shunt placement, QASIM, congenital hydrocephalus, asthma presents to ED for evaluation of left lower quadrant abdominal pain, nausea, chills.  Patient accompanied by his mom.  Patient states that starting this past evening he has had significant left lower quadrant abdominal pain.  Has been nauseous without " production of vomit.  Attempted to have bowel movement multiple times without success.  Denies consuming any abnormal foods recently.  No known sick contacts.  Denies sore throat, nasal congestion.  Denies history of abdominal surgery.  Denies history of similar symptoms.  No pain medication prior to arrival.  Denies hematuria, dysuria, increased urinary frequency, fever, seizure, shortness of breath, chest pain.                 Review of Systems   Constitutional:  Positive for chills. Negative for fever.   Gastrointestinal:  Positive for abdominal distention, abdominal pain and nausea. Negative for blood in stool, diarrhea, rectal pain and vomiting.           Objective     ED Triage Vitals [09/18/24 0122]   Temperature Pulse Blood Pressure Respirations SpO2 Patient Position - Orthostatic VS   98.1 °F (36.7 °C) 85 170/82 18 97 % Sitting      Temp Source Heart Rate Source BP Location FiO2 (%) Pain Score    Oral Monitor Right arm -- 10 - Worst Possible Pain        Physical Exam  Vitals and nursing note reviewed.   Constitutional:       General: He is not in acute distress.     Appearance: He is well-developed. He is ill-appearing. He is not toxic-appearing or diaphoretic.   HENT:      Head: Normocephalic and atraumatic.   Eyes:      Conjunctiva/sclera: Conjunctivae normal.   Cardiovascular:      Rate and Rhythm: Normal rate and regular rhythm.      Heart sounds: Normal heart sounds. No murmur heard.     No friction rub. No gallop.   Pulmonary:      Effort: Pulmonary effort is normal. No respiratory distress.      Breath sounds: Normal breath sounds. No stridor. No wheezing, rhonchi or rales.   Abdominal:      Palpations: Abdomen is soft.      Tenderness: There is abdominal tenderness in the left lower quadrant. There is no guarding or rebound. Negative signs include Betts's sign and McBurney's sign.      Hernia: No hernia is present.   Musculoskeletal:         General: No swelling.      Cervical back: Neck supple.    Skin:     General: Skin is warm and dry.      Capillary Refill: Capillary refill takes less than 2 seconds.   Neurological:      Mental Status: He is alert.   Psychiatric:         Mood and Affect: Mood normal.         Labs Reviewed   CBC AND DIFFERENTIAL - Abnormal       Result Value    WBC 15.97 (*)     RBC 5.36      Hemoglobin 16.2      Hematocrit 47.2      MCV 88      MCH 30.2      MCHC 34.3      RDW 12.7      MPV 9.3      Platelets 300      nRBC 0      Segmented % 73      Immature Grans % 2      Lymphocytes % 15      Monocytes % 8      Eosinophils Relative 1      Basophils Relative 1      Absolute Neutrophils 11.86 (*)     Absolute Immature Grans 0.25 (*)     Absolute Lymphocytes 2.33      Absolute Monocytes 1.28 (*)     Eosinophils Absolute 0.16      Basophils Absolute 0.09     COMPREHENSIVE METABOLIC PANEL - Abnormal    Sodium 140      Potassium 4.1      Chloride 102      CO2 29      ANION GAP 9      BUN 15      Creatinine 0.81      Glucose 110      Calcium 9.2      AST 11 (*)     ALT 14      Alkaline Phosphatase 54      Total Protein 7.1      Albumin 4.5      Total Bilirubin 0.40      eGFR 105      Narrative:     [employer] guidelines for Chronic Kidney Disease (CKD):     Stage 1 with normal or high GFR (GFR > 90 mL/min/1.73 square meters)    Stage 2 Mild CKD (GFR = 60-89 mL/min/1.73 square meters)    Stage 3A Moderate CKD (GFR = 45-59 mL/min/1.73 square meters)    Stage 3B Moderate CKD (GFR = 30-44 mL/min/1.73 square meters)    Stage 4 Severe CKD (GFR = 15-29 mL/min/1.73 square meters)    Stage 5 End Stage CKD (GFR <15 mL/min/1.73 square meters)  Note: GFR calculation is accurate only with a steady state creatinine   LIPASE - Normal    Lipase 24       CT abdomen pelvis with contrast   Final Interpretation by Raudel Bolivar MD (09/18 6422)      Multiple loops of prominent, borderline dilated fluid-filled small bowel are noted at the mid to lower abdomen suspicious for  gastroenteritis. A focal ileus or early/developing small bowel obstruction is considered less likely but not entirely excluded.    A repeat CT abdomen/pelvis after the administration of oral contrast is recommended for further evaluation.      4  shunt catheters with the tips terminating within the upper abdomen. Trace intra-abdominal ascites. No free intraperitoneal air.      Scattered colonic diverticulosis with no inflammatory changes present to suggest diverticulitis.         Workstation performed: [id number]             Procedures       Murray Munson PA-C  09/18/24 1198

## 2024-09-18 NOTE — DISCHARGE INSTRUCTIONS
Today I provided prescription for Zofran, Tylenol.  Zofran is for nausea.  The Tylenol is for abdominal pain.   Advance diet as tolerated.  Start with clear liquids.  Follow-up with your primary care provider in the next couple days for monitoring of symptoms.  Please do return to ED for new or worsening symptoms.

## 2024-09-20 LAB
DME PARACHUTE DELIVERY DATE ACTUAL: NORMAL
DME PARACHUTE DELIVERY DATE REQUESTED: NORMAL
DME PARACHUTE ITEM DESCRIPTION: NORMAL
DME PARACHUTE ORDER STATUS: NORMAL
DME PARACHUTE SUPPLIER NAME: NORMAL
DME PARACHUTE SUPPLIER PHONE: NORMAL

## 2024-09-26 PROBLEM — R05.9 COUGH, UNSPECIFIED TYPE: Status: RESOLVED | Noted: 2024-08-27 | Resolved: 2024-09-26

## 2024-11-18 ENCOUNTER — TELEPHONE (OUTPATIENT)
Dept: FAMILY MEDICINE CLINIC | Facility: CLINIC | Age: 49
End: 2024-11-18

## 2025-03-14 DIAGNOSIS — F41.1 GAD (GENERALIZED ANXIETY DISORDER): ICD-10-CM

## 2025-03-14 RX ORDER — ALPRAZOLAM 0.25 MG
0.25 TABLET ORAL 3 TIMES DAILY PRN
Qty: 90 TABLET | Refills: 0 | Status: SHIPPED | OUTPATIENT
Start: 2025-03-14

## 2025-03-14 NOTE — TELEPHONE ENCOUNTER
Reason for call:   [x] Refill   [] Prior Auth  [] Other:     Office:   [x] PCP/Provider -   [] Specialty/Provider -     Medication: ALPRAZolam (XANAX) 0.25 mg tablet Take 1 tablet (0.25 mg total) by mouth 3 (three) times a day as needed (PRN)     Pharmacy: [id number] - [place], [place] - [address] [facility]   Does the patient have enough for 3 days?   [x] Yes   [] No - Send as HP to POD    Mail Away Pharmacy   Does the patient have enough for 10 days?   [] Yes   [] No - Send as HP to POD

## 2025-05-16 ENCOUNTER — VBI (OUTPATIENT)
Dept: ADMINISTRATIVE | Facility: OTHER | Age: 50
End: 2025-05-16

## 2025-05-16 NOTE — TELEPHONE ENCOUNTER
Patient contacted to schedule Annual Wellness Visit.   There was no answer / a message could not be left.     Thank you.  Jose Antonio Ornelas MA  [facility]

## 2025-07-24 ENCOUNTER — DOCUMENTATION (OUTPATIENT)
Dept: ADMINISTRATIVE | Facility: OTHER | Age: 50
End: 2025-07-24

## 2025-07-24 NOTE — PROGRESS NOTES
07/24/25 11:23 AM    Annual Wellness Visit outreach is not required, patient was called within the last 3 months.    Thank you.  YESIKA CHRISTENSEN MA  [facility]

## 2025-08-21 ENCOUNTER — OFFICE VISIT (OUTPATIENT)
Dept: FAMILY MEDICINE CLINIC | Facility: CLINIC | Age: 50
End: 2025-08-21
Payer: MEDICARE

## 2025-08-21 VITALS
DIASTOLIC BLOOD PRESSURE: 78 MMHG | WEIGHT: 209.5 LBS | HEART RATE: 82 BPM | RESPIRATION RATE: 12 BRPM | BODY MASS INDEX: 29.99 KG/M2 | TEMPERATURE: 97.7 F | HEIGHT: 70 IN | SYSTOLIC BLOOD PRESSURE: 124 MMHG | OXYGEN SATURATION: 97 %

## 2025-08-21 DIAGNOSIS — G80.1 SPASTIC DIPLEGIC CEREBRAL PALSY (HCC): ICD-10-CM

## 2025-08-21 DIAGNOSIS — Z13.220 SCREENING FOR LIPID DISORDERS: ICD-10-CM

## 2025-08-21 DIAGNOSIS — F41.1 GAD (GENERALIZED ANXIETY DISORDER): Primary | ICD-10-CM

## 2025-08-21 DIAGNOSIS — Z12.11 SCREENING FOR COLORECTAL CANCER: ICD-10-CM

## 2025-08-21 DIAGNOSIS — G91.9 HYDROCEPHALUS WITH OPERATING SHUNT (HCC): ICD-10-CM

## 2025-08-21 DIAGNOSIS — Z00.00 MEDICARE ANNUAL WELLNESS VISIT, SUBSEQUENT: ICD-10-CM

## 2025-08-21 DIAGNOSIS — Z12.12 SCREENING FOR COLORECTAL CANCER: ICD-10-CM

## 2025-08-21 DIAGNOSIS — G40.109 LOCALIZATION-RELATED EPILEPSY (HCC): ICD-10-CM

## 2025-08-21 DIAGNOSIS — E55.9 VITAMIN D DEFICIENCY: ICD-10-CM

## 2025-08-21 PROBLEM — T85.618A SHUNT MALFUNCTION: Status: RESOLVED | Noted: 2018-02-06 | Resolved: 2025-08-21

## 2025-08-21 PROBLEM — Z98.2 S/P VP SHUNT: Status: RESOLVED | Noted: 2017-10-23 | Resolved: 2025-08-21

## 2025-08-21 PROBLEM — G43.109 MIGRAINE WITH AURA AND WITHOUT STATUS MIGRAINOSUS, NOT INTRACTABLE: Chronic | Status: RESOLVED | Noted: 2017-10-04 | Resolved: 2025-08-21

## 2025-08-21 PROCEDURE — G2211 COMPLEX E/M VISIT ADD ON: HCPCS | Performed by: FAMILY MEDICINE

## 2025-08-21 PROCEDURE — 99214 OFFICE O/P EST MOD 30 MIN: CPT | Performed by: FAMILY MEDICINE

## 2025-08-21 PROCEDURE — G0537 PR RISK ASCVD TST ONCE PR 12 MO: HCPCS | Performed by: FAMILY MEDICINE

## 2025-08-21 PROCEDURE — G0439 PPPS, SUBSEQ VISIT: HCPCS | Performed by: FAMILY MEDICINE

## 2025-08-21 PROCEDURE — G0444 DEPRESSION SCREEN ANNUAL: HCPCS | Performed by: FAMILY MEDICINE

## (undated) DEVICE — INTENDED FOR TISSUE SEPARATION, AND OTHER PROCEDURES THAT REQUIRE A SHARP SURGICAL BLADE TO PUNCTURE OR CUT.: Brand: BARD-PARKER SAFETY BLADES SIZE 11, STERILE

## (undated) DEVICE — SUTURE BOOTS YELLOW

## (undated) DEVICE — PREP SURGICAL PURPREP 26ML

## (undated) DEVICE — PROXIMATE PLUS MD MULTI-DIRECTIONAL RELEASE SKIN STAPLERS CONTAINS 35 STAINLESS STEEL STAPLES APPROXIMATE CLOSED DIMENSIONS: 6.9MM X 3.9MM WIDE: Brand: PROXIMATE

## (undated) DEVICE — INTENDED FOR TISSUE SEPARATION, AND OTHER PROCEDURES THAT REQUIRE A SHARP SURGICAL BLADE TO PUNCTURE OR CUT.: Brand: BARD-PARKER SAFETY BLADES SIZE 15, STERILE

## (undated) DEVICE — IV CATH 18 G X 1.16 IN

## (undated) DEVICE — INTENDED FOR TISSUE SEPARATION, AND OTHER PROCEDURES THAT REQUIRE A SHARP SURGICAL BLADE TO PUNCTURE OR CUT.: Brand: BARD-PARKER ® CARBON RIB-BACK BLADES

## (undated) DEVICE — POOLE SUCTION HANDLE: Brand: CARDINAL HEALTH

## (undated) DEVICE — 3M™ IOBAN™ 2 ANTIMICROBIAL INCISE DRAPE 6640EZ: Brand: IOBAN™ 2

## (undated) DEVICE — STOPCOCK MANOMETER CARDINAL

## (undated) DEVICE — SUT SILK 0 30 IN A306H

## (undated) DEVICE — GLOVE INDICATOR PI UNDERGLOVE SZ 8 BLUE

## (undated) DEVICE — 3M™ IOBAN™ 2 ANTIMICROBIAL INCISE DRAPE 6651EZ: Brand: IOBAN™ 2

## (undated) DEVICE — BUTTON SWITCH PENCIL HOLSTER: Brand: VALLEYLAB

## (undated) DEVICE — TIBURON SPLIT SHEET: Brand: CONVERTORS

## (undated) DEVICE — SUT SILK 2-0 18 IN A185H

## (undated) DEVICE — 3000CC GUARDIAN II: Brand: GUARDIAN

## (undated) DEVICE — PASSER 48409 60CM DISP. CATHETER

## (undated) DEVICE — ENDOPATH XCEL BLADELESS TROCARS WITH STABILITY SLEEVES: Brand: ENDOPATH XCEL

## (undated) DEVICE — PACK BURR HOLE PBDS RF

## (undated) DEVICE — ADHESIVE SKN CLSR HISTOACRYL FLEX 0.5ML LF

## (undated) DEVICE — CHLORAPREP HI-LITE 26ML ORANGE

## (undated) DEVICE — SUT MONOCRYL 4-0 PS-2 18 IN Y496G

## (undated) DEVICE — RANEY SCALP CLIP STERILE: Brand: AESCULAP

## (undated) DEVICE — NEEDLE SPINAL 25G X 3.5 IN QUINCKE

## (undated) DEVICE — SUT VICRYL PLUS 3-0 RB-1 CR/8 18 IN VCP713D

## (undated) DEVICE — PACK PBDS LAP CHOLE RF

## (undated) DEVICE — DRESSING MEPILEX AG BORDER 4 X 4 IN

## (undated) DEVICE — GLOVE SRG LF STRL BGL SKNSNS 8 PF

## (undated) DEVICE — SPONGE PVP SCRUB WING STERILE

## (undated) DEVICE — DRAPE INTESTINAL ISOLATION BAG

## (undated) DEVICE — FLOSEAL MATRIX IS INDICATED IN SURGICAL PROCEDURES (OTHER THAN IN OPHTHALMIC) AS AN ADJUNCT TO HEMOSTASIS WHEN CONTROL OF BLEEDING BY LIGATURE OR CONVENTIONAL PROCEDURES IS INEFFECTIVE OR IMPRACTICAL.: Brand: FLOSEAL HEMOSTATIC MATRIX

## (undated) DEVICE — GLOVE INDICATOR PI UNDERGLOVE SZ 8.5 BLUE

## (undated) DEVICE — LIGHT HANDLE COVER SLEEVE DISP BLUE STELLAR

## (undated) DEVICE — TOOL 9MH30 LEGEND 9CM 3MM MH: Brand: MIDAS REX

## (undated) DEVICE — DRAPE TOWEL: Brand: CONVERTORS

## (undated) DEVICE — DISPOSABLE EQUIPMENT COVER: Brand: SMALL TOWEL DRAPE

## (undated) DEVICE — VIAL DECANTER

## (undated) DEVICE — NEEDLE 22 G X 1 1/2 SAFETY

## (undated) DEVICE — SURGIFOAM 7 X 12 SPONGE ABS

## (undated) DEVICE — 2000CC GUARDIAN II: Brand: GUARDIAN

## (undated) DEVICE — NEEDLE BLUNT 18 G X 1 1/2 W FILTER

## (undated) DEVICE — 3M™ IOBAN™ 2 ANTIMICROBIAL INCISE DRAPE 6650EZ: Brand: IOBAN™ 2

## (undated) DEVICE — TELFA NON-ADHERENT ABSORBENT DRESSING: Brand: TELFA

## (undated) DEVICE — SYRINGE 10ML LL

## (undated) DEVICE — SUT VICRYL 0 UR-6 27 IN J603H